# Patient Record
Sex: FEMALE | Race: BLACK OR AFRICAN AMERICAN | Employment: FULL TIME | URBAN - METROPOLITAN AREA
[De-identification: names, ages, dates, MRNs, and addresses within clinical notes are randomized per-mention and may not be internally consistent; named-entity substitution may affect disease eponyms.]

---

## 2017-01-12 ENCOUNTER — OFFICE VISIT (OUTPATIENT)
Dept: ORTHOPEDICS | Facility: CLINIC | Age: 37
End: 2017-01-12

## 2017-01-12 VITALS
SYSTOLIC BLOOD PRESSURE: 117 MMHG | DIASTOLIC BLOOD PRESSURE: 79 MMHG | WEIGHT: 241 LBS | HEIGHT: 69 IN | BODY MASS INDEX: 35.7 KG/M2

## 2017-01-12 DIAGNOSIS — M77.12 LATERAL EPICONDYLITIS OF LEFT ELBOW: ICD-10-CM

## 2017-01-12 DIAGNOSIS — G56.03 BILATERAL CARPAL TUNNEL SYNDROME: ICD-10-CM

## 2017-01-12 DIAGNOSIS — M67.40 GANGLION CYST: Primary | ICD-10-CM

## 2017-01-12 ASSESSMENT — ENCOUNTER SYMPTOMS
MYALGIAS: 1
JOINT SWELLING: 1
MUSCLE WEAKNESS: 0
BACK PAIN: 1
STIFFNESS: 1
ARTHRALGIAS: 1
MUSCLE CRAMPS: 1
NECK PAIN: 1

## 2017-01-12 NOTE — NURSING NOTE
Reason For Visit:   Chief Complaint   Patient presents with     Consult     Left wrist ganglion cyst        [facility]  Ref. MD: Dr. Parkinson    ?  No          Pain Assessment  Patient Currently in Pain: Yes  0-10 Pain Scale: 7  Primary Pain Location: Wrist  Pain Orientation: Left  Pain Descriptors: Aching  Alleviating Factors: NSAIDS (movement)  Aggravating Factors: Movement, Exercise    Current Outpatient Prescriptions   Medication     Acetaminophen (TYLENOL PO)     Prenatal Vit-Fe Fumarate-FA (PRENATAL MULTIVITAMIN  PLUS IRON) 27-0.8 MG TABS     VITAMIN D, CHOLECALCIFEROL, PO     No current facility-administered medications for this visit.          Allergies   Allergen Reactions     No Known Drug Allergies

## 2017-01-12 NOTE — PROGRESS NOTES
"SUBJECTIVE: Carrie Munoz is a 36 year old female who presents with ganglion on left hand.  Had drainage procedure attempted to ganglion on left dorsal hand.  Sometimes tingling but at times just pain.  , at 32 weeks.  Pt reports worsening numbness into fingers both hands.  Left elbow symptoms, lateral epicondylitis.      PAST MEDICAL, SOCIAL, SURGICAL AND FAMILY HISTORY: She  has a past medical history of Unspecified sinusitis (chronic); Breast lump on right side at 2:30  o'clock position (2011); Breast disorder; Mental disorder; and Depressive disorder.  She  has past surgical history that includes TOOTH EXTRACTION W/FORCEP ().  Her family history includes Hypertension in her father, paternal aunt, and paternal grandmother; Neurologic Disorder in her father and mother; OSTEOPOROSIS in her paternal grandmother.  She reports that she has never smoked. She does not have any smokeless tobacco history on file. She reports that she does not drink alcohol or use illicit drugs.      ALLERGIES: She is allergic to no known drug allergies.    CURRENT MEDICATIONS: She has a current medication list which includes the following prescription(s): acetaminophen, prenatal multivitamin  plus iron, and cholecalciferol.     REVIEW OF SYSTEMS: 10 point review of systems is negative except as noted above.    EXAM:  /79 mmHg  Ht 5' 9.33\" (1.761 m)  Wt 241 lb (109.317 kg)  BMI 35.25 kg/m2  CONSTITUTIONIAL: healthy, alert, no distress, cooperative and obese  HEAD: Normocephalic. No masses, lesions, tenderness or abnormalities  ENT: ENT exam normal, no neck nodes or sinus tenderness  SKIN: no suspicious lesions or rashes  GAIT: normal  Stance: normal  NEUROLOGIC: Normal muscle tone and strength, reflexes normal, sensation grossly normal.  PSYCHIATRIC: affect normal/bright and mentation appears normal.    MUSCULOSKELETAL: hand, left elbow, left ganglion cyst  Inspection:mild swelling  Tender: carpal tunnel, right " hand 4th finger, left hand 3rd and 4th finger- numbness  Non-tender: All Normal except above  Range of Motion All Normal  Strength: full strength  Special tests: + Phalen's, +Tinels    Inspection: no swelling, no ecchymosis  Tender: lateral epicondyle- left elbow  Non-tender: medial epicondyle, common flexor tendon and flexor muscle of forearm  Range of Motion: all normal  Strength: elbow strength full  Special tests: normal stability        ASSESSMENT/PLAN:  Pt is a 37 yo AA  at 32 weeks pregnant with PMHx of SI joint pain presenting with left ganglion cyst, left epicondylosis, bilateral carpal tunnel symptoms  1. Left ganglion cyst- had aspiration attempted, cyst is mildly decreased in size.  Pt could consider imaging after pregnancy, dealing with cyst for past 5 years, may increase in size after infant born, consider MRI  2. Left epicondylosis- forearm strengthening exercises, hand OT  3. Bilateral carpal tunnel symptoms- velcro braces, adjustment of work station, ice, hand OT  RTC 4-6 weeks  Encounter Diagnoses   Name Primary?     Ganglion cyst Yes     Bilateral carpal tunnel syndrome      Lateral epicondylitis of left elbow        X-RAY INTERPRETATION:   None- currently pregnant  Answers for HPI/ROS submitted by the patient on 2017   General Symptoms: No  Skin Symptoms: No  HENT Symptoms: No  EYE SYMPTOMS: No  HEART SYMPTOMS: No  LUNG SYMPTOMS: No  INTESTINAL SYMPTOMS: No  URINARY SYMPTOMS: No  GYNECOLOGIC SYMPTOMS: No  BREAST SYMPTOMS: No  SKELETAL SYMPTOMS: Yes  BLOOD SYMPTOMS: No  NERVOUS SYSTEM SYMPTOMS: No  MENTAL HEALTH SYMPTOMS: No  Back pain: Yes  Muscle aches: Yes  Neck pain: Yes  Swollen joints: Yes  Joint pain: Yes  Bone pain: No  Muscle cramps: Yes  Muscle weakness: No  Joint stiffness: Yes  Bone fracture: No

## 2017-01-12 NOTE — Clinical Note
"2017       RE: Carrie Munoz  [address]  [facility] [address]     Dear Colleague,    Thank you for referring your patient, Carrie Munoz, to the [facility] at [facility]. Please see a copy of my visit note below.    SUBJECTIVE: Carrie Munoz is a 36 year old female who presents with ganglion on left hand.  Had drainage procedure attempted to ganglion on left dorsal hand.  Sometimes tingling but at times just pain.  , at 32 weeks.  Pt reports worsening numbness into fingers both hands.  Left elbow symptoms, lateral epicondylitis.      PAST MEDICAL, SOCIAL, SURGICAL AND FAMILY HISTORY: She  has a past medical history of Unspecified sinusitis (chronic); Breast lump on right side at 2:30  o'clock position (2011); Breast disorder; Mental disorder; and Depressive disorder.  She  has past surgical history that includes TOOTH EXTRACTION W/FORCEP ().  Her family history includes Hypertension in her father, paternal aunt, and paternal grandmother; Neurologic Disorder in her father and mother; OSTEOPOROSIS in her paternal grandmother.  She reports that she has never smoked. She does not have any smokeless tobacco history on file. She reports that she does not drink alcohol or use illicit drugs.      ALLERGIES: She is allergic to no known drug allergies.    CURRENT MEDICATIONS: She has a current medication list which includes the following prescription(s): acetaminophen, prenatal multivitamin  plus iron, and cholecalciferol.     REVIEW OF SYSTEMS: 10 point review of systems is negative except as noted above.    EXAM:  /79 mmHg  Ht 5' 9.33\" (1.761 m)  Wt 241 lb (109.317 kg)  BMI 35.25 kg/m2  CONSTITUTIONIAL: healthy, alert, no distress, cooperative and obese  HEAD: Normocephalic. No masses, lesions, tenderness or abnormalities  ENT: ENT exam normal, no neck nodes or sinus tenderness  SKIN: no suspicious lesions or " rashes  GAIT: normal  Stance: normal  NEUROLOGIC: Normal muscle tone and strength, reflexes normal, sensation grossly normal.  PSYCHIATRIC: affect normal/bright and mentation appears normal.    MUSCULOSKELETAL: hand, left elbow, left ganglion cyst  Inspection:mild swelling  Tender: carpal tunnel, right hand 4th finger, left hand 3rd and 4th finger- numbness  Non-tender: All Normal except above  Range of Motion All Normal  Strength: full strength  Special tests: + Phalen's, +Tinels    Inspection: no swelling, no ecchymosis  Tender: lateral epicondyle- left elbow  Non-tender: medial epicondyle, common flexor tendon and flexor muscle of forearm  Range of Motion: all normal  Strength: elbow strength full  Special tests: normal stability        ASSESSMENT/PLAN:  Pt is a 37 yo AA  at 32 weeks pregnant with PMHx of SI joint pain presenting with left ganglion cyst, left epicondylosis, bilateral carpal tunnel symptoms  1. Left ganglion cyst- had aspiration attempted, cyst is mildly decreased in size.  Pt could consider imaging after pregnancy, dealing with cyst for past 5 years, may increase in size after infant born, consider MRI  2. Left epicondylosis- forearm strengthening exercises, hand OT  3. Bilateral carpal tunnel symptoms- velcro braces, adjustment of work station, ice, hand OT  RTC 4-6 weeks  Encounter Diagnoses   Name Primary?     Ganglion cyst Yes     Bilateral carpal tunnel syndrome      Lateral epicondylitis of left elbow        X-RAY INTERPRETATION:   None- currently pregnant  Answers for HPI/ROS submitted by the patient on 2017   General Symptoms: No  Skin Symptoms: No  HENT Symptoms: No  EYE SYMPTOMS: No  HEART SYMPTOMS: No  LUNG SYMPTOMS: No  INTESTINAL SYMPTOMS: No  URINARY SYMPTOMS: No  GYNECOLOGIC SYMPTOMS: No  BREAST SYMPTOMS: No  SKELETAL SYMPTOMS: Yes  BLOOD SYMPTOMS: No  NERVOUS SYSTEM SYMPTOMS: No  MENTAL HEALTH SYMPTOMS: No  Back pain: Yes  Muscle aches: Yes  Neck pain: Yes  Swollen  joints: Yes  Joint pain: Yes  Bone pain: No  Muscle cramps: Yes  Muscle weakness: No  Joint stiffness: Yes  Bone fracture: No          Again, thank you for allowing me to participate in the care of your patient.      Sincerely,    Ruth Jenkins MD

## 2017-01-30 ENCOUNTER — MEDICAL CORRESPONDENCE (OUTPATIENT)
Dept: HEALTH INFORMATION MANAGEMENT | Facility: CLINIC | Age: 37
End: 2017-01-30

## 2017-01-30 ENCOUNTER — TRANSFERRED RECORDS (OUTPATIENT)
Dept: HEALTH INFORMATION MANAGEMENT | Facility: CLINIC | Age: 37
End: 2017-01-30

## 2017-02-01 ENCOUNTER — ALLIED HEALTH/NURSE VISIT (OUTPATIENT)
Dept: CARDIOLOGY | Facility: CLINIC | Age: 37
End: 2017-02-01
Attending: INTERNAL MEDICINE
Payer: COMMERCIAL

## 2017-02-01 DIAGNOSIS — R00.0 TACHYCARDIA: Primary | ICD-10-CM

## 2017-02-01 DIAGNOSIS — R00.0 TACHYCARDIA: ICD-10-CM

## 2017-02-01 PROCEDURE — 93226 XTRNL ECG REC<48 HR SCAN A/R: CPT | Mod: ZF

## 2017-02-01 PROCEDURE — 93227 XTRNL ECG REC<48 HR R&I: CPT | Mod: ZP | Performed by: INTERNAL MEDICINE

## 2017-02-01 PROCEDURE — 93225 XTRNL ECG REC<48 HRS REC: CPT | Mod: ZF

## 2017-02-01 NOTE — NURSING NOTE
Per Dr. Azucena Garcia, patient to have 48 hr monitor placed.  Diagnosis: Tachycardia  Monitor placed: Yes  Patient Instructed: Yes  Patient verbalized understanding: Yes  Holter # [id number]  Card ID: [id number]      Placed by Yokasta Cheung MA

## 2017-02-07 ENCOUNTER — PRE VISIT (OUTPATIENT)
Dept: CARDIOLOGY | Facility: CLINIC | Age: 37
End: 2017-02-07

## 2017-02-07 DIAGNOSIS — R00.0 SINUS TACHYCARDIA: Primary | ICD-10-CM

## 2017-02-08 ENCOUNTER — OFFICE VISIT (OUTPATIENT)
Dept: CARDIOLOGY | Facility: CLINIC | Age: 37
End: 2017-02-08
Attending: INTERNAL MEDICINE
Payer: COMMERCIAL

## 2017-02-08 ENCOUNTER — HOSPITAL ENCOUNTER (OUTPATIENT)
Dept: CARDIOLOGY | Facility: CLINIC | Age: 37
Discharge: HOME | End: 2017-02-08
Attending: INTERNAL MEDICINE | Admitting: INTERNAL MEDICINE
Payer: COMMERCIAL

## 2017-02-08 VITALS
HEIGHT: 69 IN | OXYGEN SATURATION: 97 % | HEART RATE: 124 BPM | DIASTOLIC BLOOD PRESSURE: 69 MMHG | WEIGHT: 246.9 LBS | BODY MASS INDEX: 36.57 KG/M2 | SYSTOLIC BLOOD PRESSURE: 104 MMHG

## 2017-02-08 DIAGNOSIS — R60.0 EDEMA OF BOTH LEGS: Primary | ICD-10-CM

## 2017-02-08 DIAGNOSIS — R00.0 SINUS TACHYCARDIA: ICD-10-CM

## 2017-02-08 PROCEDURE — 93306 TTE W/DOPPLER COMPLETE: CPT

## 2017-02-08 PROCEDURE — 93005 ELECTROCARDIOGRAM TRACING: CPT | Mod: ZF

## 2017-02-08 PROCEDURE — 99203 OFFICE O/P NEW LOW 30 MIN: CPT | Mod: 25 | Performed by: INTERNAL MEDICINE

## 2017-02-08 PROCEDURE — 93010 ELECTROCARDIOGRAM REPORT: CPT | Mod: ZP | Performed by: INTERNAL MEDICINE

## 2017-02-08 PROCEDURE — 99212 OFFICE O/P EST SF 10 MIN: CPT | Mod: ZF

## 2017-02-08 PROCEDURE — 93306 TTE W/DOPPLER COMPLETE: CPT | Mod: 26 | Performed by: INTERNAL MEDICINE

## 2017-02-08 ASSESSMENT — PAIN SCALES - GENERAL: PAINLEVEL: NO PAIN (0)

## 2017-02-08 NOTE — LETTER
2/8/2017      RE: Carrie Munoz  [address]  [facility] [address]       Dear Colleague,    Thank you for the opportunity to participate in the care of your patient, Carrie Munoz, at the [facility] at [facility]. Please see a copy of my visit note below.    CARDIOLOGY CONSULTATION    Referring Provider:  Claudia Garcia  Primary Care Provider:   [facility]  Indication for Consultation:  Sinus tachycardia    HPI: Carrie Munoz is a 36 year old pregnant  female due to deliver in 2-4 weeks being seen today for evaluation of sinus tachycardia.  Her midwife was concerned that her heart rate was more elevated than typical even for pregnancy.  A 48 hour holter monitor showed sinus tachycardia with ventricular rates between 120 and 150s and a few couplets and triplets.  She feels her heart rate increase when she climbs stairs and when very fast she becomes mildly dyspneic but not at rest when in the 120s.  This has only been an issue in the past several weeks.  She had a normal heart rate prior to pregnancy.  She denies lightheadedness and syncope.    EKG performed in clinic today and personally reviewed shows sinus tachycardia with a ventricular rate of 121 bpm, normal axis, and no ischemic ST/Tw changes or pathologic Q waves.    She has no personal or 1st degree family history of heart failure or coronary artery disease.  She had an episode of significant chest tightness 6-8 weeks ago which came on suddenly and then subsided spontaneously after a few minutes.  It was followed by less severe episodes of chest tightness every 1-2 days that had no relationship to exertion and also resolved in a few minutes but she has not had any such episodes now for the past 3-4 weeks.  She has had bilateral leg swelling for the past few months which has not changed appreciably recently.  She denies hemoptysis.    She has no traditional risk factors  for cardiovascular disease: (-) HTN, (-) DM, (-) hypercholesterolemia, (-) tobacco use, (-) fam Hx premature CAD.    A complete review of systems is negative except as noted above in the HPI.    Past Medical History   Diagnosis Date     Unspecified sinusitis (chronic)      Breast lump on right side at 2:30  o'clock position 5/4/2011     Breast disorder      biopsy 2011(?) benign     Mental disorder      depression, anxiety     Depressive disorder      Past Surgical History   Procedure Laterality Date     Hc tooth extraction w/forcep  2003     all 4 together     Family History   Problem Relation Age of Onset     Hypertension Paternal Grandmother      Hypertension Paternal Aunt      OSTEOPOROSIS Paternal Grandmother      Neurologic Disorder Father      bell's palsy     Neurologic Disorder Mother      bell's palsy     Hypertension Father      not on medication, smoker   No premature CAD in 1st degree relatives.  An aunt had coronary stenting in her 50s.  Social History     Social History     Marital Status: Single     Spouse Name: N/A     Number of Children: 0     Years of Education: 13 1/2     Occupational History     teller [employer]     Social History Main Topics     Smoking status: Never Smoker      Smokeless tobacco: Not on file     Alcohol Use: No     Drug Use: No     Sexual Activity:     Partners: Male     Birth Control/ Protection: None     Other Topics Concern     Not on file     Social History Narrative    Risk Behaviors & Healthy habits    Balanced Diet  no     Prevent Osteoporosis  yes - lots of dairy    Regular Exercise  no     Dental Care  yes    Seat Belt use  yes    Self Breast Exam  no     Sexually Active  no , no STD concerns, but would like to be checked     Contraception  yes    Abuse  no     Guns  no     Sun Screen  no             Working at [employer], 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  Lives with her aunt, and gets along well.  In limited contact with her mother, in  "contact with father a lot.  Born in [place], and [place] 5, [place] 11.  Father in the .                       Current Outpatient Prescriptions on File Prior to Visit:  Acetaminophen (TYLENOL PO) Take 1,000 mg by mouth as needed    Prenatal Vit-Fe Fumarate-FA (PRENATAL MULTIVITAMIN  PLUS IRON) 27-0.8 MG TABS Take 1 tablet by mouth daily   VITAMIN D, CHOLECALCIFEROL, PO Take 3,000 Units by mouth daily     No current facility-administered medications on file prior to visit.     Allergies   Allergen Reactions     No Known Drug Allergies          Examination:  /69 mmHg  Pulse 124  Ht 1.761 m (5' 9.33\")  Wt 111.993 kg (246 lb 14.4 oz)  BMI 36.11 kg/m2  SpO2 97%  General appearance:  Well appearing, obese female with unlabored breathing at rest.  Psych:  Pleasant & cooperative.  Not visibly depressed or anxious.  Neuro:  Alert & oriented fully.  Eyes:  Anicteric sclerae.  Conjugate gaze.  Neck:  Supple.  JVP is not elevated.  CV:  Tachycardic but regular rhythm.  Normal S1 and S2 with no murmur or rub.  Warm extremities with mild 2+ edema in both lower legs.  Lungs are clear on ascultation.  Normal respiratory rate.  Abdomen is soft.  Not tender.  Skin:  Not jaundiced.  No petechiae/ecchymoses.    Labs, Imaging & Procedures were reviewed.  - No lipid panel in EPIC.  - TSH was normal in 2003.      Assessment & Plan  Ms Munoz is a 37 yo female in the third trimester of pregnancy who was referred by her obstetrics provider for sinus tachycardia.  On a holter monitor her heart rate varies between 120 and 150 bpm.  At least up until 12/22/2016 her heart rate was in the 80s-90s.  Although pregnancy itself causes sinus tachycardia, the patient's heart rate has been higher than usually observed and her complaints of chest tightness a few weeks ago prompted our obtaining an echocardiogram and bilateral lower extremity venous ultrasound to investigate for underlying structural heart disease or " evidence of venous thromboembolic disease.  She is not dyspneic or hypoxic or having any symptoms of a PE aside from the sinus tachycardia currently so we do not feel compelled to perform CT angiography of the chest, especially that she is pregnant.  Ddimer is unlikely to be helpful because it is elevated in pregnancy.    Follow up to be determine pending test results.  I discussed the patient with Dr. Francois Barone.    Kings Ruelas MD  Cardiovascular disease fellow    EP STAFF NOTE  Patient seen and examined and management plan personally reviewed with the patient. I agree with the note above.  Francois Barone MD [facility]  Cardiology - Electrophysiology

## 2017-02-08 NOTE — NURSING NOTE
Chief Complaint   Patient presents with     New Patient     Sinus tachy; 35 weeks pregnant. Review holter. EKG

## 2017-02-08 NOTE — PATIENT INSTRUCTIONS
Cardiology Provider you saw in clinic today: Dr. Barone    Labs/Tests needed:     1. Echocardiogram   2. Ultrasound of bilateral lower extremities     Follow-up Visit:  Based on results        You will receive all normal lab and testing results via [employer] or mail if not reviewed in clinic today. Please contact our office if you need assistance with setting up MyChart.    If you need a medication refill please contact your pharmacy. Please allow 3 business days for your refill to be completed.     As always, thank you for trusting us with your health care needs!    If you have any questions regarding your visit please contact your care team:   Cardiology  Telephone Number    Fabiana Guadarrama RN  Electrophysiology Nurse Coordinator [phone]     Call for EP procedure scheduling concerns  Molly Russ,   EP  [phone]           Device Clinic (Pacemakers, ICDs, Loop)   RN's : Ivy Kiser Connie, Dawn During business hours: [phone]    After business hours:   [phone]- select option 4 and ask for job code [phone].

## 2017-02-08 NOTE — PROGRESS NOTES
CARDIOLOGY CONSULTATION    Referring Provider:  Claudia Garcia  Primary Care Provider:   [facility]  Indication for Consultation:  Sinus tachycardia    HPI: Carrie Munoz is a 36 year old pregnant  female due to deliver in 2-4 weeks being seen today for evaluation of sinus tachycardia.  Her midwife was concerned that her heart rate was more elevated than typical even for pregnancy.  A 48 hour holter monitor showed sinus tachycardia with ventricular rates between 120 and 150s and a few couplets and triplets.  She feels her heart rate increase when she climbs stairs and when very fast she becomes mildly dyspneic but not at rest when in the 120s.  This has only been an issue in the past several weeks.  She had a normal heart rate prior to pregnancy.  She denies lightheadedness and syncope.    EKG performed in clinic today and personally reviewed shows sinus tachycardia with a ventricular rate of 121 bpm, normal axis, and no ischemic ST/Tw changes or pathologic Q waves.    She has no personal or 1st degree family history of heart failure or coronary artery disease.  She had an episode of significant chest tightness 6-8 weeks ago which came on suddenly and then subsided spontaneously after a few minutes.  It was followed by less severe episodes of chest tightness every 1-2 days that had no relationship to exertion and also resolved in a few minutes but she has not had any such episodes now for the past 3-4 weeks.  She has had bilateral leg swelling for the past few months which has not changed appreciably recently.  She denies hemoptysis.    She has no traditional risk factors for cardiovascular disease: (-) HTN, (-) DM, (-) hypercholesterolemia, (-) tobacco use, (-) fam Hx premature CAD.    A complete review of systems is negative except as noted above in the HPI.    Past Medical History   Diagnosis Date     Unspecified sinusitis (chronic)      Breast lump on right side at 2:30  o'clock position  5/4/2011     Breast disorder      biopsy 2011(?) benign     Mental disorder      depression, anxiety     Depressive disorder      Past Surgical History   Procedure Laterality Date     Hc tooth extraction w/forcep  2003     all 4 together     Family History   Problem Relation Age of Onset     Hypertension Paternal Grandmother      Hypertension Paternal Aunt      OSTEOPOROSIS Paternal Grandmother      Neurologic Disorder Father      bell's palsy     Neurologic Disorder Mother      bell's palsy     Hypertension Father      not on medication, smoker   No premature CAD in 1st degree relatives.  An aunt had coronary stenting in her 50s.  Social History     Social History     Marital Status: Single     Spouse Name: N/A     Number of Children: 0     Years of Education: 13 1/2     Occupational History     teller [employer]     Social History Main Topics     Smoking status: Never Smoker      Smokeless tobacco: Not on file     Alcohol Use: No     Drug Use: No     Sexual Activity:     Partners: Male     Birth Control/ Protection: None     Other Topics Concern     Not on file     Social History Narrative    Risk Behaviors & Healthy habits    Balanced Diet  no     Prevent Osteoporosis  yes - lots of dairy    Regular Exercise  no     Dental Care  yes    Seat Belt use  yes    Self Breast Exam  no     Sexually Active  no , no STD concerns, but would like to be checked     Contraception  yes    Abuse  no     Guns  no     Sun Screen  no             Working at [employer], 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  Lives with her aunt, and gets along well.  In limited contact with her mother, in contact with father a lot.  Born in [place], and [place] 5, [place] at [place] 11.  Father in the .                       Current Outpatient Prescriptions on File Prior to Visit:  Acetaminophen (TYLENOL PO) Take 1,000 mg by mouth as needed    Prenatal Vit-Fe Fumarate-FA (PRENATAL MULTIVITAMIN  PLUS IRON) 27-0.8 MG TABS  "Take 1 tablet by mouth daily   VITAMIN D, CHOLECALCIFEROL, PO Take 3,000 Units by mouth daily     No current facility-administered medications on file prior to visit.     Allergies   Allergen Reactions     No Known Drug Allergies          Examination:  /69 mmHg  Pulse 124  Ht 1.761 m (5' 9.33\")  Wt 111.993 kg (246 lb 14.4 oz)  BMI 36.11 kg/m2  SpO2 97%  General appearance:  Well appearing, obese female with unlabored breathing at rest.  Psych:  Pleasant & cooperative.  Not visibly depressed or anxious.  Neuro:  Alert & oriented fully.  Eyes:  Anicteric sclerae.  Conjugate gaze.  Neck:  Supple.  JVP is not elevated.  CV:  Tachycardic but regular rhythm.  Normal S1 and S2 with no murmur or rub.  Warm extremities with mild 2+ edema in both lower legs.  Lungs are clear on ascultation.  Normal respiratory rate.  Abdomen is soft.  Not tender.  Skin:  Not jaundiced.  No petechiae/ecchymoses.    Labs, Imaging & Procedures were reviewed.  - No lipid panel in EPIC.  - TSH was normal in 2003.      Assessment & Plan  Ms Munoz is a 37 yo female in the third trimester of pregnancy who was referred by her obstetrics provider for sinus tachycardia.  On a holter monitor her heart rate varies between 120 and 150 bpm.  At least up until 12/22/2016 her heart rate was in the 80s-90s.  Although pregnancy itself causes sinus tachycardia, the patient's heart rate has been higher than usually observed and her complaints of chest tightness a few weeks ago prompted our obtaining an echocardiogram and bilateral lower extremity venous ultrasound to investigate for underlying structural heart disease or evidence of venous thromboembolic disease.  She is not dyspneic or hypoxic or having any symptoms of a PE aside from the sinus tachycardia currently so we do not feel compelled to perform CT angiography of the chest, especially that she is pregnant.  Ddimer is unlikely to be helpful because it is elevated in pregnancy.    Follow up " to be determine pending test results.  I discussed the patient with Dr. Francois Barone.    Kings Ruelas MD  Cardiovascular disease fellow    EP STAFF NOTE  Patient seen and examined and management plan personally reviewed with the patient. I agree with the note above.  Francois Barone MD [facility]  Cardiology - Electrophysiology

## 2017-02-09 LAB
GROUP B STREP PCR: POSITIVE
INTERPRETATION ECG - MUSE: NORMAL

## 2017-02-13 ENCOUNTER — CARE COORDINATION (OUTPATIENT)
Dept: CARDIOLOGY | Facility: CLINIC | Age: 37
End: 2017-02-13

## 2017-02-13 NOTE — PROGRESS NOTES
Patient updated to results of ECHO and US bilateral lower extremities. All testing was within normal range. Patient stated understanding of results.

## 2017-02-23 ENCOUNTER — DOCUMENTATION ONLY (OUTPATIENT)
Dept: OBGYN | Facility: CLINIC | Age: 37
End: 2017-02-23

## 2017-02-24 PROBLEM — I49.9 IRREGULAR HEART BEAT: Status: ACTIVE | Noted: 2017-02-24

## 2017-02-26 ENCOUNTER — HOSPITAL ENCOUNTER (EMERGENCY)
Facility: CLINIC | Age: 37
Discharge: STILL ADMITTED OR ADMINISTRATIVE | End: 2017-02-27
Attending: EMERGENCY MEDICINE | Admitting: EMERGENCY MEDICINE
Payer: COMMERCIAL

## 2017-02-26 DIAGNOSIS — Z3A.38 38 WEEKS GESTATION OF PREGNANCY: ICD-10-CM

## 2017-02-26 DIAGNOSIS — R10.2 FEELING PELVIC PRESSURE IN PREGNANCY IN THIRD TRIMESTER, ANTEPARTUM: ICD-10-CM

## 2017-02-26 DIAGNOSIS — R07.89 CHEST TIGHTNESS: ICD-10-CM

## 2017-02-26 DIAGNOSIS — O26.893 FEELING PELVIC PRESSURE IN PREGNANCY IN THIRD TRIMESTER, ANTEPARTUM: ICD-10-CM

## 2017-02-26 DIAGNOSIS — R07.9 ACUTE CHEST PAIN: ICD-10-CM

## 2017-02-26 PROCEDURE — 99284 EMERGENCY DEPT VISIT MOD MDM: CPT | Mod: 25

## 2017-02-26 PROCEDURE — 36415 COLL VENOUS BLD VENIPUNCTURE: CPT

## 2017-02-26 PROCEDURE — 93005 ELECTROCARDIOGRAM TRACING: CPT

## 2017-02-26 PROCEDURE — 93010 ELECTROCARDIOGRAM REPORT: CPT | Mod: Z6 | Performed by: EMERGENCY MEDICINE

## 2017-02-26 PROCEDURE — 99284 EMERGENCY DEPT VISIT MOD MDM: CPT | Mod: 25 | Performed by: EMERGENCY MEDICINE

## 2017-02-26 NOTE — ED AVS SNAPSHOT
[facility], Emergency Department    [address]    [place] [address]    Phone:  [phone]    Fax:  [phone]                                       Carrie Munoz   MRN: [id number]    Department:  [facility], Emergency Department   Date of Visit:  2/26/2017           Patient Information     Date Of Birth          1980        Your diagnoses for this visit were:     Acute chest pain        You were seen by Pardeep Orozco MD.        Discharge Instructions       Please return if any worsening or recurrent symptoms.  Please go back to OB for evaluation tonight.            24 Hour Appointment Hotline       To make an appointment at any [facility] clinic, call [phone] ([phone]). If you don't have a family doctor or clinic, we will help you find one. [facility] clinics are conveniently located to serve the needs of you and your family.             Review of your medicines      Our records show that you are taking the medicines listed below. If these are incorrect, please call your family doctor or clinic.        Dose / Directions Last dose taken    prenatal multivitamin  plus iron 27-0.8 MG Tabs per tablet   Dose:  1 tablet        Take 1 tablet by mouth daily   Refills:  0        TYLENOL PO   Dose:  1000 mg        Take 1,000 mg by mouth as needed   Refills:  0        VITAMIN D (CHOLECALCIFEROL) PO   Dose:  3000 Units        Take 3,000 Units by mouth daily   Refills:  0                Procedures and tests performed during your visit     Basic metabolic panel    CBC with platelets differential    EKG 12 lead    Troponin I      Orders Needing Specimen Collection     None      Pending Results     Date and Time Order Name Status Description    2/26/2017 2343 EKG 12 lead Preliminary             Pending Culture Results     No orders found for last 3 day(s).            Thank you for choosing [facility]       Thank you for choosing [facility] for your care. Our goal is always to provide  "you with excellent care. Hearing back from our patients is one way we can continue to improve our services. Please take a few minutes to complete the written survey that you may receive in the mail after you visit with us. Thank you!        [email or web address] Information     [email or web address] lets you send messages to your doctor, view your test results, renew your prescriptions, schedule appointments and more. To sign up, go to [email or web address] . Click on \"Log in\" on the left side of the screen, which will take you to the Welcome page. Then click on \"Sign up Now\" on the right side of the page.     You will be asked to enter the access code listed below, as well as some personal information. Please follow the directions to create your username and password.     Your access code is: [email or web address]  Expires: 2017  1:45 AM     Your access code will  in 90 days. If you need help or a new code, please call your [facility] clinic or [phone].        Care EveryWhere ID     This is your Care EveryWhere ID. This could be used by other organizations to access your [facility] medical records  [id number]        After Visit Summary       This is your record. Keep this with you and show to your community pharmacist(s) and doctor(s) at your next visit.                  "

## 2017-02-26 NOTE — ED AVS SNAPSHOT
[facility], [place], Emergency Department    [address]    [place] [address]    Phone:  [phone]    Fax:  [phone]                                       Carrie Munoz   MRN: [id number]    Department:  [facility], Emergency Department   Date of Visit:  2/26/2017           After Visit Summary Signature Page     I have received my discharge instructions, and my questions have been answered. I have discussed any challenges I see with this plan with the nurse or doctor.    ..........................................................................................................................................  Patient/Patient Representative Signature      ..........................................................................................................................................  Patient Representative Print Name and Relationship to Patient    ..................................................               ................................................  Date                                            Time    ..........................................................................................................................................  Reviewed by Signature/Title    ...................................................              ..............................................  Date                                                            Time

## 2017-02-27 ENCOUNTER — HOSPITAL ENCOUNTER (OUTPATIENT)
Facility: CLINIC | Age: 37
Discharge: HOME | End: 2017-02-27
Attending: ADVANCED PRACTICE MIDWIFE | Admitting: ADVANCED PRACTICE MIDWIFE
Payer: COMMERCIAL

## 2017-02-27 VITALS
RESPIRATION RATE: 24 BRPM | SYSTOLIC BLOOD PRESSURE: 110 MMHG | HEART RATE: 112 BPM | TEMPERATURE: 97.8 F | WEIGHT: 253 LBS | DIASTOLIC BLOOD PRESSURE: 75 MMHG | OXYGEN SATURATION: 98 % | HEIGHT: 69 IN | BODY MASS INDEX: 37.47 KG/M2

## 2017-02-27 VITALS — SYSTOLIC BLOOD PRESSURE: 116 MMHG | DIASTOLIC BLOOD PRESSURE: 68 MMHG | TEMPERATURE: 98.2 F | RESPIRATION RATE: 20 BRPM

## 2017-02-27 LAB
ANION GAP SERPL CALCULATED.3IONS-SCNC: 9 MMOL/L (ref 3–14)
BASOPHILS # BLD AUTO: 0 10E9/L (ref 0–0.2)
BASOPHILS NFR BLD AUTO: 0.1 %
BUN SERPL-MCNC: 5 MG/DL (ref 7–30)
CALCIUM SERPL-MCNC: 8.6 MG/DL (ref 8.5–10.1)
CHLORIDE SERPL-SCNC: 109 MMOL/L (ref 94–109)
CO2 SERPL-SCNC: 23 MMOL/L (ref 20–32)
CREAT SERPL-MCNC: 0.42 MG/DL (ref 0.52–1.04)
DIFFERENTIAL METHOD BLD: NORMAL
EOSINOPHIL # BLD AUTO: 0 10E9/L (ref 0–0.7)
EOSINOPHIL NFR BLD AUTO: 0.6 %
ERYTHROCYTE [DISTWIDTH] IN BLOOD BY AUTOMATED COUNT: 13.1 % (ref 10–15)
GFR SERPL CREATININE-BSD FRML MDRD: ABNORMAL ML/MIN/1.7M2
GLUCOSE SERPL-MCNC: 111 MG/DL (ref 70–99)
HCT VFR BLD AUTO: 35.2 % (ref 35–47)
HGB BLD-MCNC: 11.7 G/DL (ref 11.7–15.7)
IMM GRANULOCYTES # BLD: 0 10E9/L (ref 0–0.4)
IMM GRANULOCYTES NFR BLD: 0.6 %
INTERPRETATION ECG - MUSE: NORMAL
LYMPHOCYTES # BLD AUTO: 2 10E9/L (ref 0.8–5.3)
LYMPHOCYTES NFR BLD AUTO: 28.3 %
MCH RBC QN AUTO: 30 PG (ref 26.5–33)
MCHC RBC AUTO-ENTMCNC: 33.2 G/DL (ref 31.5–36.5)
MCV RBC AUTO: 90 FL (ref 78–100)
MONOCYTES # BLD AUTO: 0.7 10E9/L (ref 0–1.3)
MONOCYTES NFR BLD AUTO: 10.2 %
NEUTROPHILS # BLD AUTO: 4.2 10E9/L (ref 1.6–8.3)
NEUTROPHILS NFR BLD AUTO: 60.2 %
NRBC # BLD AUTO: 0 10*3/UL
NRBC BLD AUTO-RTO: 0 /100
PLATELET # BLD AUTO: 166 10E9/L (ref 150–450)
POTASSIUM SERPL-SCNC: 3.6 MMOL/L (ref 3.4–5.3)
RBC # BLD AUTO: 3.9 10E12/L (ref 3.8–5.2)
SODIUM SERPL-SCNC: 141 MMOL/L (ref 133–144)
TROPONIN I SERPL-MCNC: NORMAL UG/L (ref 0–0.04)
WBC # BLD AUTO: 6.9 10E9/L (ref 4–11)

## 2017-02-27 PROCEDURE — 80048 BASIC METABOLIC PNL TOTAL CA: CPT | Performed by: EMERGENCY MEDICINE

## 2017-02-27 PROCEDURE — 99213 OFFICE O/P EST LOW 20 MIN: CPT

## 2017-02-27 PROCEDURE — 85025 COMPLETE CBC W/AUTO DIFF WBC: CPT | Performed by: EMERGENCY MEDICINE

## 2017-02-27 PROCEDURE — 84484 ASSAY OF TROPONIN QUANT: CPT | Performed by: EMERGENCY MEDICINE

## 2017-02-27 PROCEDURE — 99214 OFFICE O/P EST MOD 30 MIN: CPT

## 2017-02-27 PROCEDURE — 25000132 ZZH RX MED GY IP 250 OP 250 PS 637: Performed by: ADVANCED PRACTICE MIDWIFE

## 2017-02-27 RX ORDER — HYDROXYZINE HYDROCHLORIDE 50 MG/1
100 TABLET, FILM COATED ORAL ONCE
Status: COMPLETED | OUTPATIENT
Start: 2017-02-27 | End: 2017-02-27

## 2017-02-27 RX ADMIN — HYDROXYZINE HYDROCHLORIDE 100 MG: 50 TABLET, FILM COATED ORAL at 03:02

## 2017-02-27 NOTE — ED PROVIDER NOTES
[facility] [place] EMERGENCY DEPARTMENT ([facility])    February 27, 2017    ED 7   History     Chief Complaint   Patient presents with     Chest Pain     Pt is c/o chest tightness. Hx of chest tightness and pregnant. Pt has seen a cardiologist regarding the tightness.     Confirmation Of Pregnancy     Pt is 38 weeks pregnant. Pt is uncomfortable in her abdomen.      The history is provided by the patient and medical records.     Carrie Munoz is a 36 year old pregnant female who presents with chest pain and chest tightness. She is 38 weeks gestation. Her pregnancy has been complicated by sinus tachycardia, which was evaluated 2 weeks ago by Cardiology with 48 hour Holter monitor, EKG, bilateral lower extremity dopplers, and echocardiogram.  The EKG was read as sinus tachycardia with a ventricular rate of 121 bpm, normal axis, and no ischemic ST/Tw changes or pathologic Q waves. Echo showed:    Global and regional left ventricular function is hyperkinetic with an EF of  65-70%.  The right ventricle is hyperdynamic.  The IVC is upper limit of normal with reduced respiratory variability.  Estimated mean right atrial pressure is 3-8 mmHg.  No pericardial effusion is present.    Patient developed sudden onset of chest tightness and shortness of breath today. She contacted her midwife who instructed her to come to the ER for evaluation. She states her leg swelling is symmetric and unchanged. No orthopnea.  No fever or chills.     PAST MEDICAL HISTORY:   Past Medical History   Diagnosis Date     Breast disorder      biopsy 2011(?) benign     Breast lump on right side at 2:30  o'clock position 5/4/2011     Depressive disorder      Mental disorder      depression, anxiety     Unspecified sinusitis (chronic)        PAST SURGICAL HISTORY:   Past Surgical History   Procedure Laterality Date     Hc tooth extraction w/forcep  2003     all 4 together       FAMILY HISTORY:   Family History   Problem Relation Age of Onset      "Hypertension Paternal Grandmother      Hypertension Paternal Aunt      OSTEOPOROSIS Paternal Grandmother      Neurologic Disorder Father      bell's palsy     Neurologic Disorder Mother      bell's palsy     Hypertension Father      not on medication, smoker       SOCIAL HISTORY:   Social History   Substance Use Topics     Smoking status: Never Smoker     Smokeless tobacco: Not on file     Alcohol use No       Discharge Medication List as of 2/27/2017  1:49 AM      CONTINUE these medications which have NOT CHANGED    Details   Acetaminophen (TYLENOL PO) Take 1,000 mg by mouth as needed , Historical      Prenatal Vit-Fe Fumarate-FA (PRENATAL MULTIVITAMIN  PLUS IRON) 27-0.8 MG TABS Take 1 tablet by mouth daily, Historical      VITAMIN D, CHOLECALCIFEROL, PO Take 3,000 Units by mouth daily, Historical                Allergies   Allergen Reactions     No Known Drug Allergies        I have reviewed the Medications, Allergies, Past Medical and Surgical History, and Social History in the Epic system.    Review of Systems   10 pt ROS completed and negative except as noted above in HPI      Physical Exam   BP: 122/74  Pulse: 112  Temp: 97.8  F (36.6  C)  Resp: 18  Height: 175.3 cm (5' 9\")  Weight: 114.8 kg (253 lb)  SpO2: 97 %  Physical Exam   Constitutional: She is oriented to person, place, and time. No distress.   HENT:   Head: Normocephalic and atraumatic.   Right Ear: External ear normal.   Left Ear: External ear normal.   Mouth/Throat: No oropharyngeal exudate.   Eyes: Conjunctivae are normal. Pupils are equal, round, and reactive to light. Right eye exhibits no discharge. Left eye exhibits no discharge.   Neck: Normal range of motion. Neck supple.   Cardiovascular: Normal rate and intact distal pulses.    No murmur heard.  Pulmonary/Chest: Effort normal. No respiratory distress. She has no wheezes. She has no rales. She exhibits no tenderness.   Abdominal: Soft. There is no tenderness. There is no rebound and no " guarding.   Musculoskeletal: Normal range of motion. She exhibits no edema or tenderness.   Neurological: She is alert and oriented to person, place, and time.   Skin: Skin is warm and dry. She is not diaphoretic.   Psychiatric: She has a normal mood and affect. Her behavior is normal. Thought content normal.   Nursing note and vitals reviewed.      ED Course     ED Course     Procedures             EKG Interpretation:      Interpreted by Pardeep Orozco  Time reviewed: 000  Symptoms at time of EKG: palpiatations   Rhythm: ST  Rate: 103  Axis: normal  Ectopy: none  Conduction: normal  ST Segments/ T Waves: No ST-T wave changes  Q Waves: none  Comparison to prior: Unchanged    Clinical Impression: normal EKG          Critical Care time:  none               Labs Ordered and Resulted from Time of ED Arrival Up to the Time of Departure from the ED - No data to display    Assessments & Plan (with Medical Decision Making)   1.  Chest Pain    37 yo F at 38 weeks gestation who presents with chest tightness.  She has been seen by cardiology and her work up has been unremakable.  Her recent echo showed no signs of right heart strain and she has had similar symptoms episodically for the past several months.  She also had negative lower extremity US showing no signs of DVT.  Her symptoms seem unlikely related to a PE.   Her EKG is sinus tachycardia with no ischemic changes and troponin negative. She felt improved in the ER.  She was discharged for OB evaluation and told to return if any worsening symptoms.  Follow up with cardiology as scheduled.    I have reviewed the nursing notes.    I have reviewed the findings, diagnosis, plan and need for follow up with the patient.    New Prescriptions    No medications on file       Final diagnoses:   None       2/26/2017   [facility], [place], EMERGENCY DEPARTMENT     Pardeep Orozco MD  03/06/17 9688

## 2017-02-27 NOTE — DISCHARGE INSTRUCTIONS
Please return if any worsening or recurrent symptoms.  Please go back to OB for evaluation tonight.

## 2017-02-27 NOTE — PROGRESS NOTES
HOSPITAL TRIAGE NOTE  ===================    CHIEF COMPLAINT  ========================  Carrie Munoz is a 36 year old patient presenting today at 38w5d for evaluation.    Estimated Date of Delivery: Mar 8, 2017     HPI  ==================   Patient sent up for evaluation following ED visit for c/o shortness of breath and chest tightness. Spoke with patient on telephone previously and recommended pt present to ED for evaluation. Pt reported no obstetrical complaints. ED then notified of patient's imminent arrival. Following discharge for ED in stable condition, pt arrived to . ED report stated pt needed evaluation for rupture of membranes. Upon discussion with patient, pt denies any concern for ROM. Reports normal vaginal discharge, loss of mucous plug last week. Denies vaginal bleeding or leakage of fluid. Reports occasional irregular cramping, denies regular contractions. Reports +fetal movement. Would like SVE.     Reports resolution of previous symptoms of chest tightness and shortness of breath.    Prenatal record and labs reviewed from [facility], through printed records.    CONTRACTIONS: irreg and cramping  ABDOMINAL PAIN: none  FETAL MOVEMENT: active    VAGINAL BLEEDING: none  RUPTURE OF MEMBRANES: no  PELVIC PAIN: none    REVIEW OF SYSTEMS  =====================  C: NEGATIVE for fever, chills  I: NEGATIVE for worrisome rashes, moles or lesions  E: NEGATIVE for vision changes or irritation  R: NEGATIVE for significant cough or SOB  CV: NEGATIVE for chest pain, palpitations or varicosities  GI: NEGATIVE for nausea, abdominal pain, heartburn, or change in bowel habits  : NEGATIVE for frequency, dysuria, or hematuria  M: NEGATIVE for significant arthralgias or myalgia  N: NEGATIVE for headache, weakness, dizziness or paresthesias  P: NEGATIVE for changes in mood or affect    PROBLEM LIST  ===============  Patient Active Problem List    Diagnosis Date Noted     Irregular heart beat 2017     Priority:  "Medium     2/24/2017 - 2/24/2017 - Per cardiology \"indirect tests to rule out PE and DVT, all normal. I did not see anything unusual on her cardiac echo. NO special precautions besides the usual from cardiac standpoint. If she develops any sudden worsening of her shortness of breath, I would have a low threshold to do CT scan and rule out PE. I did not have a high enough suspicion when I saw her to pursue a CT scan\".        Sinus tachycardia 02/07/2017     Priority: Medium     GBS (group B Streptococcus carrier), +RV culture, currently pregnant 12/10/2016     Priority: Medium     12/9/16- POS GBS at 28 wks from PTL eval.no 36 wk screen needed.       Pelvic pain affecting pregnancy 12/09/2016     Priority: Medium     Encounter for triage in pregnant patient 11/11/2016     Priority: Medium     Labor and delivery, indication for care 11/11/2016     Priority: Medium     Sacroiliac joint pain 10/31/2016     Priority: Medium     Encounter for supervision of normal first pregnancy in first trimester 07/14/2016     Priority: Medium     8/23/16- first trimester screen wnl, NT wnl, nasal bone could not be evaluated_____  Needs AFP only at 15 wks______  10/21/16- MFM US wnl         Breast lump on right side at 2:30  o'clock position 05/04/2011     Priority: Medium     Sinusitis, chronic 10/22/2003     Priority: Medium     Problem list name updated by automated process. Provider to review         HISTORIES  ==============  ALLERGIES:      Allergies   Allergen Reactions     No Known Drug Allergies      PAST MEDICAL HISTORY  Past Medical History   Diagnosis Date     Breast disorder      biopsy 2011(?) benign     Breast lump on right side at 2:30  o'clock position 5/4/2011     Depressive disorder      Mental disorder      depression, anxiety     Unspecified sinusitis (chronic)      SOCIAL HISTORY  Social History     Social History     Marital status: Single     Spouse name: N/A     Number of children: 0     Years of education: 13 " 1/2     Occupational History     teller [employer]     Social History Main Topics     Smoking status: Never Smoker     Smokeless tobacco: Not on file     Alcohol use No     Drug use: No     Sexual activity: Yes     Partners: Male     Birth control/ protection: None     Other Topics Concern     Not on file     Social History Narrative    Risk Behaviors & Healthy habits    Balanced Diet  no     Prevent Osteoporosis  yes - lots of dairy    Regular Exercise  no     Dental Care  yes    Seat Belt use  yes    Self Breast Exam  no     Sexually Active  no , no STD concerns, but would like to be checked     Contraception  yes    Abuse  no     Guns  no     Sun Screen  no             Working at [employer], 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  Lives with her aunt, and gets along well.  In limited contact with her mother, in contact with father a lot.  Born in [place], and [place] 5[place] 11.  Father in the .                     PARTNER: present     FAMILY HISTORY  Family History   Problem Relation Age of Onset     Hypertension Paternal Grandmother      Hypertension Paternal Aunt      OSTEOPOROSIS Paternal Grandmother      Neurologic Disorder Father      bell's palsy     Neurologic Disorder Mother      bell's palsy     Hypertension Father      not on medication, smoker     OB HISTORY  Obstetric History       T0      TAB0   SAB0   E0   M0   L0       # Outcome Date GA Lbr Alexei/2nd Weight Sex Delivery Anes PTL Lv   1 Current                 Prenatal Labs:   Lab Results   Component Value Date    ABO O 2016    RH Pos 2016    HEPBANG negative 2016    HGB 11.7 2017    HIV Negative 10/22/2003     Rubella- Immune    EXAM  ============  /68  Temp 98.2  F (36.8  C) (Oral)  Resp 20  GENERAL APPEARANCE: healthy, alert and no distress  RESP: lungs clear to auscultation - no rales, rhonchi or wheezes  BREAST: normal without masses, tenderness or nipple  discharge and no palpable axillary masses or adenopathy  CV: regular rates and rhythm, normal S1 S2, no S3 or S4 and no murmur,and no varicosities  ABDOMEN:  soft, nontender, no epigastric pain  SKIN: no suspicious lesions or rashes  NEURO: Denies headache, blurred vision, other vision changes  PSYCH: mentation appears normal. and affect normal/bright  MS/ LEGS: Reflexes normal bilaterally    CONTRACTIONS: none   FETAL HEART TONES: continuous EFM- baseline 140 with moderate variability and positive accelerations. No decelerations.  NST: NOT DONE    PELVIC EXAM: closed/long/high, posterior/firm  PRESENTATION: VERTEX  BLOOD: no  DISCHARGE: none    ROM: no  AMNISURE: not done    DIAGNOSIS  ============  38w5d sent to  from ED for evaluation.  Not in labor.  Fetal Heart rate tracing:category one  Discharged in stable condition from ED. Symptoms resolved.    PLAN  ============  100mg vistaril given prior to discharge for sleep.   Reviewed labor precautions, fetal movement counts and s/s to return to triage.  Emphasized importance of returning to ED immediately for recurrence of symptoms.  Continue scheduled prenatal care, Return to [facility] clinic for MALA appt on Thursday.  Discharged home in stable condition.    YASMEEN Esquivel, MARIEL

## 2017-02-27 NOTE — IP AVS SNAPSHOT
[id number]    [address] [facility] [place] [address]    Phone:  [phone]                                       After Visit Summary   2/27/2017    Carrie Munoz    MRN: [id number]           After Visit Summary Signature Page     I have received my discharge instructions, and my questions have been answered. I have discussed any challenges I see with this plan with the nurse or doctor.    ..........................................................................................................................................  Patient/Patient Representative Signature      ..........................................................................................................................................  Patient Representative Print Name and Relationship to Patient    ..................................................               ................................................  Date                                            Time    ..........................................................................................................................................  Reviewed by Signature/Title    ...................................................              ..............................................  Date                                                            Time

## 2017-02-27 NOTE — PLAN OF CARE
Data: [facility] at 0204.   Reason for maternal/fetal assessment per patient is Rule Out Labor  . Patient is a . Prenatal record reviewed.      Obstetric History       T0      TAB0   SAB0   E0   M0   L0       # Outcome Date GA Lbr Alexei/2nd Weight Sex Delivery Anes PTL Lv   1 Current                  Medical History:   Past Medical History   Diagnosis Date     Breast disorder      biopsy (?) benign     Breast lump on right side at 2:30  o'clock position 2011     Depressive disorder      Mental disorder      depression, anxiety     Unspecified sinusitis (chronic)    . Gestational Age 38w5d. VSS. Cervix: closed.  Fetal movement present. Patient denies cramping, backache, vaginal discharge, pelvic pressure, UTI symptoms, GI problems, bloody show, vaginal bleeding, edema, headache, visual disturbances, epigastric or URQ pain, abdominal pain, rupture of membranes. Support persons Isacc present.  Action: Verbal consent for EFM. Triage assessment completed. EFM applied. Fetal assessment: Presumed adequate fetal oxygenation documented (see flow record). Patient education pamphlets given on fetal movement counts. Patient instructed to report change in fetal movement, vaginal leaking of fluid or bleeding, abdominal pain, or any concerns related to the pregnancy to her nurse/physician.   Response: Nikita Barnes CNM, informed of arrival and complaints. Plan per provider is discharge home with vistaril. Patient verbalized understanding of education and verbalized agreement with plan. Discharged ambulatory at 0305.

## 2017-02-27 NOTE — DISCHARGE INSTRUCTIONS
Discharge Instruction for Undelivered Patients      You were seen for: Labor Assessment  We Consulted: Nikita Barnes CNM  You had (Test or Medicine):Fetal monitoring, cervical exam     Diet:   Drink 8 to 12 glasses of liquids (milk, juice, water) every day.  You may eat meals and snacks.     Activity:  Count fetal kicks everyday (see handout)     Call your provider if you notice:  Swelling in your face or increased swelling in your hands or legs.  Headaches that are not relieved by Tylenol (acetaminophen).  Changes in your vision (blurring: seeing spots or stars.)  Nausea (sick to your stomach) and vomiting (throwing up).   Weight gain of 5 pounds or more per week.  Heartburn that doesn't go away.  Signs of bladder infection: pain when you urinate (use the toilet), need to go more often and more urgently.  The bag of almonte (rupture of membranes) breaks, or you notice leaking in your underwear.  Bright red blood in your underwear.  Abdominal (lower belly) or stomach pain.  For first baby: Contractions (tightening) less than 5 minutes apart for one hour or more.  Increase or change in vaginal discharge (note the color and amount)      Follow-up:  As scheduled in the clinic

## 2017-02-27 NOTE — IP AVS SNAPSHOT
MRN:[id number]                      After Visit Summary   2/27/2017    Carrie Munoz    MRN: [id number]           Thank you!     Thank you for choosing [facility] for your care. Our goal is always to provide you with excellent care. Hearing back from our patients is one way we can continue to improve our services. Please take a few minutes to complete the written survey that you may receive in the mail after you visit with us. Thank you!        Patient Information     Date Of Birth          1980        About your hospital stay     You were admitted on:  February 27, 2017 You last received care in the:  [facility]    You were discharged on:  February 27, 2017       Who to Call     For medical emergencies, please call 911.  For non-urgent questions about your medical care, please call your primary care provider or clinic, [phone]          Attending Provider     Provider Nikita Garcia CNM Midwives       Primary Care Provider Office Phone # Fax #    Clinic [facility] [phone]       [address] [facility] [address]        Further instructions from your care team       Discharge Instruction for Undelivered Patients      You were seen for: Labor Assessment  We Consulted: Nikita Barnes CNM  You had (Test or Medicine):Fetal monitoring, cervical exam     Diet:   Drink 8 to 12 glasses of liquids (milk, juice, water) every day.  You may eat meals and snacks.     Activity:  Count fetal kicks everyday (see handout)     Call your provider if you notice:  Swelling in your face or increased swelling in your hands or legs.  Headaches that are not relieved by Tylenol (acetaminophen).  Changes in your vision (blurring: seeing spots or stars.)  Nausea (sick to your stomach) and vomiting (throwing up).   Weight gain of 5 pounds or more per week.  Heartburn that doesn't go away.  Signs of bladder infection: pain when you urinate (use the toilet),  "need to go more often and more urgently.  The bag of almonte (rupture of membranes) breaks, or you notice leaking in your underwear.  Bright red blood in your underwear.  Abdominal (lower belly) or stomach pain.  For first baby: Contractions (tightening) less than 5 minutes apart for one hour or more.  Increase or change in vaginal discharge (note the color and amount)      Follow-up:  As scheduled in the clinic          Pending Results     Date and Time Order Name Status Description    2017 2343 EKG 12 lead Preliminary             Admission Information     Date & Time Provider Department Dept. Phone    2017 Nikita BarnesanThuan, [facility] [phone]      Your Vitals Were     Blood Pressure Temperature Respirations             116/68 98.2  F (36.8  C) (Oral) 20         MyChart Information     [employer] lets you send messages to your doctor, view your test results, renew your prescriptions, schedule appointments and more. To sign up, go to [email or web address] . Click on \"Log in\" on the left side of the screen, which will take you to the Welcome page. Then click on \"Sign up Now\" on the right side of the page.     You will be asked to enter the access code listed below, as well as some personal information. Please follow the directions to create your username and password.     Your access code is: [email or web address]  Expires: 2017  1:45 AM     Your access code will  in 90 days. If you need help or a new code, please call your [facility] clinic or [phone].        Care EveryWhere ID     This is your Care EveryWhere ID. This could be used by other organizations to access your [facility] medical records  [id number]           Review of your medicines      UNREVIEWED medicines. Ask your doctor about these medicines        Dose / Directions    prenatal multivitamin  plus iron 27-0.8 MG Tabs per tablet        Dose:  1 tablet   Take 1 tablet by mouth daily   Refills:  0       TYLENOL PO     "    Dose:  1000 mg   Take 1,000 mg by mouth as needed   Refills:  0       VITAMIN D (CHOLECALCIFEROL) PO        Dose:  3000 Units   Take 3,000 Units by mouth daily   Refills:  0                Protect others around you: Learn how to safely use, store and throw away your medicines at www.disposemymeds.org.             Medication List: This is a list of all your medications and when to take them. Check marks below indicate your daily home schedule. Keep this list as a reference.      Medications           Morning Afternoon Evening Bedtime As Needed    prenatal multivitamin  plus iron 27-0.8 MG Tabs per tablet   Take 1 tablet by mouth daily                                TYLENOL PO   Take 1,000 mg by mouth as needed                                VITAMIN D (CHOLECALCIFEROL) PO   Take 3,000 Units by mouth daily                                          More Information        Kick Counts  It s normal to worry about your baby s health. One way you can know your baby s doing well is to record the baby s movements once a day. This is called a kick count. You will usually feel your baby move by the 20th week of pregnancy. Remember to take your kick count records to all your appointments with your healthcare provider.       How to Count Kicks    Choose a time when the baby is active, such as after a meal.     Sit comfortably or lie on your side.     The first time the baby moves, write down the time.     Count each movement until the baby has moved 10 times. This can take from 20 minutes to 2 hours.     If the baby hasn t moved 4 times in 1 hour, pat your stomach to wake the baby up.    Write down the time you feel the baby s 10th movement.    Try to do it at the same time each day.  When to Call Your Healthcare Provider  Call your healthcare provider right away if you notice any of the following:    Your baby moves fewer than 10 times in 2 hours while you re doing kick counts.    Your baby moves much less often than on  the days before.    You have not felt your baby move all day.    [id number] Sandy Jerez, [address] [place] [address], [place] [address]. All rights reserved. This information is not intended as a substitute for professional medical care. Always follow your healthcare professional's instructions.

## 2017-03-02 DIAGNOSIS — Z34.03 ENCOUNTER FOR SUPERVISION OF NORMAL FIRST PREGNANCY IN THIRD TRIMESTER: Primary | ICD-10-CM

## 2017-03-02 RX ORDER — HYDROXYZINE PAMOATE 25 MG/1
CAPSULE ORAL
Qty: 30 CAPSULE | Refills: 0 | Status: ON HOLD | OUTPATIENT
Start: 2017-03-02 | End: 2017-03-25

## 2017-03-09 ENCOUNTER — DOCUMENTATION ONLY (OUTPATIENT)
Dept: OBGYN | Facility: CLINIC | Age: 37
End: 2017-03-09

## 2017-03-09 NOTE — PROGRESS NOTES
36 year old  being followed in [facility] clinic for prenatal care.   Original MARIANNA in epic noted entered with incorrect date for ultrasound.  Pt had US on 16 that showed sac but no cardiac activity.  Had follow up ultrasound on 16 at 7w0d and an MARIANNA on 3/14/17.   Information was incorrectly entered into Epic linking 7w0d to the first ultrasound on 16.  NT and anatomy scans done at [facility] correlated with MARIANNA set by US on 16.  Correct information entered into Epic altering MARIANNA.  Correct in [facility] prenatal chart.

## 2017-03-16 ENCOUNTER — HOSPITAL ENCOUNTER (OUTPATIENT)
Facility: CLINIC | Age: 37
Discharge: HOME | End: 2017-03-16
Attending: ADVANCED PRACTICE MIDWIFE | Admitting: ADVANCED PRACTICE MIDWIFE
Payer: COMMERCIAL

## 2017-03-16 ENCOUNTER — TELEPHONE (OUTPATIENT)
Dept: OBGYN | Facility: CLINIC | Age: 37
End: 2017-03-16

## 2017-03-16 VITALS
DIASTOLIC BLOOD PRESSURE: 77 MMHG | SYSTOLIC BLOOD PRESSURE: 132 MMHG | HEIGHT: 69 IN | RESPIRATION RATE: 18 BRPM | TEMPERATURE: 98.1 F

## 2017-03-16 PROBLEM — O36.8190 DECREASED FETAL MOVEMENT: Status: RESOLVED | Noted: 2017-03-16 | Resolved: 2017-03-16

## 2017-03-16 PROBLEM — O36.8190 DECREASED FETAL MOVEMENT: Status: ACTIVE | Noted: 2017-03-16

## 2017-03-16 PROCEDURE — 59025 FETAL NON-STRESS TEST: CPT

## 2017-03-16 PROCEDURE — 99214 OFFICE O/P EST MOD 30 MIN: CPT | Mod: 25

## 2017-03-16 NOTE — DISCHARGE INSTRUCTIONS
Discharge Instructions for Undelivered Patients    Diet:  * Drink 8 to 12 glasses of liquids (milk, juice, water) every day  * You may eat meals and snacks.    Activity:  * Rest the pelvic area. No sex. Do not stimulate breasts or nipples.  * Stay on bed rest or partial bed rest.  * Count fetal kicks every day (see handout).  * Call your doctor or nurse midwife if your baby is moving less than usual.    Call your provider if you notice:  * Swelling in your face or increased swelling in your hands or legs.  * Headaches that are not relieved by Tylenol (acetaminophen).  * Changes in your vision (blurring; seeing spots or stars).  * Nausea (sick to your stomach) and vomiting (throwing up).  * Weight gain of 5 pounds per week.  * Heartburn that doesn't go away.  * Signs of bladder infection: Pain when you urinate (use the toilet), needing to go more often or more urgently.  * The bag of almonte (membrane) breaks, or you notice leaking in your underwear.  * Bright red blood in your underwear.  * Abdominal (lower belly) or stomach pain.  * For first baby: Contractions (tightenings) less than 5 minutes apart for one hour or more.  * Second (plus) baby: Contractions (tightenings) less than 10 minutes apart and getting stronger.  * Increase or change in vaginal discharge (note the color and amount).    .

## 2017-03-16 NOTE — PLAN OF CARE
After assessment by Roma Lancaster CNM pt has discharge to home orders.  Reviewed discharge instructions and kick count.  Pt has no further questions.  P) D/C to home

## 2017-03-16 NOTE — PROGRESS NOTES
"HOSPITAL TRIAGE NOTE  ===================    CHIEF COMPLAINT  ========================  Carrie Munoz is a 36 year old patient presenting today at 40w2d for evaluation of decreased fetal movement.    Patient's last menstrual period was 2016.  Estimated Date of Delivery: Mar 14, 2017       HPI  ==================       Prenatal record and labs reviewed from [facility], through [employer] EMR and [facility] EMR.    CONTRACTIONS: irreg and mild  ABDOMINAL PAIN: none  FETAL MOVEMENT: decreased since this past week    VAGINAL BLEEDING: none  RUPTURE OF MEMBRANES: no  PELVIC PAIN: none    PREGNANCY COMPLICATIONS: none  OTHER:     REVIEW OF SYSTEMS  =====================  C: NEGATIVE for fever, chills  I: NEGATIVE for worrisome rashes, moles or lesions  E: NEGATIVE for vision changes or irritation  R: NEGATIVE for significant cough or SOB  CV: NEGATIVE for chest pain, palpitations or varicosities  GI: NEGATIVE for nausea, abdominal pain, heartburn, or change in bowel habits  : NEGATIVE for frequency, dysuria, or hematuria  M: NEGATIVE for significant arthralgias or myalgia  N: NEGATIVE for headache, weakness, dizziness or paresthesias  P: NEGATIVE for changes in mood or affect    PROBLEM LIST  ===============  Patient Active Problem List    Diagnosis Date Noted     Decreased fetal movement 2017     Priority: Medium     Irregular heart beat 2017     Priority: Medium     2017 - 2017 - Per cardiology \"indirect tests to rule out PE and DVT, all normal. I did not see anything unusual on her cardiac echo. NO special precautions besides the usual from cardiac standpoint. If she develops any sudden worsening of her shortness of breath, I would have a low threshold to do CT scan and rule out PE. I did not have a high enough suspicion when I saw her to pursue a CT scan\".        Sinus tachycardia 2017     Priority: Medium     GBS (group B Streptococcus carrier), +RV culture, currently pregnant 12/10/2016    "  Priority: Medium     12/9/16- POS GBS at 28 wks from PTL eval.no 36 wk screen needed.       Pelvic pain affecting pregnancy 12/09/2016     Priority: Medium     Encounter for triage in pregnant patient 11/11/2016     Priority: Medium     Labor and delivery, indication for care 11/11/2016     Priority: Medium     Sacroiliac joint pain 10/31/2016     Priority: Medium     Encounter for supervision of normal first pregnancy in first trimester 07/14/2016     Priority: Medium     8/23/16- first trimester screen wnl, NT wnl, nasal bone could not be evaluated_____  Needs AFP only at 15 wks______  10/21/16- MFM US wnl         Breast lump on right side at 2:30  o'clock position 05/04/2011     Priority: Medium     Sinusitis, chronic 10/22/2003     Priority: Medium     Problem list name updated by automated process. Provider to review         HISTORIES  ==============  ALLERGIES:      Allergies   Allergen Reactions     No Known Drug Allergies      PAST MEDICAL HISTORY  Past Medical History   Diagnosis Date     Breast disorder      biopsy 2011(?) benign     Breast lump on right side at 2:30  o'clock position 5/4/2011     Cardiac abnormality      tachycardia during pregnancy     Depressive disorder      Mental disorder      depression, anxiety     Unspecified sinusitis (chronic)      SOCIAL HISTORY  Social History     Social History     Marital status: Single     Spouse name: N/A     Number of children: 0     Years of education: 13 1/2     Occupational History     teller [employer]     Social History Main Topics     Smoking status: Never Smoker     Smokeless tobacco: Not on file     Alcohol use No     Drug use: No     Sexual activity: Yes     Partners: Male     Birth control/ protection: None     Other Topics Concern     Not on file     Social History Narrative    Risk Behaviors & Healthy habits    Balanced Diet  no     Prevent Osteoporosis  yes - lots of dairy    Regular Exercise  no     Dental Care  yes    Seat Belt use  yes     "Self Breast Exam  no     Sexually Active  no , no STD concerns, but would like to be checked     Contraception  yes    Abuse  no     Guns  no     Sun Screen  no             Working at [employer], 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  Lives with her aunt, and gets along well.  In limited contact with her mother, in contact with father a lot.  Born in [place], and moved [place] 5, [place] 11.  Father in the .                     PARTNER: at bedside  EMPLOYMENT: fulltime, [employer]  FAMILY HISTORY  Family History   Problem Relation Age of Onset     Hypertension Paternal Grandmother      Hypertension Paternal Aunt      OSTEOPOROSIS Paternal Grandmother      Neurologic Disorder Father      bell's palsy     Neurologic Disorder Mother      bell's palsy     Hypertension Father      not on medication, smoker     OB HISTORY  Obstetric History       T0      TAB0   SAB0   E0   M0   L0       # Outcome Date GA Lbr Alexei/2nd Weight Sex Delivery Anes PTL Lv   1 Current                 Prenatal Labs:   Lab Results   Component Value Date    ABO O 2016    RH Pos 2016    HEPBANG negative 2016    HGB 11.7 2017    HIV Negative 10/22/2003     Rubella- immune    ULTRASOUND(s) reviewed: normal    EXAM  ============  Temp 98.1  F (36.7  C) (Oral)  Resp 18  Ht 1.753 m (5' 9\")  LMP 2016  GENERAL APPEARANCE: healthy, alert and no distress  ABDOMEN:  soft, nontender, no epigastric pain  SKIN: no suspicious lesions or rashes  NEURO: Denies headache, blurred vision, other vision changes  PSYCH: mentation appears normal. and affect normal/bright  MS/ LEGS: Reflexes normal bilaterally    CONTRACTIONS: irreg and mild   FETAL HEART TONES: continuous EFM- baseline 145 with moderate variability and positive accelerations. No decelerations.  NST: REACTIVE  EFW:8.5    PELVIC EXAM: deferred  BEAR SCORE:   PRESENTATION: VERTEX  BLOOD: no  DISCHARGE: none    ROM: no  AMNISURE: " not done    LABS: none  Lab results reviewed-   DIAGNOSIS  ============  40w2d seen on the [facility] Triage for decreased fetal movement     NST: REACTIVE  Fetal Heart rate tracing:category one    PLAN  ============  Discharge to home with labor instuctions per discharge instruction form  Call or return to the [facility] with contractions, cramping, abdominal or pelvic pain, vaginal bleeding, leaking fluid or decreased fetal movement.  Pt has , long discussion of stretching, activity balanced with rest. Discussed drinking fluids/eating. Discussed management of early labor at home prn. Follow up- w scheduled IOL next Tuesday  Fetal Non-Stress Test Results    NST Ordered By: YASMEEN Nuñez CNM    NST Medical Indication: decreased fetal movement    NST Start & Stop Times  NST Start Time: 1025  NST Stop Time: 1045         NST Results  Fetus A   Baseline Rate: 145  Accelerations: Present  Decelerations: None  Interpretation: reactive      YASMEEN Nuñez CNM

## 2017-03-16 NOTE — PLAN OF CARE
40+1weeks gestation, here with c/o decreased fetal movement.  Fetal and uterine monitors applied, intake interview completed.  FHTs are reactive, toco irregular, mild contractions, medical history remarkable for GBS+.  Roma Lancaster CNM notified of patient's arrival and RN assessment.  Will await Roma Lancaster's  assessment and plan.

## 2017-03-16 NOTE — IP AVS SNAPSHOT
[facility]    [address] [facility] [place] [address]    Phone:  [phone]                                       After Visit Summary   3/16/2017    Carrie Munoz    MRN: [id number]           After Visit Summary Signature Page     I have received my discharge instructions, and my questions have been answered. I have discussed any challenges I see with this plan with the nurse or doctor.    ..........................................................................................................................................  Patient/Patient Representative Signature      ..........................................................................................................................................  Patient Representative Print Name and Relationship to Patient    ..................................................               ................................................  Date                                            Time    ..........................................................................................................................................  Reviewed by Signature/Title    ...................................................              ..............................................  Date                                                            Time

## 2017-03-16 NOTE — IP AVS SNAPSHOT
MRN:[id number]                      After Visit Summary   3/16/2017    Carrie Munoz    MRN: [id number]           Thank you!     Thank you for choosing [facility] for your care. Our goal is always to provide you with excellent care. Hearing back from our patients is one way we can continue to improve our services. Please take a few minutes to complete the written survey that you may receive in the mail after you visit with us. Thank you!        Patient Information     Date Of Birth          1980        About your hospital stay     You were admitted on:  March 16, 2017 You last received care in the:   [facility]    You were discharged on:  March 16, 2017       Who to Call     For medical emergencies, please call 911.  For non-urgent questions about your medical care, please call your primary care provider or clinic, [phone]          Attending Provider     Provider Specialty    Page, YASMEEN Rodgers CNM MIDWIFE       Primary Care Provider Office Phone # Fax #    Clinic [facility] [phone]       [address] [facility] [address]        Further instructions from your care team       Discharge Instructions for Undelivered Patients    Diet:  * Drink 8 to 12 glasses of liquids (milk, juice, water) every day  * You may eat meals and snacks.    Activity:  * Rest the pelvic area. No sex. Do not stimulate breasts or nipples.  * Stay on bed rest or partial bed rest.  * Count fetal kicks every day (see handout).  * Call your doctor or nurse midwife if your baby is moving less than usual.    Call your provider if you notice:  * Swelling in your face or increased swelling in your hands or legs.  * Headaches that are not relieved by Tylenol (acetaminophen).  * Changes in your vision (blurring; seeing spots or stars).  * Nausea (sick to your stomach) and vomiting (throwing up).  * Weight gain of 5 pounds per week.  * Heartburn that doesn't go away.  * Signs of bladder  "infection: Pain when you urinate (use the toilet), needing to go more often or more urgently.  * The bag of almonte (membrane) breaks, or you notice leaking in your underwear.  * Bright red blood in your underwear.  * Abdominal (lower belly) or stomach pain.  * For first baby: Contractions (tightenings) less than 5 minutes apart for one hour or more.  * Second (plus) baby: Contractions (tightenings) less than 10 minutes apart and getting stronger.  * Increase or change in vaginal discharge (note the color and amount).    .        Pending Results     No orders found from 3/14/2017 to 3/17/2017.            Statement of Approval     Ordered          17 1136  I have reviewed and agree with all the recommendations and orders detailed in this document.  EFFECTIVE NOW     Approved and electronically signed by:  Roma Lancaster APRN CNM             Admission Information     Date & Time Provider Department Dept. Phone    3/16/2017 Roma Lancaster APRN CNM [facility] [phone]      Your Vitals Were     Blood Pressure Temperature Respirations Height Last Period       132/77 98.1  F (36.7  C) (Oral) 18 1.753 m (5' 9\") 2016       [employer] Information     [employer] lets you send messages to your doctor, view your test results, renew your prescriptions, schedule appointments and more. To sign up, go to [email or web address] . Click on \"Log in\" on the left side of the screen, which will take you to the Welcome page. Then click on \"Sign up Now\" on the right side of the page.     You will be asked to enter the access code listed below, as well as some personal information. Please follow the directions to create your username and password.     Your access code is: [email or web address]  Expires: 2017  2:45 AM     Your access code will  in 90 days. If you need help or a new code, please call your [facility] clinic or [phone].        Care EveryWhere ID     This is your Care EveryWhere ID. This could be " used by other organizations to access your [facility] medical records  [id number]           Review of your medicines      UNREVIEWED medicines. Ask your doctor about these medicines        Dose / Directions    hydrOXYzine 25 MG capsule   Commonly known as:  VISTARIL   Used for:  Encounter for supervision of normal first pregnancy in third trimester        May take 1-2 capsules at HS prn sleep   Quantity:  30 capsule   Refills:  0       prenatal multivitamin  plus iron 27-0.8 MG Tabs per tablet        Dose:  1 tablet   Take 1 tablet by mouth daily   Refills:  0       TYLENOL PO        Dose:  1000 mg   Take 1,000 mg by mouth as needed   Refills:  0       VITAMIN D (CHOLECALCIFEROL) PO        Dose:  3000 Units   Take 3,000 Units by mouth daily   Refills:  0                Protect others around you: Learn how to safely use, store and throw away your medicines at www.disposemymeds.org.             Medication List: This is a list of all your medications and when to take them. Check marks below indicate your daily home schedule. Keep this list as a reference.      Medications           Morning Afternoon Evening Bedtime As Needed    hydrOXYzine 25 MG capsule   Commonly known as:  VISTARIL   May take 1-2 capsules at HS prn sleep                                prenatal multivitamin  plus iron 27-0.8 MG Tabs per tablet   Take 1 tablet by mouth daily                                TYLENOL PO   Take 1,000 mg by mouth as needed                                VITAMIN D (CHOLECALCIFEROL) PO   Take 3,000 Units by mouth daily                                          More Information        Kick Counts  It s normal to worry about your baby s health. One way you can know your baby s doing well is to record the baby s movements once a day. This is called a kick count. You will usually feel your baby move by the 20th week of pregnancy. Remember to take your kick count records to all your appointments with your healthcare provider.        How to Count Kicks    Choose a time when the baby is active, such as after a meal.     Sit comfortably or lie on your side.     The first time the baby moves, write down the time.     Count each movement until the baby has moved 10 times. This can take from 20 minutes to 2 hours.     If the baby hasn t moved 4 times in 1 hour, pat your stomach to wake the baby up.    Write down the time you feel the baby s 10th movement.    Try to do it at the same time each day.  When to Call Your Healthcare Provider  Call your healthcare provider right away if you notice any of the following:    Your baby moves fewer than 10 times in 2 hours while you re doing kick counts.    Your baby moves much less often than on the days before.    You have not felt your baby move all day.    8052-8042 [facility], [address] [place] [address], [place] [address]. All rights reserved. This information is not intended as a substitute for professional medical care. Always follow your healthcare professional's instructions.

## 2017-03-16 NOTE — TELEPHONE ENCOUNTER
36 year old  at 40w2d requested IOL at 41w0d after risk/benefit of  testing vs IOL.    Scheduled for 3/21/17 at 1000.  Voicemail left for pt to call back to discuss induction timing/instructions.     - Pt called backed via  text page.  Verbalized understanding of/agreemen to IOL for postdates at 41w0d on 3/21/17 at 1000.  Verified to call [phone] ~ an hour before to verify that IOL is still proceeding on time.  Reviewed may eat normal breakfast.   Pt requesting letter for work, can come to [facility] clinic tomorrow 3/17/17 to .  Pt requesting that birth plan be updated in chart - will bring copy with her to IOL.   Reviewed with pt importance of following up over the weekend if decreased fetal movement, strong/regular contractions, LOF or VB.   All pt's questions discussed and answered.  Pt verbalized understanding of and agreement to plan of care.   Cecille ARANA, CNM

## 2017-03-21 ENCOUNTER — HOSPITAL ENCOUNTER (INPATIENT)
Facility: CLINIC | Age: 37
LOS: 4 days | Discharge: HOME HEALTH | End: 2017-03-25
Attending: ADVANCED PRACTICE MIDWIFE | Admitting: ADVANCED PRACTICE MIDWIFE
Payer: COMMERCIAL

## 2017-03-21 DIAGNOSIS — D62 ANEMIA DUE TO BLOOD LOSS, ACUTE: Primary | ICD-10-CM

## 2017-03-21 LAB
ABO + RH BLD: NORMAL
ABO + RH BLD: NORMAL
BASOPHILS # BLD AUTO: 0 10E9/L (ref 0–0.2)
BASOPHILS NFR BLD AUTO: 0.2 %
DIFFERENTIAL METHOD BLD: ABNORMAL
EOSINOPHIL # BLD AUTO: 0 10E9/L (ref 0–0.7)
EOSINOPHIL NFR BLD AUTO: 0.5 %
ERYTHROCYTE [DISTWIDTH] IN BLOOD BY AUTOMATED COUNT: 13.4 % (ref 10–15)
HCT VFR BLD AUTO: 34.7 % (ref 35–47)
HGB BLD-MCNC: 11.6 G/DL (ref 11.7–15.7)
IMM GRANULOCYTES # BLD: 0 10E9/L (ref 0–0.4)
IMM GRANULOCYTES NFR BLD: 0.6 %
LYMPHOCYTES # BLD AUTO: 1.7 10E9/L (ref 0.8–5.3)
LYMPHOCYTES NFR BLD AUTO: 27.5 %
MCH RBC QN AUTO: 29.7 PG (ref 26.5–33)
MCHC RBC AUTO-ENTMCNC: 33.4 G/DL (ref 31.5–36.5)
MCV RBC AUTO: 89 FL (ref 78–100)
MONOCYTES # BLD AUTO: 0.4 10E9/L (ref 0–1.3)
MONOCYTES NFR BLD AUTO: 6.6 %
NEUTROPHILS # BLD AUTO: 4.1 10E9/L (ref 1.6–8.3)
NEUTROPHILS NFR BLD AUTO: 64.6 %
NRBC # BLD AUTO: 0 10*3/UL
NRBC BLD AUTO-RTO: 0 /100
PLATELET # BLD AUTO: 164 10E9/L (ref 150–450)
RBC # BLD AUTO: 3.91 10E12/L (ref 3.8–5.2)
SPECIMEN EXP DATE BLD: NORMAL
T PALLIDUM IGG+IGM SER QL: NEGATIVE
WBC # BLD AUTO: 6.3 10E9/L (ref 4–11)

## 2017-03-21 PROCEDURE — 25900017 H RX MED GY IP 259 OP 259 PS 637: Performed by: ADVANCED PRACTICE MIDWIFE

## 2017-03-21 PROCEDURE — 3E0P7GC INTRODUCTION OF OTHER THERAPEUTIC SUBSTANCE INTO FEMALE REPRODUCTIVE, VIA NATURAL OR ARTIFICIAL OPENING: ICD-10-PCS | Performed by: ADVANCED PRACTICE MIDWIFE

## 2017-03-21 PROCEDURE — S0191 MISOPROSTOL, ORAL, 200 MCG: HCPCS | Performed by: ADVANCED PRACTICE MIDWIFE

## 2017-03-21 PROCEDURE — 86901 BLOOD TYPING SEROLOGIC RH(D): CPT | Performed by: ADVANCED PRACTICE MIDWIFE

## 2017-03-21 PROCEDURE — 86780 TREPONEMA PALLIDUM: CPT | Performed by: ADVANCED PRACTICE MIDWIFE

## 2017-03-21 PROCEDURE — 86900 BLOOD TYPING SEROLOGIC ABO: CPT | Performed by: ADVANCED PRACTICE MIDWIFE

## 2017-03-21 PROCEDURE — 12000030 ZZH R&B OB INTERMEDIATE UMMC

## 2017-03-21 PROCEDURE — 85025 COMPLETE CBC W/AUTO DIFF WBC: CPT | Performed by: ADVANCED PRACTICE MIDWIFE

## 2017-03-21 RX ORDER — METHYLERGONOVINE MALEATE 0.2 MG/ML
200 INJECTION INTRAVENOUS
Status: DISCONTINUED | OUTPATIENT
Start: 2017-03-21 | End: 2017-03-23

## 2017-03-21 RX ORDER — MISOPROSTOL 100 UG/1
25 TABLET ORAL EVERY 4 HOURS PRN
Status: DISCONTINUED | OUTPATIENT
Start: 2017-03-21 | End: 2017-03-21

## 2017-03-21 RX ORDER — OXYTOCIN 10 [USP'U]/ML
10 INJECTION, SOLUTION INTRAMUSCULAR; INTRAVENOUS
Status: DISCONTINUED | OUTPATIENT
Start: 2017-03-21 | End: 2017-03-23

## 2017-03-21 RX ORDER — NALOXONE HYDROCHLORIDE 0.4 MG/ML
.1-.4 INJECTION, SOLUTION INTRAMUSCULAR; INTRAVENOUS; SUBCUTANEOUS
Status: DISCONTINUED | OUTPATIENT
Start: 2017-03-21 | End: 2017-03-23

## 2017-03-21 RX ORDER — MISOPROSTOL 100 UG/1
25 TABLET ORAL
Status: DISCONTINUED | OUTPATIENT
Start: 2017-03-21 | End: 2017-03-22

## 2017-03-21 RX ORDER — OXYCODONE AND ACETAMINOPHEN 5; 325 MG/1; MG/1
1 TABLET ORAL
Status: DISCONTINUED | OUTPATIENT
Start: 2017-03-21 | End: 2017-03-23

## 2017-03-21 RX ORDER — ONDANSETRON 2 MG/ML
4 INJECTION INTRAMUSCULAR; INTRAVENOUS EVERY 6 HOURS PRN
Status: DISCONTINUED | OUTPATIENT
Start: 2017-03-21 | End: 2017-03-23

## 2017-03-21 RX ORDER — SODIUM CHLORIDE, SODIUM LACTATE, POTASSIUM CHLORIDE, CALCIUM CHLORIDE 600; 310; 30; 20 MG/100ML; MG/100ML; MG/100ML; MG/100ML
INJECTION, SOLUTION INTRAVENOUS CONTINUOUS
Status: DISCONTINUED | OUTPATIENT
Start: 2017-03-21 | End: 2017-03-23

## 2017-03-21 RX ORDER — PENICILLIN G POTASSIUM 5000000 [IU]/1
5 INJECTION, POWDER, FOR SOLUTION INTRAMUSCULAR; INTRAVENOUS ONCE
Status: COMPLETED | OUTPATIENT
Start: 2017-03-21 | End: 2017-03-23

## 2017-03-21 RX ORDER — TERBUTALINE SULFATE 1 MG/ML
0.25 INJECTION, SOLUTION SUBCUTANEOUS
Status: DISCONTINUED | OUTPATIENT
Start: 2017-03-21 | End: 2017-03-23

## 2017-03-21 RX ORDER — CARBOPROST TROMETHAMINE 250 UG/ML
250 INJECTION, SOLUTION INTRAMUSCULAR
Status: DISCONTINUED | OUTPATIENT
Start: 2017-03-21 | End: 2017-03-23

## 2017-03-21 RX ORDER — ACETAMINOPHEN 325 MG/1
650 TABLET ORAL EVERY 4 HOURS PRN
Status: DISCONTINUED | OUTPATIENT
Start: 2017-03-21 | End: 2017-03-23

## 2017-03-21 RX ORDER — IBUPROFEN 800 MG/1
800 TABLET, FILM COATED ORAL
Status: DISCONTINUED | OUTPATIENT
Start: 2017-03-21 | End: 2017-03-23

## 2017-03-21 RX ORDER — OXYTOCIN/0.9 % SODIUM CHLORIDE 30/500 ML
100-340 PLASTIC BAG, INJECTION (ML) INTRAVENOUS CONTINUOUS PRN
Status: COMPLETED | OUTPATIENT
Start: 2017-03-21 | End: 2017-03-23

## 2017-03-21 RX ORDER — FENTANYL CITRATE 50 UG/ML
50-100 INJECTION, SOLUTION INTRAMUSCULAR; INTRAVENOUS
Status: DISCONTINUED | OUTPATIENT
Start: 2017-03-21 | End: 2017-03-23

## 2017-03-21 RX ADMIN — Medication 25 MCG: at 12:26

## 2017-03-21 RX ADMIN — Medication 25 MCG: at 16:32

## 2017-03-21 NOTE — PLAN OF CARE
Pt arrived to birthplace for induction of labor. Reports feeling well, loss of mucous plug and occ irreg contractions. +FM per pt. Vss. Membranes intact. Excited for birth support person Isabel here with significant other Isacc (goes by Joann). Pt planning for natural labor. Gbs +. Plan for provider to perform SVE and determine plan of care. Pt oriented to room, call light in reach, pt bill of rights given.

## 2017-03-21 NOTE — IP AVS SNAPSHOT
[facility]    [address] [address]    Phone:  [phone]                                       After Visit Summary   3/21/2017    Carrie Munzo    MRN: [id number]           After Visit Summary Signature Page     I have received my discharge instructions, and my questions have been answered. I have discussed any challenges I see with this plan with the nurse or doctor.    ..........................................................................................................................................  Patient/Patient Representative Signature      ..........................................................................................................................................  Patient Representative Print Name and Relationship to Patient    ..................................................               ................................................  Date                                            Time    ..........................................................................................................................................  Reviewed by Signature/Title    ...................................................              ..............................................  Date                                                            Time

## 2017-03-21 NOTE — PROGRESS NOTES
"Labor progress note    S:  Patient feeling well, just requested time to nap.    O:  Blood pressure 127/62, temperature 98.3  F (36.8  C), temperature source Oral, resp. rate 16, height 1.753 m (5' 9.02\"), last menstrual period 06/08/2016.  General appearance: comfortable.  Contractions: Every 6-11 minutes. 40-60 seconds duration.  Palpate: cramping.  FHT: Baseline 135 with moderate variability. Accelerations are present. No decelerations present.  ROM: not ruptured.   Pelvic exam: deferred      Pitocin- none,  Antibiotics- PCN to be started with rupture of membranes or active labor.      A:  IUP @ 41w0d IOL for postdates   Fetal Heart rate tracing Category category one  GBS- positive  Patient Active Problem List   Diagnosis     Sinusitis, chronic     Breast lump on right side at 2:30  o'clock position     Encounter for supervision of normal first pregnancy in first trimester     Sacroiliac joint pain     Pelvic pain affecting pregnancy     GBS (group B Streptococcus carrier), +RV culture, currently pregnant     Sinus tachycardia     Irregular heart beat     Labor and delivery, indication for care         P:  After first dose of vaginal Misoprostol, patient requested to change to oral dosing.   Second dose of medication given at 1630.  Continue doses every 2 hours as needed  YASMEEN Parks CNM  "

## 2017-03-21 NOTE — H&P
"ADMIT NOTE  =================  41w0d    Carrie Munoz is a 36 year old female with an Patient's last menstrual period was 2016. and Estimated Date of Delivery: Mar 14, 2017 is admitted to the Birthplace on 3/21/2017 at 10:30 AM with for induction of labor.  Indication: postdates.     HPI  ================  Patient presents to labor and delivery for IOL.  Reports regular fetal movement.  Denies vaginal bleeding or leaking of fluid.    Contractions- not felt by patient and none  Fetal movement- active  GBS- positive  FOB- is involved, \"Joann\"  Other labor support-  Cousin Isabel in room    Weight gain- 35lbs  Height- 69\"  BMI- 32  First prenatal visit at 7 weeks, Total visits- 15    PROBLEM LIST  =================  Patient Active Problem List    Diagnosis Date Noted     Labor and delivery, indication for care 2017     Priority: Medium     Sinus tachycardia 2017     Priority: Medium     Pelvic pain affecting pregnancy 2016     Priority: Medium     Sacroiliac joint pain 10/31/2016     Priority: Medium     Encounter for supervision of normal first pregnancy in first trimester 2016     Priority: Medium     16- first trimester screen wnl, NT wnl, nasal bone could not be evaluated_____  Needs AFP only at 15 wks______  10/21/16- MFM US wnl  3/16/2017 - IOL scheduled for 41w0d on 3/21/17 at 1000.  Will bring birth plan.           Irregular heart beat 2017 - 2017 - Per cardiology \"indirect tests to rule out PE and DVT, all normal. I did not see anything unusual on her cardiac echo. NO special precautions besides the usual from cardiac standpoint. If she develops any sudden worsening of her shortness of breath, I would have a low threshold to do CT scan and rule out PE. I did not have a high enough suspicion when I saw her to pursue a CT scan\".        GBS (group B Streptococcus carrier), +RV culture, currently pregnant 12/10/2016     12/9/16- POS GBS at 28 wks " from PTL eval.no 36 wk screen needed.       Breast lump on right side at 2:30  o'clock position 2011     Sinusitis, chronic 10/22/2003     Problem list name updated by automated process. Provider to review         HISTORIES  ============  Allergies   Allergen Reactions     No Known Drug Allergies      Past Medical History   Diagnosis Date     Breast disorder      biopsy (?) benign     Breast lump on right side at 2:30  o'clock position 2011     Cardiac abnormality      tachycardia during pregnancy     Depressive disorder      Mental disorder      depression, anxiety     Unspecified sinusitis (chronic)      Past Surgical History   Procedure Laterality Date     Hc tooth extraction w/forcep       all 4 together   .  Family History   Problem Relation Age of Onset     Hypertension Paternal Grandmother      Hypertension Paternal Aunt      OSTEOPOROSIS Paternal Grandmother      Neurologic Disorder Father      bell's palsy     Neurologic Disorder Mother      bell's palsy     Hypertension Father      not on medication, smoker     Social History   Substance Use Topics     Smoking status: Never Smoker     Smokeless tobacco: Not on file     Alcohol use No     Obstetric History       T0      TAB0   SAB0   E0   M0   L0       # Outcome Date GA Lbr Alexei/2nd Weight Sex Delivery Anes PTL Lv   1 Current                    LABS:   ===========  Prenatal Labs:  Rhogam not indicated   Lab Results   Component Value Date    ABO O 2017    RH  Pos 2017    HEPBANG negative 2016    HGB 11.6 (L) 2017    HIV Negative 10/22/2003     Rubella immune  Lab Results   Component Value Date    GBS  2016     Positive  Positive: GBS DNA detected, presumed positive for GBS.   Assay performed on incubated broth culture of specimen using [employer] real-time   PCR.       Other labs:  Results for orders placed or performed during the hospital encounter of 17 (from the past 24 hour(s))   ABO and Rh  "  Result Value Ref Range    ABO O     RH(D)  Pos     Specimen Expires 03/24/2017    CBC with platelets differential   Result Value Ref Range    WBC 6.3 4.0 - 11.0 10e9/L    RBC Count 3.91 3.8 - 5.2 10e12/L    Hemoglobin 11.6 (L) 11.7 - 15.7 g/dL    Hematocrit 34.7 (L) 35.0 - 47.0 %    MCV 89 78 - 100 fl    MCH 29.7 26.5 - 33.0 pg    MCHC 33.4 31.5 - 36.5 g/dL    RDW 13.4 10.0 - 15.0 %    Platelet Count 164 150 - 450 10e9/L    Diff Method Automated Method     % Neutrophils 64.6 %    % Lymphocytes 27.5 %    % Monocytes 6.6 %    % Eosinophils 0.5 %    % Basophils 0.2 %    % Immature Granulocytes 0.6 %    Nucleated RBCs 0 0 /100    Absolute Neutrophil 4.1 1.6 - 8.3 10e9/L    Absolute Lymphocytes 1.7 0.8 - 5.3 10e9/L    Absolute Monocytes 0.4 0.0 - 1.3 10e9/L    Absolute Eosinophils 0.0 0.0 - 0.7 10e9/L    Absolute Basophils 0.0 0.0 - 0.2 10e9/L    Abs Immature Granulocytes 0.0 0 - 0.4 10e9/L    Absolute Nucleated RBC 0.0        ROS  =========  Pt denies significant respiratory, cardiovacular, GI, or muscular/skeletalcomplaints.    See RN data base ROS.       PHYSICAL EXAM:  ===============  /74  Temp 98.1  F (36.7  C) (Oral)  Resp 18  Ht 1.753 m (5' 9.02\")  LMP 06/08/2016  General appearance: comfortable  GENERAL APPEARANCE: healthy, alert and no distress  RESP: lungs clear to auscultation - no rales, rhonchi or wheezes  BREAST: normal without masses, tenderness or nipple discharge and no palpable axillary masses or adenopathy  CV: regular rates and rhythm, normal S1 S2, no S3 or S4 and no murmur,and no varicosities  ABDOMEN:  soft, nontender, no epigastric pain  SKIN: no suspicious lesions or rashes  NEURO: Denies headache, blurred vision, other vision changes  PSYCH: mentation appears normal. and affect normal/bright  Legs: Reflexes normal bilaterally     Abdomen: gravid, vertex fetus per Leopold's, non-tender between contractions.     EFW-  9 lbs.   CONTACTIONS: irreg and not felt by patient  FETAL HEART TONES: " continuous EFM- baseline 145 with moderate variability and positive accelerations. No decelerations.  PELVIC EXAM: closed/ 50%/ Posterior/ average/ -1   BEAR SCORE: 4  BLOODY SHOW: no   ROM:no  FLUID: none  AMNISURE: not done    ASSESSMENT:  ==============  IUP @ 41w0d admitted for induction of labor.  Indication: postdates   NST REACTIVE  Fetal Heart Rate - category one  GBS- positive     PLAN:  ===========  Admit - see IP orders  Pain medication options reviewed. Pt is interested in using Nitrous Oxide  Reviewed R/B/A cervical ripening options: including cook catheter and oral and vaginal misoprostol.  Patient desires to proceed with vaginal Misoprostol, verbal consent obtained.  All questions answered    Agreeable to plan.  First dose of vaginal Misoprostol placed at 1230    YASMEEN Parks CNM

## 2017-03-21 NOTE — IP AVS SNAPSHOT
MRN:[id number]                      After Visit Summary   3/21/2017    Carrie Munoz    MRN: [id number]           Thank you!     Thank you for choosing [facility] for your care. Our goal is always to provide you with excellent care. Hearing back from our patients is one way we can continue to improve our services. Please take a few minutes to complete the written survey that you may receive in the mail after you visit with us. Thank you!        Patient Information     Date Of Birth          1980        About your hospital stay     You were [facility] on:  March 21, 2017 [facility]    You were discharged on:  March 25, 2017       Who to Call     For medical emergencies, please call 911.  For non-urgent questions about your medical care, please call your primary care provider or clinic, [phone]          Attending Provider     Provider Specialty    Ivy Garza, APRJANEL CN Midwives    Cameron, Nikita Crenshaw, CNM Midwives    Cecille Ramos, APRN CN Midwives    Jeromy Cannon, APRN CN Midwives    Akanksha Hogan, APRN CN MIDWIFE       Primary Care Provider Office Phone # Fax #    Clinic [facility] [phone]       [address] [facility] [address]        After Care Instructions     Activity       Review discharge instructions            Diet       Resume previous diet            Discharge Instructions - Gestational diabetic patients       Gestational diabetic patients to follow up for fasting blood sugar and 2 hour 75gm glucose load at 6 weeks postpartum.            Discharge Instructions - Postpartum visit       Schedule postpartum visit with your provider and return to clinic in 6 weeks.                  Further instructions from your care team       Postpartum Vaginal Delivery Instructions    Activity       Ask family and friends for help when you need it.    Do not place anything in your vagina for 6  weeks.    You are not restricted on other activities, but take it easy for a few weeks to allow your body to recover from delivery.  You are able to do any activities you feel up to that point.    No driving until you have stopped taking your pain medications (usually two weeks after delivery).     Call your health care provider if you have any of these symptoms:       Increased pain, swelling, redness, or fluid around your stiches from an episiotomy or perineal tear.    A fever above 100.4 F (38 C) with or without chills when placing a thermometer under your tongue.    You soak a sanitary pad with blood within 1 hour, or you see blood clots larger than a golf ball.    Bleeding that lasts more than 6 weeks.    Vaginal discharge that smells bad.    Severe pain, cramping or tenderness in your lower belly area.    A need to urinate more frequently (use the toilet more often), more urgently (use the toilet very quickly), or it burns when you urinate.    Nausea and vomiting.    Redness, swelling or pain around a vein in your leg.    Problems breastfeeding or a red or painful area on your breast.    Chest pain and cough or are gasping for air.    Problems coping with sadness, anxiety, or depression.  If you have any concerns about hurting yourself or the baby, call your provider immediately.     You have questions or concerns after you return home.     Keep your hands clean:  Always wash your hands before touching your perineal area and stitches.  This helps reduce your risk of infection.  If your hands aren't dirty, you may use an alcohol hand-rub to clean your hands. Keep your nails clean and short.        Pending Results     No orders found from 3/19/2017 to 3/22/2017.            Statement of Approval     Ordered          03/25/17 1136  I have reviewed and agree with all the recommendations and orders detailed in this document.  EFFECTIVE NOW     Approved and electronically signed by:  Ivy Garza APRN CNM      "        Admission Information     Date & Time Provider Department Dept. Phone    3/21/2017 Akanksha Hogan, APRN CNM [facility] [phone]      Your Vitals Were     Blood Pressure Pulse Temperature Respirations Height Last Period     120 97.5  F (36.4  C) (Oral) 20 1.753 m (5' 9.02\") 2016    Pulse Oximetry                   98%           MyChart Information     [employer] lets you send messages to your doctor, view your test results, renew your prescriptions, schedule appointments and more. To sign up, go to [email or web address] . Click on \"Log in\" on the left side of the screen, which will take you to the Welcome page. Then click on \"Sign up Now\" on the right side of the page.     You will be asked to enter the access code listed below, as well as some personal information. Please follow the directions to create your username and password.     Your access code is: [email or web address]  Expires: 2017  2:45 AM     Your access code will  in 90 days. If you need help or a new code, please call your [place] clinic or [phone].        Care EveryWhere ID     This is your Care EveryWhere ID. This could be used by other organizations to access your [place] medical records  [id number]           Review of your medicines      START taking        Dose / Directions    ferrous sulfate 325 (65 FE) MG tablet   Commonly known as:  IRON   Used for:  Anemia due to blood loss, acute        Dose:  325 mg   Take 1 tablet (325 mg) by mouth 2 times daily   Quantity:  60 tablet   Refills:  2       ibuprofen 600 MG tablet   Commonly known as:  ADVIL/MOTRIN        Dose:  600 mg   Take 1 tablet (600 mg) by mouth every 6 hours as needed for moderate pain   Quantity:  90 tablet   Refills:  1       sennosides 8.6 MG tablet   Commonly known as:  SENOKOT        Dose:  2 tablet   Take 2 tablets by mouth 2 times daily as needed for constipation   Quantity:  120 tablet   Refills:  1         CONTINUE these medicines " which have NOT CHANGED        Dose / Directions    prenatal multivitamin  plus iron 27-0.8 MG Tabs per tablet        Dose:  1 tablet   Take 1 tablet by mouth daily   Refills:  0       TYLENOL PO        Dose:  1000 mg   Take 1,000 mg by mouth as needed   Refills:  0       VITAMIN D (CHOLECALCIFEROL) PO        Dose:  3000 Units   Take 3,000 Units by mouth daily   Refills:  0         STOP taking     hydrOXYzine 25 MG capsule   Commonly known as:  VISTARIL                Where to get your medicines      These medications were sent to [facility] [place], [place] - [address]  [address] [address]     Phone:  [phone]     ferrous sulfate 325 (65 FE) MG tablet    ibuprofen 600 MG tablet    sennosides 8.6 MG tablet                Protect others around you: Learn how to safely use, store and throw away your medicines at www.disposemymeds.org.             Medication List: This is a list of all your medications and when to take them. Check marks below indicate your daily home schedule. Keep this list as a reference.      Medications           Morning Afternoon Evening Bedtime As Needed    ferrous sulfate 325 (65 FE) MG tablet   Commonly known as:  IRON   Take 1 tablet (325 mg) by mouth 2 times daily   Last time this was given:  325 mg on 3/25/2017  8:12 AM                                ibuprofen 600 MG tablet   Commonly known as:  ADVIL/MOTRIN   Take 1 tablet (600 mg) by mouth every 6 hours as needed for moderate pain   Last time this was given:  800 mg on 3/25/2017 10:15 AM                                prenatal multivitamin  plus iron 27-0.8 MG Tabs per tablet   Take 1 tablet by mouth daily                                sennosides 8.6 MG tablet   Commonly known as:  SENOKOT   Take 2 tablets by mouth 2 times daily as needed for constipation                                TYLENOL PO   Take 1,000 mg by mouth as needed   Last time this was given:  650 mg on 3/23/2017  9:10 AM                                 VITAMIN D (CHOLECALCIFEROL) PO   Take 3,000 Units by mouth daily

## 2017-03-22 PROCEDURE — 25900017 H RX MED GY IP 259 OP 259 PS 637: Performed by: ADVANCED PRACTICE MIDWIFE

## 2017-03-22 PROCEDURE — S0191 MISOPROSTOL, ORAL, 200 MCG: HCPCS | Performed by: ADVANCED PRACTICE MIDWIFE

## 2017-03-22 PROCEDURE — 25800025 ZZH RX 258: Performed by: ADVANCED PRACTICE MIDWIFE

## 2017-03-22 PROCEDURE — 93010 ELECTROCARDIOGRAM REPORT: CPT | Performed by: INTERNAL MEDICINE

## 2017-03-22 PROCEDURE — 93005 ELECTROCARDIOGRAM TRACING: CPT

## 2017-03-22 PROCEDURE — 25000132 ZZH RX MED GY IP 250 OP 250 PS 637: Performed by: ADVANCED PRACTICE MIDWIFE

## 2017-03-22 PROCEDURE — 25000128 H RX IP 250 OP 636: Performed by: ADVANCED PRACTICE MIDWIFE

## 2017-03-22 PROCEDURE — 25000125 ZZHC RX 250: Performed by: ADVANCED PRACTICE MIDWIFE

## 2017-03-22 PROCEDURE — 12000030 ZZH R&B OB INTERMEDIATE UMMC

## 2017-03-22 RX ORDER — HYDROXYZINE HYDROCHLORIDE 50 MG/1
150 TABLET, FILM COATED ORAL ONCE
Status: COMPLETED | OUTPATIENT
Start: 2017-03-22 | End: 2017-03-22

## 2017-03-22 RX ORDER — SODIUM CHLORIDE, SODIUM LACTATE, POTASSIUM CHLORIDE, CALCIUM CHLORIDE 600; 310; 30; 20 MG/100ML; MG/100ML; MG/100ML; MG/100ML
INJECTION, SOLUTION INTRAVENOUS CONTINUOUS
Status: DISCONTINUED | OUTPATIENT
Start: 2017-03-22 | End: 2017-03-23

## 2017-03-22 RX ORDER — MORPHINE SULFATE 10 MG/ML
INJECTION, SOLUTION INTRAMUSCULAR; INTRAVENOUS
Status: DISCONTINUED
Start: 2017-03-22 | End: 2017-03-24 | Stop reason: HOSPADM

## 2017-03-22 RX ORDER — MORPHINE SULFATE 10 MG/ML
10 INJECTION, SOLUTION INTRAMUSCULAR; INTRAVENOUS ONCE
Status: COMPLETED | OUTPATIENT
Start: 2017-03-22 | End: 2017-03-22

## 2017-03-22 RX ORDER — OXYTOCIN/0.9 % SODIUM CHLORIDE 30/500 ML
1-24 PLASTIC BAG, INJECTION (ML) INTRAVENOUS CONTINUOUS
Status: DISCONTINUED | OUTPATIENT
Start: 2017-03-22 | End: 2017-03-23

## 2017-03-22 RX ORDER — MORPHINE SULFATE 10 MG/ML
10 INJECTION, SOLUTION INTRAMUSCULAR; INTRAVENOUS ONCE
Status: DISCONTINUED | OUTPATIENT
Start: 2017-03-22 | End: 2017-03-22

## 2017-03-22 RX ORDER — MISOPROSTOL 100 UG/1
25 TABLET ORAL EVERY 4 HOURS PRN
Status: DISCONTINUED | OUTPATIENT
Start: 2017-03-22 | End: 2017-03-22

## 2017-03-22 RX ORDER — NALBUPHINE HYDROCHLORIDE 10 MG/ML
10 INJECTION, SOLUTION INTRAMUSCULAR; INTRAVENOUS; SUBCUTANEOUS EVERY 4 HOURS PRN
Status: DISCONTINUED | OUTPATIENT
Start: 2017-03-22 | End: 2017-03-23

## 2017-03-22 RX ADMIN — NALBUPHINE HYDROCHLORIDE 10 MG: 10 INJECTION, SOLUTION INTRAMUSCULAR; INTRAVENOUS; SUBCUTANEOUS at 10:59

## 2017-03-22 RX ADMIN — Medication 25 MCG: at 04:30

## 2017-03-22 RX ADMIN — Medication 25 MCG: at 00:22

## 2017-03-22 RX ADMIN — HYDROXYZINE HYDROCHLORIDE 150 MG: 50 TABLET, FILM COATED ORAL at 00:39

## 2017-03-22 RX ADMIN — OXYTOCIN-SODIUM CHLORIDE 0.9% IV SOLN 30 UNIT/500ML 2 MILLI-UNITS/MIN: 30-0.9/5 SOLUTION at 16:11

## 2017-03-22 RX ADMIN — SODIUM CHLORIDE, POTASSIUM CHLORIDE, SODIUM LACTATE AND CALCIUM CHLORIDE: 600; 310; 30; 20 INJECTION, SOLUTION INTRAVENOUS at 16:11

## 2017-03-22 RX ADMIN — MORPHINE SULFATE 10 MG: 10 INJECTION, SOLUTION INTRAMUSCULAR; INTRAVENOUS at 02:13

## 2017-03-22 RX ADMIN — NALBUPHINE HYDROCHLORIDE 10 MG: 10 INJECTION, SOLUTION INTRAMUSCULAR; INTRAVENOUS; SUBCUTANEOUS at 18:32

## 2017-03-22 NOTE — PROGRESS NOTES
"Labor progress note    S: Carrie has been up on birthing ball, sitting on toilet to cope with contractions.   Working through them with partner's support.   Consents to SVE. Was hoping to use Nitrous Oxide for check but none available at this time.  Prefers to try IV Nubain for exam/contraction discomfort.   Again amenable to using overturned bed pan for assistance with exam.  Undecided if she is open to membrane sweep but will consider/consent during exam if desires.     O:  Blood pressure 113/55, pulse 104, temperature 98.1  F (36.7  C), temperature source Oral, resp. rate 18, height 1.753 m (5' 9.02\"), last menstrual period 2016.  General appearance: uncomfortable with contractions but coping.  Contractions: Every 3-8 minutes. 50-70 seconds duration.  Palpate: mild/moderate.  Soft resting tone.   FHT: Baseline 130 with moderate variability. Accelerations not present. no decelerations present.  EFM with periods of poor tracing d/t maternal positioning.  EFM readjusted multiple times by CNM and RN.  Audible FHR not always tracing.    ROM: not ruptured.   Pelvic exam: 2/ 80/ Mid/ soft/ -2   IBOW. +scant bloody show on glove after SVE.   Membrane sweep x2 after Carrie's consent.   Hendrix Score:  -------------------------------  Dilation:1: 1-2cm.   Effacement: 3 >/= 80%  Station: 1 -2.  Consistency: 2: Soft.  Position of Cervix: 1: Intermediate.   Total:     Pitocin- none,  Antibiotics- none  IV Saline lock  Nubain 10mg IV given before SVE  Last does of Vaginal Misoprostol #3 given at 0430.    A:  36 year old  with IUP @ 41w1d IOL for postdates, now early labor/ripe for inductoin  Fetal Heart rate tracing Category one.   GBS- positive  Patient Active Problem List   Diagnosis     Sinusitis, chronic     Breast lump on right side at 2:30  o'clock position     Encounter for supervision of normal first pregnancy in first trimester     Sacroiliac joint pain     Pelvic pain affecting pregnancy     GBS " (group B Streptococcus carrier), +RV culture, currently pregnant     Sinus tachycardia     Irregular heart beat     Labor and delivery, indication for care         P:  - Continue comfort measures prn. Recommend positions to encourage fetal rotation and descent - pt using peanut ball while resting.  Using essential oil diffuser. Plans  when active.   - Reviewed with pt importance of FHR/TOCO tracing if using pitocin for augmentation/IOL.  Pt amenable to Kelli EFM/TOCO monitor attempt.  Consider FSE/IUPC once ROM prn.   - Reviewed pain medication options, pt s/p Nubain.  RN will check with CNM before next dose.   - Prophylactic antibiotic for + GBS status to start with ROM or labor.   - MD consultant on call Jay[place]/ available prn  - Labor induction with Pitocin per protocol.   - reevaluate in 2-4 hours/PRN       Cecille ARANA, MARIEL

## 2017-03-22 NOTE — PROGRESS NOTES
"Summary:  Received report at 1205 from LAURA Mckenzie RN.  Pt is on Kelli for monitoring.  Unable to  consistantly using kelli - trouble shooting used.  Pt requesting Kelli use and for it to remain on her abdomin.  Discussed the potential for skin irritation.  Pt denies any irritation and prefers it to remain in place.  Pt seen by MARK ANTHONY Ramos CNM to discuss the plan of care.  Pt is declining the start of oxytocin until after her  arrives.  MARIEL Ramos reviewed strip.  Monitor off as no tocolytic use at this time.  Will use US for spot checks while waiting for pt's .  Periods of minimum and moderate variability.  accels present with no decels.  ctxs 3.5\" apart palpating moderate. Pt is resting and changing positions frequently - balls, sling, warm packs, aromatherapy in room and available for use.  Pt is intact. Call light is within reach.  "

## 2017-03-22 NOTE — PROGRESS NOTES
"Blood pressure 122/76, pulse 90, temperature 98.2  F (36.8  C), temperature source Oral, resp. rate 20, height 1.753 m (5' 9.02\"), last menstrual period 06/08/2016.  Patient Vitals for the past 24 hrs:   BP Temp Temp src Pulse Resp Height   03/22/17 0218 122/76 98.2  F (36.8  C) Oral 90 20 - 03/22/17 0125 137/83 - - - 18 -   03/22/17 0055 108/62 - - - 20 - 03/21/17 2348 - - - - 20 - 03/21/17 2312 107/63 98.4  F (36.9  C) Oral 98 18 -   03/21/17 2030 122/81 98.3  F (36.8  C) Oral 93 20 - 03/21/17 1930 - - - 100 - -   03/21/17 1628 127/62 98.3  F (36.8  C) Oral - 16 -   03/21/17 1049 130/74 98.1  F (36.7  C) Oral - 18 1.753 m (5' 9.02\")     General appearance: Has had 10mg IM morphine and 150mg vistaril. Reporting less discomfort. Reported 2 episodes of chest tightness and shortness of breath, lasting very briefly and occurring with contraction. States it feels the same as when she has been previously evaluated in the pregnancy. Denies racing/pound heart, palpitations, dizziness.  Appears comfortable, not showing difficulty breathing or discomfort.   CONTRACTIONS: Contractions every 3-5 minutes.  Palpate: mild  Pitocin- none,  Antibiotics- PCN to be started in active labor or rom  FETAL HEART TONES: baseline 125 with moderate FHR variability and  pos accelerations.  No decelerations present.    ROM: not ruptured  PELVIC EXAM:deferred    ASSESSMENT:  ==============  IUP @ 41w0d for induction of labor. Indication: late term 41 weeks.   Fetal Heart Rate Tracing category one  GBS- positive- not treated until active labor or rom  S/P PV cytotec x1 and PO cytotec x 1 @1630  Returned to vaginal admin of cytotec, 25mcg cytotec placed PV at 0025      Patient Active Problem List   Diagnosis     Sinusitis, chronic     Breast lump on right side at 2:30  o'clock position     Encounter for supervision of normal first pregnancy in first trimester     Sacroiliac joint pain     Pelvic pain affecting pregnancy     GBS (group B " Streptococcus carrier), +RV culture, currently pregnant     Sinus tachycardia     Irregular heart beat     Labor and delivery, indication for care      PLAN:  ===========  12 lead EKG ordered.  VS WNL. Continue to monitor closely.  Continuous efm and toco.  Reevaluate prn per maternal/fetal indications.    Nikita Barnes, APRN, CNM

## 2017-03-22 NOTE — PLAN OF CARE
Problem: Goal Outcome Summary  Goal: Goal Outcome Summary  VSS. FHTs have been reactive throughout the night (LD x1). Uterine contractions are spaced~ 8-10 minutes apart on average. Pt reports relief of pain and ability to sleep after receiving vistaril and morphine. No further complaints of  chest tightness, SOB, or heart palpitations since receiving morphine. EKG done- per provider, with the exception of already diagnosed sinus tachycardia, EKG shows no further concerns. Vaginal misoprostal #3 placed at 0430, making a total of 4 misoprostol administrations (1 oral dose). Plan to continue with IOL. Monitor closely for s/s of maternal/fetal compromise. Pain interventions as needed/per requested.

## 2017-03-22 NOTE — PROVIDER NOTIFICATION
03/22/17 0249   Provider Notification   Provider Name/Title QUOC Barnes CNM   Method of Notification In Department   Plan for stat EKG

## 2017-03-22 NOTE — PLAN OF CARE
Problem: Goal Outcome Summary  Goal: Goal Outcome Summary  The EMR was down for 3 hours on 3/22/2017.     GOYO Neves RN was responsible for completing the paper charting during this time period.      The following information was re-entered into the system by Magnolia Neves: MAR     The following information will remain in the paper chart: flowsheet data and notes made in downtime      Magnolia Neves  3/22/2017

## 2017-03-22 NOTE — PROGRESS NOTES
"Blood pressure 117/63, pulse 95, temperature 98.2  F (36.8  C), temperature source Oral, resp. rate 18, height 1.753 m (5' 9.02\"), last menstrual period 06/08/2016.  Patient Vitals for the past 24 hrs:   BP Temp Temp src Pulse Resp Height   03/22/17 0640 117/63 - - 95 18 -   03/22/17 0500 109/61 - - 90 - -   03/22/17 0430 109/64 - - 90 - -   03/22/17 0218 122/76 98.2  F (36.8  C) Oral 90 20 -   03/22/17 0125 137/83 - - - 18 -   03/22/17 0055 108/62 - - - 20 -   03/21/17 2348 - - - - 20 - 03/21/17 2312 107/63 98.4  F (36.9  C) Oral 98 18 -   03/21/17 2030 122/81 98.3  F (36.8  C) Oral 93 20 -   03/21/17 1930 - - - 100 - -   03/21/17 1628 127/62 98.3  F (36.8  C) Oral - 16 -   03/21/17 1049 130/74 98.1  F (36.7  C) Oral - 18 1.753 m (5' 9.02\")     General appearance: comfortable, got some rest   CONTRACTIONS: Contractions every q7-9 minutes.  Palpate: mild  Pitocin- none,  Antibiotics- PCN when active labor or rom  FETAL HEART TONES: baseline 125 with moderate FHR variability and  pos accelerations.  No decelerations present.    ROM: not ruptured  PELVIC EXAM:deferred    ASSESSMENT:  ==============  ============  IUP @ 41w0d for induction of labor. Indication: late term 41 weeks.   Fetal Heart Rate Tracing category one  GBS- positive- treat when active labor or rom  S/P PV cytotec x1 and PO cytotec x 1 @1630  Returned to vaginal admin of cytotec, 25mcg PV x2, last dose 0430    Patient Active Problem List   Diagnosis     Sinusitis, chronic     Breast lump on right side at 2:30  o'clock position     Encounter for supervision of normal first pregnancy in first trimester     Sacroiliac joint pain     Pelvic pain affecting pregnancy     GBS (group B Streptococcus carrier), +RV culture, currently pregnant     Sinus tachycardia     Irregular heart beat     Labor and delivery, indication for care        PLAN:  ===========  Reevaluate at 0830.  Report given to [facility] midwife, MARIEL Ramos.    YASMEEN Esquivel, MARIEL  "

## 2017-03-22 NOTE — PROGRESS NOTES
"Blood pressure 127/62, temperature 98.3  F (36.8  C), temperature source Oral, resp. rate 16, height 1.753 m (5' 9.02\"), last menstrual period 06/08/2016.  Patient Vitals for the past 24 hrs:   BP Temp Temp src Resp Height   03/21/17 1628 127/62 98.3  F (36.8  C) Oral 16 -   03/21/17 1049 130/74 98.1  F (36.7  C) Oral 18 1.753 m (5' 9.02\")     General appearance: States she is feeling \"constant\" cramping. Appears comfortable, sitting on birthing ball, talking with partner and aunt; engaged and laughing with staff. Excited to meet baby girl \"Reign Mari\"  CONTRACTIONS: Irregular; q4-10mintues  Palpate: mild  Pitocin- none,  Antibiotics- Plan for PCN in active labor or with ROM  FETAL HEART TONES: baseline 145 with moderate FHR variability and  pos accelerations. No decelerations present.    ROM: not ruptured  PELVIC EXAM:deferred    ASSESSMENT:  ==============  IUP @ 41w0d for induction of labor.  Indication: late term 41 weeks.   Fetal Heart Rate Tracing category one  GBS- positive- not treated until active labor or rom    Patient Active Problem List   Diagnosis     Sinusitis, chronic     Breast lump on right side at 2:30  o'clock position     Encounter for supervision of normal first pregnancy in first trimester     Sacroiliac joint pain     Pelvic pain affecting pregnancy     GBS (group B Streptococcus carrier), +RV culture, currently pregnant     Sinus tachycardia     Irregular heart beat     Labor and delivery, indication for care      PLAN:  ===========  Ambulation, hydration, position changes, birthing ball/sling and tub options to facilitate labor.  Difficulty tracing contractions and fetal heart tones. Kelli monitor placed.   Continue cervical ripening with PO cytotec as appropriate.  Discussed cervical exam if regularly ophelia and additional doses of cytotec cannot be given or if pt reports change in status.   Pt states cervical exams are very uncomfortable and would like to limit them as much as " possible. Would also like 1 hour notice if able so she can prepare.  Discussed potential use of cook catheter for cervical ripening. Pt very resistant to this idea- at this time, declines use of cook catheter in the future.  Plan for prophylactic IV antibiotic for positive GBS status once in active labor or if rom. Pt agreeable to PCN. IV saline lock already in place.  MD consultant on call Dr. Childs/ YASMEEN Medina, MARIEL

## 2017-03-22 NOTE — PROGRESS NOTES
"Labor progress note    S: Pt working through contractions, coping with position changes and  support.  Pt prefers SVE before starting pitocin to see if pitocin is needed, consents to SVE with overturned bedpan for positioning.  Nitrous oxide available, pt desires to use.      O:  Blood pressure 142/77, pulse 100, temperature 98  F (36.7  C), temperature source Oral, resp. rate 18, height 1.753 m (5' 9.02\"), last menstrual period 2016.  General appearance: uncomfortable with contractions, coping.  Contractions: Every 4-8 minutes. 60-80 seconds duration.  Palpate: mild and moderate.  Soft resting tone.   FHT: Baseline 135 with moderate variability. Accelerations are present. not decelerations present.  EFM tracing with breaks d/t maternal positioning, EFM readjusted.   Changed from Kelli to EFM/TOCO for better tracing.   ROM: not ruptured.   Pelvic exam: 2/ 80/ Mid/ soft/ -2.  No change from 1115 exam.   Hendrix Score: 8/13    Pitocin- none - will be started per protocol by RN,  Antibiotics- none  IV saline lock converted to continuous infusion with pitocin.     A:  36 year old  with IUP @ 41w1d IOL for postterm pregnancy  Fetal Heart rate tracing Category one  GBS- positive  Patient Active Problem List   Diagnosis     Sinusitis, chronic     Breast lump on right side at 2:30  o'clock position     Encounter for supervision of normal first pregnancy in first trimester     Sacroiliac joint pain     Pelvic pain affecting pregnancy     GBS (group B Streptococcus carrier), +RV culture, currently pregnant     Sinus tachycardia     Irregular heart beat     Labor and delivery, indication for care         P:  - Continue comfort measures prn. Positions to encourage fetal rotation/descent.    - Pain medication prn.  Pt using Nitrous Oxide with some relief, will continue prn.   - Plan Prophylactic antibiotic for + GBS status when ROM or labor.  Confirmed with bedside RN will update CNM if ROM or strong/regular " contractions to start prophylaxis.   - MD consultant on call Jayro/ available prn  - Start Labor induction with Pitocin per protocol  - reevaluate in 2-4 hours/PRN       Cecille ARANA, HEBERTM

## 2017-03-22 NOTE — PROGRESS NOTES
"Labor progress note    S: Pt using position changes, support from .  Coping but visibly tearful with several of the contractions.  Declines to try Nitrous Oxide again.  Open to considering IV Nubain again.    Had out of range BP noted x1, follow up normal.     O:  Blood pressure 137/73, pulse 90, temperature 98  F (36.7  C), temperature source Oral, resp. rate 18, height 1.753 m (5' 9.02\"), last menstrual period 2016.  General appearance: uncomfortable with contractions, coping overall.   Contractions: Every 3-5 minutes. 60-90 seconds duration.  Palpate: moderate.  Soft resting tone.   FHT: Baseline 145 with moderate variability. Accelerations are present. no decelerations present. Periods of loss of contact, EFM readjusted by RN.    ROM: not ruptured.   Pelvic exam: Defer  Hendrix Score: 8/13    Pitocin- 2 mu/min.,  Antibiotics- none  LR at maintenance rate    A:  36 year old  with IUP @ 41w1d IOL for postterm   Fetal Heart rate tracing Category one  GBS- positive  Patient Active Problem List   Diagnosis     Sinusitis, chronic     Breast lump on right side at 2:30  o'clock position     Encounter for supervision of normal first pregnancy in first trimester     Sacroiliac joint pain     Pelvic pain affecting pregnancy     GBS (group B Streptococcus carrier), +RV culture, currently pregnant     Sinus tachycardia     Irregular heart beat     Labor and delivery, indication for care         P:  - Continue comfort measures prn.  remains at bedside as does RN for continuous labor support.   - Pain medication - pt amenable to try Nubain dose again.    - Plan Prophylactic antibiotic for + GBS status  - MD consultant on call Merle/ available prn  - Continue Labor induction with Pitocin per protocol  - reevaluate in 2-4 hours/PRN       Cecille ARANA, CNKWAN    "

## 2017-03-22 NOTE — PROGRESS NOTES
Kelli replaced with belly band and US/[place].  Position changes. Pt up to bathroom.  MARK ANTHONY Ramos CNM in room.  Pt wants to have SVE prior to starting oxytocin.  Nitrous agreement signed.  Pt wants nitrous prior to SVE.   and  at bedside.  Bedside report given to maria g BRADLEY.

## 2017-03-22 NOTE — PROGRESS NOTES
"Labor progress note    S: Pt rested on/off throughout the night after Vistaril/Morphine. Breathing through contraction while CNM in room, relaxed between contractions.   Open to additional IV analgesia prn, not interested in epidural at this point.  Not interested in nitrous oxide at this point.  No chest pain, SOB, or palpitations presently.  Would like to continue with vaginal Misoprostol dosing, aware she's due around 0830.   Curious about timing of next SVE.   Partner at bedside supportive.  Pt agreeable to have Family Practice resident involved in care today.     O:  Blood pressure 113/55, pulse 104, temperature 98.1  F (36.7  C), temperature source Oral, resp. rate 18, height 1.753 m (5' 9.02\"), last menstrual period 2016.  General appearance: uncomfortable with contractions but coping.  Contractions: Every 4-5 minutes. 50-70 seconds duration.  Palpate: mild.  Soft resting tone.   FHT: Baseline 140 with moderate variability. Accelerations are present. no decelerations present.  Periods of loss of EFM contact, readjusted by RN.  ROM: not ruptured. .  Pelvic exam: Defer      Pitocin- none,  Antibiotics- none  IV Saline Lock  PO Miso 3/21 #1 at 1635  Vag Miso 3/21 #1 at 1230 ; 3/22 #2@ 0022, #3@ 0430    A:  36 year old  with IUP @ 41w1d IOL for postdates  Fetal Heart rate tracing Category one  GBS- positive  Patient Active Problem List   Diagnosis     Sinusitis, chronic     Breast lump on right side at 2:30  o'clock position     Encounter for supervision of normal first pregnancy in first trimester     Sacroiliac joint pain     Pelvic pain affecting pregnancy     GBS (group B Streptococcus carrier), +RV culture, currently pregnant     Sinus tachycardia     Irregular heart beat     Labor and delivery, indication for care         P:  - Continue comfort measures prn.  Recommend positions to encourage fetal rotation/descent.    - Conitnue cervical ripening with misoprostol every 4 hours vaginally x 6 doses " total (Has had 3 previous vaginal doses, 1 PO dose).  RN will place next dose at 0830. Reassess SVE if change in pt status, FHR changes, vaginal bleeding or labor.   - Reviewed pain medication options with pt.  Declines need for epidural, declines nitrous oxide.  Reviewed IV Stadol for analgesia prn. Encouraged up right/mobile.  - Close maternal surveillance for cardiac status.  Follow up prn.   - Plan prophylactic antibiotic for + GBS status once ROM or laboring  - MD consultant on call Jayro/ available prn - updated on cards clearance for labor.   - reevaluate in 2-4 hours/PRN       Cecille ARANA CNM      Update  3/22/2017 9:04 AM  -  Pt desiring shower before next Misoprostol dose.  Up/out of bed/shower and then EFM/TOCO before next dose placed by RN.

## 2017-03-22 NOTE — PROVIDER NOTIFICATION
03/22/17 0232   Provider Notification   Provider Name/Title QUOC Barnes CNM   Method of Notification Electronic Page   Notification Reason Other (Comment)   Pt reports tightness in chest and SOB, similar to what she has experienced in the past. Happening with contractions. Would an EKG be appropriate?

## 2017-03-22 NOTE — PLAN OF CARE
Problem: Labor (Cervical Ripen, Induct, Augment) (Adult,Obstetrics,Pediatric)  Goal: Signs and Symptoms of Listed Potential Problems Will be Absent or Manageable (Labor)  Signs and symptoms of listed potential problems will be absent or manageable by discharge/transition of care (reference Labor (Cervical Ripen, Induct, Augment) (Adult,Obstetrics,Pediatric) CPG).   Outcome: Improving  V SS. Pt coping with labor thus far, changing positions frequently, using peanut ball, hot packs. Reports vaginal pressure and lower backache. Labor vocalization. Ambulating to bathroom and emptying bladder independently. Using Nitrous PRN. Pitocin infusion started at 1611. FHR category 1. Ctx palpate mild.  and SO at bedside. Questions answered. Continue to titrate Pitocin per protocol.

## 2017-03-22 NOTE — PROGRESS NOTES
"Blood pressure 122/81, pulse 93, temperature 98.3  F (36.8  C), temperature source Oral, resp. rate 20, height 1.753 m (5' 9.02\"), last menstrual period 06/08/2016.  Patient Vitals for the past 24 hrs:   BP Temp Temp src Pulse Resp Height   03/21/17 2030 122/81 98.3  F (36.8  C) Oral 93 20 -   03/21/17 1930 - - - 100 - -   03/21/17 1628 127/62 98.3  F (36.8  C) Oral - 16 -   03/21/17 1049 130/74 98.1  F (36.7  C) Oral - 18 1.753 m (5' 9.02\")     General appearance: Reports increased intensity of \"cramping\" but states that she has a high tolerance as her normal menstrual cramps are typically extremely painful. Reports urge to have a bowel movement- not associated with contractions. Does not feel that baby is lower.   CONTRACTIONS: Contractions every 2-5 minutes.  Palpate: Nonpalable to moderate- varies with each contraction  Pitocin- none,  Antibiotics- PCN in active labor or with rom  FETAL HEART TONES: baseline 145 with moderate FHR variability and  pos accelerations.  No decelerations present.    Difficulty tracing fhr/ctx so Kelli monitor was placed. Worked well for several hours and then began showing fhr of 90s. Deceleration ruled out as FHR noted to be 150s when external monitor placed. Will switch to EFM and TOCO as pt now desires to rest in bed.  ROM: not ruptured  PELVIC EXAM:deferred, not indicated at this time, pt declines.    ASSESSMENT:  ==============  IUP @ 41w0d for induction of labor. Indication: late term 41 weeks.   Fetal Heart Rate Tracing category one  GBS- positive- not treated until active labor or rom   Josue too frequently for additional cytotec dosing  S/P PV cytotec x1 and PO cytotec x 1, last dose 1630    Patient Active Problem List   Diagnosis     Sinusitis, chronic     Breast lump on right side at 2:30  o'clock position     Encounter for supervision of normal first pregnancy in first trimester     Sacroiliac joint pain     Pelvic pain affecting pregnancy     GBS (group B " Streptococcus carrier), +RV culture, currently pregnant     Sinus tachycardia     Irregular heart beat     Labor and delivery, indication for care      PLAN:  ===========  Kelli monitor not accurately tracing fetal heart rate. EFM and TOCO applied.  Redose with PO cytotec if contractions space out.   Consider cervical exam and assessment for cook catheter placement. Pt declines at this time as she feels contractions are increasing in regularity and intensity.  Plan for prophylactic IV antibiotic for positive GBS status once in active labor or if rom. Pt agreeable to PCN. IV saline lock already in place.  Reevaluate prn per maternal/fetal indications.  MD consultant on call Dr. Childs/ available prn      Nikita Barnes, APRN, CNM

## 2017-03-22 NOTE — PROVIDER NOTIFICATION
03/22/17 0109   Provider Notification   Provider Name/Title QUOC Barnes CNM   Method of Notification Phone   Notification Reason Other (Comment)   Per CNM- Plan to continue to monitor for heart palpitations, SOB, difficulty breathing, or chest tightness. Plan for EKG if happens again.

## 2017-03-22 NOTE — PROVIDER NOTIFICATION
03/22/17 0130   Provider Notification   Provider Name/Title QUOC Barnes CNM   Method of Notification In Department   Notification Reason Patient Request   Pt reports contractions are more intense and she is unable to sleep through them. Requesting a sleep aid stronger then vistaril. Contractions mild on palpation.

## 2017-03-22 NOTE — PROVIDER NOTIFICATION
03/22/17 0207   Provider Notification   Provider Name/Title pharmacist   Per pharmacist, will need t pull morphine injection under override in pyxis due to an epic glitch

## 2017-03-22 NOTE — PROGRESS NOTES
"Labor progress note    S: Pt requesting waiting to start pitocin augmentation until  here in ~30 minutes.   Coping well with contractions but would like the additional support when starting pitocin.  Would like update on plan of care for the rest of the day.     O:  Blood pressure 114/64, pulse 91, temperature 98.4  F (36.9  C), temperature source Oral, resp. rate 16, height 1.753 m (5' 9.02\"), last menstrual period 2016.  General appearance: uncomfortable with contractions but coping well, resting in between.  Contractions: Every 5-7 minutes. 60-80 seconds duration.  Palpate: moderate.  Soft resting tone.   FHT: Baseline 130 with moderate variability. Accelerations are present. No decelerations present.   Kelli monitor with periods of intake EFM tracing and periods of loss of contact despite RN and CNM troubleshooting.    ROM: not ruptured.  Pelvic exam: Defer  Hendrix Score: 8/13    Pitocin- none,  Antibiotics- none  IV Saline Lock   Last dose of Misoprostol at 0430.      A:  36 year old  with IUP @ 41w1d IOL for postdates   Fetal Heart rate tracing Category one  GBS- positive  Patient Active Problem List   Diagnosis     Sinusitis, chronic     Breast lump on right side at 2:30  o'clock position     Encounter for supervision of normal first pregnancy in first trimester     Sacroiliac joint pain     Pelvic pain affecting pregnancy     GBS (group B Streptococcus carrier), +RV culture, currently pregnant     Sinus tachycardia     Irregular heart beat     Labor and delivery, indication for care         P:  - Continue comfort measures prn. Pt resting with peanut ball at present.   - Continue Pain medication prn.  RN will check with CNM before next dose of IV analgesia.   - Plan Prophylactic antibiotic for + GBS status when ROM or active labor.  - MD consultant on call Jayro/ available prn  - Reviewed with pt plan of care including pitocin induction of labor.  Plan pitocin for contractions q2-3 minutes.  " When active labor can decreased Pitocin infusion.  May continue to be up ad roxi, hydrotherapy.  Will need continuous EFM/TOCO while on pitocin.  All pt's questions discussed and answered.  PT verbalized understanding of and agreement to plan of care.   - Reevaluate in 30min - 1 hours to start pitocin/discus with  present and PRN       Cecille ARANA, HEBERTM

## 2017-03-22 NOTE — PROGRESS NOTES
"Blood pressure 107/63, pulse 98, temperature 98.4  F (36.9  C), temperature source Oral, resp. rate 18, height 1.753 m (5' 9.02\"), last menstrual period 06/08/2016.  Patient Vitals for the past 24 hrs:   BP Temp Temp src Pulse Resp Height   03/21/17 2312 107/63 98.4  F (36.9  C) Oral 98 18 -   03/21/17 2030 122/81 98.3  F (36.8  C) Oral 93 20 -   03/21/17 1930 - - - 100 - -   03/21/17 1628 127/62 98.3  F (36.8  C) Oral - 16 -   03/21/17 1049 130/74 98.1  F (36.7  C) Oral - 18 1.753 m (5' 9.02\")     General appearance: No change in status; not reporting contractions are more frequent or stronger than previously. Would like to sleep. Willing to have cervical exam; when discussing next dose of cytotec, patient requesting PV instead of PO.  CONTRACTIONS: Irregular, contractions every 3-7 minutes.  Palpate: some mild, some nonpalpable, not consistent  Pitocin- none,  Antibiotics- PCN when in active labor or rom  FETAL HEART TONES: baseline 145 with moderate FHR variability and  pos accelerations. No decelerations present.  Continued difficulty with consistent tracing d/t maternal body habitus.  ROM: not ruptured  PELVIC EXAM: 1/50/-2, posterior/soft, cephalic; membrane sweep performed; 25mcg PO cytotec placed PV.    ASSESSMENT:  ==============  IUP @ 41w0d for induction of labor. Indication: late term 41 weeks.   Fetal Heart Rate Tracing category one  GBS- positive- not treated until active labor or rom  Cervical change  S/P PV cytotec x1 and PO cytotec x 1 @1630  Returned to vaginal admin of cytotec, 25mcg cytotec placed PV at 0025     Patient Active Problem List   Diagnosis     Sinusitis, chronic     Breast lump on right side at 2:30  o'clock position     Encounter for supervision of normal first pregnancy in first trimester     Sacroiliac joint pain     Pelvic pain affecting pregnancy     GBS (group B Streptococcus carrier), +RV culture, currently pregnant     Sinus tachycardia     Irregular heart beat     Labor and " delivery, indication for care      PLAN:  ===========  Pt requesting PV administration vs PO of next dose of cytotec to help minimize interruptions.  25mcg cytotec placed per vagina; membrane sweep performed.  Reviewed plan of care with patient- potential of additional pv cytotec if not ophelia too frequently or cook catheter if cannot redose since cervix is now open. Pt remains resistant to idea of cook catheter. Will continue to discuss plan of care and make recommendations as appropriate.  150mg vistaril given to aide with sleep.  Continue to adjust efm and toco to obtain consistent tracing.  Plan for prophylactic IV antibiotic for positive GBS status once in active labor or if rom. Pt agreeable to PCN. IV saline lock already in place; orders for saline flush placed per protocol.  Reevaluate prn per maternal/fetal indications.  MD consultant on call Dr. Childs/ available prn     Nikita Barnes, APRN, CNM

## 2017-03-22 NOTE — PLAN OF CARE
Problem: Labor (Cervical Ripen, Induct, Augment) (Adult,Obstetrics,Pediatric)  Goal: Signs and Symptoms of Listed Potential Problems Will be Absent or Manageable (Labor)  Signs and symptoms of listed potential problems will be absent or manageable by discharge/transition of care (reference Labor (Cervical Ripen, Induct, Augment) (Adult,Obstetrics,Pediatric) [facility]).  Outcome: No Change  Pt continues to have irregular contractions. Using position changes and aromatherapy for comfort.  Family at bedside for support.  Continue with Misoprostal induction.

## 2017-03-22 NOTE — PROGRESS NOTES
"Labor progress note    S: Pt with  at bedside for support.  Has been out of bed up on birthing ball.   Had small amount regular diet for lunch.  Tolerating sips of clear after each contraction.  Consents to starting pitocin infusion as previously discussed.     O:  Blood pressure 114/64, pulse 91, temperature 98.4  F (36.9  C), temperature source Oral, resp. rate 16, height 1.753 m (5' 9.02\"), last menstrual period 2016.  General appearance: uncomfortable with contractions but coping.  Contractions: Every 2-6 minutes. 50-80 seconds duration.  Palpate: mild and moderate.  Soft resting tone.   FHT: Baseline 145 with moderate variability. Accelerations are present. No decelerations present.   Broken EFM tracing d/t Kelli pads not consistently picking up.  Readjusted by RN.    ROM: not ruptured.   Pelvic exam: Defer  Hendrix Score:8/13    Pitocin- none - will be started by RN,  Antibiotics- none  IV Saline lock    A:  36 year old  with IUP @ 41w1d IOL for postterm pregnancy  Fetal Heart rate tracing Category one when tracing.   GBS- positive  Patient Active Problem List   Diagnosis     Sinusitis, chronic     Breast lump on right side at 2:30  o'clock position     Encounter for supervision of normal first pregnancy in first trimester     Sacroiliac joint pain     Pelvic pain affecting pregnancy     GBS (group B Streptococcus carrier), +RV culture, currently pregnant     Sinus tachycardia     Irregular heart beat     Labor and delivery, indication for care         P:  - Continue comfort measures prn including positions to encourage fetal rotation/descent.  Continue non pharmacologic interventions as previously noted.  at bedside for support.   - Checked in with bedside RN who will change from Kelli monitor to EFM/TOCO prn to insure adequate FHR monitoring while pitocin infusing.  Consider FSE/IUPC prn.   - Continue Pain medication prn as ordered.  RN will update CNM if pt requesting additional " pharmacologic intervention requested.   - Plan Prophylactic antibiotic for + GBS status with ROM and labor  - MD consultant on call Jayro/ available prn  - Start Labor induction with Pitocin per protocol  - Reevaluate in 2 hours/PRN.  Consider AROM if clinically indicated for FSE/IUPC tracing prn      Cecille ARANA, MARIEL    Update 3/22/2017 2:52 PM - Pt and  updated bedside RN that she preferred to have Rebozzo done before reapplying EFM/TOCO monitors and starting pitocin.  RN will check back in ~10 minutes to move forward with agreed upon plan of care for pitocin and EFM/TOCO.  HEBERT JassoM

## 2017-03-22 NOTE — PROVIDER NOTIFICATION
"   03/22/17 0100   Provider Notification   Provider Name/Title QUOC Barnes CNM   Method of Notification Electronic Page   Notification Reason Other (Comment)   Pt called out due to experiencing an episode of \"tightness\" in her chest, her heart pounding and shortness of breath. She reports episode lasted less than one minute. Episode happened during a contraction. FHTs reactive.  "

## 2017-03-23 ENCOUNTER — ANESTHESIA (OUTPATIENT)
Dept: OBGYN | Facility: CLINIC | Age: 37
End: 2017-03-23
Payer: COMMERCIAL

## 2017-03-23 ENCOUNTER — ANESTHESIA EVENT (OUTPATIENT)
Dept: OBGYN | Facility: CLINIC | Age: 37
End: 2017-03-23
Payer: COMMERCIAL

## 2017-03-23 LAB
ALT SERPL W P-5'-P-CCNC: 13 U/L (ref 0–50)
AST SERPL W P-5'-P-CCNC: 24 U/L (ref 0–45)
CREAT SERPL-MCNC: 0.69 MG/DL (ref 0.52–1.04)
ERYTHROCYTE [DISTWIDTH] IN BLOOD BY AUTOMATED COUNT: 13.3 % (ref 10–15)
GFR SERPL CREATININE-BSD FRML MDRD: NORMAL ML/MIN/1.7M2
HCT VFR BLD AUTO: 32.3 % (ref 35–47)
HGB BLD-MCNC: 10.7 G/DL (ref 11.7–15.7)
INTERPRETATION ECG - MUSE: NORMAL
MCH RBC QN AUTO: 29.4 PG (ref 26.5–33)
MCHC RBC AUTO-ENTMCNC: 33.1 G/DL (ref 31.5–36.5)
MCV RBC AUTO: 89 FL (ref 78–100)
PLATELET # BLD AUTO: 151 10E9/L (ref 150–450)
RBC # BLD AUTO: 3.64 10E12/L (ref 3.8–5.2)
URATE SERPL-MCNC: 5.6 MG/DL (ref 2.6–6)
WBC # BLD AUTO: 14 10E9/L (ref 4–11)

## 2017-03-23 PROCEDURE — 85027 COMPLETE CBC AUTOMATED: CPT | Performed by: ADVANCED PRACTICE MIDWIFE

## 2017-03-23 PROCEDURE — 25800025 ZZH RX 258: Performed by: ADVANCED PRACTICE MIDWIFE

## 2017-03-23 PROCEDURE — 84460 ALANINE AMINO (ALT) (SGPT): CPT | Performed by: ADVANCED PRACTICE MIDWIFE

## 2017-03-23 PROCEDURE — 25000125 ZZHC RX 250: Performed by: ADVANCED PRACTICE MIDWIFE

## 2017-03-23 PROCEDURE — 82565 ASSAY OF CREATININE: CPT | Performed by: ADVANCED PRACTICE MIDWIFE

## 2017-03-23 PROCEDURE — 25000125 ZZHC RX 250

## 2017-03-23 PROCEDURE — 25000132 ZZH RX MED GY IP 250 OP 250 PS 637: Performed by: ADVANCED PRACTICE MIDWIFE

## 2017-03-23 PROCEDURE — 40000977 ZZH STATISTIC ATTENDANCE AT DELIVERY

## 2017-03-23 PROCEDURE — S0020 INJECTION, BUPIVICAINE HYDRO: HCPCS | Performed by: ANESTHESIOLOGY

## 2017-03-23 PROCEDURE — 84550 ASSAY OF BLOOD/URIC ACID: CPT | Performed by: ADVANCED PRACTICE MIDWIFE

## 2017-03-23 PROCEDURE — 84450 TRANSFERASE (AST) (SGOT): CPT | Performed by: ADVANCED PRACTICE MIDWIFE

## 2017-03-23 PROCEDURE — 25000125 ZZHC RX 250: Performed by: ANESTHESIOLOGY

## 2017-03-23 PROCEDURE — 12000030 ZZH R&B OB INTERMEDIATE UMMC

## 2017-03-23 PROCEDURE — 72200001 ZZH LABOR CARE VAGINAL DELIVERY SINGLE

## 2017-03-23 PROCEDURE — 12000032 ZZH R&B OB CRITICAL UMMC

## 2017-03-23 PROCEDURE — 36415 COLL VENOUS BLD VENIPUNCTURE: CPT | Performed by: ADVANCED PRACTICE MIDWIFE

## 2017-03-23 PROCEDURE — 00HU33Z INSERTION OF INFUSION DEVICE INTO SPINAL CANAL, PERCUTANEOUS APPROACH: ICD-10-PCS | Performed by: ANESTHESIOLOGY

## 2017-03-23 PROCEDURE — 3E0R3CZ INTRODUCTION OF REGIONAL ANESTHETIC INTO SPINAL CANAL, PERCUTANEOUS APPROACH: ICD-10-PCS | Performed by: ANESTHESIOLOGY

## 2017-03-23 PROCEDURE — 10907ZC DRAINAGE OF AMNIOTIC FLUID, THERAPEUTIC FROM PRODUCTS OF CONCEPTION, VIA NATURAL OR ARTIFICIAL OPENING: ICD-10-PCS | Performed by: ADVANCED PRACTICE MIDWIFE

## 2017-03-23 PROCEDURE — 25000128 H RX IP 250 OP 636: Performed by: ADVANCED PRACTICE MIDWIFE

## 2017-03-23 RX ORDER — CALCIUM CARBONATE 500 MG/1
1000 TABLET, CHEWABLE ORAL 3 TIMES DAILY PRN
Status: DISCONTINUED | OUTPATIENT
Start: 2017-03-23 | End: 2017-03-23

## 2017-03-23 RX ORDER — NALOXONE HYDROCHLORIDE 0.4 MG/ML
.1-.4 INJECTION, SOLUTION INTRAMUSCULAR; INTRAVENOUS; SUBCUTANEOUS
Status: DISCONTINUED | OUTPATIENT
Start: 2017-03-23 | End: 2017-03-25 | Stop reason: HOSPADM

## 2017-03-23 RX ORDER — OXYTOCIN 10 [USP'U]/ML
INJECTION, SOLUTION INTRAMUSCULAR; INTRAVENOUS
Status: DISCONTINUED
Start: 2017-03-23 | End: 2017-03-23 | Stop reason: WASHOUT

## 2017-03-23 RX ORDER — OXYTOCIN/0.9 % SODIUM CHLORIDE 30/500 ML
100 PLASTIC BAG, INJECTION (ML) INTRAVENOUS CONTINUOUS
Status: DISCONTINUED | OUTPATIENT
Start: 2017-03-23 | End: 2017-03-25 | Stop reason: HOSPADM

## 2017-03-23 RX ORDER — OXYTOCIN/0.9 % SODIUM CHLORIDE 30/500 ML
PLASTIC BAG, INJECTION (ML) INTRAVENOUS
Status: COMPLETED
Start: 2017-03-23 | End: 2017-03-23

## 2017-03-23 RX ORDER — LANOLIN 100 %
OINTMENT (GRAM) TOPICAL
Status: DISCONTINUED | OUTPATIENT
Start: 2017-03-23 | End: 2017-03-25 | Stop reason: HOSPADM

## 2017-03-23 RX ORDER — IBUPROFEN 400 MG/1
400-800 TABLET, FILM COATED ORAL EVERY 6 HOURS PRN
Status: DISCONTINUED | OUTPATIENT
Start: 2017-03-23 | End: 2017-03-25 | Stop reason: HOSPADM

## 2017-03-23 RX ORDER — HYDROCORTISONE 2.5 %
CREAM (GRAM) TOPICAL 3 TIMES DAILY PRN
Status: DISCONTINUED | OUTPATIENT
Start: 2017-03-23 | End: 2017-03-25 | Stop reason: HOSPADM

## 2017-03-23 RX ORDER — OXYTOCIN/0.9 % SODIUM CHLORIDE 30/500 ML
340 PLASTIC BAG, INJECTION (ML) INTRAVENOUS CONTINUOUS PRN
Status: DISCONTINUED | OUTPATIENT
Start: 2017-03-23 | End: 2017-03-25 | Stop reason: HOSPADM

## 2017-03-23 RX ORDER — NALBUPHINE HYDROCHLORIDE 10 MG/ML
2.5-5 INJECTION, SOLUTION INTRAMUSCULAR; INTRAVENOUS; SUBCUTANEOUS EVERY 6 HOURS PRN
Status: DISCONTINUED | OUTPATIENT
Start: 2017-03-23 | End: 2017-03-25 | Stop reason: HOSPADM

## 2017-03-23 RX ORDER — AMOXICILLIN 250 MG
1-2 CAPSULE ORAL 2 TIMES DAILY
Status: DISCONTINUED | OUTPATIENT
Start: 2017-03-23 | End: 2017-03-25 | Stop reason: HOSPADM

## 2017-03-23 RX ORDER — ACETAMINOPHEN 325 MG/1
650 TABLET ORAL EVERY 4 HOURS PRN
Status: DISCONTINUED | OUTPATIENT
Start: 2017-03-23 | End: 2017-03-25 | Stop reason: HOSPADM

## 2017-03-23 RX ORDER — MISOPROSTOL 200 UG/1
400 TABLET ORAL
Status: DISCONTINUED | OUTPATIENT
Start: 2017-03-23 | End: 2017-03-25 | Stop reason: HOSPADM

## 2017-03-23 RX ORDER — OXYTOCIN 10 [USP'U]/ML
10 INJECTION, SOLUTION INTRAMUSCULAR; INTRAVENOUS
Status: DISCONTINUED | OUTPATIENT
Start: 2017-03-23 | End: 2017-03-25 | Stop reason: HOSPADM

## 2017-03-23 RX ORDER — EPHEDRINE SULFATE 50 MG/ML
5 INJECTION, SOLUTION INTRAMUSCULAR; INTRAVENOUS; SUBCUTANEOUS
Status: DISCONTINUED | OUTPATIENT
Start: 2017-03-23 | End: 2017-03-25 | Stop reason: HOSPADM

## 2017-03-23 RX ADMIN — Medication 10 ML: at 01:30

## 2017-03-23 RX ADMIN — Medication 2.5 MILLION UNITS: at 13:33

## 2017-03-23 RX ADMIN — PENICILLIN G POTASSIUM 5 MILLION UNITS: 5000000 POWDER, FOR SOLUTION INTRAMUSCULAR; INTRAPLEURAL; INTRATHECAL; INTRAVENOUS at 05:31

## 2017-03-23 RX ADMIN — OXYTOCIN-SODIUM CHLORIDE 0.9% IV SOLN 30 UNIT/500ML 340 ML/HR: 30-0.9/5 SOLUTION at 22:16

## 2017-03-23 RX ADMIN — Medication 10 ML/HR: at 18:46

## 2017-03-23 RX ADMIN — Medication 2.5 MILLION UNITS: at 17:30

## 2017-03-23 RX ADMIN — CALCIUM CARBONATE (ANTACID) CHEW TAB 500 MG 1000 MG: 500 CHEW TAB at 13:35

## 2017-03-23 RX ADMIN — SODIUM CHLORIDE, POTASSIUM CHLORIDE, SODIUM LACTATE AND CALCIUM CHLORIDE: 600; 310; 30; 20 INJECTION, SOLUTION INTRAVENOUS at 01:16

## 2017-03-23 RX ADMIN — Medication 340 ML/HR: at 22:16

## 2017-03-23 RX ADMIN — Medication 2.5 MILLION UNITS: at 10:15

## 2017-03-23 RX ADMIN — Medication: at 01:40

## 2017-03-23 RX ADMIN — SODIUM CHLORIDE, POTASSIUM CHLORIDE, SODIUM LACTATE AND CALCIUM CHLORIDE: 600; 310; 30; 20 INJECTION, SOLUTION INTRAVENOUS at 10:15

## 2017-03-23 RX ADMIN — ACETAMINOPHEN 650 MG: 325 TABLET, FILM COATED ORAL at 09:10

## 2017-03-23 RX ADMIN — SODIUM CHLORIDE, POTASSIUM CHLORIDE, SODIUM LACTATE AND CALCIUM CHLORIDE 1000 ML: 600; 310; 30; 20 INJECTION, SOLUTION INTRAVENOUS at 00:12

## 2017-03-23 RX ADMIN — OXYTOCIN-SODIUM CHLORIDE 0.9% IV SOLN 30 UNIT/500ML 2 MILLI-UNITS/MIN: 30-0.9/5 SOLUTION at 02:13

## 2017-03-23 NOTE — PROVIDER NOTIFICATION
03/22/17 2315   Uterine Activity   Monitor [place]   Contraction Frequency (minutes) 2-5   Contraction Duration (seconds)    Contraction Quality Moderate   Resting Tone Palpated Soft   FHR   Monitor External US   Unable to obtain FHT while pt sitting on birthing ball and up to bathroom. Once in side lying, FHT moderate variability in 140-150s.

## 2017-03-23 NOTE — PROGRESS NOTES
Rounded on patient to assess functionality of epidural.  Patient's RN was present and stated patient is complete.  Patient stated she was comfortable, but required a bolus from anesthesia roughly 2 hours prior.      I notified the patient that as she gets closer to pushing and as baby descends, she may have more discomfort that may not be fully covered by the epidural.  I did suggest to her that once she begins to push, she should bolus her catheter with her PCEA to try to garnish some increased relief during the final period of labor.  Patient understood these recommendations.    Adam Henderson  OB Anesthesia Resident

## 2017-03-23 NOTE — PROGRESS NOTES
"Blood pressure 156/70, pulse 100, temperature 98.5  F (36.9  C), temperature source Oral, resp. rate 18, height 1.753 m (5' 9.02\"), last menstrual period 2016.    General appearance: uncomfortable with contractions  CONTRACTIONS: moderate and every 2-5 minutes, toco not picking up contractions well  Pitocin- none,  Antibiotics- none  FETAL HEART TONES: much loss of contact, continuous EFM- baseline 145 with moderate variability and positive accelerations. No decelerations.  ROM: not ruptured  PELVIC EXAM: 4.5/ 80%/ -2     ASSESSMENT:  ==============  IUP @ 41w2d in early labor and for induction of labor.  Indication: SROM without labor   Fetal Heart Rate Tracing category one  GBS- positive- not treated until active labor    PLAN:  ===========  Discussed pain management options with patient. Reviewed ambulation, hydration, position changes, birthing ball/sling, hydrotherapy, nitrous, and IV medications.  Pt has tried nitrous, birthing ball & sling, IV nubain; not interested in trying other methods at this time. Discussed r/b/a of epidural. Pt interested in epidural.   IV fluid bolus begun and anesthesia notified  Reevaluate in 2-4 hours prn  Anticipate   Pitocin discontinued due to frequent contractions per patient's report. Continue labor augmentation with Pitocin when epidural is placed.  MD consultant on call Dr. Bloom/ available prn     YASMEEN [place] CNM    "

## 2017-03-23 NOTE — PROGRESS NOTES
"Blood pressure 121/66, pulse 100, temperature 98  F (36.7  C), temperature source Oral, resp. rate 18, height 1.753 m (5' 9.02\"), last menstrual period 2016.    General appearance: pt reporting more rectal pressure with contractions, appears uncomfortable during contractions;  and FOB bedside for support  CONTRACTIONS: moderate and every 3-5 minutes  Pitocin- 4 mu/min,  Antibiotics- none  FETAL HEART TONES: continuous EFM- baseline 125 with minimal to moderate variability and accelerations x 1. No decelerations.  S/p Nubain at 1630.  ROM: not ruptured  PELVIC EXAM: 3.5/ 80%/ Anterior/ soft/ -2     ASSESSMENT:  ==============  IUP @ 41w1d in early labor and for induction of labor.  Indication: postdates   Fetal Heart Rate Tracing category two  GBS- positive- not treated until active labor    PLAN:  ===========  Encouraged ambulation, hydration, position changes, counter-pressure as comfort measure options.  Re-evaluate in 2-4 hours prn  Anticipate   Continue labor induction with Pitocin  Prophylactic IV antibiotic for positive GBS status reviewed with pt. Agreeable to PCN. Begin treatment with active labor.  MD consultant on call Dr. Bloom/ available prn     YASMEEN [place] CNM    "

## 2017-03-23 NOTE — PROGRESS NOTES
SVE at 2018: 3.5/80/-2. Pt coping, using counter pressure. Comfortable in lateral positions. Educated on upright positioning, discussed several positions to try, encouraged ambulation -- pt states ctx while standing are too unbearable and wants to stay lying down. Pitocin at 5mL/hr. FHR category 1.  and support person at bedside. Continue with plan of care.

## 2017-03-23 NOTE — PROGRESS NOTES
"Blood pressure 118/56, pulse 130, temperature 98.6  F (37  C), resp. rate 18, height 1.753 m (5' 9.02\"), last menstrual period 2016, SpO2 96 %.    General appearance: resting comfortably in bed, has been able to rest  CONTRACTIONS: moderate and every 3-6 minutes  Pitocin- 6 mu/min.,  Antibiotics- none  FETAL HEART TONES: continuous EFM- baseline 135 with moderate variability and positive accelerations. No decelerations.  ROM: not ruptured  PELVIC EXAM: 5.5/ 90%/  -1     ASSESSMENT:  ==============  IUP @ 41w2d in early labor and for induction of labor.  Indication: postdates   Fetal Heart Rate Tracing category one  GBS- positive- antibiotics started  Maternal tachycardia, afebrile    PLAN:  ===========  Frequent position changes to facilitate labor with epidural anesthesia.  Reevaluate in 2-4 hours prn  Anticipate   Continue labor induction with Pitocin  Begin prophylactic IV antibiotic for positive GBS status reviewed with pt. Agreeable to PCN.  MD consultant on call Dr. Bloom/ available prn     YASMEEN [place] CNM    Update:  Consulted with Dr. Bloom about maternal tachycardia.  Vitals stable, patient asymptomatic; encouraged to continue to monitor vitals (HR, T, BP, and O2). Re-evaluate if patient becomes symptomatic or change in vital signs.   "

## 2017-03-23 NOTE — PLAN OF CARE
Problem: Goal Outcome Summary  Goal: Goal Outcome Summary  Outcome: Improving  Pt is getting more uncomfortable. Changing positions with encouragement, upright positioning. TOCO being adjusted frequently as well as US. RN having to rely on pt report/palpation d/t unclear TOCO strip. This RN hesitant to titrate Pitocin without reliable interpretation of strip. It's currently at 6mL/hr. ARACELI Cannon CNM updated in department on patient status. Penicillin has not been started yet. Continue with plan of care. Anticipate .

## 2017-03-23 NOTE — PROVIDER NOTIFICATION
03/22/17 2215   Uterine Activity   Monitor [place]   Contraction Frequency (minutes) 1-5   Contraction Duration (seconds)    Contraction Quality Moderate   Resting Tone Palpated Soft   FHR   Monitor External US   Variability 6-25 BPM   Baseline Rate (Fetus A) 130 bpm   Baseline Classification Normal   Accelerations Present   Decelerations Other (comment)   Pt up to void and was sitting on a chair in the shower for awhile leaning against Joann. FHT shows what could be decels to 90s but the tracing was broken. Pt not wanting to stand up or get into another position to identify FHR. At 2220 - 2240 pt was standing up in room, leaning against the bed, and went into hands and knees. RN trying to adjust US to detect FHR, pt not willing to allow RN to adjust monitor adequately -- doesn't want to be touched. Pt needing help to control breathing, support persons providing counter pressure when able. Contraction pattern also not detected the best, adjusting TOCO again is difficult. RN palpating and listening to pt report for frequency/duration.

## 2017-03-23 NOTE — PROGRESS NOTES
"Blood pressure 137/77, pulse 100, temperature 98.5  F (36.9  C), temperature source Oral, resp. rate 18, height 1.753 m (5' 9.02\"), last menstrual period 2016, SpO2 99 %.    General appearance: pt sleeping in bed after epidural placement  CONTRACTIONS: moderate and every 5 minutes  Pitocin- none,  Antibiotics- none  FETAL HEART TONES: continuous EFM- baseline 145 with moderate variability and positive accelerations. No decelerations.  ROM: not ruptured  PELVIC EXAM:deferred    ASSESSMENT:  ==============  IUP @ 41w2d in early labor and for induction of labor.  Indication: postdates   Fetal Heart Rate Tracing category one  GBS- positive- not treated until active labor    PLAN:  ===========  Frequent position changes to facilitate labor with epidural anesthesia.  Reevaluate in 2-4 hours prn  Anticipate   Continue labor induction with Pitocin, restart Pitocin  MD consultant on call Dr. Bloom/ available prn     YASMEEN [place] CNM    "

## 2017-03-23 NOTE — PROGRESS NOTES
"Blood pressure 120/73, pulse 130, temperature 97.6  F (36.4  C), temperature source Oral, resp. rate 18, height 1.753 m (5' 9.02\"), last menstrual period 06/08/2016, SpO2 99 %.  Patient Vitals for the past 24 hrs:   BP Temp Temp src Pulse Resp SpO2   03/23/17 0929 120/73 - - - - -   03/23/17 0900 - 97.6  F (36.4  C) Oral - 18 99 %   03/23/17 0856 - - - - - 96 %   03/23/17 0844 - - - - - 100 %   03/23/17 0830 - - - - - 98 %   03/23/17 0800 - - - - - 98 %   03/23/17 0715 117/61 - - - - 97 %   03/23/17 0645 139/77 - - - - 94 %   03/23/17 0515 150/72 - - - - 98 %   03/23/17 0415 118/56 - - 130 18 96 %   03/23/17 0400 - 98.6  F (37  C) - 124 - 96 %   03/23/17 0345 129/61 - - 115 18 99 %   03/23/17 0330 - - - 118 - -   03/23/17 0315 118/54 - - 115 - 96 %   03/23/17 0300 - - - 111 - -   03/23/17 0245 116/61 - - 109 - 96 %   03/23/17 0230 - - - 99 - -   03/23/17 0215 138/80 - - - - -   03/23/17 0211 142/79 - - - - -   03/23/17 0206 142/80 - - - - -   03/23/17 0201 139/83 - - - - -   03/23/17 0156 138/79 - - - - -   03/23/17 0152 138/80 - - - - -   03/23/17 0147 137/75 - - - - -   03/23/17 0144 135/74 - - - - 98 %   03/23/17 0141 136/73 - - - - -   03/23/17 0140 137/77 - - - - -   03/23/17 0137 139/74 - - - - -   03/23/17 0134 150/78 - - - - 99 %   03/23/17 0128 153/81 - - - - -   03/23/17 0127 141/76 - - - - -   03/23/17 0125 146/86 - - - - 98 %   03/23/17 0055 131/78 - - - - 98 %   03/22/17 2345 - 98.5  F (36.9  C) Oral - - -   03/22/17 2229 156/70 - - - - -   03/22/17 2117 132/63 - - - - -   03/22/17 2021 130/63 - - - - -   03/22/17 1930 121/66 98  F (36.7  C) Oral 100 18 -   03/22/17 1844 125/72 - - - 18 -   03/22/17 1710 137/73 - - - - -   03/22/17 1541 142/77 98  F (36.7  C) Oral 90 18 -   03/22/17 1505 123/73 98.2  F (36.8  C) Oral 100 18 -   03/22/17 1211 114/64 98.4  F (36.9  C) Oral 91 16 -     General appearance: comfortable and dozing. Some point pain in left groin not covered by epidural bolus. Better with " position change.  Not feeling pressure. No chestpain, SOB  CONTACTIONS: moderate and every 3-7 minutes  Pitocin- 14 mu/min.,  Antibiotics- PCN  FETAL HEART TONES: continuous EFM- baseline 140 with moderate variability and positive accelerations. No decelerations.  ROM: AROM with SVE with pt consent, moderate meconium fluid  PELVIC EXAM:4.5/90/-1  ASSESSMENT:  ==============  IUP @ 41w2d for induction of labor.  Indication: postdates   No cervical change since last exam  Fetal Heart Rate Tracing category one  GBS- positive- adequately treated    PLAN:  ===========  Frequent position changes to facilitate labor with epidural anesthesia.  Reevaluate in 2-4 hours prn  Continue labor induction with Pitocin  Continue prophylactic IV antibiotic PCN for positive GBS status.   Akanksha Griffiths, YASMEEN AMBROSIOM

## 2017-03-23 NOTE — ANESTHESIA PREPROCEDURE EVALUATION
Anesthesia Evaluation       history and physical reviewed .      No history of anesthetic complications          ROS/MED HX    ENT/Pulmonary:  - neg pulmonary ROS     Neurologic:  - neg neurologic ROS     Cardiovascular:  - neg cardiovascular ROS       METS/Exercise Tolerance:     Hematologic:         Musculoskeletal:         GI/Hepatic:  - neg GI/hepatic ROS       Renal/Genitourinary:         Endo:         Psychiatric:         Infectious Disease:         Malignancy:         Other:                     Physical Exam  Normal systems: cardiovascular, pulmonary and dental    Airway   Mallampati: III  TM distance: > 3 FB  Neck ROM: full  Mouth opening: > 3 cm    Dental     Cardiovascular       Pulmonary                 Carpal tunnel.  Sinus tachycardia.           Anesthesia Plan      History & Physical Review      ASA Status:  .  OB Epidural Asa: 2 and emergent            Postoperative Care      Consents  Anesthetic plan, risks, benefits and alternatives discussed with:  Patient..

## 2017-03-23 NOTE — PROVIDER NOTIFICATION
03/22/17 6331   Provider Notification   Provider Name/Title Jeromy Cannon CNM   Method of Notification Electronic Page   Notification Reason Patient Request   Pt is requesting to see you for SVE and to talk to you about medication :)  She is not feeling like she is getting a break from ctx and toco is not picking up well, so  Pit stopped for the moment and will re-evaluate in 30 min.

## 2017-03-23 NOTE — PLAN OF CARE
Problem: Goal Outcome Summary  Goal: Goal Outcome Summary  Outcome: No Change  VSS. (HR WDL- hx of tachycardia, states normal range is 100-130). Afebrile with SaO2 >95%. Patient has been experiencing hip pain on both sides, benefits from frequent repositioning and pillows/peanut ball between legs. Pitocin has been steadily increased as contractions have not stayed in the 2-3 minutes range (currently running at 23). FHR stable between 130-145. Patient's bladder emptied two times (last emptied at 1350). Last empty provided lower abdomen pain relief for patient. Epidural is running, may need to remind patient she can push PCA. Patient takes frequent naps and is able to relax between contractions.

## 2017-03-23 NOTE — PLAN OF CARE
Problem: Labor (Cervical Ripen, Induct, Augment) (Adult,Obstetrics,Pediatric)  Goal: Signs and Symptoms of Listed Potential Problems Will be Absent or Manageable (Labor)  Signs and symptoms of listed potential problems will be absent or manageable by discharge/transition of care (reference Labor (Cervical Ripen, Induct, Augment) (Adult,Obstetrics,Pediatric) [facility]).   Outcome: Improving  Pt stable, VS WDL - maternal tachycardia (ranging 100-130 bpm) with hx of sinus tachycardia, pt states this is her normal range. Afebrile. SaO2>95%. Epidural placement, difficulty tracing FHR prior to and during placement with pt movement and position. Pitocin turned off at 2341 due to pt reports of not getting rest between contractions & difficulty tracing ctx on [place]; pit restarted at 0213 and titrated up to 11mu/min. After epidural, pt repositioned frequently and bladder emptied via straight catheter. Pt able to rest comfortably with epidural.

## 2017-03-23 NOTE — PROGRESS NOTES
"Labor progress note    S:  Carrie is now completely dilated per SVE from RN.  She received an epidural bolus and was getting good relief until recently, she is now feeling pressure on her pubic bone with contractions.  She denies rectal pressure or urge to push.      O:  Blood pressure 126/58, pulse 120, temperature 98.8  F (37.1  C), temperature source Oral, resp. rate 20, height 1.753 m (5' 9.02\"), last menstrual period 2016, SpO2 99 %.  General appearance: feeling anterior pressure with contractions.  Contractions: Every 4-5 minutes. 60 seconds duration.  Palpate: moderate.  FHT: Baseline 150 with moderate variability. Accelerations and decelerations are absent.  ROM: light meconium fluid. Membranes have been ruptured for 8 hours.  Pelvic exam: 10/ +1 per RN    Pitocin- 24 mu/min.,  Antibiotics- PCN    A:  IUP @ 41w2d IOL for postdates   Fetal Heart rate tracing Category category one  GBS- positive, adequately treated with antibiotics.   Patient Active Problem List   Diagnosis     Sinusitis, chronic     Breast lump on right side at 2:30  o'clock position     Encounter for supervision of normal first pregnancy in first trimester     Sacroiliac joint pain     Pelvic pain affecting pregnancy     GBS (group B Streptococcus carrier), +RV culture, currently pregnant     Sinus tachycardia     Irregular heart beat     Labor and delivery, indication for care     P:  Labor down until patient feeling urge to push.  Re-evaluate in one hour.  Anticipate .       IEbony SNM am serving as a scribe to document services personally performed by Sidney Griffiths CNM based on the provider's statements to me.  - Ebony Meyers  Seen and evaluated with student and agree with note as written. Assessment and plan formulated by myself.YASMEEN [place]air CNM    "

## 2017-03-23 NOTE — PROVIDER NOTIFICATION
03/23/17 0417   Provider Notification   Provider Name/Title ARACELI Cannon CNM   Method of Notification Electronic Page   Notification Reason Status Update   Pit titrated up to 6 - pt HR had been sitting in the 110s and is now up to the 130s - baby looks great, last /56, temp 98.6. I wasn't sure if there was anything we're doing with her Hx of Sinus Tachycardia?

## 2017-03-23 NOTE — PROGRESS NOTES
"Blood pressure 117/61, pulse 130, temperature 98.6  F (37  C), resp. rate 18, height 1.753 m (5' 9.02\"), last menstrual period 06/08/2016, SpO2 98 %.  Patient Vitals for the past 24 hrs:   BP Temp Temp src Pulse Resp SpO2   03/23/17 0800 - - - - - 98 %   03/23/17 0715 117/61 - - - - 97 %   03/23/17 0645 139/77 - - - - 94 %   03/23/17 0515 150/72 - - - - 98 %   03/23/17 0415 118/56 - - 130 18 96 %   03/23/17 0400 - 98.6  F (37  C) - 124 - 96 %   03/23/17 0345 129/61 - - 115 18 99 %   03/23/17 0330 - - - 118 - -   03/23/17 0315 118/54 - - 115 - 96 %   03/23/17 0300 - - - 111 - -   03/23/17 0245 116/61 - - 109 - 96 %   03/23/17 0230 - - - 99 - -   03/23/17 0215 138/80 - - - - -   03/23/17 0211 142/79 - - - - -   03/23/17 0206 142/80 - - - - -   03/23/17 0201 139/83 - - - - -   03/23/17 0156 138/79 - - - - -   03/23/17 0152 138/80 - - - - -   03/23/17 0147 137/75 - - - - -   03/23/17 0144 135/74 - - - - 98 %   03/23/17 0141 136/73 - - - - -   03/23/17 0140 137/77 - - - - -   03/23/17 0137 139/74 - - - - -   03/23/17 0134 150/78 - - - - 99 %   03/23/17 0128 153/81 - - - - -   03/23/17 0127 141/76 - - - - -   03/23/17 0125 146/86 - - - - 98 %   03/23/17 0055 131/78 - - - - 98 %   03/22/17 2345 - 98.5  F (36.9  C) Oral - - -   03/22/17 2229 156/70 - - - - -   03/22/17 2117 132/63 - - - - -   03/22/17 2021 130/63 - - - - -   03/22/17 1930 121/66 98  F (36.7  C) Oral 100 18 -   03/22/17 1844 125/72 - - - 18 -   03/22/17 1710 137/73 - - - - -   03/22/17 1541 142/77 98  F (36.7  C) Oral 90 18 -   03/22/17 1505 123/73 98.2  F (36.8  C) Oral 100 18 -   03/22/17 1211 114/64 98.4  F (36.9  C) Oral 91 16 -     General appearance: comfortable  No chest pain or SOB. EKG from yesterday reviewed with MD  Rested for the last few hours. Not feeling pressure  CONTACTIONS: moderate and every 3-5 minutes  Pitocin- 12 mu/min.,  Antibiotics- PCN  FETAL HEART TONES: continuous EFM- baseline 145 with moderate variability and positive " accelerations. No decelerations.  ROM: not ruptured  PELVIC EXAM:deferred  EFW 8.5-9 lb per leopolds  ASSESSMENT:  ==============  IUP @ 41w2d for induction of labor.  Indication: postdates   Hx of sinus tachycardia, EKG wnl and unchanged from previous study  Fetal Heart Rate Tracing category one  GBS- positive- antibiotics started    PLAN:  ===========  Discussed AROM to facilitate labor. Pt wishes to wait until after she has eaten- planning applesauce and juice  Frequent position changes to facilitate labor with epidural anesthesia.  Continue labor induction with Pitocin  Continue prophylactic IV antibiotic PCN for positive GBS status.     Akanksha Griffiths, YASMEEN CNM

## 2017-03-23 NOTE — ANESTHESIA PROCEDURE NOTES
Epidural Procedure Note    Staff:     Anesthesiologist:  JOSEPH HARTLEY    Resident/CRNA:  MITZI RUDOLPH    Procedure performed by resident/CRNA in the presence of a teaching physician    Location: OB     Procedure start time:  3/23/2017 12:25 AM     Procedure end time:  3/23/2017 1:30 AM   Pre-procedure checklist:   patient identified, IV checked, site marked, risks and benefits discussed, informed consent, monitors and equipment checked, pre-op evaluation, at physician/surgeon's request and post-op pain management      Correct Patient: Yes      Correct Position: Yes      Correct Site: Yes      Correct Procedure: Yes      Correct Laterality:  Yes    Site Marked:  Yes  Procedure:     Procedure:  Epidural catheter    Position:  Sitting    Sterile Prep: chloraprep, mask, sterile gloves and patient draped      Insertion site:  L3-4    Local skin infiltration:  1% lidocaine    amount (mL):  5    Approach:  Midline    Needle gauge (G):  17    Needle Length (in):  3.5    Block Needle Type:  Touhy    Injection Technique:  LORT saline    CATARINO at (cm):  9    Attempts:  3    Redirects:  2    Catheter gauge (G):  19    Catheter threaded easily: Yes      Threaded to cm at skin:  13    Threaded in epidural space (cm):  4    Paresthesias:  No    Aspiration negative for Heme or CSF: Yes      Test dose (mL):  3     Local anesthetic:  Lidocaine 1.5% w/ 1:200,000 epinephrine    Test dose time:  01:25    Test dose negative for signs of intravascular, subdural or intrathecal injection: Yes    Assessment/Narrative:      Difficult placement given patient's body habitus.  Difficulty locating spinous processes.  Attempted CSE; however, return of spinal fluid was not accomplished and patient was having pain while attempting to puncture dura therefore CSE was aborted and catheter was threaded into epidural space.  10 mL bolus of 0.125% Bupivacaine given at 0130.

## 2017-03-23 NOTE — PROGRESS NOTES
"Blood pressure 122/77, pulse 120, temperature 98.2  F (36.8  C), temperature source Oral, resp. rate 18, height 1.753 m (5' 9.02\"), last menstrual period 06/08/2016, SpO2 99 %.    General appearance: uncomfortable with contractions  Had been feeling more pressure. Was rebolused by anesthesia with some relief  CONTACTIONS: moderate and every 2-4 minutes  Pitocin- 23 mu/min.,  Antibiotics- PCN  FETAL HEART TONES: continuous EFM- baseline 145 with moderate variability and positive accelerations. No decelerations.  ROM: moderate meconium fluid  PELVIC EXAM:9/100/-1 per RN  ASSESSMENT:  ==============  IUP @ 41w2d in active labor and for induction of labor.  Indication: postdates   Fetal Heart Rate Tracing category one  GBS- positive- adequately treated    PLAN:  ===========  Frequent position changes to facilitate labor with epidural anesthesia.  Observation and reevaluate in 1-2 hours prn. Plan to labor down when complete  Continue labor induction with Pitocin  Continue prophylactic IV antibiotic PCN for positive GBS status.   Akanksha Griffiths, APRN CNM    "

## 2017-03-24 LAB
GLUCOSE BLDC GLUCOMTR-MCNC: 88 MG/DL (ref 70–99)
HGB BLD-MCNC: 9.3 G/DL (ref 11.7–15.7)

## 2017-03-24 PROCEDURE — 25000132 ZZH RX MED GY IP 250 OP 250 PS 637: Performed by: ADVANCED PRACTICE MIDWIFE

## 2017-03-24 PROCEDURE — 85018 HEMOGLOBIN: CPT | Performed by: ADVANCED PRACTICE MIDWIFE

## 2017-03-24 PROCEDURE — 00000146 ZZHCL STATISTIC GLUCOSE BY METER IP

## 2017-03-24 PROCEDURE — 12000030 ZZH R&B OB INTERMEDIATE UMMC

## 2017-03-24 PROCEDURE — 36415 COLL VENOUS BLD VENIPUNCTURE: CPT | Performed by: ADVANCED PRACTICE MIDWIFE

## 2017-03-24 RX ORDER — SENNOSIDES 8.6 MG
2 TABLET ORAL 2 TIMES DAILY PRN
Qty: 120 TABLET | Refills: 1 | Status: SHIPPED | OUTPATIENT
Start: 2017-03-24 | End: 2017-08-24

## 2017-03-24 RX ORDER — FERROUS SULFATE 325(65) MG
325 TABLET ORAL 2 TIMES DAILY
Qty: 60 TABLET | Refills: 2 | Status: SHIPPED | OUTPATIENT
Start: 2017-03-24 | End: 2017-08-24

## 2017-03-24 RX ORDER — IBUPROFEN 600 MG/1
600 TABLET, FILM COATED ORAL EVERY 6 HOURS PRN
Qty: 90 TABLET | Refills: 1 | Status: SHIPPED | OUTPATIENT
Start: 2017-03-24 | End: 2017-08-24

## 2017-03-24 RX ORDER — FERROUS SULFATE 325(65) MG
325 TABLET ORAL 2 TIMES DAILY
Status: DISCONTINUED | OUTPATIENT
Start: 2017-03-24 | End: 2017-03-25 | Stop reason: HOSPADM

## 2017-03-24 RX ADMIN — IBUPROFEN 800 MG: 400 TABLET ORAL at 08:40

## 2017-03-24 RX ADMIN — SENNOSIDES AND DOCUSATE SODIUM 2 TABLET: 8.6; 5 TABLET ORAL at 21:10

## 2017-03-24 RX ADMIN — IBUPROFEN 800 MG: 400 TABLET ORAL at 00:20

## 2017-03-24 RX ADMIN — IRON 325 MG: 65 TABLET ORAL at 08:41

## 2017-03-24 RX ADMIN — IBUPROFEN 800 MG: 400 TABLET ORAL at 21:10

## 2017-03-24 RX ADMIN — IBUPROFEN 800 MG: 400 TABLET ORAL at 15:47

## 2017-03-24 RX ADMIN — IRON 325 MG: 65 TABLET ORAL at 21:10

## 2017-03-24 RX ADMIN — SENNOSIDES AND DOCUSATE SODIUM 2 TABLET: 8.6; 5 TABLET ORAL at 08:41

## 2017-03-24 NOTE — PROGRESS NOTES
"Labor progress note    S:  Carrie has been pushing with strong urge since 1945. She has tried high-fowlers and both left and right side. She continues to have some pain in her left hip and groin. Baby has descended with small amount of caput but is not yet .     O:  Blood pressure 138/80, pulse 120, temperature 99.1  F (37.3  C), resp. rate 20, height 1.753 m (5' 9.02\"), last menstrual period 2016, SpO2 100 %.  General appearance: pressure with contractions.  Contractions: Every 2-3 minutes. 60 seconds duration.  Palpate: strong.  FHT: Baseline 155 bpm with moderate to minimal variability. Accelerations are not present present and decelerations are also absent.   ROM: moderate meconium fluid. Membranes have been ruptured for 11 hours.  Pelvic exam: Complete and pushing    Pitocin- 24 mu/min.,  Antibiotics- PCN    A:  IUP @ 41w2d IOL for postdates  Fetal Heart rate tracing Category category two  GBS- positive, has been adequately treated  Has been pushing for 1 hour and 15 minutes with slow fetal descent.   Patient Active Problem List   Diagnosis     Sinusitis, chronic     Breast lump on right side at 2:30  o'clock position     Encounter for supervision of normal first pregnancy in first trimester     Sacroiliac joint pain     Pelvic pain affecting pregnancy     GBS (group B Streptococcus carrier), +RV culture, currently pregnant     Sinus tachycardia     Irregular heart beat     Labor and delivery, indication for care       P:  MD on call, Dr. Malin, has been notified of possible large infant and risk for shoulder dystocia, will have inhouse MD stand by.  Anticipate vaginal delivery at this time.     IEbony SNM am serving as a scribe to document services personally performed by Sidney Griffiths CNM based on the provider's statements to me.  - Ebony Meyers  Seen and evaluated with student and agree with note as written. Assessment and plan formulated by myself.Akanksha Chris" YASMEEN GriffithsM

## 2017-03-24 NOTE — PROGRESS NOTES
"Post Partum Note    Carrie Munoz      MRN#: [id number]  Age: 36 year old      YOB: 1980      Post-partum day #1    SIGNIFICANT PROBLEMS:  Patient Active Problem List    Diagnosis Date Noted      (normal spontaneous vaginal delivery) 2017     Priority: Medium     Labor and delivery, indication for care 2017     Priority: Medium     Sinus tachycardia 2017     Priority: Medium     Pelvic pain affecting pregnancy 2016     Priority: Medium     Sacroiliac joint pain 10/31/2016     Priority: Medium     Encounter for supervision of normal first pregnancy in first trimester 2016     Priority: Medium     16- first trimester screen wnl, NT wnl, nasal bone could not be evaluated_____  Needs AFP only at 15 wks______  10/21/16- MFM US wnl  3/16/2017 - IOL scheduled for 41w0d on 3/21/17 at 1000.  Will bring birth plan.           Irregular heart beat 2017 - 2017 - Per cardiology \"indirect tests to rule out PE and DVT, all normal. I did not see anything unusual on her cardiac echo. NO special precautions besides the usual from cardiac standpoint. If she develops any sudden worsening of her shortness of breath, I would have a low threshold to do CT scan and rule out PE. I did not have a high enough suspicion when I saw her to pursue a CT scan\".        GBS (group B Streptococcus carrier), +RV culture, currently pregnant 12/10/2016     12/9/16- POS GBS at 28 wks from PTL eval.no 36 wk screen needed.       Breast lump on right side at 2:30  o'clock position 2011     Sinusitis, chronic 10/22/2003     Problem list name updated by automated process. Provider to review         INTERVAL HISTORY:  /75  Pulse 120  Temp 98.2  F (36.8  C) (Oral)  Resp 18  Ht 1.753 m (5' 9.02\")  LMP 2016  SpO2 98%  Breastfeeding? Yes    Pt stable, BPs now within normal range.  Baby is rooming in and doing well.   Breast feeding status: " Initiated  Complications since 2 hours post delivery: None  Patient is tolerating acitivity well Voiding without difficulty, cramping is minimal, lochia is decreasing and patient denies clots.  Perineal pain is is minimal.  The perineum is intact and moderate edema present.  Denies lightheaded/dizziness with mild anemia.    Normal postpartum exam    Postpartum hemoglobin   Hemoglobin   Date Value Ref Range Status   03/24/2017 9.3 (L) 11.7 - 15.7 g/dL Final     Blood type   Lab Results   Component Value Date    ABO O 03/21/2017       Lab Results   Component Value Date    RH  Pos 03/21/2017     Rubella status: Immune    ASSESSMENT/PLAN:  Stable Post-partum day #1  BPs well controlled, Pre-eclampsia labs WNL  Anemic, plan for iron supplementation BID  Encouraged ice and tub bath for perineal swelling   Teaching done: Birth Control Options and Iron supplemenation  Plan d/c home tomorrow  RTC 6 weeks    Birthcontrol planned: Unsure at this time, considering Paragard IUD    Current Discharge Medication List      CONTINUE these medications which have NOT CHANGED    Details   hydrOXYzine (VISTARIL) 25 MG capsule May take 1-2 capsules at HS prn sleep  Qty: 30 capsule, Refills: 0    Associated Diagnoses: Encounter for supervision of normal first pregnancy in third trimester      Acetaminophen (TYLENOL PO) Take 1,000 mg by mouth as needed       Prenatal Vit-Fe Fumarate-FA (PRENATAL MULTIVITAMIN  PLUS IRON) 27-0.8 MG TABS Take 1 tablet by mouth daily      VITAMIN D, CHOLECALCIFEROL, PO Take 3,000 Units by mouth daily             IEbony SNM am serving as a scribe to document services personally performed by Ivy Garza CNM based on the provider's statements to me.  - Ebony Meyers    Patient was seen with student who acted as my scribe. I personally assessed, examined and made medical decisions reflected in the documentation. Any necessary edits to the note have been made by myself. Ivy Garza, YASMEEN  CNM

## 2017-03-24 NOTE — PLAN OF CARE
Problem: Goal Outcome Summary  Goal: Goal Outcome Summary  Outcome: Improving  VS stable.  Postpartum assessment WNL.  Breastfeeding with a nipple shield, infant has not had any interest in sucking most of the day.  Pumping and hand expression, produced only drops.  Gave to infant on cotton swab.  Infant has been spitting up moderate amounts all shift, clear and yellow.  Had some interest at 1325 to suck on nipple shield.  Voiding, no BM.  Eating and drinking.  Ambulates independently.  Pain controlled with Ibuprofen.  Patient started taking iron supplement today due to Hgb of 9.3.  Mother and father independent with cares, supportive of each other and bonding with baby.

## 2017-03-24 NOTE — PLAN OF CARE
Problem: Goal Outcome Summary  Goal: Goal Outcome Summary  Outcome: Improving  Pt was complete at 1805 and began pushing at 1945. RN, epg, Cristina, spouse at bedside. She pushed in multiple positions, some positions limited by left hip pain -- epidural wasn't covering this pain adequately during pushing. FHR unable to be traced during pushes, at rest FHR had minimal variability, no accelerations, no decelerations noted. 02 therapy was initiated to help with variability. Bladder emptied via straight cath. She had 2 high blood pressures while pushing, MARK ANTHONY Hogan CNM notified at bedside. (Plan to do Pre-E labs postpartum.) BP rechecks normal. She pushed with good efforts and delivered a baby girl at 2214. NICU, second RN, and Dr. Sanchez present for delivery. No postpartum complications thus far. Peg helping pt breastfeed at this time. Pt would like to take home placenta -- consent paperwork given to receiving RN to complete -- pt's friend to  placenta. Report given to Haylee LIU RN at bedside. Continue with postpartum recovery.

## 2017-03-24 NOTE — PLAN OF CARE
0045-PT. UP TO VOID, WITH ASSIST OF ALYX CHRISTOPHER.  PT. DENIES DIZZINESS.  PT. UNABLE TO VOID.  PERICARE COMPLETED.  NEW GOWN, PERIPAD, ET PANTIES APPLIED.  PT. TO WHEELCHAIR.  PT. TRANSFERRED TO RM 7124, PER WHEELCHAIR, HOLDING INFANT.  BOTH IN STABLE CONDITION.  PT. TRANSFERRED TO  BED, WITH MINIMAL ASSISTANCE.  REPORT GIVEN JR RG RN.

## 2017-03-24 NOTE — PLAN OF CARE
Problem: Goal Outcome Summary  Goal: Goal Outcome Summary  Outcome: Therapy, progress toward functional goals as expected  Patient , infant and FOB transferred to room 7124 per wheelchair at 0100. IV infusing RT arm. Transferred to bed with assist of 2. VSS. Bleeding WDL, no clots. Oriented to room and plan of care. Declined watching into to [employer] at this time. Attempted to breastfeed, few sucks with nipple shield. Nipples inverted. Electric pumped and hand expression.Q-tip colostrum, oral care for baby. Assisted up to bathroom at 0300. Unable to void. Plan to rest and drink water, retry in one hour.

## 2017-03-24 NOTE — L&D DELIVERY NOTE
Delivery Summary    Carrie Munoz MRN# [id number]   Age: 36 year old YOB: 1980     ASSESSMENT & PLAN:  DELIVERY NOTE:  Brief Labor Course: pt admitted 3/21/17 for IOL for postdates. Had vaginal miso x3 doses and oral miso x2. Pitocin started 3/22/17 at 1600am, AROM mec fluid 3/23/17 at 1015am. Proceeded steadily to complete, labored down x2 hours.   Delivery Note:   Pushed well with slow crown in variety of positions. Dr desouza called to stand by for delivery. NICU present for delivery for meconium.  viable Fe with nuchal arm. Infant rotated as delivering. Placed on moms abd and cried with stimulation. NICU dismissed. Spont rg placenta delivered intact. Minor skid marks on perineum. BP elevated intermittently in labor and with pushing. Pre-eclampsia labs ordered.  IUP at 41 weeks gestation delivered on 2017.     delivery of a viable Female infant.  Weight : pending  Apgars of 8 at 1 minute and 9 at 5 minutes.  Labor was induced.  Medications administered  in labor:  Pain Rx Epidural; Antibiotics Yes: PCN and IV antibiotics infused greater than 4 hours; Other   Perineum: Minor laceration - No repair  Placenta-mechanism: spontaneous, intact,  with a 3 vessel cord. IV oxytocin was given After delivery of baby  Quantitative Blood Loss was 500cc..  Complications of labor and delivery: Meconium stained fluid and Nuchal hand  Anticipated Discharge Date: 3/25/17  Birth attendants: Akanksha Hogan CNM and Student Nurse Midwife YASMEEN Healy CNM   I was present, gowned and gloved for the entire procedure.  Student kevin pardo assisted me with delivery of viable Female infant as documented above.   Minor laceration - No repair . Documentation reflects events of labor and procedures completed. YASMEEN Adair CNM              Labor Event Times    Labor onset date:  3/23/17 Onset time:   4:00 PM   Dilation complete date:   3/23/17 Complete time:   6:05 PM   Start pushing date/time:  3/23/2017 1945            Labor Length    1st Stage (hrs):  2 (min):  5   2nd Stage (hrs):  4 (min):  9   3rd Stage (hrs):  0 (min):  31      Labor Events     labor?:  No    steroids:  None   Labor Type:  Induction   Predominate monitoring during 1st stage:  continuous electronic fetal monitoring      Antibiotics received during labor?:  Yes   Reason for Antibiotics:  GBS   Antibiotics received for GBS:  Penicillin   Antibiotics Given (GBS):  Greater than 4 hours prior to delivery      Rupture identifier:  Rupture 1   Rupture date/time: 3/23/17 1045   Rupture type:  Artificial Rupture of Membranes   Fluid color:  Meconium   Fluid odor:  Normal      Induction:  Misoprostol, AROM, Oxytocin   Induction date/time:     Cervical ripening date/time:     Indications for induction:  Post-term Gestation      1:1 continuous labor support provided by?:  RN, salvatore Labor partogram used?:  no         Delivery/Placenta Date and Time    Delivery Date:  3/23/17 Delivery Time:  10:14 PM   Placenta Date/Time:  3/23/2017 10:45 PM   Oxytocin given at the time of delivery:  after delivery of baby      Vaginal Counts    Initial count performed by 2 team members:   Two Team Members   LATONIA Cárdenas RN          Needles Suture Box Elder Sponges Instruments   Initial counts 2  5    Added to count       Final counts 2  5       Placed during labor Accounted for at the end of labor   No NA   No NA   No NA      Final count performed by 2 team members:   Two Team Members   MARIEL Tinsley RN         Final count correct?:  Yes         Apgars    Living status:  Yes    1 Minute 5 Minute 10 Minute 15 Minute 20 Minute   Skin color: 1  1       Heart rate: 2  2       Reflex irritability: 2  2       Muscle tone: 1  2       Respiratory effort: 2  2       Total: 8  9          Apgars assigned by:  JOON DEMARCO RN      Cord    Vessels:  3 Vessels Complications:   None   Cord Blood Disposition:  Lab Gases Sent?:  No          Resuscitation    Methods:  Suctioning       Care at Delivery:  NICU team called by Jacquelyn Hogan CNM for a vaginal delivery of a post-term, 41 week infant for meconium stained amniotic fluid and minimal variability.  Infant did not cry initially, was cyanotic but had fair tone.  After drying, stimulating and oral bulb suctioning, the infant cried lustily, she was centrally pink with acrocyanosis noted, and she had good tone.  NICU team dismissed and infant left in the care of the L&D nurse.    Rozina Galan, APRN, CNP  3/23/2017 10:35 PM     Output in Delivery Room:  Stool      Skin to Skin and Feeding Plan    Skin to skin initiation date/time: 3/23/17 2214   Skin to skin with:  Mother   Skin to skin end date/time:     Breastfeeding initiated date/time:  3/23/2017 2307   How do you plan to feed your baby:  Breastfeeding      Labor Events and Shoulder Dystocia    Fetal Tracing Prior to Delivery:  Category 2   Shoulder dystocia present?:  Neg            Delivery (Maternal) (Provider to Complete) ([id number])    Episiotomy:  None   Perineal lacerations:  None    Vaginal laceration?:  No    Cervical laceration?:  No          Mother's Information  Mother: Carrie Munoz #[id number]    Start of Mother's Information     IO Blood Loss  17 1600 - 17 2317    Mom's I/O Activity            End of Mother's Information  Mother: Carrie Munoz #[id number]            Delivery - Provider to Complete ([id number])    Delivering clinician:  JACQUELYN HOGAN CNM Care:  Exclusive CNM care in labor   Attempted Delivery Types (Choose all that apply):  Spontaneous Vaginal Delivery   Delivery Type (Choose the 1 that will go to the Birth History):  Vaginal, Spontaneous Delivery                     Other personnel:   Provider Role   MARKELL KIRBY Student            Placenta    Delayed Cord Clamping:  Done   Date/Time:  3/23/2017  10:45 PM   Removal:  Spontaneous   Disposition:  Patient possesion      Anesthesia    Method:  Epidural, Nitrous Oxide         Presentation and Position    Presentation:  Vertex    Occiput Anterior                    YASMEEN Adair CNM

## 2017-03-25 VITALS
HEART RATE: 120 BPM | TEMPERATURE: 97.5 F | HEIGHT: 69 IN | OXYGEN SATURATION: 98 % | SYSTOLIC BLOOD PRESSURE: 96 MMHG | RESPIRATION RATE: 20 BRPM | DIASTOLIC BLOOD PRESSURE: 55 MMHG

## 2017-03-25 PROCEDURE — 25000132 ZZH RX MED GY IP 250 OP 250 PS 637: Performed by: ADVANCED PRACTICE MIDWIFE

## 2017-03-25 RX ADMIN — IBUPROFEN 800 MG: 400 TABLET ORAL at 10:15

## 2017-03-25 RX ADMIN — IRON 325 MG: 65 TABLET ORAL at 08:12

## 2017-03-25 RX ADMIN — SENNOSIDES AND DOCUSATE SODIUM 2 TABLET: 8.6; 5 TABLET ORAL at 08:13

## 2017-03-25 RX ADMIN — IBUPROFEN 800 MG: 400 TABLET ORAL at 03:31

## 2017-03-25 NOTE — PLAN OF CARE
Problem: Goal Outcome Summary  Goal: Goal Outcome Summary  Outcome: Adequate for Discharge Date Met:  03/25/17  VS stable.  Postpartum assessment WNL.  Breastfeeding with nipple shield, infant struggles with latching.  Pumping after breastfeeding but is not producing colostrum.  Mother and father feeling overwhelmed and concerned about feeding baby.  Decided before discharge it would be best to supplement with formula until more colostrum and milk is present.  Infant at 7.5 percent weight loss.  Patient being seen by home care tomorrow and clinic on Monday.  Recommended they meet with lactation consultant at clinic.  Voiding and had BM.  Eating and drinking.  Ambulates independently.  Mother and father independent with cares, supportive of each other and bonding with baby.  Parents went to discharge class.  Discharge instructions reviewed, signed for and copy given.  Discharge medications reviewed, signed for and given.  Education complete and resolved.  Care plan complete and resolved.

## 2017-03-25 NOTE — DISCHARGE SUMMARY
[facility] Discharge Summary    Carrie Munoz MRN# [id number]   Age: 36 year old YOB: 1980     Date of Admission:  3/21/2017  Date of Discharge::  3/25/2017  Admitting Physician:  YASMEEN Leiva CNM  Discharge Physician:  YASMEEN Parks CNM      Home clinic: [facility]          Admission Diagnoses:   Maternity*MARIANNA 2017/IOL  Labor and delivery, indication for care  Labor and delivery, indication for care   (normal spontaneous vaginal delivery)          Discharge Diagnosis:   Normal spontaneous vaginal delivery  Intrauterine pregnancy at 41 weeks gestation          Procedures:   Procedure(s): No additional procedures performed       No other significant procedures performed during this admission           Medications Prior to Admission:     Prescriptions Prior to Admission   Medication Sig Dispense Refill Last Dose     Acetaminophen (TYLENOL PO) Take 1,000 mg by mouth as needed    Past Week at Unknown time     Prenatal Vit-Fe Fumarate-FA (PRENATAL MULTIVITAMIN  PLUS IRON) 27-0.8 MG TABS Take 1 tablet by mouth daily   3/20/2017 at Unknown time     VITAMIN D, CHOLECALCIFEROL, PO Take 3,000 Units by mouth daily   3/20/2017 at Unknown time     [DISCONTINUED] hydrOXYzine (VISTARIL) 25 MG capsule May take 1-2 capsules at HS prn sleep 30 capsule 0 Past Week at Unknown time             Discharge Medications:     Current Discharge Medication List      START taking these medications    Details   ferrous sulfate (IRON) 325 (65 FE) MG tablet Take 1 tablet (325 mg) by mouth 2 times daily  Qty: 60 tablet, Refills: 2    Associated Diagnoses: Anemia due to blood loss, acute      sennosides (SENOKOT) 8.6 MG tablet Take 2 tablets by mouth 2 times daily as needed for constipation  Qty: 120 tablet, Refills: 1    Associated Diagnoses:  (normal spontaneous vaginal delivery)      ibuprofen (ADVIL/MOTRIN) 600 MG tablet Take 1 tablet (600 mg) by mouth every 6 hours as needed for  moderate pain  Qty: 90 tablet, Refills: 1    Associated Diagnoses:  (normal spontaneous vaginal delivery)         CONTINUE these medications which have NOT CHANGED    Details   Acetaminophen (TYLENOL PO) Take 1,000 mg by mouth as needed       Prenatal Vit-Fe Fumarate-FA (PRENATAL MULTIVITAMIN  PLUS IRON) 27-0.8 MG TABS Take 1 tablet by mouth daily      VITAMIN D, CHOLECALCIFEROL, PO Take 3,000 Units by mouth daily         STOP taking these medications       hydrOXYzine (VISTARIL) 25 MG capsule Comments:   Reason for Stopping:                     Consultations:   No consultations were requested during this admission          Brief History of Labor:   Brief Labor Course: pt admitted 3/21/17 for IOL for postdates. Had vaginal miso x3 doses and oral miso x2. Pitocin started 3/22/17 at 1600am, AROM mec fluid 3/23/17 at 1015am. Proceeded steadily to complete, labored down x2 hours.   Delivery Note:   Pushed well with slow crown in variety of positions. Dr desouza called to stand by for delivery. NICU present for delivery for meconium.  viable Fe with nuchal arm. Infant rotated as delivering. Placed on moms abd and cried with stimulation. NICU dismissed. Spont rg placenta delivered intact. Minor skid marks on perineum. BP elevated intermittently in labor and with pushing. Pre-eclampsia labs ordered.  IUP at 41 weeks gestation delivered on 2017.    delivery of a viable Female infant.  Weight : pending  Apgars of 8 at 1 minute and 9 at 5 minutes.  Labor was induced.  Medications administered in labor: Pain Rx Epidural; Antibiotics Yes: PCN and IV antibiotics infused greater than 4 hours; Other   Perineum: Minor laceration - No repair  Placenta-mechanism: spontaneous, intact, with a 3 vessel cord. IV oxytocin was given After delivery of baby  Quantitative Blood Loss was 500cc..  Complications of labor and delivery: Meconium stained fluid and Nuchal hand  Anticipated Discharge Date: 3/25/17  Birth  attendants: Akanksha Hogan CNM and Student Nurse Midwife YASMEEN Healy CNM   I was present, gowned and gloved for the entire procedure.  Student kevin pardo assisted me with delivery of viable Female infant as documented above.  Minor laceration - No repair . Documentation reflects events of labor and procedures completed. YASMEEN Adair CNM     Assessment Day of Discharge      Breasts: Soft, filling  Nipples:inverted, using nipple shield  Fundus: firm  Abdomen: soft  Lochia: minimal  Perineum: inact, minor lacerations well approximated  Legs: trace edema           Hospital Course:   The patient's hospital course was unremarkable, elevated blood pressure at the end of second stage, no severe range pressures.  Pre-eclampsia labs WNL.  On discharge, her pain was well controlled. Vaginal bleeding is similar to peak menstrual flow.  Voiding without difficulty.  Ambulating well and tolerating a normal diet.  No fever.  Breastfeeding difficulties, using nipple shield and pumping.  Infant is stable.  Has had a bowel movement. She was discharged on post-partum day #2.    Post-partum hemoglobin:   Hemoglobin   Date Value Ref Range Status   03/24/2017 9.3 (L) 11.7 - 15.7 g/dL Final             Discharge Instructions and Follow-Up:   Discharge diet: Regular   Discharge activity: Pelvic rest: abstain from intercourse and do not use tampons for 6 week(s)   Discharge follow-up: Follow up with CNM in 6 weeks   Wound care: Drink plenty of fluids           Discharge Disposition:   Discharged to home        YASMEEN Parks CNM

## 2017-03-25 NOTE — PLAN OF CARE
"Problem: Goal Outcome Summary  Goal: Goal Outcome Summary  Outcome: Improving  PP assessment WNL. Vital signs stable. Pt up ad roxi, steady gait. Pain managed with Ibuprofen for uterine cramping-see MAR. Intake and output remains appropriate. Breastfeeding infant with assistance on cue, latch assessed and is appropriate with nipple shield. Utilizing double electric breast pump after feedings independently. Encouraged independence and educated with feedings tonight. Discussed confidence with breastfeeding infant at home pt states \"she feels much more comfortable.\" No further concerns, will continue with pt plan of care.       "

## 2017-03-31 ENCOUNTER — OFFICE VISIT (OUTPATIENT)
Dept: FAMILY MEDICINE | Facility: CLINIC | Age: 37
End: 2017-03-31
Payer: COMMERCIAL

## 2017-03-31 DIAGNOSIS — N64.59 INVERTED NIPPLE: Primary | ICD-10-CM

## 2017-03-31 PROCEDURE — 99214 OFFICE O/P EST MOD 30 MIN: CPT | Performed by: NURSE PRACTITIONER

## 2017-03-31 NOTE — NURSING NOTE
"Chief Complaint   Patient presents with     Lactation Consult       Initial LMP 06/08/2016 Estimated body mass index is 37.36 kg/(m^2) as calculated from the following:    Height as of 3/14/17: 5' 9\" (1.753 m).    Weight as of 3/14/17: 253 lb (114.8 kg).  Medication Reconciliation: complete       Jerrell Goodman MA      "

## 2017-03-31 NOTE — PROGRESS NOTES
.Initial Lactation Consultation    Baby:  Dyan Sanders         MRN:  [id number]  Mom:  Carrie lugo    Consultation Date: 3/31/2017    HPI  Breastfeeding long-term goals: Want to do feeding and pumping for 4 months.   Breastfeeding story:  Baby did not latch at all in the hospital OR eat a bottle in the hospital and lost weight after discharge.  Did not have a lot of support in the hospital for latching.  Has tried nipple shield, but milk had not come in at that point so it did not work  Packing and moving this weekend  Mom has questions about baby's vaginal discharge     Nursing 0 times per day/every 0 hours.  Nursing on 0 side(s).  Nursing sessions last 0 minutes per side.    Nipple pain: None. Not latching yet    PUMPING: MPIS; pumping at least three times a day, one side and then the other; no hands free bra.  Getting 5-7 oz total with morning pump, 1-2 oz with later in the day pumps  SUPPLEMENTATION: Expressed breastmilk and Similac - 3.5-4 oz per feeding every 1.5-2 hours    Baby's OUTPUT:   Lots of stooled diapers with Mustard yellowish appearance    MOTHER      Medical History  Patient Active Problem List   Diagnosis     Sinusitis, chronic     Breast lump on right side at 2:30  o'clock position     Encounter for supervision of normal first pregnancy in first trimester     Sacroiliac joint pain     Pelvic pain affecting pregnancy     GBS (group B Streptococcus carrier), +RV culture, currently pregnant     Sinus tachycardia     Irregular heart beat     Labor and delivery, indication for care      (normal spontaneous vaginal delivery)       History of Breast Surgery: No    Breastfeeding History  NoN/A    Pregnancy History  First pregnancy    Delivery History  Vaginal   41 weeks  Pitocin induction and three days of labor  Penicillin for GBS    Labor Meds/Anesthesia  Epidural    Current Medications  Ibuprofen and stool softeners, and iron.     Herbals:  None    ASSESSMENT OF MOTHER    Physical:  "  Breast appearance  Breast Size: extra large  Nipple Appearance - Left: intact  Nipple Appearance - Right: intact  Nipple Erectility - Left: inverted  Nipple Erectility - Right: inverted  Areolas Compressibility: soft  Nipple Size: large  Milk Supply: mature      BABY       Name: Dyan Sanders Birth Date: [birth year] Age: 8 days    Doctor: RFP    BABY'S WEIGHT HISTORY  Last interval weight:  6.5 oz.in 3 days.  Birth Weight: 10 lbs 1 oz  Discharge Weight: 9 lb 5 oz  Bottom weight 8 lb 11 oz  Wt Readings from Last 5 Encounters:   03/31/17 9 lb 9.5 oz (4.352 kg) (95 %)*   03/28/17 9 lb 3 oz (4.167 kg) (94 %)*   03/27/17 9 lb 2 oz (4.139 kg) (94 %)*   03/24/17 9 lb 5 oz (4.224 kg) (97 %)*     * Growth percentiles are based on WHO (Girls, 0-2 years) data.       ASSESSMENT OF BABY    Physical:   Temp 97.9  F (36.6  C) (Axillary)  Ht 1' 8.5\" (0.521 m)  Wt 9 lb 9.5 oz (4.352 kg)  HC 15\" (38.1 cm)  BMI 16.05 kg/m2    GENERAL: Alert, vigorous, is in no acute distress.  SKIN: skin is clear, no rash or abnormal pigmentation  HEAD: The head is normocephalic. The fontanels and sutures are normal  EYES: The eyes are normal. The conjunctivae and cornea normal.   NOSE: Clear, no discharge or congestion  NECK: The neck is supple and thyroid is normal, no masses  LYMPH NODES: No adenopathy  LUNGS: The lung fields are clear to auscultation,no rales, rhonchi, wheezing or retractions  HEART: The precordium is quiet. Rhythm is regular. S1 and S2 are normal. No murmurs. .  ABDOMEN: The umbilicus is normal. The bowel sounds are normal. Abdomen soft, non tender,  non distended, no masses or hepatosplenomegaly.  : normal female genitalia with vernix covering inner labia  EXTREMITIES: Symmetric extremities no deformities  NEUROLOGIC: Normal tone throughout. Has normal reflexes for age    Oral Anatomy  Mouth: normal  Palate: normal  Jaw: normal  Tongue: normal  Lingual Frenulum: normal  Lip Frenulum: normal  Digital Suck Exam: " "root      FEEDING ASSESSMENT    Initial position and latch strategy observed: assisted; able to slightly pull out nipple with hand expression, but rebounded before baby attempted to latch.  Baby would open mouth wide and also lick nipple, but unable to make meaningful latch.  With nipple shield baby latched more readily with a wide mouth and appeared to suckle, but no swallowing was noted  Effort to Latch: gentle stimulation needed for infant to latch    Nipple pain:  none  Weight gain at breast:  none     INTERVENTIONS/EVALUATION:  Cross Cradle, Asymmetric Latch, Breast Compression and Shield      SUMMARY  Lack of early support with inverted nipples  Seems to have maintained a fair supply despite relative lack of milk expression with TID pumping  Infant unable to latch without nipple shield currently  Family stressors of moving   Great infant weight gain with expressed breastmilk and formula.  We have room to pull back on formula    RECOMMENDATIONS  Patient Instructions   BrestFriend pillow  Look into \"Milk Savers\"    After getting settled in your new place, then the days should be all about nursing and pumping    1. Breastfeed every 1-3 hours, at least 8 times a day.  If she is not going into the longer sucks, then limit the breastfeeding time to 10-15 minutes.  If she is doing longer sucks with swallows, you can let her nurse until she is no longer having long sucks. Use breast compression during feedings to help  maximize milk flow.  For now, use the nipple shield to help Reign get the breast in her mouth.  You c an also try pumping a little bit ahead to pull out the nipple (but not pump milk).   Supplement with expressed breastmilk or formula 1.5-2 oz after nursing.      2. Pumping after each breastfeeding    -for 10-15 minutes    -at least 6-8 times in 24 hrs  Hints:      wash pump parts every 24 hours if you store them in the fridge between    Pump right before you go to bed and again right after the first " "nursing in the am    Breast massage and hand expression for 1-2 minutes before, during and after pumping completed to maximize milk production.   3. \"Hands on \" pumping - breast massage and hand expression before, during and after pumping to help breast stimulation-see website below.   You can make a \"hands free\" pumping bra by using an old bra and cutting out holes for the pump flanges to fit in. You can also use a tube top and make a slit or cut out holes for the pump flanges     Positioning and latch  Goal is to have a deep latch with areola in the baby's mouth instead of just nipple and to have baby pulling tongue along breast to get milk instead of \"chomping\" or sucking  Shallowly    Here is one way to achieve that:  1. Sit back with feet up and shoulders relaxed - you'll be bringing baby to you instead of your breast to baby  2. Bring baby snug up to you (skin to skin is best!) with baby's tummy to your tummy and with baby's ear, shoulder and hip aligned  3.  The baby's nose (not mouth) should be aligned with your nipple  4.  Hold breast behind areola in a \"U shape\" to help mold the breast tissue and make it easy for baby to latch  5.  Hand on neck/bottom of head and baby's chin on the breast  6.  Wait for a big open mouth and \"pop\" baby onto breast    For a football hold, you would hold your breast in a more \"C\" shape      Follow up: 1 week    60 minutes time spent face-to-face, 30 with mother and 30 with baby, with over 50% spent in counseling/coordination of care regarding breastfeeding goals, latch, nipple care, weight gain expectations, and pumping.     MASOUD Perkins    "

## 2017-03-31 NOTE — MR AVS SNAPSHOT
After Visit Summary   3/31/2017    Carrie Munoz    MRN: [id number]           Patient Information     Date Of Birth          1980        Visit Information        Provider Department      3/31/2017 10:00 AM Eleanor Bullock APRN CNP [facility]        Today's Diagnoses     Inverted nipple    -  1       Follow-ups after your visit        Your next 10 appointments already scheduled     Apr 07, 2017 11:15 AM CDT   Office Visit with YASMEEN Zuniga CNP   [facility] ([facility])    [address] [place] [id number] [phone]           Bring a current list of meds and any records pertaining to this visit.  For Physicals, please bring immunization records and any forms needing to be filled out.  Please arrive 10 minutes early to complete paperwork.              Who to contact     If you have questions or need follow up information about today's clinic visit or your schedule please contact [facility] directly at [phone].  Normal or non-critical lab and imaging results will be communicated to you by [employer] System[employer]art, letter or phone within 4 business days after the clinic has received the results. If you do not hear from us within 7 days, please contact the clinic through [employer] or phone. If you have a critical or abnormal lab result, we will notify you by phone as soon as possible.  Submit refill requests through [employer] or call your pharmacy and they will forward the refill request to us. Please allow 3 business days for your refill to be completed.          Additional Information About Your Visit        Notion Systemshart Information     [employer] gives you secure access to your electronic health record. If you see a primary care provider, you can also send messages to your care team and make appointments. If you have questions, please call your primary care clinic.  If you do not have a primary care provider, please  call [phone] and they will assist you.        Care EveryWhere ID     This is your Care EveryWhere ID. This could be used by other organizations to access your [facility] medical records  [id number]        Your Vitals Were     Last Period                   06/08/2016            Blood Pressure from Last 3 Encounters:   03/25/17 96/55   03/16/17 132/77   03/14/17 127/69    Weight from Last 3 Encounters:   03/14/17 253 lb (114.8 kg)   02/26/17 253 lb (114.8 kg)   02/08/17 246 lb 14.4 oz (112 kg)              Today, you had the following     No orders found for display       Primary Care Provider Office Phone # Fax #    Clinic [facility] [phone]       [address] [facility] [address]        Thank you!     Thank you for choosing [facility]  for your care. Our goal is always to provide you with excellent care. Hearing back from our patients is one way we can continue to improve our services. Please take a few minutes to complete the written survey that you may receive in the mail after your visit with us. Thank you!             Your Updated Medication List - Protect others around you: Learn how to safely use, store and throw away your medicines at www.disposemymeds.org.          This list is accurate as of: 3/31/17  1:07 PM.  Always use your most recent med list.                   Brand Name Dispense Instructions for use    ferrous sulfate 325 (65 FE) MG tablet    IRON    60 tablet    Take 1 tablet (325 mg) by mouth 2 times daily       ibuprofen 600 MG tablet    ADVIL/MOTRIN    90 tablet    Take 1 tablet (600 mg) by mouth every 6 hours as needed for moderate pain       prenatal multivitamin  plus iron 27-0.8 MG Tabs per tablet      Take 1 tablet by mouth daily       sennosides 8.6 MG tablet    SENOKOT    120 tablet    Take 2 tablets by mouth 2 times daily as needed for constipation       TYLENOL PO      Take 1,000 mg by mouth as needed       VITAMIN D (CHOLECALCIFEROL) PO       Take 3,000 Units by mouth daily

## 2017-04-07 ENCOUNTER — OFFICE VISIT (OUTPATIENT)
Dept: FAMILY MEDICINE | Facility: CLINIC | Age: 37
End: 2017-04-07
Payer: COMMERCIAL

## 2017-04-07 DIAGNOSIS — O92.70 LACTATION PROBLEM: Primary | ICD-10-CM

## 2017-04-07 PROCEDURE — 99214 OFFICE O/P EST MOD 30 MIN: CPT | Performed by: NURSE PRACTITIONER

## 2017-04-07 NOTE — MR AVS SNAPSHOT
After Visit Summary   4/7/2017    Carrie Munoz    MRN: [id number]           Patient Information     Date Of Birth          1980        Visit Information        Provider Department      4/7/2017 11:15 AM Eleanor Bullock APRN CNP [facility]        Today's Diagnoses     Lactation problem    -  1       Follow-ups after your visit        Who to contact     If you have questions or need follow up information about today's clinic visit or your schedule please contact [facility] directly at [phone].  Normal or non-critical lab and imaging results will be communicated to you by [address] [facility]art, letter or phone within 4 business days after the clinic has received the results. If you do not hear from us within 7 days, please contact the clinic through [employer] or phone. If you have a critical or abnormal lab result, we will notify you by phone as soon as possible.  Submit refill requests through [employer] or call your pharmacy and they will forward the refill request to us. Please allow 3 business days for your refill to be completed.          Additional Information About Your Visit        MyChart Information     [employer] gives you secure access to your electronic health record. If you see a primary care provider, you can also send messages to your care team and make appointments. If you have questions, please call your primary care clinic.  If you do not have a primary care provider, please call [phone] and they will assist you.        Care EveryWhere ID     This is your Care EveryWhere ID. This could be used by other organizations to access your [facility] medical records  [id number]        Your Vitals Were     Last Period                   06/08/2016            Blood Pressure from Last 3 Encounters:   03/25/17 96/55   03/16/17 132/77   03/14/17 127/69    Weight from Last 3 Encounters:   03/14/17 253 lb (114.8 kg)   02/26/17 253 lb (114.8 kg)   02/08/17 246 lb 14.4 oz  (112 kg)              Today, you had the following     No orders found for display       Primary Care Provider Office Phone # Fax #    [facility] [phone]       [address] [facility] [address]        Thank you!     Thank you for choosing [facility]  for your care. Our goal is always to provide you with excellent care. Hearing back from our patients is one way we can continue to improve our services. Please take a few minutes to complete the written survey that you may receive in the mail after your visit with us. Thank you!             Your Updated Medication List - Protect others around you: Learn how to safely use, store and throw away your medicines at www.disposemymeds.org.          This list is accurate as of: 4/7/17 11:59 PM.  Always use your most recent med list.                   Brand Name Dispense Instructions for use    ferrous sulfate 325 (65 FE) MG tablet    IRON    60 tablet    Take 1 tablet (325 mg) by mouth 2 times daily       ibuprofen 600 MG tablet    ADVIL/MOTRIN    90 tablet    Take 1 tablet (600 mg) by mouth every 6 hours as needed for moderate pain       prenatal multivitamin  plus iron 27-0.8 MG Tabs per tablet      Take 1 tablet by mouth daily       sennosides 8.6 MG tablet    SENOKOT    120 tablet    Take 2 tablets by mouth 2 times daily as needed for constipation       TYLENOL PO      Take 1,000 mg by mouth as needed       VITAMIN D (CHOLECALCIFEROL) PO      Take 3,000 Units by mouth daily

## 2017-04-09 NOTE — PROGRESS NOTES
"Follow-up Lactation Consultation    Baby:  Dyan Sanders         MRN:  [id number]  Mom:      Consultation Date: 4/7/2017    HPI  Update since last visit:  \"Not well\"  Infant has not been latching on the nipple shield and mother has not been able to pump more than 1-2 times a day due to caring for infant almost entirely on her own.  She is feeling very overwhelmed.      Nursing 0 times per day/every 0 hours.  Nursing on both side(s).  Nipple pain: None    PUMPING: Pump in Style - only able to get in 1-2 pumping times a day and getting 2-3 oz total  SUPPLEMENTATION: Baby's IOnly Organic Toddler Formula  - 2 oz every 2 hours - takes 45 minutes with the bottle due to stops for burping; longest stretch without eating is 4-5 hours    Baby's OUTPUT:   3-4 stooled diapers with yellow mustard appearance with bad odor.    MOTHER      Medical History  None    History of Breast Surgery: No    Breastfeeding History  NoN/A    Pregnancy History  First pregnancy     Delivery History  Vaginal    Labor Meds/Anesthesia  Epidural    Current Medications  None, should be taking vitamins and iron but forget    Herbals:  None    ASSESSMENT OF MOTHER    Physical:   Breast appearance  Breast Size: extra large  Nipple Appearance - Left: intact  Nipple Appearance - Right: intact  Nipple Erectility - Left: inverted  Nipple Erectility - Right: inverted  Areolas Compressibility: soft  Nipple Size: large  Milk Supply: mature      BABY       Name: Dyan Scott  Age: 2 weeks     Doctor: RFP    BABY'S WEIGHT HISTORY  Last interval weight:  2 oz.in 4 days.    Wt Readings from Last 5 Encounters:   04/07/17 9 lb 11 oz (4.394 kg) (90 %)*   04/03/17 9 lb 9 oz (4.338 kg) (93 %)*   03/31/17 9 lb 9.5 oz (4.352 kg) (95 %)*   03/28/17 9 lb 3 oz (4.167 kg) (94 %)*   03/27/17 9 lb 2 oz (4.139 kg) (94 %)*     * Growth percentiles are based on WHO (Girls, 0-2 years) data.       ASSESSMENT OF BABY    Physical:   Temp 97.8  F (36.6  C) (Axillary)  Ht 1' 9.5\" " "(0.546 m)  Wt 9 lb 11 oz (4.394 kg)  HC 15.5\" (39.4 cm)  BMI 14.73 kg/m2    GENERAL: Alert, vigorous, is in no acute distress.  SKIN: skin is clear, no rash or abnormal pigmentation  HEAD: The head is normocephalic. The fontanels and sutures are normal  NECK: The neck is supple and thyroid is normal, no masses  LYMPH NODES: No adenopathy  LUNGS: The lung fields are clear to auscultation,no rales, rhonchi, wheezing or retractions  HEART: The precordium is quiet. Rhythm is regular. S1 and S2 are normal. No murmurs. The femoral pulses are normal.  NEUROLOGIC: Normal tone throughout.         FEEDING ASSESSMENT    Initial position and latch strategy observed: Left side with nipple shield.  Side-lying, tummy-to-tummy.  Infant not latching  Fed baby bottle.  Difficulty initially had difficulty achieving sealed lips around the bottle nipple.  Assisted by supporting cheeks and infant was then able to sustain seal and nurse, but did have occasionally noisy gap.  Effort to Latch: did not sustain latch  did not nurse       INTERVENTIONS/EVALUATION:  Asymmetric Latch and Shield      SUMMARY  Infant not latching on nipple shield and having some difficulty with bottle nipple.  Adjusts with assistance and able to then finish bottle.  Does not tolerate volumes larger than 1-1.5 oz at a time.  Did slow on weight gain over the last week, though still within the expectations.  Has been drinking toddler formula for supplement and advised to immediately switch to infant formula for appropriate calories.  Inadequate milk expression causing decrease in milk supply.  Largely due to lack of support and time for mom to pump.    Mom is overwhelmed and tired, but \"not ready to give up.\"  Will need to increase pumping to have a chance at offering breastmilk.  To simplify, we took out the effort at the breast with the nipple shield as this is time consuming and has not had any benefit over the last week.      RECOMMENDATIONS  Patient " Instructions   Let's simplify things and have you just pumping.  We need you to get the milk out of your breast for your supply to stay up and Reign is not able to do this right now.  Pump 8-10 times a day.  A hands-free bra would allow you to give Reign a bottle at the same time you pump.  Switch to an infant formula for the right calories per oz.  Return for two week well child in one week and we can recheck pumping and weight        Follow up: 1 week - two week well child    60 minutes time spent face-to-face, 30 with mother and 30 with baby, with over 50% spent in counseling/coordination of care regarding breastfeeding goals, latch, nipple care, weight gain expectations, and pumping.     MASOUD Perkins

## 2017-04-20 ENCOUNTER — MYC MEDICAL ADVICE (OUTPATIENT)
Dept: MIDWIFE SERVICES | Facility: CLINIC | Age: 37
End: 2017-04-20

## 2017-04-20 DIAGNOSIS — M25.559 HIP PAIN: Primary | ICD-10-CM

## 2017-04-20 NOTE — TELEPHONE ENCOUNTER
Order placed. TC to pt to give her the number so that she could call and schedule and appt. Beatriz Escobar RN

## 2017-04-22 ENCOUNTER — THERAPY VISIT (OUTPATIENT)
Dept: PHYSICAL THERAPY | Facility: CLINIC | Age: 37
End: 2017-04-22
Payer: COMMERCIAL

## 2017-04-22 DIAGNOSIS — M53.3 SACROILIAC JOINT PAIN: ICD-10-CM

## 2017-04-22 PROCEDURE — 97110 THERAPEUTIC EXERCISES: CPT | Mod: GP | Performed by: PHYSICAL THERAPIST

## 2017-04-22 PROCEDURE — 97535 SELF CARE MNGMENT TRAINING: CPT | Mod: GP | Performed by: PHYSICAL THERAPIST

## 2017-04-22 ASSESSMENT — ACTIVITIES OF DAILY LIVING (ADL)
WALKING_UP_STEEP_HILLS: EXTREME DIFFICULTY
HOS_ADL_ITEM_SCORE_TOTAL: 28
SITTING_FOR_15_MINUTES: SLIGHT DIFFICULTY
HOS_ADL_HIGHEST_POTENTIAL_SCORE: 60
HEAVY_WORK: EXTREME DIFFICULTY
LIGHT_TO_MODERATE_WORK: MODERATE DIFFICULTY
WALKING_15_MINUTES_OR_GREATER: EXTREME DIFFICULTY
WALKING_APPROXIMATELY_10_MINUTES: EXTREME DIFFICULTY
HOS_ADL_SCORE(%): 46.67
ROLLING_OVER_IN_BED: MODERATE DIFFICULTY
STANDING_FOR_15_MINUTES: EXTREME DIFFICULTY
GOING_DOWN_1_FLIGHT_OF_STAIRS: SLIGHT DIFFICULTY
WALKING_INITIALLY: SLIGHT DIFFICULTY
GETTING_INTO_AND_OUT_OF_AN_AVERAGE_CAR: MODERATE DIFFICULTY
DEEP_SQUATTING: EXTREME DIFFICULTY
STEPPING_UP_AND_DOWN_CURBS: NO DIFFICULTY AT ALL
GOING_UP_1_FLIGHT_OF_STAIRS: SLIGHT DIFFICULTY
GETTING_INTO_AND_OUT_OF_A_BATHTUB: MODERATE DIFFICULTY
WALKING_DOWN_STEEP_HILLS: EXTREME DIFFICULTY
HOS_ADL_COUNT: 15
PUTTING_ON_SOCKS_AND_SHOES: SLIGHT DIFFICULTY

## 2017-04-22 NOTE — PROGRESS NOTES
Subjective:    HPI                    Objective:    System    Physical Exam    General     ROS    Assessment/Plan:      PROGRESS  REPORT    Progress reporting period is from Pre partum to 4/22/17.       SUBJECTIVE  Subjective changes noted by patient:  Patient came ib today to get instruction on SIJ lock brace. She is 4 weeks post partum.       Current pain level is moderate  .     Previous pain level was  NA  .   Changes in function:  None  Adverse reaction to treatment or activity: None    OBJECTIVE  Changes noted in objective findings:  Only spent time with patient discussing how to use SIJ lock brace for Instability.        ASSESSMENT/PLAN  Updated problem list and treatment plan: Diagnosis 1:  SIJ  Pain -  home program  Instability -  Therapeutic Activity  home program  STG/LTGs have been met or progress has been made towards goals:    Assessment of Progress: The patient's condition has potential to improve.  Self Management Plans:  Patient is independent in self management of symptoms.    Carrie continues to require the following intervention to meet STG and LTG's:  PT    Recommendations:  Should follow up with [facility] PT after she tries 2 weeks with SIJ belt    Please refer to the daily flowsheet for treatment today, total treatment time and time spent performing 1:1 timed codes.

## 2017-04-22 NOTE — MR AVS SNAPSHOT
After Visit Summary   4/22/2017    Carrie Munoz    MRN: [id number]           Patient Information     Date Of Birth          1980        Visit Information        Provider Department      4/22/2017 11:10 AM Antwon Pt, Eric PT [place] [facility]        Today's Diagnoses     Sacroiliac joint pain           Follow-ups after your visit        Your next 10 appointments already scheduled     Apr 27, 2017 11:30 AM CDT   MyChart OB [facility]    [address] [place] [address] [address]  [facility] [id number] [phone]            May 01, 2017 10:20 AM CDT   KACIE Extremity with Juliet Seay PT   [place] [facility] ([facility]  )    [address] [facility] [address]  [facility] [address] [phone]              Who to contact     If you have questions or need follow up information about today's clinic visit or your schedule please contact [facility] directly at [phone].  Normal or non-critical lab and imaging results will be communicated to you by [employer], letter or phone within 4 business days after the clinic has received the results. If you do not hear from us within 7 days, please contact the clinic through [employer] or phone. If you have a critical or abnormal lab result, we will notify you by phone as soon as possible.  Submit refill requests through [employer] or call your pharmacy and they will forward the refill request to us. Please allow 3 business days for your refill to be completed.          Additional Information About Your Visit        MyChart Information     [employer] gives you secure access to your electronic health record. If you see a primary care provider, you can also send messages to your care team and make appointments. If you have questions, please call your primary  care clinic.  If you do not have a primary care provider, please call [phone] and they will assist you.        Care EveryWhere ID     This is your Care EveryWhere ID. This could be used by other organizations to access your [facility] medical records  [id number]        Your Vitals Were     Last Period                   06/08/2016            Blood Pressure from Last 3 Encounters:   03/25/17 96/55   03/16/17 132/77   03/14/17 127/69    Weight from Last 3 Encounters:   03/14/17 114.8 kg (253 lb)   02/26/17 114.8 kg (253 lb)   02/08/17 112 kg (246 lb 14.4 oz)              We Performed the Following     KACIE PROGRESS NOTES REPORT     Self Care Management Training     THERAPEUTIC EXERCISES        Primary Care Provider Office Phone # Fax #    [facility] [phone]       [address] [facility] [address]        Thank you!     Thank you for choosing [facility]  for your care. Our goal is always to provide you with excellent care. Hearing back from our patients is one way we can continue to improve our services. Please take a few minutes to complete the written survey that you may receive in the mail after your visit with us. Thank you!             Your Updated Medication List - Protect others around you: Learn how to safely use, store and throw away your medicines at www.disposemymeds.org.          This list is accurate as of: 4/22/17 12:00 PM.  Always use your most recent med list.                   Brand Name Dispense Instructions for use    ferrous sulfate 325 (65 FE) MG tablet    IRON    60 tablet    Take 1 tablet (325 mg) by mouth 2 times daily       ibuprofen 600 MG tablet    ADVIL/MOTRIN    90 tablet    Take 1 tablet (600 mg) by mouth every 6 hours as needed for moderate pain       prenatal multivitamin  plus iron 27-0.8 MG Tabs per tablet      Take 1 tablet by mouth daily       sennosides 8.6 MG tablet    SENOKOT    120 tablet    Take 2  tablets by mouth 2 times daily as needed for constipation       TYLENOL PO      Take 1,000 mg by mouth as needed       VITAMIN D (CHOLECALCIFEROL) PO      Take 3,000 Units by mouth daily

## 2017-04-27 ENCOUNTER — PRENATAL OFFICE VISIT (OUTPATIENT)
Dept: MIDWIFE SERVICES | Facility: CLINIC | Age: 37
End: 2017-04-27
Payer: COMMERCIAL

## 2017-04-27 VITALS
TEMPERATURE: 98 F | HEART RATE: 87 BPM | DIASTOLIC BLOOD PRESSURE: 79 MMHG | WEIGHT: 233.7 LBS | SYSTOLIC BLOOD PRESSURE: 112 MMHG | HEIGHT: 69 IN | BODY MASS INDEX: 34.61 KG/M2

## 2017-04-27 DIAGNOSIS — Z30.015 ENCOUNTER FOR INITIAL PRESCRIPTION OF VAGINAL RING HORMONAL CONTRACEPTIVE: ICD-10-CM

## 2017-04-27 LAB — BETA HCG QUAL IFA URINE: NEGATIVE

## 2017-04-27 PROCEDURE — 84703 CHORIONIC GONADOTROPIN ASSAY: CPT | Performed by: ADVANCED PRACTICE MIDWIFE

## 2017-04-27 PROCEDURE — 87624 HPV HI-RISK TYP POOLED RSLT: CPT | Performed by: ADVANCED PRACTICE MIDWIFE

## 2017-04-27 PROCEDURE — G0145 SCR C/V CYTO,THINLAYER,RESCR: HCPCS | Performed by: ADVANCED PRACTICE MIDWIFE

## 2017-04-27 RX ORDER — ETONOGESTREL AND ETHINYL ESTRADIOL VAGINAL RING .015; .12 MG/D; MG/D
RING VAGINAL
Qty: 3 EACH | Refills: 3 | Status: SHIPPED | OUTPATIENT
Start: 2017-04-27 | End: 2018-01-11

## 2017-04-27 NOTE — LETTER
[address]  [facility] [address]  [phone]        April 27, 2017      Carrie Munoz  [address]   [facility] [address]      To Whom it May Concern:      Carrie Munoz was seen in our office on 4/27/2017 and was given the following instructions:    Patient may return to work on 4/27/2017.      Sincerely,          Shelby Monsivais CNM

## 2017-04-27 NOTE — NURSING NOTE
"Chief Complaint   Patient presents with     Post Partum Exam     post ob, due for pap       Initial /79  Pulse 87  Temp 98  F (36.7  C) (Oral)  Ht 5' 9\" (1.753 m)  Wt 233 lb 11.2 oz (106 kg)  Breastfeeding? No  BMI 34.51 kg/m2 Estimated body mass index is 34.51 kg/(m^2) as calculated from the following:    Height as of this encounter: 5' 9\" (1.753 m).    Weight as of this encounter: 233 lb 11.2 oz (106 kg).  BP completed using cuff size: X-large        The following HM Due: pap smear      The following patient reported/Care Every where data was sent to:  P ABSTRACT QUALITY INITIATIVES [89430]       patient has appointment for today  Farrah Hall               "

## 2017-04-27 NOTE — MR AVS SNAPSHOT
After Visit Summary   4/27/2017    Carrie Munoz    MRN: [id number]           Patient Information     Date Of Birth          1980        Visit Information        Provider Department      4/27/2017 11:30 AM Shelby Monsivais APRN CNM [facility]        Today's Diagnoses     Postpartum exam    -  1    Encounter for initial prescription of vaginal ring hormonal contraceptive           Follow-ups after your visit        Your next 10 appointments already scheduled     May 08, 2017  7:30 AM CDT   KACIE Extremity with Margi Vivas PT   [facility]    [address] [place] [address] [phone]              Who to contact     If you have questions or need follow up information about today's clinic visit or your schedule please contact [facility] directly at [phone].  Normal or non-critical lab and imaging results will be communicated to you by MyChart, letter or phone within 4 business days after the clinic has received the results. If you do not hear from us within 7 days, please contact the clinic through [employer] or phone. If you have a critical or abnormal lab result, we will notify you by phone as soon as possible.  Submit refill requests through [employer] or call your pharmacy and they will forward the refill request to us. Please allow 3 business days for your refill to be completed.          Additional Information About Your Visit        MyChart Information     [employer] gives you secure access to your electronic health record. If you see a primary care provider, you can also send messages to your care team and make appointments. If you have questions, please call your primary care clinic.  If you do not have a primary care provider, please call [phone] and they will assist you.        Care EveryWhere ID     This is your Care EveryWhere ID. This could be used by  "other organizations to access your [place] medical records  [id number]        Your Vitals Were     Pulse Temperature Height Breastfeeding? BMI (Body Mass Index)       87 98  F (36.7  C) (Oral) 5' 9\" (1.753 m) No 34.51 kg/m2        Blood Pressure from Last 3 Encounters:   04/27/17 112/79   03/25/17 96/55   03/16/17 132/77    Weight from Last 3 Encounters:   04/27/17 233 lb 11.2 oz (106 kg)   03/14/17 253 lb (114.8 kg)   02/26/17 253 lb (114.8 kg)              We Performed the Following     Beta HCG qual IFA urine     HPV High Risk Types DNA Cervical     Pap imaged thin layer screen with HPV - recommended age 30 - 65 years (select HPV order below)          Today's Medication Changes          These changes are accurate as of: 4/27/17  1:49 PM.  If you have any questions, ask your nurse or doctor.               Start taking these medicines.        Dose/Directions    etonogestrel-ethinyl estradiol 0.12-0.015 MG/24HR vaginal ring   Commonly known as:  NUVARING   Used for:  Encounter for initial prescription of vaginal ring hormonal contraceptive        Insert 1 ring vaginally every 21 days then remove for 1 week then repeat with new ring.   Quantity:  3 each   Refills:  3            Where to get your medicines      These medications were sent to [facility] [place], [place] - [address]  [address] [address]     Phone:  [phone]     etonogestrel-ethinyl estradiol 0.12-0.015 MG/24HR vaginal ring                Primary Care Provider Office Phone # Fax #    [facility] [phone]       [address] [facility] [address]        Thank you!     Thank you for choosing [facility]  for your care. Our goal is always to provide you with excellent care. Hearing back from our patients is one way we can continue to improve our services. Please take a few minutes to complete the written survey that you may receive in the mail after your visit " with us. Thank you!             Your Updated Medication List - Protect others around you: Learn how to safely use, store and throw away your medicines at www.disposemymeds.org.          This list is accurate as of: 4/27/17  1:49 PM.  Always use your most recent med list.                   Brand Name Dispense Instructions for use    etonogestrel-ethinyl estradiol 0.12-0.015 MG/24HR vaginal ring    NUVARING    3 each    Insert 1 ring vaginally every 21 days then remove for 1 week then repeat with new ring.       ferrous sulfate 325 (65 FE) MG tablet    IRON    60 tablet    Take 1 tablet (325 mg) by mouth 2 times daily       ibuprofen 600 MG tablet    ADVIL/MOTRIN    90 tablet    Take 1 tablet (600 mg) by mouth every 6 hours as needed for moderate pain       prenatal multivitamin  plus iron 27-0.8 MG Tabs per tablet      Take 1 tablet by mouth daily       sennosides 8.6 MG tablet    SENOKOT    120 tablet    Take 2 tablets by mouth 2 times daily as needed for constipation       TYLENOL PO      Take 1,000 mg by mouth as needed       VITAMIN D (CHOLECALCIFEROL) PO      Take 3,000 Units by mouth daily

## 2017-04-27 NOTE — PROGRESS NOTES
"SUBJECTIVE:   Carrie Munoz is here for her 6-week postpartum checkup.     HPI: Patient presents for 6 week PP visit. She had her baby with the [facility] CNM group but her baby is now being seen by FP in our clinic and the patient states it is easier for both of them to come to the same clinic. She is interested in birth control - has narrowed it down to NuvaRing. Briefly reviewed all options and patient is certain she would like NuvaRing. Patient has been dealing with low back, SI and hip pain since pregnancy. Thought it would be improving by now but it isn't. Still seeing chiro and has appt for PT.     DELIVERY DATE: 3/23/2017   of a viable girl, weight 10 pounds 1 oz., with no complications.  FEEDING METHOD:Bottlefed    CONTRACEPTION PLANNED: ring  She  has not had intercourse since delivery and complains of No discomfort.  HX OF DEPRESSION:Yes: sees a therapist weekly, not currently on any medication and does not feel it is necessary at this time. PHQ9 = 7  OTHER HPI: She has no signs of infection, bleeding or other complications. Bleeding stopped, epis/lac healing well.         EXAM:  /79  Pulse 87  Temp 98  F (36.7  C) (Oral)  Ht 5' 9\" (1.753 m)  Wt 233 lb 11.2 oz (106 kg)  Breastfeeding? No  BMI 34.51 kg/m2  GENERAL APPEARANCE: healthy, alert and no distress  NECK: thyroid normal to palpation  BREAST: soft, nontender, nipples intact, lactating   ABDOMEN: soft, nontender, diastasis recti closing  PSYCH: mentation appears normal and affect normal/bright  PELVIC EXAM:  Vulva: BUS WNL, no lesions noted  Vagina: Discharge normal and physiologic, no lesions, well rugated, good tone  Cervix: Smooth, pink, no visible lesions, neg CMT  Uterus: Normal size and position, non-tender, mobile  Ovaries: No masses palpable, non-tender, mobile  Rectal exam: deferred    ASSESSMENT:   Normal postpartum exam after .    PLAN:  Birth Control as ordered. Fertility reviewed.   Return as needed or at time interval " for next routine pap, pelvic, or breast exam.  Encourage Kegals and abdominal exercise. Slow, steady weight loss.  Continue a multi vitamin supplement, especially if breastfeeding.  Pap smear was obtained.  GC/CHLAMYDIA CULTURE OBTAINED:NO   Post partum Hgb was not obtained.  Letter given to return to work - plans to go back on Tuesday.   Shelby Monsivais CNM

## 2017-04-28 ASSESSMENT — PATIENT HEALTH QUESTIONNAIRE - PHQ9: SUM OF ALL RESPONSES TO PHQ QUESTIONS 1-9: 7

## 2017-05-01 LAB
COPATH REPORT: NORMAL
PAP: NORMAL

## 2017-05-02 LAB
FINAL DIAGNOSIS: NORMAL
HPV HR 12 DNA CVX QL NAA+PROBE: NEGATIVE
HPV16 DNA SPEC QL NAA+PROBE: NEGATIVE
HPV18 DNA SPEC QL NAA+PROBE: NEGATIVE
SPECIMEN DESCRIPTION: NORMAL

## 2017-05-08 ENCOUNTER — THERAPY VISIT (OUTPATIENT)
Dept: PHYSICAL THERAPY | Facility: CLINIC | Age: 37
End: 2017-05-08
Payer: COMMERCIAL

## 2017-05-08 DIAGNOSIS — M53.3 SACROILIAC JOINT PAIN: ICD-10-CM

## 2017-05-08 PROCEDURE — 97110 THERAPEUTIC EXERCISES: CPT | Mod: GP | Performed by: PHYSICAL THERAPIST

## 2017-05-08 PROCEDURE — 97530 THERAPEUTIC ACTIVITIES: CPT | Mod: GP | Performed by: PHYSICAL THERAPIST

## 2017-05-16 ENCOUNTER — THERAPY VISIT (OUTPATIENT)
Dept: PHYSICAL THERAPY | Facility: CLINIC | Age: 37
End: 2017-05-16
Payer: COMMERCIAL

## 2017-05-16 DIAGNOSIS — M53.3 SACROILIAC JOINT PAIN: ICD-10-CM

## 2017-05-16 PROCEDURE — 97112 NEUROMUSCULAR REEDUCATION: CPT | Mod: GP | Performed by: PHYSICAL THERAPIST

## 2017-05-16 PROCEDURE — 97140 MANUAL THERAPY 1/> REGIONS: CPT | Mod: GP | Performed by: PHYSICAL THERAPIST

## 2017-05-23 ENCOUNTER — THERAPY VISIT (OUTPATIENT)
Dept: PHYSICAL THERAPY | Facility: CLINIC | Age: 37
End: 2017-05-23
Payer: COMMERCIAL

## 2017-05-23 DIAGNOSIS — M53.3 SACROILIAC JOINT PAIN: ICD-10-CM

## 2017-05-23 PROCEDURE — 97112 NEUROMUSCULAR REEDUCATION: CPT | Mod: GP | Performed by: PHYSICAL THERAPIST

## 2017-05-23 PROCEDURE — 97530 THERAPEUTIC ACTIVITIES: CPT | Mod: GP | Performed by: PHYSICAL THERAPIST

## 2017-05-23 PROCEDURE — 97140 MANUAL THERAPY 1/> REGIONS: CPT | Mod: GP | Performed by: PHYSICAL THERAPIST

## 2017-05-30 ENCOUNTER — THERAPY VISIT (OUTPATIENT)
Dept: PHYSICAL THERAPY | Facility: CLINIC | Age: 37
End: 2017-05-30
Payer: COMMERCIAL

## 2017-05-30 DIAGNOSIS — M53.3 SACROILIAC JOINT PAIN: ICD-10-CM

## 2017-05-30 PROCEDURE — 97110 THERAPEUTIC EXERCISES: CPT | Mod: GP | Performed by: PHYSICAL THERAPIST

## 2017-05-30 PROCEDURE — 97140 MANUAL THERAPY 1/> REGIONS: CPT | Mod: GP | Performed by: PHYSICAL THERAPIST

## 2017-05-30 PROCEDURE — 97112 NEUROMUSCULAR REEDUCATION: CPT | Mod: GP | Performed by: PHYSICAL THERAPIST

## 2017-06-26 ENCOUNTER — MYC MEDICAL ADVICE (OUTPATIENT)
Dept: FAMILY MEDICINE | Facility: CLINIC | Age: 37
End: 2017-06-26

## 2017-06-27 NOTE — TELEPHONE ENCOUNTER
Adilia,   Please see SI-BONEt message below and advise. Thanks.    Ruth Guerrier RN  [facility]

## 2017-08-24 ENCOUNTER — OFFICE VISIT (OUTPATIENT)
Dept: MIDWIFE SERVICES | Facility: CLINIC | Age: 37
End: 2017-08-24
Payer: COMMERCIAL

## 2017-08-24 VITALS
WEIGHT: 234.9 LBS | OXYGEN SATURATION: 97 % | TEMPERATURE: 99.2 F | HEART RATE: 93 BPM | SYSTOLIC BLOOD PRESSURE: 120 MMHG | BODY MASS INDEX: 34.79 KG/M2 | HEIGHT: 69 IN | DIASTOLIC BLOOD PRESSURE: 84 MMHG

## 2017-08-24 DIAGNOSIS — N91.2 AMENORRHEA: Primary | ICD-10-CM

## 2017-08-24 DIAGNOSIS — E03.8 TSH (THYROID-STIMULATING HORMONE DEFICIENCY): ICD-10-CM

## 2017-08-24 PROCEDURE — 36415 COLL VENOUS BLD VENIPUNCTURE: CPT | Performed by: ADVANCED PRACTICE MIDWIFE

## 2017-08-24 PROCEDURE — 84146 ASSAY OF PROLACTIN: CPT | Performed by: ADVANCED PRACTICE MIDWIFE

## 2017-08-24 PROCEDURE — 84443 ASSAY THYROID STIM HORMONE: CPT | Performed by: ADVANCED PRACTICE MIDWIFE

## 2017-08-24 PROCEDURE — 84403 ASSAY OF TOTAL TESTOSTERONE: CPT | Performed by: ADVANCED PRACTICE MIDWIFE

## 2017-08-24 PROCEDURE — 99213 OFFICE O/P EST LOW 20 MIN: CPT | Performed by: ADVANCED PRACTICE MIDWIFE

## 2017-08-24 RX ORDER — MEDROXYPROGESTERONE ACETATE 10 MG
10 TABLET ORAL DAILY
Qty: 10 TABLET | Refills: 0 | Status: SHIPPED | OUTPATIENT
Start: 2017-08-24 | End: 2017-09-03

## 2017-08-24 NOTE — MR AVS SNAPSHOT
"              After Visit Summary   8/24/2017    Carrie Munoz    MRN: [id number]           Patient Information     Date Of Birth          1980        Visit Information        Provider Department      8/24/2017 4:00 PM May Escobedo APRN CNM [facility]        Today's Diagnoses     Amenorrhea    -  1    TSH (thyroid-stimulating hormone deficiency)           Follow-ups after your visit        Who to contact     If you have questions or need follow up information about today's clinic visit or your schedule please contact [facility] directly at [phone].  Normal or non-critical lab and imaging results will be communicated to you by Think Passengerhart, letter or phone within 4 business days after the clinic has received the results. If you do not hear from us within 7 days, please contact the clinic through [employer] or phone. If you have a critical or abnormal lab result, we will notify you by phone as soon as possible.  Submit refill requests through [employer]iv[employer] or call your pharmacy and they will forward the refill request to us. Please allow 3 business days for your refill to be completed.          Additional Information About Your Visit        MyChart Information     [employer] gives you secure access to your electronic health record. If you see a primary care provider, you can also send messages to your care team and make appointments. If you have questions, please call your primary care clinic.  If you do not have a primary care provider, please call [phone] and they will assist you.        Care EveryWhere ID     This is your Care EveryWhere ID. This could be used by other organizations to access your [place] medical records  [id number]        Your Vitals Were     Pulse Temperature Height Last Period Pulse Oximetry Breastfeeding?    93 99.2  F (37.3  C) (Oral) 5' 9\" (1.753 m) 06/25/2017 97% No    BMI (Body Mass Index)                   34.69 kg/m2            Blood " Pressure from Last 3 Encounters:   08/24/17 120/84   04/27/17 112/79   03/25/17 96/55    Weight from Last 3 Encounters:   08/24/17 234 lb 14.4 oz (106.5 kg)   04/27/17 233 lb 11.2 oz (106 kg)   03/14/17 253 lb (114.8 kg)              We Performed the Following     Prolactin     Testosterone, total     TSH with free T4 reflex          Today's Medication Changes          These changes are accurate as of: 8/24/17 11:59 PM.  If you have any questions, ask your nurse or doctor.               Start taking these medicines.        Dose/Directions    medroxyPROGESTERone 10 MG tablet   Commonly known as:  PROVERA   Used for:  Amenorrhea   Started by:  May Escobedo APRN CNM        Dose:  10 mg   Take 1 tablet (10 mg) by mouth daily for 10 days   Quantity:  10 tablet   Refills:  0         Stop taking these medicines if you haven't already. Please contact your care team if you have questions.     ferrous sulfate 325 (65 FE) MG tablet   Commonly known as:  IRON   Stopped by:  May Escobedo APRN CNM           ibuprofen 600 MG tablet   Commonly known as:  ADVIL/MOTRIN   Stopped by:  May Escobedo APRN CNM           prenatal multivitamin plus iron 27-0.8 MG Tabs per tablet   Stopped by:  May Escobedo APRN CNM           sennosides 8.6 MG tablet   Commonly known as:  SENOKOT   Stopped by:  May Escobedo APRN CNM           TYLENOL PO   Stopped by:  May Escobedo APRN CNM           VITAMIN D (CHOLECALCIFEROL) PO   Stopped by:  May Escobedo APRN CNM                Where to get your medicines      These medications were sent to [facility] [place], [place] - [address]  [address] [address]     Phone:  [phone]     medroxyPROGESTERone 10 MG tablet                Primary Care Provider Office Phone # Fax #    [facility] [phone]       [address] [facility] [address]         Equal Access to Services     [facility] : Hadii aad ku hadhaleighlouisa Adelaidadara, wadomingoda lushavonnewilliamha, qaemelia montanamalcommyriam durham. So [facility] [phone].    ATENCIÓN: Si habla español, tiene a kapoor disposición servicios gratuitos de asistencia lingüística. Llame al [phone].    We comply with applicable federal civil rights laws and [place] laws. We do not discriminate on the basis of race, color, national origin, age, disability sex, sexual orientation or gender identity.            Thank you!     Thank you for choosing [facility]  for your care. Our goal is always to provide you with excellent care. Hearing back from our patients is one way we can continue to improve our services. Please take a few minutes to complete the written survey that you may receive in the mail after your visit with us. Thank you!             Your Updated Medication List - Protect others around you: Learn how to safely use, store and throw away your medicines at www.disposemymeds.org.          This list is accurate as of: 8/24/17 11:59 PM.  Always use your most recent med list.                   Brand Name Dispense Instructions for use Diagnosis    etonogestrel-ethinyl estradiol 0.12-0.015 MG/24HR vaginal ring    NUVARING    3 each    Insert 1 ring vaginally every 21 days then remove for 1 week then repeat with new ring.    Encounter for initial prescription of vaginal ring hormonal contraceptive       medroxyPROGESTERone 10 MG tablet    PROVERA    10 tablet    Take 1 tablet (10 mg) by mouth daily for 10 days    Amenorrhea

## 2017-08-24 NOTE — PROGRESS NOTES
"Chief Complaint   Patient presents with     Consult     Hormone issues.        Initial /84 (BP Location: Left arm, Patient Position: Sitting, Cuff Size: Adult Large)  Pulse 93  Temp 99.2  F (37.3  C) (Oral)  Ht 5' 9\" (1.753 m)  Wt 234 lb 14.4 oz (106.5 kg)  LMP 2017  SpO2 97%  Breastfeeding? No  BMI 34.69 kg/m2 Estimated body mass index is 34.69 kg/(m^2) as calculated from the following:    Height as of this encounter: 5' 9\" (1.753 m).    Weight as of this encounter: 234 lb 14.4 oz (106.5 kg).  BP completed using cuff size: large        The following HM Due: NONE      The following patient reported/Care Every where data was sent to:  P ABSTRACT QUALITY INITIATIVES [77889]  n/a     n/a and patient has appointment for today             "

## 2017-08-25 LAB
PROLACTIN SERPL-MCNC: 22 UG/L (ref 3–27)
TSH SERPL DL<=0.005 MIU/L-ACNC: 1.39 MU/L (ref 0.4–4)

## 2017-08-25 NOTE — PROGRESS NOTES
"S: Carrie Munoz is a 36 year old woman who presents to the clinic for a discussion about feeling that her hormones are out of control. She has a long history of depression but this feels \"totally different\". She has an 8 month old daughter who she loves and is sleeping through the night. She is not currently breastfeeding. Reports no current stressors and reports that she is in a very supportive relationship (her  just quit work to take care of their baby full time) and she just got an awesome new job (feels financially secure and happy/ challenged and respected at work). Despite all this, she reports having big mood swings, like she has PMS all of the time. She is sleeping well and eating well. She is using the nuvaring for contraception but has not had sex since she was pregnant because she has absolutely no libido. Her partner is supportive but encouraged her to get help. Before she had her daughter she got her period every month/ 28 days. SInce starting the nuvaring she gets it irregularly. She used the nuvaring several months and got a period when nuvaring was removed. Then she forgot to use the nuvaring for several months and has not gotten a period since June.     Past Medical History:   Diagnosis Date     Breast disorder     biopsy 2011(?) benign     Breast lump on right side at 2:30  o'clock position 5/4/2011     Cardiac abnormality     tachycardia during pregnancy     Depressive disorder      Mental disorder     depression, anxiety     Unspecified sinusitis (chronic)      Current Outpatient Prescriptions   Medication     medroxyPROGESTERone (PROVERA) 10 MG tablet     etonogestrel-ethinyl estradiol (NUVARING) 0.12-0.015 MG/24HR vaginal ring     No current facility-administered medications for this visit.      Facility-Administered Medications Ordered in Other Visits   Medication     bupivacaine (MARCAINE) 0.125 % injection (diluted from stock concentration by MD or CRNA)     Review Of " "Systems  Skin: negative  Eyes: negative  Ears/Nose/Throat: negative  Respiratory: No shortness of breath, dyspnea on exertion, cough, or hemoptysis  Cardiovascular: negative  Gastrointestinal: negative  Genitourinary: positive for negative, abnormal menstrual cycles and decreased libido  Musculoskeletal: negative  Neurologic: negative  Psychiatric: positive for mood swings without depressive or anxiety symptoms  Hematologic/Lymphatic/Immunologic: negative  Endocrine: negative for, cold intolerance, heat intolerance and hot flashes      O: /84 (BP Location: Left arm, Patient Position: Sitting, Cuff Size: Adult Large)  Pulse 93  Temp 99.2  F (37.3  C) (Oral)  Ht 5' 9\" (1.753 m)  Wt 234 lb 14.4 oz (106.5 kg)  LMP 06/25/2017  SpO2 97%  Breastfeeding? No  BMI 34.69 kg/m2  Mentation is normal and bright. She seems upbeat and articulate.   Heart: RRR  Lungs: clear      Labs drawn: prolactin, testosterone, TSH,     A: Amenorrhea  Mood swings  Absent libido      P: Labs were drawn today to r/o pathological causes of amennorhea including PCOS or thyroid disorder.  IF these labs are normal, we discussed not using nuvaring and starting a progesterone challenge to see if that starts her cycle. I discussed that she should get a period after the 10 days of progesterone and if she does not have withdrawal bleeding, she should call us.  If cycles start and ovulation resumes, hopefully from a hormonal standpoint, she will feel better. She is not currently sexually active but as it is a goal of hers, should she become sexually active I encouraged her to use condoms. Wait several cycles and make and appointment for BC consult. If this all does not impact her mood swings, her mental health should be addressed by a mental health provider.  She agrees with the plan and all her questions were answered.    May Escobedo, MARIEL APRN    "

## 2017-08-29 LAB — TESTOST SERPL-MCNC: 6 NG/DL (ref 8–60)

## 2017-08-31 NOTE — PROGRESS NOTES
"Dear Carrie,    Your test results are attached below. Your hormones are normal. The test results show that it is unlikely that you have a thyroid problem or hormonal causes for your period problems. The level for your testosterone was a little low which does not indicate that anything is wrong. I was actually drawing this lab to rule out a disorder called polycystic ovarian syndrome (PCOS) which is characterized by an elevated testosterone level. So, for sure, you do not have PCOS.  Yay. I hope taking the progesterone pills and \"resetting\" your cycle is the cure. If not, call us. If you have any questions, please contact me via BookingPal or you can call our office at [phone].    Omid,  May Escobedo, MARIEL, HEBERTM"

## 2017-09-22 ENCOUNTER — ALLIED HEALTH/NURSE VISIT (OUTPATIENT)
Dept: NURSING | Facility: CLINIC | Age: 37
End: 2017-09-22
Payer: COMMERCIAL

## 2017-09-22 DIAGNOSIS — Z23 NEED FOR PROPHYLACTIC VACCINATION AND INOCULATION AGAINST INFLUENZA: Primary | ICD-10-CM

## 2017-09-22 PROCEDURE — 90471 IMMUNIZATION ADMIN: CPT

## 2017-09-22 PROCEDURE — 99207 ZZC NO CHARGE NURSE ONLY: CPT

## 2017-09-22 PROCEDURE — 90686 IIV4 VACC NO PRSV 0.5 ML IM: CPT

## 2017-09-22 NOTE — PROGRESS NOTES
Injectable Influenza Immunization Documentation    1.  Is the person to be vaccinated sick today?   No    2. Does the person to be vaccinated have an allergy to a component   of the vaccine?   No    3. Has the person to be vaccinated ever had a serious reaction   to influenza vaccine in the past?   No    4. Has the person to be vaccinated ever had Guillain-Barré syndrome?   No    Form completed by Fiona Rubio CMA

## 2017-09-22 NOTE — MR AVS SNAPSHOT
After Visit Summary   9/22/2017    Carrie Munoz    MRN: [id number]           Patient Information     Date Of Birth          1980        Visit Information        Provider Department      9/22/2017 11:45 AM RD LIZZIE ASTUDILLO/LPN [facility]        Today's Diagnoses     Need for prophylactic vaccination and inoculation against influenza    -  1       Follow-ups after your visit        Your next 10 appointments already scheduled     Sep 27, 2017  3:00 PM CDT   Office Visit with Shira Alejandro MD   [facility] ([facility])    [address] [place] [id number] [phone]           Bring a current list of meds and any records pertaining to this visit. For Physicals, please bring immunization records and any forms needing to be filled out. Please arrive 10 minutes early to complete paperwork.              Who to contact     If you have questions or need follow up information about today's clinic visit or your schedule please contact [facility] directly at [phone].  Normal or non-critical lab and imaging results will be communicated to you by MMRGlobalhart, letter or phone within 4 business days after the clinic has received the results. If you do not hear from us within 7 days, please contact the clinic through [facility] or phone. If you have a critical or abnormal lab result, we will notify you by phone as soon as possible.  Submit refill requests through [facility] or call your pharmacy and they will forward the refill request to us. Please allow 3 business days for your refill to be completed.          Additional Information About Your Visit        M[employer] Information     [facility] gives you secure access to your electronic health record. If you see a primary care provider, you can also send messages to your care team and make appointments. If you have questions, please call your primary care clinic.  If you do not  have a primary care provider, please call [phone] and they will assist you.        Care EveryWhere ID     This is your Care EveryWhere ID. This could be used by other organizations to access your [place] medical records  [id number]        Your Vitals Were     Last Period                   06/25/2017            Blood Pressure from Last 3 Encounters:   08/24/17 120/84   04/27/17 112/79   03/25/17 96/55    Weight from Last 3 Encounters:   08/24/17 234 lb 14.4 oz (106.5 kg)   04/27/17 233 lb 11.2 oz (106 kg)   03/14/17 253 lb (114.8 kg)              We Performed the Following     FLU VAC, SPLIT VIRUS IM > 3 YO (QUADRIVALENT) [08944]     Vaccine Administration, Initial [31931]        Primary Care Provider Office Phone # Fax #    Clinic [facility] [phone]       [address] [facility] [address]        Equal Access to Services     JUANA MOMIN AH: Hadii aad ku hadasho Soomaali, waaxda luqadaha, qaybta kaalmada adeegyada, waxay idiin hayeduardon mildred mario . So [facility] [phone].    ATENCIÓN: Si habla español, tiene a kapoor disposición servicios gratuitos de asistencia lingüística. Llame al [phone].    We comply with applicable federal civil rights laws and [place] laws. We do not discriminate on the basis of race, color, national origin, age, disability sex, sexual orientation or gender identity.            Thank you!     Thank you for choosing [facility]  for your care. Our goal is always to provide you with excellent care. Hearing back from our patients is one way we can continue to improve our services. Please take a few minutes to complete the written survey that you may receive in the mail after your visit with us. Thank you!             Your Updated Medication List - Protect others around you: Learn how to safely use, store and throw away your medicines at www.disposemymeds.org.          This list is accurate as of: 9/22/17 11:59 AM.  Always use your most recent  med list.                   Brand Name Dispense Instructions for use Diagnosis    etonogestrel-ethinyl estradiol 0.12-0.015 MG/24HR vaginal ring    NUVARING    3 each    Insert 1 ring vaginally every 21 days then remove for 1 week then repeat with new ring.    Encounter for initial prescription of vaginal ring hormonal contraceptive

## 2017-11-06 ENCOUNTER — OFFICE VISIT (OUTPATIENT)
Dept: OBGYN | Facility: CLINIC | Age: 37
End: 2017-11-06
Payer: MEDICAID

## 2017-11-06 VITALS
WEIGHT: 244 LBS | DIASTOLIC BLOOD PRESSURE: 84 MMHG | HEART RATE: 84 BPM | SYSTOLIC BLOOD PRESSURE: 124 MMHG | TEMPERATURE: 98.7 F | BODY MASS INDEX: 36.03 KG/M2

## 2017-11-06 DIAGNOSIS — N91.4 SECONDARY OLIGOMENORRHEA: Primary | ICD-10-CM

## 2017-11-06 LAB — BETA HCG QUAL IFA URINE: NEGATIVE

## 2017-11-06 PROCEDURE — 84146 ASSAY OF PROLACTIN: CPT | Performed by: OBSTETRICS & GYNECOLOGY

## 2017-11-06 PROCEDURE — 84443 ASSAY THYROID STIM HORMONE: CPT | Performed by: OBSTETRICS & GYNECOLOGY

## 2017-11-06 PROCEDURE — 99214 OFFICE O/P EST MOD 30 MIN: CPT | Performed by: OBSTETRICS & GYNECOLOGY

## 2017-11-06 PROCEDURE — 83001 ASSAY OF GONADOTROPIN (FSH): CPT | Performed by: OBSTETRICS & GYNECOLOGY

## 2017-11-06 PROCEDURE — 36415 COLL VENOUS BLD VENIPUNCTURE: CPT | Performed by: OBSTETRICS & GYNECOLOGY

## 2017-11-06 PROCEDURE — 84703 CHORIONIC GONADOTROPIN ASSAY: CPT | Performed by: OBSTETRICS & GYNECOLOGY

## 2017-11-06 RX ORDER — MEDROXYPROGESTERONE ACETATE 10 MG
10 TABLET ORAL DAILY
Qty: 10 TABLET | Refills: 0 | Status: SHIPPED | OUTPATIENT
Start: 2017-11-06 | End: 2017-11-16

## 2017-11-06 NOTE — PROGRESS NOTES
CC:  Oligomenorrhea      Carrie Munoz is a 37 year old  who had an uncomplicated vaginal delivery on .  She denies having an extra bleeding at delivery.  Prior to pregnancy, she had normal, predictable monthly cycles.  After her pregnancy, she started using the NuvaRing.  She had bleeding in April.  Used the NuvaRing again in May and had bleeding.  She has had no bleeding since she took out her NuvaRing in .      She was seen by her midwife in August.  TSH and prolactin here checked and normal.  Testosterone was not elevated.  Had progresterone withdrawal test and had no bleeding after this.    Denies any history of D&C,  or uterine instrumentation.  No history of hemorrhage.  No hot flashes or mood swings.    We discussed possible etiology of oligomenorrhea, including hormonal imbalance, PCOS, uterine scaring (Ashermans syndrome), result of significant hemorrage (Levy syndrome) and premature ovarian failure.    Re hormonal imbalance: repeated TSH and prolactin.    Re PCOS:  No hirsutism, androgens not elevated.    Re Asherman:  Surgical history is negative for any procedures associated with Asherman's, so this is unlikely.    Re Levy syndrome:  Did not have a post-partum hemorrhage or any other episode of severe bleeding in her life.    Re premature ovarian failure:  Ordered FSH to evaluate.    Pregnancy test ordered to rule out pregnancy as a cause.    She had failed progesterone withdrawal, but will re-attempt with estrogen support initially.  Premarin 0.625mg daily x 35d.  On days 26-35, add provera 10mg.  She should have a withdrawal bleed after this.    If she does not have a withdrawal bleeding or lab testing is abnormal, she will return to clinic to discuss next steps.    Shira Alejandro MD    Please note greater than 50% of this  minute appointment were spent in counseling with the patient on issues described above in the history of present illness and in the plan,  including evaluation and management of secondary oligomenorrhea.

## 2017-11-06 NOTE — MR AVS SNAPSHOT
After Visit Summary   11/6/2017    Carrie Munoz    MRN: [id number]           Patient Information     Date Of Birth          1980        Visit Information        Provider Department      11/6/2017 4:00 PM Shira Alejandro MD [facility]        Today's Diagnoses     Secondary oligomenorrhea    -  1      Care Instructions    Take the estrogen tablet daily for 35 days.    Take the Provera on for the last 10 days you're taking the estrogen.    I would anticipate you will have bleeding after this.          Follow-ups after your visit        Who to contact     If you have questions or need follow up information about today's clinic visit or your schedule please contact [facility] directly at [phone].  Normal or non-critical lab and imaging results will be communicated to you by MyChart, letter or phone within 4 business days after the clinic has received the results. If you do not hear from us within 7 days, please contact the clinic through [employer] or phone. If you have a critical or abnormal lab result, we will notify you by phone as soon as possible.  Submit refill requests through [employer] or call your pharmacy and they will forward the refill request to us. Please allow 3 business days for your refill to be completed.          Additional Information About Your Visit        MyChart Information     [employer] gives you secure access to your electronic health record. If you see a primary care provider, you can also send messages to your care team and make appointments. If you have questions, please call your primary care clinic.  If you do not have a primary care provider, please call [phone] and they will assist you.        Care EveryWhere ID     This is your Care EveryWhere ID. This could be used by other organizations to access your [place] medical records  [id number]        Your Vitals Were     Pulse Temperature Last Period BMI (Body Mass Index)           84 98.7  F (37.1  C) (Oral) 06/09/2017 36.03 kg/m2         Blood Pressure from Last 3 Encounters:   11/06/17 124/84   08/24/17 120/84   04/27/17 112/79    Weight from Last 3 Encounters:   11/06/17 244 lb (110.7 kg)   08/24/17 234 lb 14.4 oz (106.5 kg)   04/27/17 233 lb 11.2 oz (106 kg)              Today, you had the following     No orders found for display         Today's Medication Changes          These changes are accurate as of: 11/6/17  4:08 PM.  If you have any questions, ask your nurse or doctor.               Start taking these medicines.        Dose/Directions    estrogens (conjugated) 0.625 MG tablet   Commonly known as:  PREMARIN   Used for:  Secondary oligomenorrhea        Dose:  0.625 mg   Take 1 tablet (0.625 mg) by mouth daily   Quantity:  35 tablet   Refills:  0       medroxyPROGESTERone 10 MG tablet   Commonly known as:  PROVERA   Used for:  Secondary oligomenorrhea        Dose:  10 mg   Take 1 tablet (10 mg) by mouth daily for 10 days   Quantity:  10 tablet   Refills:  0            Where to get your medicines      These medications were sent to [facility] [address]  [address] [address]     Phone:  [phone]     estrogens (conjugated) 0.625 MG tablet    medroxyPROGESTERone 10 MG tablet                Primary Care Provider Office Phone # Fax #    Clinic [facility] [phone]       [address] [facility] [address]        Equal Access to Services     JUANA MOMIN : Hadii tonny kauffman hadhaleigho Sodara, waaxda luqadaha, qaybta kaalmada adeegyada, myriam levy. So [facility] [phone].    ATENCIÓN: Si habla español, tiene a kapoor disposición servicios gratuitos de asistencia lingüística. Llame al [phone].    We comply with applicable federal civil rights laws and [place] laws. We do not discriminate on the basis of race, color, national origin, age, disability, sex, sexual  orientation, or gender identity.            Thank you!     Thank you for choosing [facility]  for your care. Our goal is always to provide you with excellent care. Hearing back from our patients is one way we can continue to improve our services. Please take a few minutes to complete the written survey that you may receive in the mail after your visit with us. Thank you!             Your Updated Medication List - Protect others around you: Learn how to safely use, store and throw away your medicines at www.disposemymeds.org.          This list is accurate as of: 11/6/17  4:08 PM.  Always use your most recent med list.                   Brand Name Dispense Instructions for use Diagnosis    estrogens (conjugated) 0.625 MG tablet    PREMARIN    35 tablet    Take 1 tablet (0.625 mg) by mouth daily    Secondary oligomenorrhea       etonogestrel-ethinyl estradiol 0.12-0.015 MG/24HR vaginal ring    NUVARING    3 each    Insert 1 ring vaginally every 21 days then remove for 1 week then repeat with new ring.    Encounter for initial prescription of vaginal ring hormonal contraceptive       medroxyPROGESTERone 10 MG tablet    PROVERA    10 tablet    Take 1 tablet (10 mg) by mouth daily for 10 days    Secondary oligomenorrhea

## 2017-11-06 NOTE — NURSING NOTE
"Chief Complaint   Patient presents with     RECHECK     patient have not had a period since        Initial /84  Pulse 84  Temp 98.7  F (37.1  C) (Oral)  Wt 244 lb (110.7 kg)  LMP 2017  BMI 36.03 kg/m2 Estimated body mass index is 36.03 kg/(m^2) as calculated from the following:    Height as of 17: 5' 9\" (1.753 m).    Weight as of this encounter: 244 lb (110.7 kg).  BP completed using cuff size: large        The following HM Due: NONE      The following patient reported/Care Every where data was sent to:  P ABSTRACT QUALITY INITIATIVES [31784]  VINAY Mcintyre          "

## 2017-11-06 NOTE — PATIENT INSTRUCTIONS
Take the estrogen tablet daily for 35 days.    Take the Provera on for the last 10 days you're taking the estrogen.    I would anticipate you will have bleeding after this.

## 2017-11-07 LAB
FSH SERPL-ACNC: 6.2 IU/L
PROLACTIN SERPL-MCNC: 19 UG/L (ref 3–27)
TSH SERPL DL<=0.005 MIU/L-ACNC: 0.78 MU/L (ref 0.4–4)

## 2017-12-08 ENCOUNTER — OFFICE VISIT (OUTPATIENT)
Dept: FAMILY MEDICINE | Facility: CLINIC | Age: 37
End: 2017-12-08
Payer: MEDICAID

## 2017-12-08 VITALS
SYSTOLIC BLOOD PRESSURE: 116 MMHG | TEMPERATURE: 98.7 F | BODY MASS INDEX: 35.91 KG/M2 | WEIGHT: 243.2 LBS | HEART RATE: 90 BPM | OXYGEN SATURATION: 96 % | DIASTOLIC BLOOD PRESSURE: 80 MMHG

## 2017-12-08 DIAGNOSIS — M54.41 ACUTE MIDLINE LOW BACK PAIN WITH RIGHT-SIDED SCIATICA: Primary | ICD-10-CM

## 2017-12-08 PROCEDURE — 99213 OFFICE O/P EST LOW 20 MIN: CPT | Performed by: FAMILY MEDICINE

## 2017-12-08 RX ORDER — GABAPENTIN 300 MG/1
CAPSULE ORAL
Qty: 90 CAPSULE | Refills: 0 | Status: SHIPPED | OUTPATIENT
Start: 2017-12-08 | End: 2017-12-18

## 2017-12-08 NOTE — MR AVS SNAPSHOT
After Visit Summary   12/8/2017    Carrie Munoz    MRN: [id number]           Patient Information     Date Of Birth          1980        Visit Information        Provider Department      12/8/2017 9:00 AM Narciso Sanford MD [facility] [place] [place]        Today's Diagnoses     Acute midline low back pain with right-sided sciatica    -  1       Follow-ups after your visit        Additional Services     SPINE SURGERY REFERRAL       Please choose Medical Spine Specialist (unless patient was seen by a Medical Spine Specialist within the past 6 months).  Surgical Evaluation is advised if the patient presents with one or more of the following red flags:     **Cauda Equina Syndrome  **Evidence of Spinal Tumor  **Fracture  **Infection  **Loss of Bowel or Bladder Control  **Sudden or Progressive Weakness  **Any other documented emergent neurological condition resulting from a Lumbar Spinal Condition.    You have been referred to: Spine Lumbar: Spine Surgeon: [facility]: Neurosurgery Clinic [facility] [phone]   [email or web address]  [facility] [phone]   [email or web address]    Please be aware that coverage of these services is subject to the terms and limitations of your health insurance plan.  Call member services at your health plan with any benefit or coverage questions.      Please bring the following to your appointment:    **Any x-rays, CTs or MRIs which have been performed.  Contact the facility where they were done to arrange for  prior to your scheduled appointment.    **List of current medications   **This referral request   **Any documents/labs given to you regarding this referral                  Your next 10 appointments already scheduled     Dec 08, 2017  9:00 AM CST   MyChart Long with Narciso Sanford MD   [facility] [place] [place] ([facility] [place] [place]) [id number]  [address] [address] [phone]              Future tests that were ordered for you today     Open Future Orders        Priority Expected Expires Ordered    MR Lumbar Spine w/o & w Contrast Routine  12/8/2018 12/8/2017            Who to contact     If you have questions or need follow up information about today's clinic visit or your schedule please contact [facility] directly at [phone].  Normal or non-critical lab and imaging results will be communicated to you by BleepBleepshart, letter or phone within 4 business days after the clinic has received the results. If you do not hear from us within 7 days, please contact the clinic through Drive.SG or phone. If you have a critical or abnormal lab result, we will notify you by phone as soon as possible.  Submit refill requests through Drive.SG or call your pharmacy and they will forward the refill request to us. Please allow 3 business days for your refill to be completed.          Additional Information About Your Visit        Drive.SG Information     Drive.SG gives you secure access to your electronic health record. If you see a primary care provider, you can also send messages to your care team and make appointments. If you have questions, please call your primary care clinic.  If you do not have a primary care provider, please call [phone] and they will assist you.        Care EveryWhere ID     This is your Care EveryWhere ID. This could be used by other organizations to access your [facility] medical records  [id number]        Your Vitals Were     Pulse Temperature Pulse Oximetry BMI (Body Mass Index)          90 98.7  F (37.1  C) (Oral) 96% 35.91 kg/m2         Blood Pressure from Last 3 Encounters:   12/08/17 116/80   11/06/17 124/84   08/24/17 120/84    Weight from Last 3 Encounters:   12/08/17 243 lb 3.2 oz (110.3 kg)   11/06/17 244 lb (110.7 kg)   08/24/17 234 lb 14.4 oz (106.5 kg)              We Performed the  Following     SPINE SURGERY REFERRAL        Primary Care Provider Office Phone # Fax #    Clinic [facility] [phone]       [address] [facility] [address]        Equal Access to Services     JUANA MOMIN : Hadii aad ku hadtessa Hobbs, melissada lumiranda, qaalbata kaalmada celestino, myriam leon shelbi levy. So [facility] [phone].    ATENCIÓN: Si habla español, tiene a kapoor disposición servicios gratuitos de asistencia lingüística. Llame al [phone].    We comply with applicable federal civil rights laws and [place] laws. We do not discriminate on the basis of race, color, national origin, age, disability, sex, sexual orientation, or gender identity.            Thank you!     Thank you for choosing [facility]  for your care. Our goal is always to provide you with excellent care. Hearing back from our patients is one way we can continue to improve our services. Please take a few minutes to complete the written survey that you may receive in the mail after your visit with us. Thank you!             Your Updated Medication List - Protect others around you: Learn how to safely use, store and throw away your medicines at www.disposemymeds.org.          This list is accurate as of: 12/8/17  8:59 AM.  Always use your most recent med list.                   Brand Name Dispense Instructions for use Diagnosis    estrogens (conjugated) 0.625 MG tablet    PREMARIN    35 tablet    Take 1 tablet (0.625 mg) by mouth daily    Secondary oligomenorrhea       etonogestrel-ethinyl estradiol 0.12-0.015 MG/24HR vaginal ring    NUVARING    3 each    Insert 1 ring vaginally every 21 days then remove for 1 week then repeat with new ring.    Encounter for initial prescription of vaginal ring hormonal contraceptive

## 2017-12-08 NOTE — NURSING NOTE
"Chief Complaint   Patient presents with     Back Pain     Musculoskeletal Problem       Initial /80 (BP Location: Right arm, Patient Position: Chair, Cuff Size: Adult Large)  Pulse 90  Temp 98.7  F (37.1  C) (Oral)  Wt 243 lb 3.2 oz (110.3 kg)  SpO2 96%  BMI 35.91 kg/m2 Estimated body mass index is 35.91 kg/(m^2) as calculated from the following:    Height as of 8/24/17: 5' 9\" (1.753 m).    Weight as of this encounter: 243 lb 3.2 oz (110.3 kg).  Medication Reconciliation: complete     Fiona Rubio CMA    "

## 2017-12-08 NOTE — PROGRESS NOTES
SUBJECTIVE:   Carrie Munoz is a 37 year old female who presents to clinic today for the following health issues:    Back Pain       Duration: Several months         Specific cause: pregnancy     Description:   Location of pain: middle of back and hips  Character of pain: Burning, throbbing, spasms   Pain radiation:Shooting down legs   New numbness or weakness in legs, not attributed to pain:  no     Intensity: moderate to severe     History:   Pain interferes with job: YES- missed one day   History of back problems: no prior back problems  Any previous MRI or X-rays: X-rays done 1 month ago   Sees a specialist for back pain:  No  Therapies tried without relief: chiropractor, massage and Physical Therapy, ibuprofen     Alleviating factors:   Improved by: Nothing       Precipitating factors:  Worsened by: Lifting, Bending, Standing, Sitting, Lying Flat and Walking    Functional and Psychosocial Screen (Honey STarT Back):      Not performed today          Accompanying Signs & Symptoms:  Risk of Fracture:  None  Risk of Cauda Equina:  Unexplained bowel or bladder changes but came after postpartum ?  Risk of Infection:  None  Risk of Cancer:  None  Risk of Ankylosing Spondylitis:  Onset at age <35, male, AND morning back stiffness. no                       Problem list and histories reviewed & adjusted, as indicated.  Additional history: as documented    Labs reviewed in EPIC    Reviewed and updated as needed this visit by clinical staff       Reviewed and updated as needed this visit by Provider         ROS:  Constitutional, HEENT, cardiovascular, pulmonary, gi and gu systems are negative, except as otherwise noted.      OBJECTIVE:   /80 (BP Location: Right arm, Patient Position: Chair, Cuff Size: Adult Large)  Pulse 90  Temp 98.7  F (37.1  C) (Oral)  Wt 243 lb 3.2 oz (110.3 kg)  SpO2 96%  BMI 35.91 kg/m2  Body mass index is 35.91 kg/(m^2).  GENERAL: alert, mild distress, obese and fatigued  RESP: lungs  clear to auscultation - no rales, rhonchi or wheezes  CV: regular rate and rhythm, normal S1 S2, no S3 or S4, no murmur, click or rub, no peripheral edema and peripheral pulses strong  ABDOMEN: soft, nontender, no hepatosplenomegaly, no masses and bowel sounds normal  MS: no gross musculoskeletal defects noted, no edema  BACK: no CVA tenderness, has paralumbar tenderness  Comprehensive back pain exam:  Tenderness of lowewr LS region, Pain limits the following motions: flexion beyond 45', Lower extremity strength functional and equal on both sides, Lower extremity reflexes within normal limits bilaterally, Lower extremity sensation normal and equal on both sides and Straight leg raise negative bilaterally        ASSESSMENT/PLAN:             1. Acute midline low back pain with right-sided sciatica  Not responding to P etc  - MR Lumbar Spine w/o & w Contrast; Future  - SPINE SURGERY REFERRAL  - gabapentin (NEURONTIN) 300 MG capsule; Take 1 tablet (300 mg) every morning for 1-3 days, then 1 tablet twice daily for 1-3 days, then 1 tablet three times daily  Dispense: 90 capsule; Refill: 0    Work on weight loss  Follow up in 1 month.   See Patient Instructions    Narciso Sanford MD  [facility]

## 2017-12-13 ENCOUNTER — RADIANT APPOINTMENT (OUTPATIENT)
Dept: MRI IMAGING | Facility: CLINIC | Age: 37
End: 2017-12-13
Attending: FAMILY MEDICINE
Payer: MEDICAID

## 2017-12-13 DIAGNOSIS — M54.41 ACUTE MIDLINE LOW BACK PAIN WITH RIGHT-SIDED SCIATICA: ICD-10-CM

## 2017-12-13 RX ORDER — GADOBUTROL 604.72 MG/ML
10 INJECTION INTRAVENOUS ONCE
Status: COMPLETED | OUTPATIENT
Start: 2017-12-13 | End: 2017-12-13

## 2017-12-13 RX ADMIN — GADOBUTROL 10 ML: 604.72 INJECTION INTRAVENOUS at 19:06

## 2017-12-14 ENCOUNTER — TELEPHONE (OUTPATIENT)
Dept: FAMILY MEDICINE | Facility: CLINIC | Age: 37
End: 2017-12-14

## 2017-12-14 DIAGNOSIS — M54.16 LUMBAR NERVE ROOT IMPINGEMENT: Primary | ICD-10-CM

## 2017-12-14 NOTE — DISCHARGE INSTRUCTIONS
MRI Contrast Discharge Instructions    The IV contrast you received today will pass out of your body in your  urine. This will happen in the next 24 hours. You will not feel this process.  Your urine will not change color.    Drink at least 4 extra glasses of water or juice today (unless your doctor  has restricted your fluids). This reduces the stress on your kidneys.  You may take your regular medicines.    If you are on dialysis: It is best to have dialysis today.    If you have a reaction: Most reactions happen right away. If you have  any new symptoms after leaving the hospital (such as hives or swelling),  call your hospital at the correct number below. Or call your family doctor.  If you have breathing distress or wheezing, call 911.    Special instructions: ***    I have read and understand the above information.    Signature:______________________________________ Date:___________    Staff:__________________________________________ Date:___________     Time:__________    [place] [facility] Departments:    ___[place]: [phone]  ___[facility]: [phone]  ___[place]: [phone] ___[facility]: [phone]  ___[facility]: [phone]  ___[facility]: [phone]  ___[phone]  ___[facility]: [phone]  ___[phone]

## 2017-12-14 NOTE — TELEPHONE ENCOUNTER
Writer called patient left non detailed message requesting return call to clinic and ask to speak with nurse    Writer notes referral is within [facility] network - no transfer of imaging needed    Miah Calhoun RN

## 2017-12-14 NOTE — TELEPHONE ENCOUNTER
Please call patient  MRI shows nerve impingement   she sh]ould see spine surgery as discussed  Orders Placed This Encounter     SPINE SURGERY REFERRAL     Referral Type:   Consultation

## 2017-12-15 NOTE — TELEPHONE ENCOUNTER
Writer relayed message from Dr. Sanford regarding the MRI results. Patient has appt. Set up for 1/2018.     Thanks! Augustina Street RN

## 2017-12-27 DIAGNOSIS — N91.1 AMENORRHEA, SECONDARY: ICD-10-CM

## 2017-12-27 LAB — ESTRADIOL SERPL-MCNC: 73 PG/ML

## 2017-12-27 PROCEDURE — 82670 ASSAY OF TOTAL ESTRADIOL: CPT | Performed by: OBSTETRICS & GYNECOLOGY

## 2017-12-27 PROCEDURE — 36415 COLL VENOUS BLD VENIPUNCTURE: CPT | Performed by: OBSTETRICS & GYNECOLOGY

## 2018-01-02 ENCOUNTER — OFFICE VISIT (OUTPATIENT)
Dept: OBGYN | Facility: CLINIC | Age: 38
End: 2018-01-02
Attending: OBSTETRICS & GYNECOLOGY
Payer: MEDICAID

## 2018-01-02 ENCOUNTER — RADIANT APPOINTMENT (OUTPATIENT)
Dept: ULTRASOUND IMAGING | Facility: CLINIC | Age: 38
End: 2018-01-02
Attending: OBSTETRICS & GYNECOLOGY
Payer: MEDICAID

## 2018-01-02 VITALS
HEART RATE: 85 BPM | SYSTOLIC BLOOD PRESSURE: 114 MMHG | BODY MASS INDEX: 36.92 KG/M2 | WEIGHT: 250 LBS | DIASTOLIC BLOOD PRESSURE: 79 MMHG | TEMPERATURE: 98.8 F

## 2018-01-02 DIAGNOSIS — N91.1 AMENORRHEA, SECONDARY: ICD-10-CM

## 2018-01-02 DIAGNOSIS — N91.2 ABSENCE OF MENSTRUATION: Primary | ICD-10-CM

## 2018-01-02 DIAGNOSIS — N91.1 SECONDARY AMENORRHEA: Primary | ICD-10-CM

## 2018-01-02 PROCEDURE — 99213 OFFICE O/P EST LOW 20 MIN: CPT | Performed by: OBSTETRICS & GYNECOLOGY

## 2018-01-02 PROCEDURE — 76830 TRANSVAGINAL US NON-OB: CPT | Performed by: OBSTETRICS & GYNECOLOGY

## 2018-01-02 NOTE — MR AVS SNAPSHOT
After Visit Summary   1/2/2018    Carrie Munoz    MRN: [id number]           Patient Information     Date Of Birth          1980        Visit Information        Provider Department      1/2/2018 10:00 AM Shira Alejandro MD [facility]        Today's Diagnoses     Secondary amenorrhea    -  1       Follow-ups after your visit        Your next 10 appointments already scheduled     Jan 05, 2018  9:00 AM CST   (Arrive by 8:45 AM)   XR HYSTEROSALPINGOGRAM with URXR2   [facility],  Radiology ([facility])    [address] [address] [phone]           Schedule this exam within 10 days from the start of your period.  Do not have sex (intercourse) from the start of your period until you come in for the exam. If you have had sex, we will need to reschedule the exam.  An hour before the exam, you may take 600 mg (milligrams) of ibuprofen (Advil, Motrin), if you wish. This may relieve cramping during the exam.  Please bring a list of your current medicines to your exam. (Include vitamins, minerals and over-the-counter medicines.) Leave your valuables at home.  Tell your doctor if there is a chance you may be pregnant.  Please call the Imaging Department at your exam site with any questions.            Jan 11, 2018  1:45 PM CST   (Arrive by 1:30 PM)   New Patient Visit with Akanksha Guerrier MD   [facility] Neurosurgery ([facility] [place])    [address] [place] [id number] [phone]              Future tests that were ordered for you today     Open Future Orders        Priority Expected Expires Ordered    XR Hysterosalpingogram Routine 1/2/2018 1/2/2019 1/2/2018            Who to contact     If you have questions or need follow up information about today's clinic visit or your schedule please contact [facility] directly at  [phone].  Normal or non-critical lab and imaging results will be communicated to you by MyChart, letter or phone within 4 business days after the clinic has received the results. If you do not hear from us within 7 days, please contact the clinic through Crowdmarkhart or phone. If you have a critical or abnormal lab result, we will notify you by phone as soon as possible.  Submit refill requests through Lumense or call your pharmacy and they will forward the refill request to us. Please allow 3 business days for your refill to be completed.          Additional Information About Your Visit        Crowdmarkhart Information     Lumense gives you secure access to your electronic health record. If you see a primary care provider, you can also send messages to your care team and make appointments. If you have questions, please call your primary care clinic.  If you do not have a primary care provider, please call [phone] and they will assist you.        Care EveryWhere ID     This is your Care EveryWhere ID. This could be used by other organizations to access your [facility] medical records  [id number]        Your Vitals Were     Pulse Temperature BMI (Body Mass Index)             85 98.8  F (37.1  C) (Oral) 36.92 kg/m2          Blood Pressure from Last 3 Encounters:   01/02/18 114/79   12/08/17 116/80   11/06/17 124/84    Weight from Last 3 Encounters:   01/02/18 250 lb (113.4 kg)   12/08/17 243 lb 3.2 oz (110.3 kg)   11/06/17 244 lb (110.7 kg)              Today, you had the following     No orders found for display       Primary Care Provider Office Phone # Fax #    Clinic [facility] [phone]       [address] [facility] [address]        Equal Access to Services     JUANA MOMIN : Hadii tonny Hobbs, kimberly luqadaha, qaeemlia kaalmada celestino, myriam levy. So [facility] [phone].    ATENCIÓN: Si habla español, tiene a kapoor disposición servicios gratuitos de  asistencia lingüística. Omar al [phone].    We comply with applicable federal civil rights laws and [place] laws. We do not discriminate on the basis of race, color, national origin, age, disability, sex, sexual orientation, or gender identity.            Thank you!     Thank you for choosing [facility]  for your care. Our goal is always to provide you with excellent care. Hearing back from our patients is one way we can continue to improve our services. Please take a few minutes to complete the written survey that you may receive in the mail after your visit with us. Thank you!             Your Updated Medication List - Protect others around you: Learn how to safely use, store and throw away your medicines at www.disposemymeds.org.          This list is accurate as of: 1/2/18  6:09 PM.  Always use your most recent med list.                   Brand Name Dispense Instructions for use Diagnosis    estrogens (conjugated) 0.625 MG tablet    PREMARIN    35 tablet    Take 1 tablet (0.625 mg) by mouth daily    Secondary oligomenorrhea       etonogestrel-ethinyl estradiol 0.12-0.015 MG/24HR vaginal ring    NUVARING    3 each    Insert 1 ring vaginally every 21 days then remove for 1 week then repeat with new ring.    Encounter for initial prescription of vaginal ring hormonal contraceptive       gabapentin 300 MG capsule    NEURONTIN    120 capsule    Take 1 tablet (300 mg) by mouth in AM/morning, 1 tablet (300 mg) at noon and 2 tablets (600 mg) at bedtime for sleep    Acute midline low back pain with right-sided sciatica

## 2018-01-02 NOTE — NURSING NOTE
"Chief Complaint   Patient presents with     RECHECK     ultrasound       Initial /79  Pulse 85  Temp 98.8  F (37.1  C) (Oral)  Wt 250 lb (113.4 kg)  BMI 36.92 kg/m2 Estimated body mass index is 36.92 kg/(m^2) as calculated from the following:    Height as of 17: 5' 9\" (1.753 m).    Weight as of this encounter: 250 lb (113.4 kg).  BP completed using cuff size: large        The following HM Due: NONE      The following patient reported/Care Every where data was sent to:  P ABSTRACT QUALITY INITIATIVES [[id number]]  VINAY Mcintyre            "

## 2018-01-03 NOTE — PROGRESS NOTES
CC:  F/u US, secondary amennorhea.    Patient reports she hasn't any any vaginal bleeding since our initial visit.  Does report feeling symptoms that she's ovulating, despite the fact she hasn't been getting her periods.  Once a month feels cramping and bloating, similar to what she had with ovulation prior to this last pregnancy.    Reviewed workup so far.  Hormonal testing WNL.  Negative progesterone withdrawal test x 2.  Pelvic US today WNL.  Thin endometrium.    Discussed that while initially Asherman's syndrome was thought to be unlikely given the fact she's never had instrumentation of her uterus, it is possible.  Could perform hysterosalpingogram to evaluate for intrauterine adhesions.  Reviewed procedure and expectations.  She would like to proceed.    Patient brought to surgery scheduler prior to leaving office today.    Shira Alejandro MD    Please note greater than 50% of this 15 minute appointment were spent in counseling with the patient on issues described above in the history of present illness and in the plan, including Ashermans syndrome and HSG.

## 2018-01-05 ENCOUNTER — HOSPITAL ENCOUNTER (OUTPATIENT)
Dept: GENERAL RADIOLOGY | Facility: CLINIC | Age: 38
Discharge: HOME | End: 2018-01-05
Attending: OBSTETRICS & GYNECOLOGY | Admitting: OBSTETRICS & GYNECOLOGY
Payer: MEDICAID

## 2018-01-05 DIAGNOSIS — Z01.812 PRE-PROCEDURE LAB EXAM: ICD-10-CM

## 2018-01-05 DIAGNOSIS — N91.2 ABSENCE OF MENSTRUATION: ICD-10-CM

## 2018-01-05 DIAGNOSIS — Z01.812 PRE-PROCEDURE LAB EXAM: Primary | ICD-10-CM

## 2018-01-05 LAB — BETA HCG QUAL IFA URINE: NEGATIVE

## 2018-01-05 PROCEDURE — 58340 CATHETER FOR HYSTEROGRAPHY: CPT

## 2018-01-05 PROCEDURE — 58340 CATHETER FOR HYSTEROGRAPHY: CPT | Performed by: OBSTETRICS & GYNECOLOGY

## 2018-01-05 PROCEDURE — 84703 CHORIONIC GONADOTROPIN ASSAY: CPT | Performed by: OBSTETRICS & GYNECOLOGY

## 2018-01-05 PROCEDURE — 25500064 ZZH RX 255 OP 636: Performed by: OBSTETRICS & GYNECOLOGY

## 2018-01-05 PROCEDURE — 25000125 ZZHC RX 250: Performed by: OBSTETRICS & GYNECOLOGY

## 2018-01-05 RX ORDER — IOPAMIDOL 510 MG/ML
50 INJECTION, SOLUTION INTRAVASCULAR ONCE
Status: COMPLETED | OUTPATIENT
Start: 2018-01-05 | End: 2018-01-05

## 2018-01-05 RX ORDER — LIDOCAINE HYDROCHLORIDE 10 MG/ML
5 INJECTION, SOLUTION EPIDURAL; INFILTRATION; INTRACAUDAL; PERINEURAL ONCE
Status: COMPLETED | OUTPATIENT
Start: 2018-01-05 | End: 2018-01-05

## 2018-01-05 RX ADMIN — LIDOCAINE HYDROCHLORIDE 3 ML: 10 INJECTION, SOLUTION EPIDURAL; INFILTRATION; INTRACAUDAL; PERINEURAL at 09:54

## 2018-01-05 RX ADMIN — IOPAMIDOL 25 ML: 510 INJECTION, SOLUTION INTRAVASCULAR at 09:53

## 2018-01-05 NOTE — PROCEDURES
The patient is seen today for a hysterosalpingogram to discern evaluate uterine cavity due to 6+months of amenorrhea and normal hormonal testing.    Procedure:  After verbal informed consent was obtained, they patient was placed in dorsal lithotomy on the fluoroscopy table. A speculum was placed in the vagina and the vagina and cervix were prepped with hibicleans. A tenaculum was placed on the anterior lip of the cervix.    A Avery acorn cannula was placed into the cervical os and the speculum was removed.    Under fluoroscopy, the Isovue dye was injected.    The radiologic findings will be documented by the interpreting radiologist.    The cannula and tenaculum were removed.      Speculum was reinserted to evaluate tenaculum sites for hemostasis, and a NuvaRing was found in the posterior fornix.  This had likely been in place since around April and would explain the longstanding amenorrhea.  Patient was counseled that she will return to fertility quickly and should use contraception, as she doesn't desire pregnancy.    The patient tolerated the procedure well and was discharged to home.

## 2018-01-08 ASSESSMENT — ENCOUNTER SYMPTOMS
INSOMNIA: 1
DEPRESSION: 1
NUMBNESS: 1
DECREASED APPETITE: 1
TINGLING: 1
WEIGHT GAIN: 1
DECREASED LIBIDO: 1
WEAKNESS: 1
NECK PAIN: 1
HEADACHES: 1
INCREASED ENERGY: 1
BACK PAIN: 1
NERVOUS/ANXIOUS: 1
POLYPHAGIA: 1
DISTURBANCES IN COORDINATION: 1
MUSCLE CRAMPS: 1
MYALGIAS: 1
STIFFNESS: 1
ARTHRALGIAS: 1
PANIC: 1
MUSCLE WEAKNESS: 1

## 2018-01-10 NOTE — PROGRESS NOTES
"  Neurosurgery Clinic Consult  Date of Visit: 1/11/2018  Referred for: therapy \"middle of back and hips\"  Referring Provider: Narciso Sanford MD     Dear Dr. Sanford,    We were happy to see Carrie Munoz, a pleasant 37 year old year old female for low back pain and bilateral posterior leg pain.     HPI: As you may recall her history begins prior to her pregnancy.  About 18 months ago she developed some bilateral low back pain. Initially it was really just in the low back, present when she was moving around or shifting positions or sitting in one position too long, then as her pregnancy progressed and after birth her posterior legs began causing pain, down the backs of both legs to the bottoms of both feet This is present more often on the right than the left, but overall leg pain is less common, and less troublesome than the back pain. Interestingly her back pain additionally radiates up the back to both shoulder blades and creates numbness across the trapezii bilaterally. She has been to chiropractic, without much ultimate relief, physical therapy again without much ultimate relief, has been on gabapentin at 1200 mg per day which gives some relief, but with no particular improvement over time she  Spoke to you and you ordered an MRI of the lumbar spine. There were some findings and so she was referred for surgical consideration.    Other symptoms include occasional leakage of urine and stool which began about 2 months ago. No coordination or balance troubles no difficulty with walking.  Current symptom list:  [facility] Pain -  Patient Entered Questionnaire/Answers 1/8/2018   What number best describes your pain right now:  0 = No pain  to  10 = Worst pain imaginable 8   How would you describe the pain? sharp, throbbing   Which of the following worsen your pain? lying down, standing, sitting, walking   Which of the following improve or reduce your pain?  nothing relieves the pain   What number best describes " your average pain for the past week:  0 = No pain  to  10 = Worst pain imaginable 8   What number best describes your LOWEST pain in past 24 hours:  0 = No pain  to  10 = Worst pain imaginable 3   What number best describes your WORST pain in past 24 hours:  0 = No pain  to  10 = Worst pain imaginable 8   When is your pain worst? Constant   What non-medicine treatments have you already had for your pain? physical therapy, chiropractic care   Have you tried treating your pain with medication?  Yes   Are you currently taking medications for your pain? Yes   *  PAIN: Location:  Low back greater than bilateral S1 right greater than left   Exacerbated by: Standing plus walking more than 2 hours, sitting more than 2 hrs  Alleviated by:  sleep  *  NUMBNESS: Location:  Shoulder area, trapezius   *  WEAKNESS:  no  *  BOWEL/BLADDER FUNCTION: sometimes leaks urine and stool 2 months ago  *  OTHER SYMPTOMS: none (Coordination, Balance, Vision, Hearing, Scent, Speech.)    She presents for evaluation, and treatment recommendations with the understanding that we are a surgical team.    Current Outpatient Prescriptions:      gabapentin (NEURONTIN) 300 MG capsule, Take 1 tablet (300 mg) by mouth in AM/morning, 1 tablet (300 mg) at noon and 2 tablets (600 mg) at bedtime for sleep, Disp: 120 capsule, Rfl: 0  No current facility-administered medications for this visit.     Facility-Administered Medications Ordered in Other Visits:      bupivacaine (MARCAINE) 0.125 % injection (diluted from stock concentration by MD or CRNA), , EPIDURAL, PRN, Honl, Adam Fernando MD, 10 mL at 03/23/17 0130    Allergies   Allergen Reactions     No Known Drug Allergies        Past Medical History:   Diagnosis Date     Breast disorder     biopsy 2011(?) benign     Breast lump on right side at 2:30  o'clock position 5/4/2011     Cardiac abnormality     tachycardia during pregnancy     Depressive disorder      Mental disorder     depression, anxiety      "Unspecified sinusitis (chronic)        Past Surgical History:   Procedure Laterality Date     HC TOOTH EXTRACTION W/FORCEP  2003    all 4 together       Family History   Problem Relation Age of Onset     Hypertension Paternal Grandmother      OSTEOPOROSIS Paternal Grandmother      Hypertension Paternal Aunt      Neurologic Disorder Father      sheehan's palsy     Hypertension Father      not on medication, smoker     Neurologic Disorder Mother      bell's palsy       Social History     Social History     Marital status: Single     Spouse name: N/A     Number of children: 0     Years of education: 13 1/2     Occupational History     teller [employer]     Social History Main Topics     Smoking status: Never Smoker     Smokeless tobacco: Never Used     Alcohol use No     Drug use: No     Sexual activity: Not Currently     Partners: Male     Birth control/ protection: Inserts     Other Topics Concern     Not on file     Social History Narrative    Risk Behaviors & Healthy habits    Balanced Diet  no     Prevent Osteoporosis  yes - lots of dairy    Regular Exercise  no     Dental Care  yes    Seat Belt use  yes    Self Breast Exam  no     Sexually Active  no , no STD concerns, but would like to be checked     Contraception  yes    Abuse  no     Guns  no     Sun Screen  no             Working at [employer], 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  Lives with her aunt, and gets along well.  In limited contact with her mother, in contact with father a lot.  Born in [place], and [place] 5, [place] 11.  Father in the .                     Problem list and 13 point review of systems: is reviewed in Epic and is negative with the exception of those symptoms associated with HPI and PMH.      OBJECTIVE:   BP (!) 128/91  Pulse 80  Ht 1.74 m (5' 8.5\")  Wt 113.4 kg (250 lb)  BMI 37.46 kg/m2    Imaging:  These are the pertinent radiologist's findings from:  MRI lumbar W/WO 12/13/17 @ [facility]   1. " Lumbar spondylosis with tiny right foraminal disc protrusion at  L4-5 contacting and possibly impinging the exiting right L4 nerve  root.  2. Mild neural foraminal stenosis on the right at L4-5 and on the left  L5-S1. No significant spinal canal stenosis.  3. Mild right L5-S1 facet joint active arthropathy.  4. Incompletely characterized and visualized rounded signal  abnormality posterior to the T7 vertebral body on the  images. If  clinically indicated, consider thoracic spine MRI for further  evaluation.  See the full report in EPIC/Media tab.  I personally reviewed the images with the patient.      Exam:  *   Well developed, well nourished female found seated comfortably in exam room chair.  She is able to sit and rise independently.  She is accompanied by .    *  Mental Status  A&O X3.  Bright, alert, affable, interactive. Language fluid, fund of knowledge intact. Good historian.  Mood and affect congruent and WNL.  *  Cranial Nerves  II: Able to read printed forms, VF full to gross confrontation.  III: IV, VI:  PERRLA, EOMI, No nystagmus, no ptosis.  V: Sensation intact in bilateral V1, V2, and V3. Jaw clench symmetric.    VII:  Intact to voice bilaterally.  IX:  Pushes tongue against bilateral cheeks.  X:  Palate elevates, uvula midline, phonation intact.  XI: Elevates shoulders, head turn intact.  XII: Tongue midline. No fasciculations..  *  Cervical Spine:  DTR's at Biceps, Triceps, and Brachioradialis 2/4 and symmetric.  Sensation intact.    No Shelton's. No Phalen's.  No Tinel's. No fasciculations.   Muscle bulk and tone WNL.  Upper Extremity Strength.              RIGHT                LEFT     Deltoid              5/5                   5/5       Biceps              5/5                   5/5        Triceps              5/5                   5/5       Wrist Extensor              5/5                   5/5                     5/5                    5/5       Interossei              5/5                    5/5       EPL              5/5                    5/5       Pinch              5/5                  5/5          *  Lumbar Spine:    Able to heel and toe stand/walk.   DTR's Patellar and Achilles 1/4 and symmetric.   No fasciculations.  Muscle bulk and tone WNL.  No Clonus.  Sensation intact.    Lower Extremity Strength                   RIGHT                   LEFT     Iliopsoas                    5/5                      5/5       Quad                    5/5                        5/5       Hamstring                      5/5                        5/5         Gastrocs                    5/5                        5/5       Tib. Anterior                     5/5                        5/5       EHL                      5/5                        5/5       Babinski                 Down                                      Down                   *  Structural Exam  Inspection of the spine reveals no scoliosis, exaggerated kyphosis or lordosis. Head is balanced over the pelvis in the coronal and sagittal plane.    Cervical spine ROM full. No spasming. No tenderness to palpation.  No Spurling's or L'Hermitte's.   Lumbar ROM full.  Able to doff and don she was without difficulty. No spasming,  no step offs. No SLR.    Bilateral SI tenderness. Moderately tender at the midline at around the L2 level. . Negative piriformis stretch tests.  No trochanteric bursal tenderness. Negative Caryn's.   Feet are warm  Gait narrow, smooth, no scissoring. No antalgia.   *  Sensory level intact.   *  Bartolo's 5/5 are negative.   (Bartolo's are:   non anatomic tenderness;   pain with simulated exam, trunk rotation or axial load;    positive distraction tests e.g.. SLR;    regional disturbances, non anatomic pain, numbness, weakness;  overreaction to testing.)    ASSESSMENT/PLAN:  1. Spondylosis of lumbar region without myelopathy or radiculopathy    2. Arthropathy of sacroiliac joint (H)    3. Mass of spinal cord (H)        Overall I  think she has rather mild spondylosis of the lumbar spine.  She describes symptoms of bilateral S1 radiculopathy, but there is no impingement at that level on the imaging, so I don't think her leg pain is coming from her spine, but neither are the piriformis tests positive. I may consider EMGs down the road. However, in thinking about this she is clear that her back pain is the more troublesome of the symptoms and so I'm going to put aside her leg symptoms for the moment and focus on her back.    On exam her back pain seems to be emanating from her SI joints. Given the relatively mild spondylosis on the spinal MRI, I will put in orders for bilateral SI joint injections for diagnosis and treatment and see how she does.     I am troubled however by her bowel/bladder control issues the pain radiating up the back into the trapezii, and the lesion seen on the  film in the thoracic cord. She has no long tract signs, fortunately.   I'm going to image the thoracic spine with an MRI with and without gadolinium.    She may continue the gabapentin.  No other medications or therapies ordered today.    Once all this is completed I'll have her return and we will assess her response and go over the images together.    I answered all her questions, and supplied her with contact information if anything else comes up between now and the time of her next visit.  It's been a pleasure participating in the care of this nice patient. We thank you for your confidence in the [facility], Department of Neurosurgery.      Best Regards,    Laura Porter PA-C  [facility]  Department of Neurosurgery  Phone: [phone]  Fax: [phone]        Total time: 60 minutes with more than 30 minutes spent in direct face to face contact reviewing films, providing education, counseling, non-operative therapies,and indications for surgery as well as further follow up.    This note was generated using voice  recognition software. While edited for content some inaccurate phrasing may be found.

## 2018-01-11 ENCOUNTER — MYC MEDICAL ADVICE (OUTPATIENT)
Dept: NEUROSURGERY | Facility: CLINIC | Age: 38
End: 2018-01-11

## 2018-01-11 ENCOUNTER — OFFICE VISIT (OUTPATIENT)
Dept: NEUROSURGERY | Facility: CLINIC | Age: 38
End: 2018-01-11
Payer: MEDICAID

## 2018-01-11 VITALS
HEIGHT: 69 IN | BODY MASS INDEX: 37.03 KG/M2 | DIASTOLIC BLOOD PRESSURE: 91 MMHG | WEIGHT: 250 LBS | SYSTOLIC BLOOD PRESSURE: 128 MMHG | HEART RATE: 80 BPM

## 2018-01-11 DIAGNOSIS — G95.89 MASS OF SPINAL CORD (H): ICD-10-CM

## 2018-01-11 DIAGNOSIS — M47.818 ARTHROPATHY OF SACROILIAC JOINT: ICD-10-CM

## 2018-01-11 DIAGNOSIS — M47.816 SPONDYLOSIS OF LUMBAR REGION WITHOUT MYELOPATHY OR RADICULOPATHY: Primary | ICD-10-CM

## 2018-01-11 ASSESSMENT — PAIN SCALES - GENERAL: PAINLEVEL: SEVERE PAIN (7)

## 2018-01-11 NOTE — LETTER
"1/11/2018       RE: Carrie Munoz  [address]   [facility] [address]     Dear Colleague,    Thank you for referring your patient, Carrie Munoz, to the [facility] NEUROSURGERY at [facility]. Please see a copy of my visit note below.      Neurosurgery Clinic Consult  Date of Visit: 1/11/2018  Referred for: therapy \"middle of back and hips\"  Referring Provider: Narciso Sanford MD     Dear Dr. Sanford,    We were happy to see Carrie Munoz, a pleasant 37 year old year old female for low back pain and bilateral posterior leg pain.     HPI: As you may recall her history begins prior to her pregnancy.  About 18 months ago she developed some bilateral low back pain. Initially it was really just in the low back, present when she was moving around or shifting positions or sitting in one position too long, then as her pregnancy progressed and after birth her posterior legs began causing pain, down the backs of both legs to the bottoms of both feet This is present more often on the right than the left, but overall leg pain is less common, and less troublesome than the back pain. Interestingly her back pain additionally radiates up the back to both shoulder blades and creates numbness across the trapezii bilaterally. She has been to chiropractic, without much ultimate relief, physical therapy again without much ultimate relief, has been on gabapentin at 1200 mg per day which gives some relief, but with no particular improvement over time she  Spoke to you and you ordered an MRI of the lumbar spine. There were some findings and so she was referred for surgical consideration.    Other symptoms include occasional leakage of urine and stool which began about 2 months ago. No coordination or balance troubles no difficulty with walking.  Current symptom list:  [facility] Pain -  Patient Entered Questionnaire/Answers 1/8/2018   What number best describes your pain right " now:  0 = No pain  to  10 = Worst pain imaginable 8   How would you describe the pain? sharp, throbbing   Which of the following worsen your pain? lying down, standing, sitting, walking   Which of the following improve or reduce your pain?  nothing relieves the pain   What number best describes your average pain for the past week:  0 = No pain  to  10 = Worst pain imaginable 8   What number best describes your LOWEST pain in past 24 hours:  0 = No pain  to  10 = Worst pain imaginable 3   What number best describes your WORST pain in past 24 hours:  0 = No pain  to  10 = Worst pain imaginable 8   When is your pain worst? Constant   What non-medicine treatments have you already had for your pain? physical therapy, chiropractic care   Have you tried treating your pain with medication?  Yes   Are you currently taking medications for your pain? Yes   *  PAIN: Location:  Low back greater than bilateral S1 right greater than left   Exacerbated by: Standing plus walking more than 2 hours, sitting more than 2 hrs  Alleviated by:  sleep  *  NUMBNESS: Location:  Shoulder area, trapezius   *  WEAKNESS:  no  *  BOWEL/BLADDER FUNCTION: sometimes leaks urine and stool 2 months ago  *  OTHER SYMPTOMS: none (Coordination, Balance, Vision, Hearing, Scent, Speech.)    She presents for evaluation, and treatment recommendations with the understanding that we are a surgical team.    Current Outpatient Prescriptions:      gabapentin (NEURONTIN) 300 MG capsule, Take 1 tablet (300 mg) by mouth in AM/morning, 1 tablet (300 mg) at noon and 2 tablets (600 mg) at bedtime for sleep, Disp: 120 capsule, Rfl: 0  No current facility-administered medications for this visit.     Facility-Administered Medications Ordered in Other Visits:      bupivacaine (MARCAINE) 0.125 % injection (diluted from stock concentration by MD or CRNA), , EPIDURAL, PRN, Honl, Adam Fernando MD, 10 mL at 03/23/17 0130    Allergies   Allergen Reactions     No Known Drug  Allergies        Past Medical History:   Diagnosis Date     Breast disorder     biopsy 2011(?) benign     Breast lump on right side at 2:30  o'clock position 5/4/2011     Cardiac abnormality     tachycardia during pregnancy     Depressive disorder      Mental disorder     depression, anxiety     Unspecified sinusitis (chronic)        Past Surgical History:   Procedure Laterality Date     HC TOOTH EXTRACTION W/FORCEP  2003    all 4 together       Family History   Problem Relation Age of Onset     Hypertension Paternal Grandmother      OSTEOPOROSIS Paternal Grandmother      Hypertension Paternal Aunt      Neurologic Disorder Father      sheehan's palsy     Hypertension Father      not on medication, smoker     Neurologic Disorder Mother      bell's palsy       Social History     Social History     Marital status: Single     Spouse name: N/A     Number of children: 0     Years of education: 13 1/2     Occupational History     teller [employer]     Social History Main Topics     Smoking status: Never Smoker     Smokeless tobacco: Never Used     Alcohol use No     Drug use: No     Sexual activity: Not Currently     Partners: Male     Birth control/ protection: Inserts     Other Topics Concern     Not on file     Social History Narrative    Risk Behaviors & Healthy habits    Balanced Diet  no     Prevent Osteoporosis  yes - lots of dairy    Regular Exercise  no     Dental Care  yes    Seat Belt use  yes    Self Breast Exam  no     Sexually Active  no , no STD concerns, but would like to be checked     Contraception  yes    Abuse  no     Guns  no     Sun Screen  no             Working at [employer], 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  Lives with her aunt, and gets along well.  In limited contact with her mother, in contact with father a lot.  Born in [place], and moved [place] 5, [place] at age 11.  Father in the .                     Problem list and 13 point review of systems: is reviewed in  "Epic and is negative with the exception of those symptoms associated with HPI and PMH.      OBJECTIVE:   BP (!) 128/91  Pulse 80  Ht 1.74 m (5' 8.5\")  Wt 113.4 kg (250 lb)  BMI 37.46 kg/m2    Imaging:  These are the pertinent radiologist's findings from:  MRI lumbar W/WO 12/13/17 @ [facility]   1. Lumbar spondylosis with tiny right foraminal disc protrusion at  L4-5 contacting and possibly impinging the exiting right L4 nerve  root.  2. Mild neural foraminal stenosis on the right at L4-5 and on the left  L5-S1. No significant spinal canal stenosis.  3. Mild right L5-S1 facet joint active arthropathy.  4. Incompletely characterized and visualized rounded signal  abnormality posterior to the T7 vertebral body on the  images. If  clinically indicated, consider thoracic spine MRI for further  evaluation.  See the full report in EPIC/Media tab.  I personally reviewed the images with the patient.      Exam:  *   Well developed, well nourished female found seated comfortably in exam room chair.  She is able to sit and rise independently.  She is accompanied by .    *  Mental Status  A&O X3.  Bright, alert, affable, interactive. Language fluid, fund of knowledge intact. Good historian.  Mood and affect congruent and WNL.  *  Cranial Nerves  II: Able to read printed forms, VF full to gross confrontation.  III: IV, VI:  PERRLA, EOMI, No nystagmus, no ptosis.  V: Sensation intact in bilateral V1, V2, and V3. Jaw clench symmetric.    VII:  Intact to voice bilaterally.  IX:  Pushes tongue against bilateral cheeks.  X:  Palate elevates, uvula midline, phonation intact.  XI: Elevates shoulders, head turn intact.  XII: Tongue midline. No fasciculations..  *  Cervical Spine:  DTR's at Biceps, Triceps, and Brachioradialis 2/4 and symmetric.  Sensation intact.    No Shelton's. No Phalen's.  No Tinel's. No fasciculations.   Muscle bulk and tone WNL.  Upper Extremity Strength.              RIGHT                LEFT     Deltoid        "       5/5                   5/5       Biceps              5/5 5/5        Triceps              5/5 5/5       Wrist Extensor              5/5 5/5                     5/5 5/5       Interossei              5/5 5/5       EPL              5/5 5/5       Pinch              5/5 5/5          *  Lumbar Spine:    Able to heel and toe stand/walk.   DTR's Patellar and Achilles 1/4 and symmetric.   No fasciculations.  Muscle bulk and tone WNL.  No Clonus.  Sensation intact.    Lower Extremity Strength                   RIGHT                   LEFT     Iliopsoas                    5/5 5/5       Quad                    5/5 5/5       Hamstring                      5/5 5/5         Gastrocs                    5/5 5/5       Tib. Anterior                     5/5 5/5       EHL                      5/5 5/5       Babinski                 Down                                      Down                   *  Structural Exam  Inspection of the spine reveals no scoliosis, exaggerated kyphosis or lordosis. Head is balanced over the pelvis in the coronal and sagittal plane.    Cervical spine ROM full. No spasming. No tenderness to palpation.  No Spurling's or L'Hermitte's.   Lumbar ROM full.  Able to doff and don she was without difficulty. No spasming,  no step offs. No SLR.    Bilateral SI tenderness. Moderately tender at the midline at around the L2 level. . Negative piriformis stretch tests.  No trochanteric bursal tenderness. Negative Caryn's.   Feet are warm  Gait narrow, smooth, no scissoring. No antalgia.   *  Sensory level intact.   *  Bartolo's 5/5 are negative.   (Bartolo's are:   non anatomic tenderness;   pain with simulated exam, trunk rotation or axial load;    positive  distraction tests e.g.. SLR;    regional disturbances, non anatomic pain, numbness, weakness;  overreaction to testing.)    ASSESSMENT/PLAN:  1. Spondylosis of lumbar region without myelopathy or radiculopathy    2. Arthropathy of sacroiliac joint (H)    3. Mass of spinal cord (H)        Overall I think she has rather mild spondylosis of the lumbar spine.  She describes symptoms of bilateral S1 radiculopathy, but there is no impingement at that level on the imaging, so I don't think her leg pain is coming from her spine, but neither are the piriformis tests positive. I may consider EMGs down the road. However, in thinking about this she is clear that her back pain is the more troublesome of the symptoms and so I'm going to put aside her leg symptoms for the moment and focus on her back.    On exam her back pain seems to be emanating from her SI joints. Given the relatively mild spondylosis on the spinal MRI, I will put in orders for bilateral SI joint injections for diagnosis and treatment and see how she does.     I am troubled however by her bowel/bladder control issues the pain radiating up the back into the trapezii, and the lesion seen on the  film in the thoracic cord. She has no long tract signs, fortunately.   I'm going to image the thoracic spine with an MRI with and without gadolinium.    She may continue the gabapentin.  No other medications or therapies ordered today.    Once all this is completed I'll have her return and we will assess her response and go over the images together.    I answered all her questions, and supplied her with contact information if anything else comes up between now and the time of her next visit.  It's been a pleasure participating in the care of this nice patient. We thank you for your confidence in the [facility], Department of Neurosurgery.      Best Regards,    Laura Porter PA-C  [facility]  Department of Neurosurgery  Phone:  [phone]  Fax: [phone]        Total time: 60 minutes with more than 30 minutes spent in direct face to face contact reviewing films, providing education, counseling, non-operative therapies,and indications for surgery as well as further follow up.    This note was generated using voice recognition software. While edited for content some inaccurate phrasing may be found.    Again, thank you for allowing me to participate in the care of your patient.      Sincerely,    Laura Porter PA-C

## 2018-01-11 NOTE — MR AVS SNAPSHOT
After Visit Summary   1/11/2018    Carrie Munoz    MRN: [id number]           Patient Information     Date Of Birth          1980        Visit Information        Provider Department      1/11/2018 12:30 PM Laura Porter PA-C [facility] Neurosurgery        Today's Diagnoses     Spondylosis of lumbar region without myelopathy or radiculopathy    -  1    Arthropathy of sacroiliac joint (H)        Mass of spinal cord (H)           Follow-ups after your visit        Your next 10 appointments already scheduled     Feb 09, 2018  5:30 PM CST   (Arrive by 5:15 PM)   MR THORACIC SPINE W/O & W CONTRAST with [id number]   [facility] [place] [facility] ([facility] [place])    [address] [address] [phone]           Take your medicines as usual, unless your doctor tells you not to. Bring a list of your current medicines to your exam (including vitamins, minerals and over-the-counter drugs).  You will be given intravenous contrast for this exam. To prepare:   The day before your exam, drink extra fluids at least six 8-ounce glasses (unless your doctor tells you to restrict your fluids).   Have a blood test (creatinine test) within 30 days of your exam. Go to your clinic or Diagnostic Imaging Department for this test.  The MRI machine uses a strong magnet. Please wear clothes without metal (snaps, zippers). A sweatsuit works well, or we may give you a hospital gown.  Please remove any body piercings and hair extensions before you arrive. You will also remove watches, jewelry, hairpins, wallets, dentures, partial dental plates and hearing aids. You may wear contact lenses, and you may be able to wear your rings. We have a safe place to keep your personal items, but it is safer to leave them at home.   **IMPORTANT** THE INSTRUCTIONS BELOW ARE ONLY FOR THOSE PATIENTS WHO HAVE BEEN TOLD THEY WILL RECEIVE SEDATION OR GENERAL ANESTHESIA DURING THEIR MRI  PROCEDURE:  IF YOU WILL RECEIVE SEDATION (take medicine to help you relax during your exam):   You must get the medicine from your doctor before you arrive. Bring the medicine to the exam. Do not take it at home.   Arrive one hour early. Bring someone who can take you home after the test. Your medicine will make you sleepy. After the exam, you may not drive, take a bus or take a taxi by yourself.   No eating 8 hours before your exam. You may have clear liquids up until 4 hours before your exam. (Clear liquids include water, clear tea, black coffee and fruit juice without pulp.)  IF YOU WILL RECEIVE ANESTHESIA (be asleep for your exam):   Arrive 1 1/2 hours early. Bring someone who can take you home after the test. You may not drive, take a bus or take a taxi by yourself.   No eating 8 hours before your exam. You may have clear liquids up until 4 hours before your exam. (Clear liquids include water, clear tea, black coffee and fruit juice without pulp.)  Please call the Imaging Department at your exam site with any questions.            Feb 16, 2018 12:30 PM CST   (Arrive by 12:15 PM)   Return Visit with Laura Porter PA-C   [facility] ([facility])    [address] [address] [phone]              Future tests that were ordered for you today     Open Future Orders        Priority Expected Expires Ordered    MRI Thoracic spine with & without gadolinium [MVF062] Routine  1/11/2019 1/11/2018            Who to contact     Please call your clinic at [phone] to:    Ask questions about your health    Make or cancel appointments    Discuss your medicines    Learn about your test results    Speak to your doctor   If you have compliments or concerns about an experience at your clinic, or if you wish to file a complaint, please contact [facility] Patient Relations at [phone] or email us at  [email or web address]         Additional Information About Your Visit        [employer] Information     [employer] gives you secure access to your electronic health record. If you see a primary care provider, you can also send messages to your care team and make appointments. If you have questions, please call your primary care clinic.  If you do not have a primary care provider, please call [phone] and they will assist you.      Mungo is an electronic gateway that provides easy, online access to your medical records. With Mungo, you can request a clinic appointment, read your test results, renew a prescription or communicate with your care team.     To access your existing account, please contact your [facility] or call [phone] for assistance.        Care EveryWhere ID     This is your Care EveryWhere ID. This could be used by other organizations to access your [place] medical records  [id number]        Your Vitals Were     Pulse Height BMI (Body Mass Index)             80 1.74 m (5' 8.5\") 37.46 kg/m2          Blood Pressure from Last 3 Encounters:   01/11/18 (!) 128/91   01/02/18 114/79   12/08/17 116/80    Weight from Last 3 Encounters:   01/11/18 113.4 kg (250 lb)   01/02/18 113.4 kg (250 lb)   12/08/17 110.3 kg (243 lb 3.2 oz)              We Performed the Following     INJECTION SACROILIAC JOINT          Today's Medication Changes          These changes are accurate as of: 1/11/18  1:30 PM.  If you have any questions, ask your nurse or doctor.               Stop taking these medicines if you haven't already. Please contact your care team if you have questions.     estrogens (conjugated) 0.625 MG tablet   Commonly known as:  PREMARIN   Stopped by:  Laura Porter PA-C           etonogestrel-ethinyl estradiol 0.12-0.015 MG/24HR vaginal ring   Commonly known as:  NUVARING   Stopped by:  Laura Porter PA-C                    Primary Care Provider Office " Phone # Fax #    [facility] [phone]       [address] [facility] [address]        Equal Access to Services     JUANA MOMIN : Hadii aad ku hadhaleighlouisa Adelaidajaniaali, melissada erikawilliamha, apolinar kamalcomda celestino, myriam leon shelbi levy. So [facility] [phone].    ATENCIÓN: Si habla español, tiene a kapoor disposición servicios gratuitos de asistencia lingüística. Llame al [phone].    We comply with applicable federal civil rights laws and [place] laws. We do not discriminate on the basis of race, color, national origin, age, disability, sex, sexual orientation, or gender identity.            Thank you!     Thank you for choosing [facility]  for your care. Our goal is always to provide you with excellent care. Hearing back from our patients is one way we can continue to improve our services. Please take a few minutes to complete the written survey that you may receive in the mail after your visit with us. Thank you!             Your Updated Medication List - Protect others around you: Learn how to safely use, store and throw away your medicines at www.disposemymeds.org.          This list is accurate as of: 1/11/18  1:30 PM.  Always use your most recent med list.                   Brand Name Dispense Instructions for use Diagnosis    gabapentin 300 MG capsule    NEURONTIN    120 capsule    Take 1 tablet (300 mg) by mouth in AM/morning, 1 tablet (300 mg) at noon and 2 tablets (600 mg) at bedtime for sleep    Acute midline low back pain with right-sided sciatica

## 2018-01-22 ENCOUNTER — TELEPHONE (OUTPATIENT)
Dept: NEUROSURGERY | Facility: CLINIC | Age: 38
End: 2018-01-22

## 2018-01-22 NOTE — TELEPHONE ENCOUNTER
Sent my chart message to follow up with Clarissa for information on injections.   Followed up after conversation with voice mail that the orders were place, I sent a copy of the order to her in the mail with all the information she needs to make an appointment for her injections. I left follow up phone number on voice mail.   Clarissa Scruggs LPN

## 2018-02-09 ENCOUNTER — OFFICE VISIT (OUTPATIENT)
Dept: OBGYN | Facility: CLINIC | Age: 38
End: 2018-02-09
Payer: MEDICAID

## 2018-02-09 ENCOUNTER — RADIANT APPOINTMENT (OUTPATIENT)
Dept: MRI IMAGING | Facility: CLINIC | Age: 38
End: 2018-02-09
Attending: PHYSICIAN ASSISTANT
Payer: MEDICAID

## 2018-02-09 VITALS
BODY MASS INDEX: 36.41 KG/M2 | HEART RATE: 84 BPM | SYSTOLIC BLOOD PRESSURE: 106 MMHG | WEIGHT: 243 LBS | TEMPERATURE: 98.5 F | DIASTOLIC BLOOD PRESSURE: 80 MMHG

## 2018-02-09 DIAGNOSIS — Z30.430 ENCOUNTER FOR IUD INSERTION: ICD-10-CM

## 2018-02-09 DIAGNOSIS — G95.89 MASS OF SPINAL CORD (H): ICD-10-CM

## 2018-02-09 DIAGNOSIS — N91.1 SECONDARY AMENORRHEA: Primary | ICD-10-CM

## 2018-02-09 LAB — BETA HCG QUAL IFA URINE: NEGATIVE

## 2018-02-09 PROCEDURE — 84703 CHORIONIC GONADOTROPIN ASSAY: CPT | Performed by: OBSTETRICS & GYNECOLOGY

## 2018-02-09 PROCEDURE — 58300 INSERT INTRAUTERINE DEVICE: CPT | Performed by: OBSTETRICS & GYNECOLOGY

## 2018-02-09 RX ORDER — GADOBUTROL 604.72 MG/ML
10 INJECTION INTRAVENOUS ONCE
Status: COMPLETED | OUTPATIENT
Start: 2018-02-09 | End: 2018-02-09

## 2018-02-09 RX ADMIN — GADOBUTROL 10 ML: 604.72 INJECTION INTRAVENOUS at 18:24

## 2018-02-09 NOTE — MR AVS SNAPSHOT
After Visit Summary   2/9/2018    Carrie Munoz    MRN: [id number]           Patient Information     Date Of Birth          1980        Visit Information        Provider Department      2/9/2018 2:30 PM Shira Alejandro MD [facility]        Today's Diagnoses     Secondary amenorrhea    -  1    Encounter for IUD insertion           Follow-ups after your visit        Your next 10 appointments already scheduled     Feb 09, 2018  5:30 PM CST   (Arrive by 5:15 PM)   MR THORACIC SPINE W/O & W CONTRAST with [id number]   [facility] [place] [facility] ([facility] [place])    [address] [address] [phone]           Take your medicines as usual, unless your doctor tells you not to. Bring a list of your current medicines to your exam (including vitamins, minerals and over-the-counter drugs).  You may or may not receive intravenous (IV) contrast for this exam pending the discretion of the Radiologist.  You do not need to do anything special to prepare.  The MRI machine uses a strong magnet. Please wear clothes without metal (snaps, zippers). A sweatsuit works well, or we may give you a hospital gown.  Please remove any body piercings and hair extensions before you arrive. You will also remove watches, jewelry, hairpins, wallets, dentures, partial dental plates and hearing aids. You may wear contact lenses, and you may be able to wear your rings. We have a safe place to keep your personal items, but it is safer to leave them at home.  **IMPORTANT** THE INSTRUCTIONS BELOW ARE ONLY FOR THOSE PATIENTS WHO HAVE BEEN PRESCRIBED SEDATION OR GENERAL ANESTHESIA DURING THEIR MRI PROCEDURE:  IF YOUR DOCTOR PRESCRIBED ORAL SEDATION (take medicine to help you relax during your exam):   You must get the medicine from your doctor (oral medication) before you arrive. Bring the medicine to the exam. Do not take it at home. You ll be told when to  take it upon arriving for your exam.   Arrive one hour early. Bring someone who can take you home after the test. Your medicine will make you sleepy. After the exam, you may not drive, take a bus or take a taxi by yourself.  IF YOUR DOCTOR PRESCRIBED IV SEDATION:   Arrive one hour early. Bring someone who can take you home after the test. Your medicine will make you sleepy. After the exam, you may not drive, take a bus or take a taxi by yourself.   No eating 6 hours before your exam. You may have clear liquids up until 4 hours before your exam. (Clear liquids include water, clear tea, black coffee and fruit juice without pulp.)  IF YOUR DOCTOR PRESCRIBED ANESTHESIA (be asleep for your exam):   Arrive 1 1/2 hours early. Bring someone who can take you home after the test. You may not drive, take a bus or take a taxi by yourself.   No eating 8 hours before your exam. You may have clear liquids up until 4 hours before your exam. (Clear liquids include water, clear tea, black coffee and fruit juice without pulp.)   You will spend four to five hours in the recovery room.  Please call the Imaging Department at your exam site with any questions.            Feb 16, 2018 12:30 PM CST   (Arrive by 12:15 PM)   Return Visit with Laura Porter PA-C   [facility] Neurosurgery ([facility] [place])    [address] [place] [address] [phone]              Who to contact     If you have questions or need follow up information about today's clinic visit or your schedule please contact [facility] directly at [phone].  Normal or non-critical lab and imaging results will be communicated to you by MyChart, letter or phone within 4 business days after the clinic has received the results. If you do not hear from us within 7 days, please contact the clinic through MyChart or phone. If you have a critical or abnormal lab result, we will notify you by phone as soon as  possible.  Submit refill requests through [employer]a or call your pharmacy and they will forward the refill request to us. Please allow 3 business days for your refill to be completed.          Additional Information About Your Visit        [employer] Information     Yarraa gives you secure access to your electronic health record. If you see a primary care provider, you can also send messages to your care team and make appointments. If you have questions, please call your primary care clinic.  If you do not have a primary care provider, please call [phone] and they will assist you.        Care EveryWhere ID     This is your Care EveryWhere ID. This could be used by other organizations to access your [place] medical records  [id number]        Your Vitals Were     Pulse Temperature Last Period BMI (Body Mass Index)          84 98.5  F (36.9  C) (Oral) 01/29/2018 36.41 kg/m2         Blood Pressure from Last 3 Encounters:   02/09/18 106/80   01/11/18 (!) 128/91   01/02/18 114/79    Weight from Last 3 Encounters:   02/09/18 243 lb (110.2 kg)   01/11/18 250 lb (113.4 kg)   01/02/18 250 lb (113.4 kg)              We Performed the Following     Beta HCG qual IFA urine - [facility] and [place]      LEVONORGESTREL IU 52MG 5 YR     INSERTION INTRAUTERINE DEVICE          Today's Medication Changes          These changes are accurate as of 2/9/18  3:15 PM.  If you have any questions, ask your nurse or doctor.               Start taking these medicines.        Dose/Directions    levonorgestrel 20 MCG/24HR IUD   Commonly known as:  MIRENA (52 MG)   Used for:  Encounter for IUD insertion   Started by:  Shira Alejandro MD        Dose:  1 each   1 each (20 mcg) by Intrauterine route once for 1 dose   Quantity:  1 each   Refills:  0            Where to get your medicines      Some of these will need a paper prescription and others can be bought over the counter.  Ask your nurse if you have questions.     You don't need a  prescription for these medications     levonorgestrel 20 MCG/24HR IUD                Primary Care Provider Office Phone # Fax #    [facility] [phone]       [address] [facility] [address]        Equal Access to Services     JUANA MOMIN : Hadii aad ku hadhaleighlouisa Sojaniaali, waaxda luqadaha, qaybta kaalmada adereta, myriam martínin hayaaberonica levy dellaradhika levy. So [facility] [phone].    ATENCIÓN: Si habla español, tiene a kapoor disposición servicios gratuitos de asistencia lingüística. Llame al [phone].    We comply with applicable federal civil rights laws and [place] laws. We do not discriminate on the basis of race, color, national origin, age, disability, sex, sexual orientation, or gender identity.            Thank you!     Thank you for choosing [facility]  for your care. Our goal is always to provide you with excellent care. Hearing back from our patients is one way we can continue to improve our services. Please take a few minutes to complete the written survey that you may receive in the mail after your visit with us. Thank you!             Your Updated Medication List - Protect others around you: Learn how to safely use, store and throw away your medicines at www.disposemymeds.org.          This list is accurate as of 2/9/18  3:15 PM.  Always use your most recent med list.                   Brand Name Dispense Instructions for use Diagnosis    gabapentin 300 MG capsule    NEURONTIN    120 capsule    Take 1 tablet (300 mg) by mouth in AM/morning, 1 tablet (300 mg) at noon and 2 tablets (600 mg) at bedtime for sleep    Acute midline low back pain with right-sided sciatica       levonorgestrel 20 MCG/24HR IUD    MIRENA (52 MG)    1 each    1 each (20 mcg) by Intrauterine route once for 1 dose    Encounter for IUD insertion

## 2018-02-09 NOTE — PROGRESS NOTES
GYN clinic visit  2018    CC: IUD insertion    HPI:   Carrie Munoz is a 37 year old  who presents to clinic today for an IUD insertion.  She has used NuvaRing for contraception in the past. She has a no history of STI and most recently had negative testing for GC/chlamydia in . She denies current abnormal vaginal discharge. Her LMP was Patient's last menstrual period was 2018.  She had no menses for months due to NuvaRing in place, but had normal period in January.  Last Pap smear was NIL, HPV neg in 2017.     Reviewed GYN hx, OB hx, past medical hx, surgical hx and family hx.   Reviewed medications and allergies. Changes made in EPIC.     OBJECTIVE:  Vitals:    18 1430   BP: 106/80   Pulse: 84   Temp: 98.5  F (36.9  C)   TempSrc: Oral   Weight: 243 lb (110.2 kg)     Body mass index is 36.41 kg/(m^2).    Gen: alert, oriented, no distress, cooperative and pleasant  Abd: soft, nontender, nondistended, normoactive bowel sounds, no masses, no scars  : normal external genitalia without lesions or abnormalities. Normal Bartholin's, normal Startex's, normal urethra. Normal vaginal mucosa, no abnormal discharge or lesions. Cervix appears WNL, no lesions or erythema. Bimanual exam reveals 7 week sized anteverted mobile uterus, no cervical motion tenderness, no uterine tenderness, no adnexal masses.    PROCEDURE:  Patient has verbalized understanding of risks and benefits. She was counseled on risks of infection, bleeding, uterine perforation, cervical laceration, expulsion, overall risk of pregnancy 2 in 1000, if she does get pregnant that there is an increased risk of ectopic pregnancy. All questions answered. She has signed the consent form.    Urine pregnancy test was negative.      A regular Graves speculum was placed in the vagina with good visualization of the cervix.  The cervix was then swabbed with a betadine x3.  Tenaculum was placed at the 12 o'clock position on the cervix and the  uterus sounded to 8cm.  The Mirena  IUD was then placed in the usual fashion under sterile technique without difficulty.  Strings were clipped about 2-3 cm from the cervical os.  Tenaculum was removed and cervix was hemostatic.  There were no complications. The patient tolerated the procedure well.      ASSESSMENT:   Carrie Munoz is a 37 year old  who had a Mirena IUD inserted today without complication.    PLAN:  The patient should feel for the IUD strings in 2 weeks.  If unable to locate them, she should return to clinic for a speculum examination for confirmation that the IUD is in place. Bleeding pattern of this particular IUD was discussed with the patient. She is aware that the IUD will need to be removed in 5 years or PRN.  She is to return to clinic for her next annual or PRN.    Shira Alejandro MD

## 2018-02-09 NOTE — NURSING NOTE
"Chief Complaint   Patient presents with     IUD     placement   NDC:83042-994-16 LOT: [id number] EXP:     Initial /80  Pulse 84  Temp 98.5  F (36.9  C) (Oral)  Wt 243 lb (110.2 kg)  LMP 2018  BMI 36.41 kg/m2 Estimated body mass index is 36.41 kg/(m^2) as calculated from the following:    Height as of 18: 5' 8.5\" (1.74 m).    Weight as of this encounter: 243 lb (110.2 kg).  BP completed using cuff size: large        The following HM Due: NONE      The following patient reported/Care Every where data was sent to:  P ABSTRACT QUALITY INITIATIVES [[id number]]  VINAY Mcintyre           "

## 2018-02-10 NOTE — DISCHARGE INSTRUCTIONS

## 2018-02-16 ENCOUNTER — OFFICE VISIT (OUTPATIENT)
Dept: NEUROSURGERY | Facility: CLINIC | Age: 38
End: 2018-02-16
Payer: MEDICAID

## 2018-02-16 VITALS
WEIGHT: 243 LBS | HEART RATE: 82 BPM | SYSTOLIC BLOOD PRESSURE: 116 MMHG | RESPIRATION RATE: 24 BRPM | DIASTOLIC BLOOD PRESSURE: 74 MMHG | BODY MASS INDEX: 35.99 KG/M2 | HEIGHT: 69 IN | TEMPERATURE: 98.4 F | OXYGEN SATURATION: 97 %

## 2018-02-16 DIAGNOSIS — M47.816 SPONDYLOSIS OF LUMBAR REGION WITHOUT MYELOPATHY OR RADICULOPATHY: Primary | ICD-10-CM

## 2018-02-16 DIAGNOSIS — M47.818 ARTHROPATHY OF SACROILIAC JOINT: ICD-10-CM

## 2018-02-16 DIAGNOSIS — G95.89 MASS OF SPINAL CORD (H): ICD-10-CM

## 2018-02-16 RX ORDER — IBUPROFEN 600 MG/1
600 TABLET, FILM COATED ORAL EVERY 6 HOURS PRN
COMMUNITY
End: 2018-06-14

## 2018-02-16 ASSESSMENT — PAIN SCALES - GENERAL: PAINLEVEL: SEVERE PAIN (7)

## 2018-02-16 NOTE — LETTER
"2/16/2018       RE: Carrie Munoz  [address]   [facility] [address]     Dear Colleague,    Thank you for referring your patient, Carrie Munoz, to the [facility] NEUROSURGERY at [facility]. Please see a copy of my visit note below.      Neurosurgery Clinic Follow-up  Date of Visit:  2/16/18/2018  Referred for: therapy \"middle of back and hips\"  Referring Provider: Narciso Sanford MD     Dear Dr. Sanford,    We were happy to see Carrie Munoz, a pleasant 37 year old year old female for low back pain and bilateral posterior leg pain.     HPI: As you may recall her history begins prior to her pregnancy.  About 18 months ago she developed some bilateral low back pain. Initially it was really just in the low back, present when she was moving around or shifting positions or sitting in one position too long, then as her pregnancy progressed and after birth her posterior legs began causing pain, down the backs of both legs to the bottoms of both feet This is present more often on the right than the left, but overall leg pain is less common, and less troublesome than the back pain. Interestingly her back pain additionally radiates up the back to both shoulder blades and creates numbness across the trapezii bilaterally. She has been to chiropractic, without much ultimate relief, physical therapy again without much ultimate relief, has been on gabapentin at 1200 mg per day which gives some relief, but with no particular improvement over time she spoke to you and you ordered an MRI of the lumbar spine. There were some findings and so she was referred for surgical consideration.    Other symptoms include occasional leakage of urine and stool which began about 2 months ago. No coordination or balance troubles no difficulty with walking.    At last visit I thought based on exam she had some relatively mild spondylosis in her back, but had pain which appeared to " "be emanating from the SI joints. I elected to try for bilateral SI injections and have her return. I couldn't explain where her leg symptoms were coming from based on her imaging.  Nor the bowel and bladder symptoms. The lumbar MRI was fairly benign, but there was something seen on the  film behind the body of T7 which appeared to be a T2 hyperintense signal and dedicated imaging was suggested.  I imaged the thoracic spine and had her return with that today as well.  She has had very good response to the SI injections, back pain resolved for about 3 weeks. The hot spot/lesion seen on the lumbar  film behind T7 is not seen on the dedicated thoracic MRI.      Current Outpatient Prescriptions:      ibuprofen (ADVIL/MOTRIN) 600 MG tablet, Take 600 mg by mouth every 6 hours as needed for moderate pain, Disp: , Rfl:      gabapentin (NEURONTIN) 300 MG capsule, Take 1 tablet (300 mg) by mouth in AM/morning, 1 tablet (300 mg) at noon and 2 tablets (600 mg) at bedtime for sleep, Disp: 120 capsule, Rfl: 0     levonorgestrel (MIRENA, 52 MG,) 20 MCG/24HR IUD, 1 each (20 mcg) by Intrauterine route once for 1 dose, Disp: 1 each, Rfl: 0  No current facility-administered medications for this visit.     Facility-Administered Medications Ordered in Other Visits:      bupivacaine (MARCAINE) 0.125 % injection (diluted from stock concentration by MD or CRNA), , EPIDURAL, PRN, Honl, Adam Fernando MD, 10 mL at 03/23/17 0130    Allergies   Allergen Reactions     No Known Drug Allergies        Problem list, Past medical history, family, social: is reviewed in Epic.     OBJECTIVE:   /74  Pulse 82  Temp 98.4  F (36.9  C) (Oral)  Resp 24  Ht 1.74 m (5' 8.5\")  Wt 110.2 kg (243 lb)  LMP 01/29/2018  SpO2 97%  Breastfeeding? No  BMI 36.41 kg/m2    Imaging:  These are the pertinent radiologist's findings from:    Bilateral SI joint injections CDI  1/26/18  1. Bilateral SI joint injections with steroid and anesthetic.  2. " Initial response to injected anesthetic was 75% pain relief.    MRI thoracic with and without 2/9/18  1.  No abnormal signal intensity or enhancement seen within the  thoracic spinal cord, thecal sac or vertebral column.  2.  No significant spinal canal stenosis or neural foraminal narrowing  at any level.    MRI lumbar W/WO 12/13/17 @ [facility]   1. Lumbar spondylosis with tiny right foraminal disc protrusion at  L4-5 contacting and possibly impinging the exiting right L4 nerve  root.  2. Mild neural foraminal stenosis on the right at L4-5 and on the left  L5-S1. No significant spinal canal stenosis.  3. Mild right L5-S1 facet joint active arthropathy.  4. Incompletely characterized and visualized rounded signal  abnormality posterior to the T7 vertebral body on the  images. If  clinically indicated, consider thoracic spine MRI for further  evaluation.  See the full report in EPIC/Media tab.  I personally reviewed the images with the patient.      Exam:  *   Well developed, well nourished female found seated comfortably in exam room chair.  She is able to sit and rise independently.    *  Mental Status  A&O X3.  Bright, alert, affable, interactive. Language fluid, fund of knowledge intact. Good historian.  Mood and affect congruent and WNL.  Able to heel and toe stand/walk.   DTR's Patellar and Achilles 1/4 and symmetric.   No fasciculations.  Muscle bulk and tone WNL.  No Clonus.  Sensation intact.    Lower Extremity Strength                   RIGHT                   LEFT     Iliopsoas                    5/5                      5/5       Quad                    5/5                        5/5       Hamstring                      5/5                        5/5         Gastrocs                    5/5                        5/5       Tib. Anterior                     5/5                        5/5       EHL                      5/5                        5/5       Babinski                 Down                          "             Down                   *  Structural Exam  Inspection of the spine reveals no scoliosis, exaggerated kyphosis or lordosis. Head is balanced over the pelvis in the coronal and sagittal plane.    Cervical spine ROM full. No spasming. No tenderness to palpation.  No Spurling's or L'Hermitte's.   Lumbar ROM full.  Able to doff and don she was without difficulty. No spasming,  no step offs. No SLR.    Bilateral SI tenderness. Moderately tender at the midline at around the L2 level. . Negative piriformis stretch tests.  No trochanteric bursal tenderness. Negative Caryn's.   Feet are warm  Gait narrow, smooth, no scissoring. No antalgia.     ASSESSMENT/PLAN:  1. Spondylosis of lumbar region without myelopathy or radiculopathy    2. Arthropathy of sacroiliac joint (H)    3. Mass of spinal cord (H)      The previously seen \"mass\" in the spinal cord is not present. This may have been an artifact. Overall I cannot explain the source of bowel or bladder symptoms, and recommend she return to your care for further evaluation, possible referral to urology or GI.    Her back pain responded very well to the SI injections, and since she did so well with that recommendations are for non-surgical care.  Neurosurgery generally does not treat these things but we see a fair amount of it and people come to us with back pain. Dedicated physical therapy for SI  Problems, weight loss, general reconditioning, anti-inflammatories are good options managed by a medical provider, such as primary care or PMR for example.     Overall I think she has rather mild spondylosis of the lumbar spine.  She describes symptoms of bilateral S1 radiculopathy, but there is no impingement at that level on the imaging, so I don't think her leg symptoms are coming from her spine, but neither are the piriformis tests positive.  EMGs may be useful down the road. However, in thinking about this she is clear that her back pain was the more troublesome of " the symptoms.       From the standpoint of neurosurgery there are no neurosurgical interventions we may offer him, and so she may return p.r.n. She was disappointed that we didn't have a more cohesive and definitive answer for her, but she does have our contact information and know that she may call at any time if anything else comes up  It's been a pleasure participating in the care of this nice patient. We thank you for your confidence in the [facility], Department of Neurosurgery.      This note was generated using voice recognition software. While edited for content some inaccurate phrasing may be found.    Again, thank you for allowing me to participate in the care of your patient.      Sincerely,    Laura Porter PA-C

## 2018-02-16 NOTE — PROGRESS NOTES
"  Neurosurgery Clinic Follow-up  Date of Visit:  2/16/18/2018  Referred for: therapy \"middle of back and hips\"  Referring Provider: Narciso Sanford MD     Dear Dr. Sanford,    We were happy to see Carrie Munoz, a pleasant 37 year old year old female for low back pain and bilateral posterior leg pain.     HPI: As you may recall her history begins prior to her pregnancy.  About 18 months ago she developed some bilateral low back pain. Initially it was really just in the low back, present when she was moving around or shifting positions or sitting in one position too long, then as her pregnancy progressed and after birth her posterior legs began causing pain, down the backs of both legs to the bottoms of both feet This is present more often on the right than the left, but overall leg pain is less common, and less troublesome than the back pain. Interestingly her back pain additionally radiates up the back to both shoulder blades and creates numbness across the trapezii bilaterally. She has been to chiropractic, without much ultimate relief, physical therapy again without much ultimate relief, has been on gabapentin at 1200 mg per day which gives some relief, but with no particular improvement over time she spoke to you and you ordered an MRI of the lumbar spine. There were some findings and so she was referred for surgical consideration.    Other symptoms include occasional leakage of urine and stool which began about 2 months ago. No coordination or balance troubles no difficulty with walking.    At last visit I thought based on exam she had some relatively mild spondylosis in her back, but had pain which appeared to be emanating from the SI joints. I elected to try for bilateral SI injections and have her return. I couldn't explain where her leg symptoms were coming from based on her imaging.  Nor the bowel and bladder symptoms. The lumbar MRI was fairly benign, but there was something seen on the  " "film behind the body of T7 which appeared to be a T2 hyperintense signal and dedicated imaging was suggested.  I imaged the thoracic spine and had her return with that today as well.  She has had very good response to the SI injections, back pain resolved for about 3 weeks. The hot spot/lesion seen on the lumbar  film behind T7 is not seen on the dedicated thoracic MRI.      Current Outpatient Prescriptions:      ibuprofen (ADVIL/MOTRIN) 600 MG tablet, Take 600 mg by mouth every 6 hours as needed for moderate pain, Disp: , Rfl:      gabapentin (NEURONTIN) 300 MG capsule, Take 1 tablet (300 mg) by mouth in AM/morning, 1 tablet (300 mg) at noon and 2 tablets (600 mg) at bedtime for sleep, Disp: 120 capsule, Rfl: 0     levonorgestrel (MIRENA, 52 MG,) 20 MCG/24HR IUD, 1 each (20 mcg) by Intrauterine route once for 1 dose, Disp: 1 each, Rfl: 0  No current facility-administered medications for this visit.     Facility-Administered Medications Ordered in Other Visits:      bupivacaine (MARCAINE) 0.125 % injection (diluted from stock concentration by MD or CRNA), , EPIDURAL, PRN, Honl, Adam Fernando MD, 10 mL at 03/23/17 0130    Allergies   Allergen Reactions     No Known Drug Allergies        Problem list, Past medical history, family, social: is reviewed in Epic.     OBJECTIVE:   /74  Pulse 82  Temp 98.4  F (36.9  C) (Oral)  Resp 24  Ht 1.74 m (5' 8.5\")  Wt 110.2 kg (243 lb)  LMP 01/29/2018  SpO2 97%  Breastfeeding? No  BMI 36.41 kg/m2    Imaging:  These are the pertinent radiologist's findings from:    Bilateral SI joint injections CDI  1/26/18  1. Bilateral SI joint injections with steroid and anesthetic.  2. Initial response to injected anesthetic was 75% pain relief.    MRI thoracic with and without 2/9/18  1.  No abnormal signal intensity or enhancement seen within the  thoracic spinal cord, thecal sac or vertebral column.  2.  No significant spinal canal stenosis or neural foraminal narrowing  at " any level.    MRI lumbar W/WO 12/13/17 @ [facility]   1. Lumbar spondylosis with tiny right foraminal disc protrusion at  L4-5 contacting and possibly impinging the exiting right L4 nerve  root.  2. Mild neural foraminal stenosis on the right at L4-5 and on the left  L5-S1. No significant spinal canal stenosis.  3. Mild right L5-S1 facet joint active arthropathy.  4. Incompletely characterized and visualized rounded signal  abnormality posterior to the T7 vertebral body on the  images. If  clinically indicated, consider thoracic spine MRI for further  evaluation.  See the full report in EPIC/Media tab.  I personally reviewed the images with the patient.      Exam:  *   Well developed, well nourished female found seated comfortably in exam room chair.  She is able to sit and rise independently.    *  Mental Status  A&O X3.  Bright, alert, affable, interactive. Language fluid, fund of knowledge intact. Good historian.  Mood and affect congruent and WNL.  Able to heel and toe stand/walk.   DTR's Patellar and Achilles 1/4 and symmetric.   No fasciculations.  Muscle bulk and tone WNL.  No Clonus.  Sensation intact.    Lower Extremity Strength                   RIGHT                   LEFT     Iliopsoas                    5/5                      5/5       Quad                    5/5                        5/5       Hamstring                      5/5                        5/5         Gastrocs                    5/5                        5/5       Tib. Anterior                     5/5                        5/5       EHL                      5/5                        5/5       Babinski                 Down                                      Down                   *  Structural Exam  Inspection of the spine reveals no scoliosis, exaggerated kyphosis or lordosis. Head is balanced over the pelvis in the coronal and sagittal plane.    Cervical spine ROM full. No spasming. No tenderness to palpation.  No Spurling's or  "L'Hermitte's.   Lumbar ROM full.  Able to doff and don she was without difficulty. No spasming,  no step offs. No SLR.    Bilateral SI tenderness. Moderately tender at the midline at around the L2 level. . Negative piriformis stretch tests.  No trochanteric bursal tenderness. Negative Caryn's.   Feet are warm  Gait narrow, smooth, no scissoring. No antalgia.     ASSESSMENT/PLAN:  1. Spondylosis of lumbar region without myelopathy or radiculopathy    2. Arthropathy of sacroiliac joint (H)    3. Mass of spinal cord (H)      The previously seen \"mass\" in the spinal cord is not present. This may have been an artifact. Overall I cannot explain the source of bowel or bladder symptoms, and recommend she return to your care for further evaluation, possible referral to urology or GI.    Her back pain responded very well to the SI injections, and since she did so well with that recommendations are for non-surgical care.  Neurosurgery generally does not treat these things but we see a fair amount of it and people come to us with back pain. Dedicated physical therapy for SI  Problems, weight loss, general reconditioning, anti-inflammatories are good options managed by a medical provider, such as primary care or PMR for example.     Overall I think she has rather mild spondylosis of the lumbar spine.  She describes symptoms of bilateral S1 radiculopathy, but there is no impingement at that level on the imaging, so I don't think her leg symptoms are coming from her spine, but neither are the piriformis tests positive.  EMGs may be useful down the road. However, in thinking about this she is clear that her back pain was the more troublesome of the symptoms.       From the standpoint of neurosurgery there are no neurosurgical interventions we may offer him, and so she may return p.r.n. She was disappointed that we didn't have a more cohesive and definitive answer for her, but she does have our contact information and know that she " may call at any time if anything else comes up  It's been a pleasure participating in the care of this nice patient. We thank you for your confidence in the [facility], Department of Neurosurgery.      Best Regards,    Laura Porter PA-C  [facility]  Department of Neurosurgery  Phone: [phone]  Fax: [phone]      This note was generated using voice recognition software. While edited for content some inaccurate phrasing may be found.

## 2018-02-16 NOTE — MR AVS SNAPSHOT
"              After Visit Summary   2/16/2018    Carrie Munoz    MRN: [id number]           Patient Information     Date Of Birth          1980        Visit Information        Provider Department      2/16/2018 12:30 PM Laura Porter PA-C [facility] Neurosurgery        Today's Diagnoses     Spondylosis of lumbar region without myelopathy or radiculopathy    -  1    Arthropathy of sacroiliac joint (H)        Mass of spinal cord (H)           Follow-ups after your visit        Follow-up notes from your care team     Return if symptoms worsen or fail to improve.      Who to contact     Please call your clinic at [phone] to:    Ask questions about your health    Make or cancel appointments    Discuss your medicines    Learn about your test results    Speak to your doctor            Additional Information About Your Visit        MyChart Information     idiag gives you secure access to your electronic health record. If you see a primary care provider, you can also send messages to your care team and make appointments. If you have questions, please call your primary care clinic.  If you do not have a primary care provider, please call [phone] and they will assist you.      idiag is an electronic gateway that provides easy, online access to your medical records. With idiag, you can request a clinic appointment, read your test results, renew a prescription or communicate with your care team.     To access your existing account, please contact your [facility] or call [phone] for assistance.        Care EveryWhere ID     This is your Care EveryWhere ID. This could be used by other organizations to access your [facility] medical records  [id number]        Your Vitals Were     Pulse Temperature Respirations Height Last Period Pulse Oximetry    82 98.4  F (36.9  C) (Oral) 24 1.74 m (5' 8.5\") 01/29/2018 97%    Breastfeeding? BMI (Body Mass Index)                No " 36.41 kg/m2           Blood Pressure from Last 3 Encounters:   02/16/18 116/74   02/09/18 106/80   01/11/18 (!) 128/91    Weight from Last 3 Encounters:   02/16/18 110.2 kg (243 lb)   02/09/18 110.2 kg (243 lb)   01/11/18 113.4 kg (250 lb)              Today, you had the following     No orders found for display       Primary Care Provider Office Phone # Fax #    [facility] [phone]       [address] [address]        Equal Access to Services     [facility] : Hadii tonny kauffman hadhaleigho Sodara, waaxda luqadaha, qaybta kaalmada adeorlandoda, myriam mario . So [facility] [phone].    ATENCIÓN: Si habla español, tiene a kapoor disposición servicios gratuitos de asistencia lingüística. Llame al [phone].    We comply with applicable federal civil rights laws and [place] laws. We do not discriminate on the basis of race, color, national origin, age, disability, sex, sexual orientation, or gender identity.            Thank you!     Thank you for choosing [facility]  for your care. Our goal is always to provide you with excellent care. Hearing back from our patients is one way we can continue to improve our services. Please take a few minutes to complete the written survey that you may receive in the mail after your visit with us. Thank you!             Your Updated Medication List - Protect others around you: Learn how to safely use, store and throw away your medicines at www.disposemymeds.org.          This list is accurate as of 2/16/18 11:59 PM.  Always use your most recent med list.                   Brand Name Dispense Instructions for use Diagnosis    gabapentin 300 MG capsule    NEURONTIN    120 capsule    Take 1 tablet (300 mg) by mouth in AM/morning, 1 tablet (300 mg) at noon and 2 tablets (600 mg) at bedtime for sleep    Acute midline low back pain with right-sided sciatica       ibuprofen 600 MG tablet    ADVIL/MOTRIN      Take 600 mg by mouth every 6 hours as needed for moderate pain        levonorgestrel 20 MCG/24HR IUD    MIRENA (52 MG)    1 each    1 each (20 mcg) by Intrauterine route once for 1 dose    Encounter for IUD insertion

## 2018-02-16 NOTE — NURSING NOTE
Chief Complaint   Patient presents with     RECHECK     [facility]- BACK PAIN, 1 MONTH F/U     Bhaskar Bailey, CMA

## 2018-03-11 ENCOUNTER — MYC MEDICAL ADVICE (OUTPATIENT)
Dept: FAMILY MEDICINE | Facility: CLINIC | Age: 38
End: 2018-03-11

## 2018-03-12 ENCOUNTER — MYC REFILL (OUTPATIENT)
Dept: FAMILY MEDICINE | Facility: CLINIC | Age: 38
End: 2018-03-12

## 2018-03-12 DIAGNOSIS — M54.41 ACUTE MIDLINE LOW BACK PAIN WITH RIGHT-SIDED SCIATICA: ICD-10-CM

## 2018-03-12 NOTE — TELEPHONE ENCOUNTER
I would first try Gabapentin 2 tabs AM, 1 at noon and 3 at night  She can also add ibuprofen to this

## 2018-03-12 NOTE — TELEPHONE ENCOUNTER
Dr. Sanford    See pt mychart message    Advise    Stephanie Prieto, RN   [facility]

## 2018-03-13 RX ORDER — GABAPENTIN 300 MG/1
CAPSULE ORAL
Qty: 120 CAPSULE | Refills: 0 | Status: SHIPPED | OUTPATIENT
Start: 2018-03-13 | End: 2018-03-13

## 2018-03-13 RX ORDER — GABAPENTIN 300 MG/1
CAPSULE ORAL
Qty: 150 CAPSULE | Refills: 1 | Status: SHIPPED | OUTPATIENT
Start: 2018-03-13 | End: 2018-06-14

## 2018-03-13 NOTE — TELEPHONE ENCOUNTER
Routing refill request to provider for review/approval because:  Drug not on the [facility] refill protocol      check last fill 1/2/18 for 120    Dose was increases by 2 daily on 3/12/18, instructions   Gabapentin 2 tabs AM, 1 at noon and 3 at night     Stephanie Prieto RN   [facility]

## 2018-03-13 NOTE — TELEPHONE ENCOUNTER
Message from [employer]:  Original authorizing provider: Narciso Sanford MD    Deweydaraabe Alexander would like a refill of the following medications:  gabapentin (NEURONTIN) 300 MG capsule [Narciso Sanford MD]    Preferred pharmacy: [place], [place] - [address]    Comment:  I need a renewal

## 2018-03-13 NOTE — TELEPHONE ENCOUNTER
Requested Prescriptions   Pending Prescriptions Disp Refills     gabapentin (NEURONTIN) 300 MG capsule 120 capsule 0     Sig: Take 1 tablet (300 mg) by mouth in AM/morning, 1 tablet (300 mg) at noon and 2 tablets (600 mg) at bedtime for sleep    There is no refill protocol information for this order   Last Written Prescription Date:  12/8/17  Last Fill Quantity: 120,  # refills: 0   Last office visit: 12/8/2017 with prescribing provider:  12/8/17   Future Office Visit:

## 2018-04-11 ENCOUNTER — OFFICE VISIT (OUTPATIENT)
Dept: FAMILY MEDICINE | Facility: CLINIC | Age: 38
End: 2018-04-11
Payer: MEDICAID

## 2018-04-11 VITALS
OXYGEN SATURATION: 97 % | SYSTOLIC BLOOD PRESSURE: 119 MMHG | TEMPERATURE: 98.8 F | HEART RATE: 99 BPM | DIASTOLIC BLOOD PRESSURE: 79 MMHG

## 2018-04-11 DIAGNOSIS — H66.002 ACUTE SUPPURATIVE OTITIS MEDIA OF LEFT EAR WITHOUT SPONTANEOUS RUPTURE OF TYMPANIC MEMBRANE, RECURRENCE NOT SPECIFIED: ICD-10-CM

## 2018-04-11 DIAGNOSIS — R07.0 THROAT PAIN: Primary | ICD-10-CM

## 2018-04-11 LAB
DEPRECATED S PYO AG THROAT QL EIA: NORMAL
SPECIMEN SOURCE: NORMAL

## 2018-04-11 PROCEDURE — 87081 CULTURE SCREEN ONLY: CPT | Performed by: NURSE PRACTITIONER

## 2018-04-11 PROCEDURE — 87880 STREP A ASSAY W/OPTIC: CPT | Performed by: NURSE PRACTITIONER

## 2018-04-11 PROCEDURE — 99213 OFFICE O/P EST LOW 20 MIN: CPT | Performed by: NURSE PRACTITIONER

## 2018-04-11 RX ORDER — AMOXICILLIN 875 MG
875 TABLET ORAL 2 TIMES DAILY
Qty: 20 TABLET | Refills: 0 | Status: SHIPPED | OUTPATIENT
Start: 2018-04-11 | End: 2018-05-04

## 2018-04-11 NOTE — PROGRESS NOTES
SUBJECTIVE:   Carrie Munoz is a 37 year old female who presents to clinic today for the following health issues:    Acute Illness   Acute illness concerns: sore throat, ear ache  Onset: 2 week    Fever: no    Chills/Sweats: YES- feels hot    Headache (location?): YES- occasionally     Sinus Pressure:YES    Conjunctivitis:  no    Ear Pain: YES: left    Rhinorrhea: no    Congestion: YES    Sore Throat: YES     Cough: YES    Wheeze: YES- sometimes    Decreased Appetite: YES    Nausea: no    Vomiting: no    Diarrhea:  no    Dysuria/Freq.: no- less frequent     Fatigue/Achiness: YES- fatigue    Sick/Strep Exposure: YES, 3 year old with a cold      Therapies Tried and outcome: Claritin D, apple cider vinegar, louie, lemon     Seasonal allergies    Problem list and histories reviewed & adjusted, as indicated.  Additional history: as documented    Patient Active Problem List   Diagnosis     Sinusitis, chronic     Breast lump on right side at 2:30  o'clock position     Sacroiliac joint pain     Sinus tachycardia     Irregular heart beat     Secondary amenorrhea     Past Surgical History:   Procedure Laterality Date     HC TOOTH EXTRACTION W/FORCEP  2003    all 4 together       Social History   Substance Use Topics     Smoking status: Never Smoker     Smokeless tobacco: Never Used     Alcohol use No     Family History   Problem Relation Age of Onset     Hypertension Paternal Grandmother      OSTEOPOROSIS Paternal Grandmother      Hypertension Paternal Aunt      Neurologic Disorder Father      sheehan's palsy     Hypertension Father      not on medication, smoker     Neurologic Disorder Mother      bell's palsy         Allergies   Allergen Reactions     No Known Drug Allergies      BP Readings from Last 3 Encounters:   04/11/18 119/79   02/16/18 116/74   02/09/18 106/80    Wt Readings from Last 3 Encounters:   02/16/18 243 lb (110.2 kg)   02/09/18 243 lb (110.2 kg)   01/11/18 250 lb (113.4 kg)                  Labs  reviewed in EPIC    Reviewed and updated as needed this visit by clinical staff       Reviewed and updated as needed this visit by Provider         ROS:  Constitutional, HEENT, cardiovascular, pulmonary, gi and gu systems are negative, except as otherwise noted.    OBJECTIVE:     /79 (BP Location: Left arm, Patient Position: Sitting, Cuff Size: Adult Large)  Pulse 99  Temp 98.8  F (37.1  C) (Oral)  SpO2 97%  There is no height or weight on file to calculate BMI.  GENERAL: mildly ill appearing, alert and in no distress  EYES: Eyes grossly normal to inspection, no discharge. Extraocular movements - intact, and PERRL  HENT: Normocephalic, ear canals- normal; TMs- left with erythema bulging and right with sm amt clear fluid normal landmarks; No sinus tenderness  Nose- normal with clear rhinnorhea; oropharynx clear without erythema or exudates, Mouth- no ulcers, no lesions and mucous membranes moist  NECK: no tenderness, no adenopathy, no asymmetry, no masses, no stiffness  RESP: lungs clear to auscultation - no rales, no rhonchi, no wheezes  CV: regular rates and rhythm, normal S1 S2, no S3 or S4 and no murmur, no click or rub - peripheral pulses normal and brisk cap refill   ABDOMEN: soft, no tenderness, no hepatosplenomegaly, no masses, normal bowel sounds  MS: extremities- no gross deformities noted, no edema  SKIN: no suspicious lesions, no rashes, good skin turgor  NEURO: strength and tone- normal, sensory exam- grossly normal, mentation appears normal       Diagnostic Test Results:  Results for orders placed or performed in visit on 04/11/18 (from the past 24 hour(s))   Strep, Rapid Screen   Result Value Ref Range    Specimen Description Throat     Rapid Strep A Screen       NEGATIVE: No Group A streptococcal antigen detected by immunoassay, await culture report.       ASSESSMENT/PLAN:         ICD-10-CM    1. Throat pain R07.0 Strep, Rapid Screen     Beta strep group A culture   2. Acute suppurative  otitis media of left ear without spontaneous rupture of tympanic membrane, recurrence not specified H66.002 amoxicillin (AMOXIL) 875 MG tablet   left ear discussed and will treat with antibiotics, finish entire course and follow up prn      Discussed viral versus bacterial illnesses along with typical course of progression, Culture pending   Symptom management: gargle salt water, honey in tea or warm water, plenty of rest and extra fluids. Reviewed signs of dehydration. See patient instructions     Patient Instructions   Drink plenty of extra fluids, honey soothes the cough (honey and lemon in tea is great), and cold fluids help keep swelling down.     Gargle salt water a few times a day, some studies show this can kill the virus sooner. Chicken noodle soup, and good nutrition in small amounts. Vitamin C and Zinc can help boost your immune system and can be taken short term while you fight this off. Extra fluids and rest also help your body fight off the virus.     Do saline washes with neti pot or saline nasal spray a few times throughout the day    Over the counter decongestants you can try:   Oxymetazolone nasal spray  (Afrin or generic is fine):  two sprays twice daily for 3-4 days then stop to avoid rebound congestion.    OR  Pseudoephedrine: 30mg three times daily by mouth for 1-2 weeks. You need to go to the Pharmacist counter and show your ID to get this medication. Do not take if you have high blood pressure, and this medication may also keep you awake.     I would also recommend to thin mucous:  Guaifenesin regularly until you are better    For pain and discomfort:  Ibuprofen 400 - 600mg three times daily for 5-7 days (anti-inflammatory) to decrease swelling and help with pain of sinuses, nose, throat and chest. If you have Diabetes or Kidney issues, do not take this or any other NSAID     Return to clinic at any time if you develop high fevers, if worsening or in 1-2 weeks if not improving.          Yesica  YASMEEN Wagner CNP  [facility]

## 2018-04-11 NOTE — MR AVS SNAPSHOT
After Visit Summary   4/11/2018    Carrie Munoz    MRN: [id number]           Patient Information     Date Of Birth          1980        Visit Information        Provider Department      4/11/2018 10:20 AM Yesica Navarro APRN [facility]        Today's Diagnoses     Throat pain    -  1    Acute suppurative otitis media of left ear without spontaneous rupture of tympanic membrane, recurrence not specified          Care Instructions    Drink plenty of extra fluids, honey soothes the cough (honey and lemon in tea is great), and cold fluids help keep swelling down.     Gargle salt water a few times a day, some studies show this can kill the virus sooner. Chicken noodle soup, and good nutrition in small amounts. Vitamin C and Zinc can help boost your immune system and can be taken short term while you fight this off. Extra fluids and rest also help your body fight off the virus.     Do saline washes with neti pot or saline nasal spray a few times throughout the day    Over the counter decongestants you can try:   Oxymetazolone nasal spray  (Afrin or generic is fine):  two sprays twice daily for 3-4 days then stop to avoid rebound congestion.    OR  Pseudoephedrine: 30mg three times daily by mouth for 1-2 weeks. You need to go to the Pharmacist counter and show your ID to get this medication. Do not take if you have high blood pressure, and this medication may also keep you awake.     I would also recommend to thin mucous:  Guaifenesin regularly until you are better    For pain and discomfort:  Ibuprofen 400 - 600mg three times daily for 5-7 days (anti-inflammatory) to decrease swelling and help with pain of sinuses, nose, throat and chest. If you have Diabetes or Kidney issues, do not take this or any other NSAID     Return to clinic at any time if you develop high fevers, if worsening or in 1-2 weeks if not improving.              Follow-ups after your visit        Who to  contact     If you have questions or need follow up information about today's clinic visit or your schedule please contact [facility] directly at [phone].  Normal or non-critical lab and imaging results will be communicated to you by [employer], letter or phone within 4 business days after the clinic has received the results. If you do not hear from us within 7 days, please contact the clinic through [employer] or phone. If you have a critical or abnormal lab result, we will notify you by phone as soon as possible.  Submit refill requests through [employer] or call your pharmacy and they will forward the refill request to us. Please allow 3 business days for your refill to be completed.          Additional Information About Your Visit        [employer] Information     [employer] gives you secure access to your electronic health record. If you see a primary care provider, you can also send messages to your care team and make appointments. If you have questions, please call your primary care clinic.  If you do not have a primary care provider, please call [phone] and they will assist you.        Care EveryWhere ID     This is your Care EveryWhere ID. This could be used by other organizations to access your [place] medical records  [id number]        Your Vitals Were     Pulse Temperature Pulse Oximetry             99 98.8  F (37.1  C) (Oral) 97%          Blood Pressure from Last 3 Encounters:   04/11/18 119/79   02/16/18 116/74   02/09/18 106/80    Weight from Last 3 Encounters:   02/16/18 243 lb (110.2 kg)   02/09/18 243 lb (110.2 kg)   01/11/18 250 lb (113.4 kg)              We Performed the Following     Beta strep group A culture     Strep, Rapid Screen          Today's Medication Changes          These changes are accurate as of 4/11/18 10:54 AM.  If you have any questions, ask your nurse or doctor.               Start taking these medicines.        Dose/Directions    amoxicillin 875 MG tablet    Commonly known as:  AMOXIL   Used for:  Acute suppurative otitis media of left ear without spontaneous rupture of tympanic membrane, recurrence not specified        Dose:  875 mg   Take 1 tablet (875 mg) by mouth 2 times daily   Quantity:  20 tablet   Refills:  0            Where to get your medicines      These medications were sent to [place], [place] - [address]  [address], [facility] [address]     Phone:  [phone]     amoxicillin 875 MG tablet                Primary Care Provider Office Phone # Fax #    [facility] [phone]       [address]  [facility] [address]        Equal Access to Services     JUANA MOMIN : Hadii tonny burciagao Sodara, waaxda luqadaha, qaybta kaalmada adeegyada, myriam levy. So [facility] [phone].    ATENCIÓN: Si habla español, tiene a kapoor disposición servicios gratuitos de asistencia lingüística. Llame al [phone].    We comply with applicable federal civil rights laws and [place] laws. We do not discriminate on the basis of race, color, national origin, age, disability, sex, sexual orientation, or gender identity.            Thank you!     Thank you for choosing [facility]  for your care. Our goal is always to provide you with excellent care. Hearing back from our patients is one way we can continue to improve our services. Please take a few minutes to complete the written survey that you may receive in the mail after your visit with us. Thank you!             Your Updated Medication List - Protect others around you: Learn how to safely use, store and throw away your medicines at www.disposemymeds.org.          This list is accurate as of 4/11/18 10:54 AM.  Always use your most recent med list.                   Brand Name Dispense Instructions for use Diagnosis    amoxicillin 875 MG tablet    AMOXIL    20 tablet    Take 1 tablet (875 mg)  by mouth 2 times daily    Acute suppurative otitis media of left ear without spontaneous rupture of tympanic membrane, recurrence not specified       gabapentin 300 MG capsule    NEURONTIN    150 capsule    Take 2 tablets (93551 mg) by mouth in AM/morning, 1 tablet (300 mg) at noon and 2 tablets (600 mg) at bedtime for sleep    Acute midline low back pain with right-sided sciatica       ibuprofen 600 MG tablet    ADVIL/MOTRIN     Take 600 mg by mouth every 6 hours as needed for moderate pain        levonorgestrel 20 MCG/24HR IUD    MIRENA (52 MG)    1 each    1 each (20 mcg) by Intrauterine route once for 1 dose    Encounter for IUD insertion

## 2018-04-11 NOTE — PATIENT INSTRUCTIONS
Drink plenty of extra fluids, honey soothes the cough (honey and lemon in tea is great), and cold fluids help keep swelling down.     Gargle salt water a few times a day, some studies show this can kill the virus sooner. Chicken noodle soup, and good nutrition in small amounts. Vitamin C and Zinc can help boost your immune system and can be taken short term while you fight this off. Extra fluids and rest also help your body fight off the virus.     Do saline washes with neti pot or saline nasal spray a few times throughout the day    Over the counter decongestants you can try:   Oxymetazolone nasal spray  (Afrin or generic is fine):  two sprays twice daily for 3-4 days then stop to avoid rebound congestion.    OR  Pseudoephedrine: 30mg three times daily by mouth for 1-2 weeks. You need to go to the Pharmacist counter and show your ID to get this medication. Do not take if you have high blood pressure, and this medication may also keep you awake.     I would also recommend to thin mucous:  Guaifenesin regularly until you are better    For pain and discomfort:  Ibuprofen 400 - 600mg three times daily for 5-7 days (anti-inflammatory) to decrease swelling and help with pain of sinuses, nose, throat and chest. If you have Diabetes or Kidney issues, do not take this or any other NSAID     Return to clinic at any time if you develop high fevers, if worsening or in 1-2 weeks if not improving.

## 2018-04-12 LAB
BACTERIA SPEC CULT: NORMAL
SPECIMEN SOURCE: NORMAL

## 2018-04-20 ENCOUNTER — MYC MEDICAL ADVICE (OUTPATIENT)
Dept: FAMILY MEDICINE | Facility: CLINIC | Age: 38
End: 2018-04-20

## 2018-04-20 NOTE — TELEPHONE ENCOUNTER
[employer] message sent to patient.    Dinora Quinn RN  [facility]

## 2018-04-27 ENCOUNTER — OFFICE VISIT (OUTPATIENT)
Dept: FAMILY MEDICINE | Facility: CLINIC | Age: 38
End: 2018-04-27
Payer: MEDICAID

## 2018-04-27 VITALS
BODY MASS INDEX: 36.49 KG/M2 | TEMPERATURE: 98.2 F | DIASTOLIC BLOOD PRESSURE: 81 MMHG | WEIGHT: 243.5 LBS | SYSTOLIC BLOOD PRESSURE: 121 MMHG | HEART RATE: 81 BPM | OXYGEN SATURATION: 98 %

## 2018-04-27 DIAGNOSIS — M67.432 GANGLION CYST OF WRIST, LEFT: ICD-10-CM

## 2018-04-27 DIAGNOSIS — H60.392 INFECTIVE OTITIS EXTERNA, LEFT: ICD-10-CM

## 2018-04-27 DIAGNOSIS — R09.82 POSTNASAL DRIP: ICD-10-CM

## 2018-04-27 DIAGNOSIS — M54.41 ACUTE MIDLINE LOW BACK PAIN WITH RIGHT-SIDED SCIATICA: Primary | ICD-10-CM

## 2018-04-27 PROCEDURE — 99214 OFFICE O/P EST MOD 30 MIN: CPT | Performed by: FAMILY MEDICINE

## 2018-04-27 RX ORDER — NEOMYCIN SULFATE, POLYMYXIN B SULFATE AND HYDROCORTISONE 10; 3.5; 1 MG/ML; MG/ML; [USP'U]/ML
4 SUSPENSION/ DROPS AURICULAR (OTIC) 4 TIMES DAILY
Qty: 10 ML | Refills: 0 | Status: SHIPPED | OUTPATIENT
Start: 2018-04-27 | End: 2018-05-04

## 2018-04-27 RX ORDER — LIDOCAINE 50 MG/G
PATCH TOPICAL
Qty: 30 PATCH | Refills: 0 | Status: SHIPPED | OUTPATIENT
Start: 2018-04-27 | End: 2018-08-13

## 2018-04-27 RX ORDER — FLUTICASONE PROPIONATE 50 MCG
1-2 SPRAY, SUSPENSION (ML) NASAL DAILY
Qty: 3 BOTTLE | Refills: 1 | Status: SHIPPED | OUTPATIENT
Start: 2018-04-27 | End: 2020-10-15

## 2018-04-27 NOTE — NURSING NOTE
"Chief Complaint   Patient presents with     Back Pain     Ear Problem       Initial /81 (BP Location: Left arm, Patient Position: Sitting, Cuff Size: Adult Large)  Pulse 81  Temp 98.2  F (36.8  C) (Oral)  Wt 243 lb 8 oz (110.5 kg)  SpO2 98%  BMI 36.49 kg/m2 Estimated body mass index is 36.49 kg/(m^2) as calculated from the following:    Height as of 2/16/18: 5' 8.5\" (1.74 m).    Weight as of this encounter: 243 lb 8 oz (110.5 kg).  Medication Reconciliation: complete     Fiona Rubio CMA    "

## 2018-04-27 NOTE — PROGRESS NOTES
"  SUBJECTIVE:   Carrie Munoz is a 37 year old female who presents to clinic today for the following health issues:    Back Pain Follow Up      Description:   Location of pain:  SI joint, middle of back up into shoulders   Character of pain: sharp  Pain radiation: Radiates into both legs   Since last visit, pain is:  worsened  New numbness or weakness in legs, not attributed to pain:  Numbness     Intensity: Currently 7/10, At its worst 10/10    History:   Pain interferes with job: YES  Therapies tried without relief: gabapentin, ibuprofen, chiropractor, massage and Physical Therapy  Therapies tried with relief: Cortisone shot helped for about 1 month            Accompanying Signs & Symptoms:  Risk of Fracture:  None  Risk of Cauda Equina:  None  Risk of Infection:  None  Risk of Cancer:  None        Acute Illness   Acute illness concerns: ear infection f/u   Onset: 1 month     Fever: no    Chills/Sweats: no    Headache (location?): YES    Sinus Pressure:YES    Conjunctivitis:  no    Ear Pain: YES: left    Rhinorrhea: no    Congestion: YES    Sore Throat: YES     Cough: YES    Wheeze: no    Decreased Appetite: no    Nausea: no    Vomiting: no    Diarrhea:  no    Dysuria/Freq.: no    Fatigue/Achiness: YES- \"always\"    Sick/Strep Exposure: no     Therapies Tried and outcome: Finished antibiotics, claritin D, ibuprofen           Has recurrence of ganglion cyst left wrist, wangts to see ortho    Problem list and histories reviewed & adjusted, as indicated.  Additional history: as documented    Labs reviewed in EPIC    Reviewed and updated as needed this visit by clinical staff  Allergies  Meds       Reviewed and updated as needed this visit by Provider         ROS:  Constitutional, HEENT, cardiovascular, pulmonary, gi and gu systems are negative, except as otherwise noted.    OBJECTIVE:     /81 (BP Location: Left arm, Patient Position: Sitting, Cuff Size: Adult Large)  Pulse 81  Temp 98.2  F (36.8  C) " (Oral)  Wt 243 lb 8 oz (110.5 kg)  SpO2 98%  BMI 36.49 kg/m2  Body mass index is 36.49 kg/(m^2).  GENERAL: alert and mild distress  HENT: normal cephalic/atraumatic, right ear: normal: no effusions, no erythema, normal landmarks, left ear: red and boggy canal, nose and mouth without ulcers or lesions, nasal mucosa edematous , rhinorrhea clear, oropharynx clear and oral mucous membranes moist  NECK: no adenopathy, no asymmetry, masses, or scars and thyroid normal to palpation  RESP: lungs clear to auscultation - no rales, rhonchi or wheezes  CV: regular rate and rhythm, normal S1 S2, no S3 or S4, no murmur, click or rub, no peripheral edema and peripheral pulses strong  ABDOMEN: soft, nontender, no hepatosplenomegaly, no masses and bowel sounds normal  MS: normal muscle tone, tenderness to palpation across low back, negative straight leg raising test and mildly tender 2-3 cm cytsic mass left wrist  SKIN: no suspicious lesions or rashes  NEURO: Normal strength and tone, mentation intact and speech normal    Diagnostic Test Results:  none     ASSESSMENT/PLAN:             1. Acute midline low back pain with right-sided sciatica  Was better after injection, pain now in LS region, has not responed to PHYSICAL THERAPY   - SPINE SURGERY REFERRAL  - lidocaine (LIDODERM) 5 % Patch; Apply up to 3 patches to painful area at once for up to 12 h within a 24 h period.  Remove after 12 hours.  Dispense: 30 patch; Refill: 0    2. Ganglion cyst of wrist, left  Worse  Follow up with consultant as planned.   - ORTHOPEDICS ADULT REFERRAL    3. Infective otitis externa, left  use  - neomycin-polymyxin-hydrocortisone (CORTISPORIN) 3.5-40655-5 otic suspension; Place 4 drops Into the left ear 4 times daily  Dispense: 10 mL; Refill: 0    4. Postnasal drip  Did not respond to claritin, try  - fluticasone (FLONASE) 50 MCG/ACT spray; Spray 1-2 sprays into both nostrils daily  Dispense: 3 Bottle; Refill: 1    See Patient  Instructions    Narciso Sanford MD  [facility]

## 2018-04-27 NOTE — MR AVS SNAPSHOT
After Visit Summary   4/27/2018    Carrie Munoz    MRN: [id number]           Patient Information     Date Of Birth          1980        Visit Information        Provider Department      4/27/2018 2:00 PM Narcios Sanford MD [facility]        Today's Diagnoses     Acute midline low back pain with right-sided sciatica    -  1    Ganglion cyst of wrist, left           Follow-ups after your visit        Additional Services     ORTHOPEDICS ADULT REFERRAL       Your provider has referred you to: [facility]: Orthopaedic [facility] [phone]   [email or web address]      Please be aware that coverage of these services is subject to the terms and limitations of your health insurance plan.  Call member services at your health plan with any benefit or coverage questions.      Please bring the following to your appointment:    >>   Any x-rays, CTs or MRIs which have been performed.  Contact the facility where they were done to arrange for  prior to your scheduled appointment.  Any new CT, MRI or other procedures ordered by your specialist must be performed at a [facility] or coordinated by your clinic's referral office.    >>   List of current medications   >>   This referral request   >>   Any documents/labs given to you for this referral            SPINE SURGERY REFERRAL       Please choose Medical Spine Specialist (unless patient was seen by a Medical Spine Specialist within the past 6 months).  Surgical Evaluation is advised if the patient presents with one or more of the following red flags:     **Cauda Equina Syndrome  **Evidence of Spinal Tumor  **Fracture  **Infection  **Loss of Bowel or Bladder Control  **Sudden or Progressive Weakness  **Any other documented emergent neurological condition resulting from a Lumbar Spinal Condition.    You have been referred to: Spine Lumbar: Medical Spine Specialist: [facility]: [place]  Orthopaedic Therapy Center [facility] [phone]   [email or web address]    Please be aware that coverage of these services is subject to the terms and limitations of your health insurance plan.  Call member services at your health plan with any benefit or coverage questions.      Please bring the following to your appointment:    **Any x-rays, CTs or MRIs which have been performed.  Contact the facility where they were done to arrange for  prior to your scheduled appointment.    **List of current medications   **This referral request   **Any documents/labs given to you regarding this referral                  Who to contact     If you have questions or need follow up information about today's clinic visit or your schedule please contact [facility] directly at [phone].  Normal or non-critical lab and imaging results will be communicated to you by MyChart, letter or phone within 4 business days after the clinic has received the results. If you do not hear from us within 7 days, please contact the clinic through [employer] or phone. If you have a critical or abnormal lab result, we will notify you by phone as soon as possible.  Submit refill requests through [employer] or call your pharmacy and they will forward the refill request to us. Please allow 3 business days for your refill to be completed.          Additional Information About Your Visit        [employer] Information     [employer] gives you secure access to your electronic health record. If you see a primary care provider, you can also send messages to your care team and make appointments. If you have questions, please call your primary care clinic.  If you do not have a primary care provider, please call [phone] and they will assist you.        Care EveryWhere ID     This is your Care EveryWhere ID. This could be used by other organizations to access your [facility]  records  [id number]        Your Vitals Were     Pulse Temperature Pulse Oximetry BMI (Body Mass Index)          81 98.2  F (36.8  C) (Oral) 98% 36.49 kg/m2         Blood Pressure from Last 3 Encounters:   04/27/18 121/81   04/11/18 119/79   02/16/18 116/74    Weight from Last 3 Encounters:   04/27/18 243 lb 8 oz (110.5 kg)   02/16/18 243 lb (110.2 kg)   02/09/18 243 lb (110.2 kg)              We Performed the Following     ORTHOPEDICS ADULT REFERRAL     SPINE SURGERY REFERRAL          Today's Medication Changes          These changes are accurate as of 4/27/18  2:34 PM.  If you have any questions, ask your nurse or doctor.               Start taking these medicines.        Dose/Directions    lidocaine 5 % Patch   Commonly known as:  LIDODERM   Used for:  Acute midline low back pain with right-sided sciatica        Apply up to 3 patches to painful area at once for up to 12 h within a 24 h period.  Remove after 12 hours.   Quantity:  30 patch   Refills:  0         These medicines have changed or have updated prescriptions.        Dose/Directions    gabapentin 300 MG capsule   Commonly known as:  NEURONTIN   This may have changed:  additional instructions   Used for:  Acute midline low back pain with right-sided sciatica        Take 2 tablets (98705 mg) by mouth in AM/morning, 1 tablet (300 mg) at noon and 2 tablets (600 mg) at bedtime for sleep   Quantity:  150 capsule   Refills:  1            Where to get your medicines      These medications were sent to [facility] [address]  [address], [facility] [address]     Phone:  [phone]     lidocaine 5 % Patch                Primary Care Provider Office Phone # Fax #    [facility] [phone]       [address]  [facility] [address]        Equal Access to Services     JUANA MOMIN AH: Anthony Hobbs, kimberly javier, myriam loya  mo de leonreneradhika beardenGracelo ah. So [facility] [phone].    ATENCIÓN: Si raadla nuzhat, tiene a kapoor disposición servicios gratuitos de asistencia lingüística. Omar tracey [phone].    We comply with applicable federal civil rights laws and [place] laws. We do not discriminate on the basis of race, color, national origin, age, disability, sex, sexual orientation, or gender identity.            Thank you!     Thank you for choosing [facility]  for your care. Our goal is always to provide you with excellent care. Hearing back from our patients is one way we can continue to improve our services. Please take a few minutes to complete the written survey that you may receive in the mail after your visit with us. Thank you!             Your Updated Medication List - Protect others around you: Learn how to safely use, store and throw away your medicines at www.disposemymeds.org.          This list is accurate as of 4/27/18  2:34 PM.  Always use your most recent med list.                   Brand Name Dispense Instructions for use Diagnosis    amoxicillin 875 MG tablet    AMOXIL    20 tablet    Take 1 tablet (875 mg) by mouth 2 times daily    Acute suppurative otitis media of left ear without spontaneous rupture of tympanic membrane, recurrence not specified       gabapentin 300 MG capsule    NEURONTIN    150 capsule    Take 2 tablets (01524 mg) by mouth in AM/morning, 1 tablet (300 mg) at noon and 2 tablets (600 mg) at bedtime for sleep    Acute midline low back pain with right-sided sciatica       ibuprofen 600 MG tablet    ADVIL/MOTRIN     Take 600 mg by mouth every 6 hours as needed for moderate pain        levonorgestrel 20 MCG/24HR IUD    MIRENA (52 MG)    1 each    1 each (20 mcg) by Intrauterine route once for 1 dose    Encounter for IUD insertion       lidocaine 5 % Patch    LIDODERM    30 patch    Apply up to 3 patches to painful area at once for up to 12 h within a 24 h period.  Remove after 12 hours.     Acute midline low back pain with right-sided sciatica

## 2018-05-01 ENCOUNTER — TELEPHONE (OUTPATIENT)
Dept: FAMILY MEDICINE | Facility: CLINIC | Age: 38
End: 2018-05-01

## 2018-05-01 NOTE — TELEPHONE ENCOUNTER
Prior Authorization Retail Medication Request    Medication/Dose: lidocaine (LIDODERM) 5 % Patch  ICD code (if different than what is on RX):    Previously Tried and Failed:  unknown  Rationale:  Adjudication reject    Ins[place]- [id number]  Insurance ID:  [id number]      Pharmacy Information (if different than what is on RX)  Name:  [facility]  Phone:  [phone]

## 2018-05-02 ENCOUNTER — MYC MEDICAL ADVICE (OUTPATIENT)
Dept: FAMILY MEDICINE | Facility: CLINIC | Age: 38
End: 2018-05-02

## 2018-05-02 NOTE — TELEPHONE ENCOUNTER
PA Initiation    Medication: lidocaine (LIDODERM) 5 % Patch - INITIATED  Insurance Company: [place] Medicaid ([facility]) - Phone [phone] Fax [phone]  Pharmacy Filling the Rx: [place], [place] - [address]  Filling Pharmacy Phone: [phone]  Filling Pharmacy Fax:    Start Date: 5/2/2018

## 2018-05-03 NOTE — TELEPHONE ENCOUNTER
PRIOR AUTHORIZATION DENIED    Medication: lidocaine (LIDODERM) 5 % Patch - DENIED    Denial Date: 5/2/2018    Denial Rational:       Appeal Information:

## 2018-05-03 NOTE — TELEPHONE ENCOUNTER
Pt aware that this prescription not covered.    Dinora Quinn RN  [facility]

## 2018-05-04 ENCOUNTER — RADIANT APPOINTMENT (OUTPATIENT)
Dept: GENERAL RADIOLOGY | Facility: CLINIC | Age: 38
End: 2018-05-04
Attending: FAMILY MEDICINE
Payer: MEDICAID

## 2018-05-04 ENCOUNTER — OFFICE VISIT (OUTPATIENT)
Dept: ORTHOPEDICS | Facility: CLINIC | Age: 38
End: 2018-05-04
Payer: MEDICAID

## 2018-05-04 VITALS — BODY MASS INDEX: 35.99 KG/M2 | WEIGHT: 243 LBS | HEIGHT: 69 IN

## 2018-05-04 DIAGNOSIS — R22.32 MASS OF LEFT WRIST: ICD-10-CM

## 2018-05-04 DIAGNOSIS — M25.532 LEFT WRIST PAIN: Primary | ICD-10-CM

## 2018-05-04 ASSESSMENT — ENCOUNTER SYMPTOMS
SINUS CONGESTION: 1
MUSCLE CRAMPS: 1
MUSCLE WEAKNESS: 1
STIFFNESS: 1
SINUS PAIN: 1
TROUBLE SWALLOWING: 1
SORE THROAT: 1
NECK PAIN: 1
ARTHRALGIAS: 1
MYALGIAS: 1
BACK PAIN: 1

## 2018-05-04 NOTE — LETTER
"  5/4/2018      RE: Carrie Munoz  [address]   [facility] [address]        Subjective:   Carrie Munoz is a 37 year old female who follows up with a ganglion cyst on the left wrist. She had the previous cyst drained on 1/12/17. Two and a half weeks ago the cyst came back.  Picking up baby and thought, geez my wrist hurts.  Carpal tunnel during pregnancy.  Symptoms are improved regarding that problem.    Dorsal wrist 1 cm x 1 cm swelling    Date of injury: None  Date last seen: Visit date not found     PAST MEDICAL, SOCIAL, SURGICAL AND FAMILY HISTORY: She  has a past medical history of Breast disorder; Breast lump on right side at 2:30  o'clock position (5/4/2011); Cardiac abnormality; Depressive disorder; Mental disorder; and Unspecified sinusitis (chronic).  She  has a past surgical history that includes TOOTH EXTRACTION W/FORCEP (2003).  Her family history includes Hypertension in her father, paternal aunt, and paternal grandmother; Neurologic Disorder in her father and mother; OSTEOPOROSIS in her paternal grandmother.  She reports that she has never smoked. She has never used smokeless tobacco. She reports that she does not drink alcohol or use illicit drugs.    ALLERGIES: She is allergic to no known drug allergies.    CURRENT MEDICATIONS: She has a current medication list which includes the following prescription(s): fluticasone, gabapentin, ibuprofen, lidocaine, lidocaine, and levonorgestrel, and the following Facility-Administered Medications: bupivacaine.     REVIEW OF SYSTEMS: 10 point review of systems is negative except as noted above.     Exam:   Ht 5' 8.5\" (1.74 m)  Wt 243 lb (110.2 kg)  BMI 36.41 kg/m2           CONSTITUTIONAL: healthy, alert, no distress and cooperative  HEAD: Normocephalic. No masses, lesions, tenderness or abnormalities  SKIN: no suspicious lesions or rashes  GAIT: normal  NEUROLOGIC: Non-focal  PSYCHIATRIC: affect normal/bright and mentation appears " normal.    MUSCULOSKELETAL: left wrist mass  WRIST:  Inspection: swelling 1 cm x 1cm dorsal wrist  Palpation: Tender: dorsal wrist  Non-tender: distal radius, distal ulna, 1st dorsal compartment, flexor tendons  Range of Motion: normal  Strength: no deficits  Special tests: negative Tinel's at carpal tunnel.  , negative Phalen's.      ELBOW:  elbow exam not done         Assessment/Plan:   Pt is a 36 yo AA female with PMhx of ganglion cyst presenting with left dorsal wrist swelling  1. Left dorsal wrist mass- suspected ganglion, drained previously and gel substance removed.  Pt doing baby cares and noticed increased left wrist pain and return of swelling  MRI left wrist, referral to hand surgery for excision    RTC prn    X-RAY INTERPRETATION:   Left wrist xr- normal    Ruth Jenkins MD

## 2018-05-04 NOTE — MR AVS SNAPSHOT
After Visit Summary   5/4/2018    Carrie Munoz    MRN: [id number]           Patient Information     Date Of Birth          1980        Visit Information        Provider Department      5/4/2018 12:45 PM Ruth Jenkins MD [facility]        Today's Diagnoses     Left wrist pain    -  1    Mass of left wrist           Follow-ups after your visit        Additional Services     ORTHOPEDICS ADULT REFERRAL       Your provider has referred you to: PREFERRED PROVIDERS: hand    Please be aware that coverage of these services is subject to the terms and limitations of your health insurance plan.  Call member services at your health plan with any benefit or coverage questions.      Please bring the following to your appointment:    >>   Any x-rays, CTs or MRIs which have been performed.  Contact the facility where they were done to arrange for  prior to your scheduled appointment.    >>   List of current medications   >>   This referral request   >>   Any documents/labs given to you for this referral                  Follow-up notes from your care team     Return if symptoms worsen or fail to improve.      Your next 10 appointments already scheduled     May 08, 2018  3:15 PM CDT   MR UPPER EXTREMITY JOINT WRIST LEFT with [id number]   [facility] ([facility])    [address] [place] [address] [phone]           Take your medicines as usual, unless your doctor tells you not to. Bring a list of your current medicines to your exam (including vitamins, minerals and over-the-counter drugs).  You may or may not receive intravenous (IV) contrast for this exam pending the discretion of the Radiologist.  You do not need to do anything special to prepare.  The MRI machine uses a strong magnet. Please wear clothes without metal (snaps, zippers). A sweatsuit works well, or we may give you a hospital gown.  Please  remove any body piercings and hair extensions before you arrive. You will also remove watches, jewelry, hairpins, wallets, dentures, partial dental plates and hearing aids. You may wear contact lenses, and you may be able to wear your rings. We have a safe place to keep your personal items, but it is safer to leave them at home.  **IMPORTANT** THE INSTRUCTIONS BELOW ARE ONLY FOR THOSE PATIENTS WHO HAVE BEEN PRESCRIBED SEDATION OR GENERAL ANESTHESIA DURING THEIR MRI PROCEDURE:  IF YOUR DOCTOR PRESCRIBED ORAL SEDATION (take medicine to help you relax during your exam):   You must get the medicine from your doctor (oral medication) before you arrive. Bring the medicine to the exam. Do not take it at home. You ll be told when to take it upon arriving for your exam.   Arrive one hour early. Bring someone who can take you home after the test. Your medicine will make you sleepy. After the exam, you may not drive, take a bus or take a taxi by yourself.  IF YOUR DOCTOR PRESCRIBED IV SEDATION:   Arrive one hour early. Bring someone who can take you home after the test. Your medicine will make you sleepy. After the exam, you may not drive, take a bus or take a taxi by yourself.   No eating 6 hours before your exam. You may have clear liquids up until 4 hours before your exam. (Clear liquids include water, clear tea, black coffee and fruit juice without pulp.)  IF YOUR DOCTOR PRESCRIBED ANESTHESIA (be asleep for your exam):   Arrive 1 1/2 hours early. Bring someone who can take you home after the test. You may not drive, take a bus or take a taxi by yourself.   No eating 8 hours before your exam. You may have clear liquids up until 4 hours before your exam. (Clear liquids include water, clear tea, black coffee and fruit juice without pulp.)   You will spend four to five hours in the recovery room.  Please call the Imaging Department at your exam site with any questions.            May 08, 2018  4:30 PM CDT   (Arrive by 4:00 PM)  "  NEW SPINE with Doe Grossman MD   [facility] ([facility] [place])    [address] [address] [phone]            May 09, 2018 10:15 AM CDT   (Arrive by 10:00 AM)   NEW HAND with Randall Maradiaga MD   [facility] ([facility] [place])    [address] [address] [phone]              Future tests that were ordered for you today     Open Future Orders        Priority Expected Expires Ordered    MR Upper Extremity Joint Wrist Left Routine  5/4/2019 5/4/2018            Who to contact     Please call your clinic at [phone] to:    Ask questions about your health    Make or cancel appointments    Discuss your medicines    Learn about your test results    Speak to your doctor            Additional Information About Your Visit        [employer] Information     [employer] gives you secure access to your electronic health record. If you see a primary care provider, you can also send messages to your care team and make appointments. If you have questions, please call your primary care clinic.  If you do not have a primary care provider, please call [phone] and they will assist you.      [employer] is an electronic gateway that provides easy, online access to your medical records. With [employer], you can request a clinic appointment, read your test results, renew a prescription or communicate with your care team.     To access your existing account, please contact your [facility] or call [phone] for assistance.        Care EveryWhere ID     This is your Care EveryWhere ID. This could be used by other organizations to access your [place] medical records  [id number]        Your Vitals Were     Height BMI (Body Mass Index)                5' 8.5\" (1.74 m) 36.41 kg/m2           Blood Pressure from Last 3 Encounters:   04/27/18 121/81 "   04/11/18 119/79   02/16/18 116/74    Weight from Last 3 Encounters:   05/04/18 243 lb (110.2 kg)   04/27/18 243 lb 8 oz (110.5 kg)   02/16/18 243 lb (110.2 kg)              We Performed the Following     ORTHOPEDICS ADULT REFERRAL          Today's Medication Changes          These changes are accurate as of 5/4/18  1:42 PM.  If you have any questions, ask your nurse or doctor.               These medicines have changed or have updated prescriptions.        Dose/Directions    gabapentin 300 MG capsule   Commonly known as:  NEURONTIN   This may have changed:  additional instructions   Used for:  Acute midline low back pain with right-sided sciatica        Take 2 tablets (90129 mg) by mouth in AM/morning, 1 tablet (300 mg) at noon and 2 tablets (600 mg) at bedtime for sleep   Quantity:  150 capsule   Refills:  1         Stop taking these medicines if you haven't already. Please contact your care team if you have questions.     amoxicillin 875 MG tablet   Commonly known as:  AMOXIL   Stopped by:  Ruth Jenkins MD           neomycin-polymyxin-hydrocortisone [id number] otic suspension   Commonly known as:  CORTISPORIN   Stopped by:  Ruth Jenkins MD                    Primary Care Provider Office Phone # Fax #    [facility] [phone]       [address] [place], [place] [id number]        Equal Access to Services     [facility] AH: Hadii tonny kauffman hadhaleigho Sodara, waaxda luqadaha, qaybta kaalmada rafaelda, waxay ken mario . So [facility] [phone].    ATENCIÓN: Si habla español, tiene a kapoor disposición servicios gratuitos de asistencia lingüística. Llame al [phone].    We comply with applicable federal civil rights laws and [place] laws. We do not discriminate on the basis of race, color, national origin, age, disability, sex, sexual orientation, or gender identity.            Thank you!     Thank you for choosing [facility]  for your care. Our goal is always to provide you with excellent care. Hearing back from our patients is one way we can continue to improve our services. Please take a few minutes to complete the written survey that you may receive in the mail after your visit with us. Thank you!             Your Updated Medication List - Protect others around you: Learn how to safely use, store and throw away your medicines at www.disposemymeds.org.          This list is accurate as of 5/4/18  1:42 PM.  Always use your most recent med list.                   Brand Name Dispense Instructions for use Diagnosis    fluticasone 50 MCG/ACT spray    FLONASE    3 Bottle    Spray 1-2 sprays into both nostrils daily    Postnasal drip       gabapentin 300 MG capsule    NEURONTIN    150 capsule    Take 2 tablets (18661 mg) by mouth in AM/morning, 1 tablet (300 mg) at noon and 2 tablets (600 mg) at bedtime for sleep    Acute midline low back pain with right-sided sciatica       ibuprofen 600 MG tablet    ADVIL/MOTRIN     Take 600 mg by mouth every 6 hours as needed for moderate pain        levonorgestrel 20 MCG/24HR IUD    MIRENA (52 MG)    1 each    1 each (20 mcg) by Intrauterine route once for 1 dose    Encounter for IUD insertion       * lidocaine 5 % Patch    LIDODERM    30 patch    Apply up to 3 patches to painful area at once for up to 12 h within a 24 h period.  Remove after 12 hours.    Acute midline low back pain with right-sided sciatica       * Lidocaine 4 % Patch    LIDOCARE    30 patch    Apply 3 patches to painful area for up to 12 hours in 24 hours period of time, remove after 12 hours    Sacroiliac joint pain, Acute bilateral low back pain with right-sided sciatica       * Notice:  This list has 2 medication(s) that are the same as other medications prescribed for you. Read the directions carefully, and ask your doctor or other care provider to review them with you.

## 2018-05-04 NOTE — PROGRESS NOTES
" Subjective:   Carrie Munoz is a 37 year old female who follows up with a ganglion cyst on the left wrist. She had the previous cyst drained on 1/12/17. Two and a half weeks ago the cyst came back.  Picking up baby and thought, geez my wrist hurts.  Carpal tunnel during pregnancy.  Symptoms are improved regarding that problem.    Dorsal wrist 1 cm x 1 cm swelling    Date of injury: None  Date last seen: Visit date not found     PAST MEDICAL, SOCIAL, SURGICAL AND FAMILY HISTORY: She  has a past medical history of Breast disorder; Breast lump on right side at 2:30  o'clock position (5/4/2011); Cardiac abnormality; Depressive disorder; Mental disorder; and Unspecified sinusitis (chronic).  She  has a past surgical history that includes TOOTH EXTRACTION W/FORCEP (2003).  Her family history includes Hypertension in her father, paternal aunt, and paternal grandmother; Neurologic Disorder in her father and mother; OSTEOPOROSIS in her paternal grandmother.  She reports that she has never smoked. She has never used smokeless tobacco. She reports that she does not drink alcohol or use illicit drugs.    ALLERGIES: She is allergic to no known drug allergies.    CURRENT MEDICATIONS: She has a current medication list which includes the following prescription(s): fluticasone, gabapentin, ibuprofen, lidocaine, lidocaine, and levonorgestrel, and the following Facility-Administered Medications: bupivacaine.     REVIEW OF SYSTEMS: 10 point review of systems is negative except as noted above.     Exam:   Ht 5' 8.5\" (1.74 m)  Wt 243 lb (110.2 kg)  BMI 36.41 kg/m2           CONSTITUTIONAL: healthy, alert, no distress and cooperative  HEAD: Normocephalic. No masses, lesions, tenderness or abnormalities  SKIN: no suspicious lesions or rashes  GAIT: normal  NEUROLOGIC: Non-focal  PSYCHIATRIC: affect normal/bright and mentation appears normal.    MUSCULOSKELETAL: left wrist mass  WRIST:  Inspection: swelling 1 cm x 1cm dorsal " wrist  Palpation: Tender: dorsal wrist  Non-tender: distal radius, distal ulna, 1st dorsal compartment, flexor tendons  Range of Motion: normal  Strength: no deficits  Special tests: negative Tinel's at carpal tunnel.  , negative Phalen's.      ELBOW:  elbow exam not done         Assessment/Plan:   Pt is a 36 yo AA female with PMhx of ganglion cyst presenting with left dorsal wrist swelling  1. Left dorsal wrist mass- suspected ganglion, drained previously and gel substance removed.  Pt doing baby cares and noticed increased left wrist pain and return of swelling  MRI left wrist, referral to hand surgery for excision    RTC prn    X-RAY INTERPRETATION:   Left wrist xr- normal

## 2018-05-06 NOTE — TELEPHONE ENCOUNTER
FUTURE VISIT INFORMATION      FUTURE VISIT INFORMATION:    Date: 5/8/18    Time:     Location:   REFERRAL INFORMATION:    Referring provider:  Dr. Narciso Sanford    Referring providers clinic:  [facility]    Reason for visit/diagnosis  Mid back pain    RECORDS REQUESTED FROM:       Clinic name Comments Records Status Imaging Status   [facility] ACute midline LBP-   Internal Internal    Health Lumbar spondys-  Internal Internal     RECORDS STATUS      RECORDS RECEIVED FROM:  [facility]   DATE RECEIVED: 12/8/17   NOTES STATUS DETAILS   OFFICE NOTE from referring provider Internal 12/8/18   OFFICE NOTE from other specialist Internal -Neurosurgery 1/11/18   DISCHARGE SUMMARY from hospital N/A    DISCHARGE REPORT from the ER N/A    OPERATIVE REPORT N/A    MEDICATION LIST Internal    IMPLANT RECORD/STICKER N/A    LABS     CBC/DIFF N/A    CULTURES N/A    INJECTIONS DONE IN RADIOLOGY N/A    MRI Internal 2/9/18 T- spine  12/13/17- L-spine   CT SCAN N/A    XRAYS (IMAGES & REPORTS)     TUMOR     PATHOLOGY  Slides & report N/A

## 2018-05-07 NOTE — TELEPHONE ENCOUNTER
FUTURE VISIT INFORMATION      FUTURE VISIT INFORMATION:    Date: 5/9/18    Time:     Location: [facility]   REFERRAL INFORMATION:    Referring provider:  Dr. Jenkins     Referring providers clinic: [facility]     Reason for visit/diagnosis  Mass on left wrist     RECORDS REQUESTED FROM:       Clinic name Comments Records Status Imaging Status   [facility]  5/4/18 Internal  Internal      RECORDS STATUS

## 2018-05-08 ENCOUNTER — PRE VISIT (OUTPATIENT)
Dept: ORTHOPEDICS | Facility: CLINIC | Age: 38
End: 2018-05-08

## 2018-05-08 ENCOUNTER — RADIANT APPOINTMENT (OUTPATIENT)
Dept: MRI IMAGING | Facility: CLINIC | Age: 38
End: 2018-05-08
Attending: FAMILY MEDICINE
Payer: MEDICAID

## 2018-05-08 ENCOUNTER — OFFICE VISIT (OUTPATIENT)
Dept: ORTHOPEDICS | Facility: CLINIC | Age: 38
End: 2018-05-08
Payer: MEDICAID

## 2018-05-08 VITALS
BODY MASS INDEX: 37.37 KG/M2 | TEMPERATURE: 98.2 F | SYSTOLIC BLOOD PRESSURE: 116 MMHG | OXYGEN SATURATION: 100 % | HEART RATE: 72 BPM | WEIGHT: 249.4 LBS | DIASTOLIC BLOOD PRESSURE: 83 MMHG

## 2018-05-08 DIAGNOSIS — M53.3 SACROILIAC JOINT PAIN: Primary | ICD-10-CM

## 2018-05-08 DIAGNOSIS — R22.32 MASS OF LEFT WRIST: ICD-10-CM

## 2018-05-08 RX ORDER — GADOBUTROL 604.72 MG/ML
10 INJECTION INTRAVENOUS ONCE
Status: COMPLETED | OUTPATIENT
Start: 2018-05-08 | End: 2018-05-08

## 2018-05-08 RX ADMIN — GADOBUTROL 10 ML: 604.72 INJECTION INTRAVENOUS at 15:30

## 2018-05-08 NOTE — PROGRESS NOTES
Chief Complaint:   Chief Complaint   Patient presents with     Consult     Referred by PCP for mid back pain x 1.5 years. Patient has tried PT, Chiropractor, Massage with no relief. Patient one epidural steroid injection done this Feb 2018 with 1 month of relief. Patient is now on Gabapentin with minimal relief.        History of Present Illness:  Carrie is a 37 year old female who presents for evaluation of low back pain.  Symptoms have been present for about 18 months without clear precipitating event.  Pain is located in the bilateral upper sacroiliac region with radiation to the bilateral posterior thigh, posterior leg and bottom of the feet.  Pain is present all the time.  Chiropractic treatment was not helpful.  She had bilateral SI joint injections at [facility] on 1/26/2018.  Review of the procedural note indicates 75% relief of her symptoms during the period of the local anesthetic action.  She reports sustained relief for 4-6 weeks follow the injection, but then the pain returned to her baseline level.    Of note, she did have a salmonella GI infection in July of 2016 when pregnant, and had no low back symptoms prior to this.     Past Medical History:  Sinusitis, chronic  Breast lump on right side at 2:30  o'clock position  Sacroiliac joint pain  Sinus tachycardia  Irregular heart beat  Secondary amenorrhea    Social History:  Social History     Social History     Marital status: Single     Spouse name: N/A     Number of children: 0     Years of education: 13 1/2     Occupational History     teller [employer]     Social History Main Topics     Smoking status: Never Smoker     Smokeless tobacco: Never Used     Alcohol use No     Drug use: No     Sexual activity: Not Currently     Partners: Male     Birth control/ protection: None     Other Topics Concern     Parent/Sibling W/ Cabg, Mi Or Angioplasty Before 65f 55m? 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  Lives with her aunt, and gets along well.  In limited contact with her mother, in contact with father a lot.  Born in [place], and [place] 5, [place] 11.  Father in the .                       Family History:  Family History   Problem Relation Age of Onset     Hypertension Paternal Grandmother      OSTEOPOROSIS Paternal Grandmother      Hypertension Paternal Aunt      Neurologic Disorder Father      sheehan's palsy     Hypertension Father      not on medication, smoker     Neurologic Disorder Mother      bell's palsy           Physical Exam:  Blood pressure 116/83, pulse 72, temperature 98.2  F (36.8  C), temperature source Oral, weight 113.1 kg (249 lb 6.4 oz), SpO2 100 %, not currently breastfeeding.  Oswestry (KULWANT) Questionnaire    OSWESTRY DISABILITY INDEX 5/4/2018   Count 10   Sum 28   Oswestry Score (%) 56   Some recent data might be hidden       General:  Alert, cooperative and in no acute distress.  Mood and affect are appropriate.   Cardiovascular: Posterior tibial pulses are palpable bilaterally.  Back: Lumbar flexion is full.  Lumbar extension is full, but slightly increases her pain.  Straight leg raise is negative bilaterally.    Neuro: 2+ Achilles and patellar reflexes bilaterally.   Sensation is intact to light touch over the L3-S1 dermatomes bilaterally.  5/5 strength throughout the bilateral lower extremities.     Imaging: Thoracic and lumbar MRIs reviewed in EPIC      Diagnostic Impression:  SI joint pain, suspect related to reactive arthritis secondary to Salmonella infection in July 2016.    Plan:  Recommended Rheumatology  consult to evaluate for any inflammatory arthritis,  patient follow up as needed.       Review of Systems:  Answers for HPI/ROS submitted by the patient on 5/4/2018   General Symptoms: No  Skin Symptoms: No  HENT Symptoms: Yes  EYE SYMPTOMS: No  HEART SYMPTOMS: No  LUNG SYMPTOMS: No  INTESTINAL SYMPTOMS: No  URINARY SYMPTOMS: No  GYNECOLOGIC SYMPTOMS: No  BREAST SYMPTOMS: No  SKELETAL SYMPTOMS: Yes  BLOOD SYMPTOMS: No  NERVOUS SYSTEM SYMPTOMS: No  MENTAL HEALTH SYMPTOMS: No  Ear pain: Yes  Congestion: Yes  Sinus pain: Yes  Trouble swallowing: Yes  Sore throat: Yes  Back pain: Yes  Muscle aches: Yes  Neck pain: Yes  Joint pain: Yes  Muscle cramps: Yes  Muscle weakness: Yes  Joint stiffness: Yes

## 2018-05-08 NOTE — MR AVS SNAPSHOT
After Visit Summary   5/8/2018    Carrie Munoz    MRN: [id number]           Patient Information     Date Of Birth          1980        Visit Information        Provider Department      5/8/2018 4:30 PM Doe Grossman MD [facility]        Today's Diagnoses     Sacroiliac joint pain    -  1       Follow-ups after your visit        Additional Services     RHEUMATOLOGY REFERRAL       Your provider has referred you to: [facility]: Rheumatology Clinic [facility] [phone]   [email or web address]    Please be aware that coverage of these services is subject to the terms and limitations of your health insurance plan.  Call member services at your health plan with any benefit or coverage questions.      Please bring the following with you to your appointment:    (1) Any X-Rays, CTs or MRIs which have been performed.  Contact the facility where they were done to arrange for  prior to your scheduled appointment.    (2) List of current medications   (3) This referral request   (4) Any documents/labs given to you for this referral                  Your next 10 appointments already scheduled     May 09, 2018 10:15 AM CDT   (Arrive by 10:00 AM)   NEW HAND with Randall Maradiaga MD   [facility] ([facility])    [address] [place] [address]  4th [facility] [address] [phone]              Who to contact     Please call your clinic at [phone] to:    Ask questions about your health    Make or cancel appointments    Discuss your medicines    Learn about your test results    Speak to your doctor            Additional Information About Your Visit        MyChart Information     [employer] gives you secure access to your electronic health record. If you see a primary care provider, you can also send messages to your care team and make appointments. If you have questions, please call your primary  care clinic.  If you do not have a primary care provider, please call [phone] and they will assist you.      [employer] is an electronic gateway that provides easy, online access to your medical records. With [employer], you can request a clinic appointment, read your test results, renew a prescription or communicate with your care team.     To access your existing account, please contact your [facility] or call [phone] for assistance.        Care EveryWhere ID     This is your Care EveryWhere ID. This could be used by other organizations to access your [place] medical records  [id number]        Your Vitals Were     Pulse Temperature Pulse Oximetry BMI (Body Mass Index)          72 98.2  F (36.8  C) (Oral) 100% 37.37 kg/m2         Blood Pressure from Last 3 Encounters:   05/08/18 116/83   04/27/18 121/81   04/11/18 119/79    Weight from Last 3 Encounters:   05/08/18 113.1 kg (249 lb 6.4 oz)   05/04/18 110.2 kg (243 lb)   04/27/18 110.5 kg (243 lb 8 oz)              We Performed the Following     RHEUMATOLOGY REFERRAL          Today's Medication Changes          These changes are accurate as of 5/8/18  6:51 PM.  If you have any questions, ask your nurse or doctor.               These medicines have changed or have updated prescriptions.        Dose/Directions    gabapentin 300 MG capsule   Commonly known as:  NEURONTIN   This may have changed:  additional instructions   Used for:  Acute midline low back pain with right-sided sciatica        Take 2 tablets (86218 mg) by mouth in AM/morning, 1 tablet (300 mg) at noon and 2 tablets (600 mg) at bedtime for sleep   Quantity:  150 capsule   Refills:  1                Primary Care Provider Office Phone # Fax #    Narciso Sanford [phone]       [address] [facility] [address]  [facility] [address]        Equal Access to Services     JUANA MOMIN : Anthony Hobbs, kimberly javier, apolinar figueroa  myriam tiradorafy moisesantoinette bearden'aan ah. So [facility] [phone].    ATENCIÓN: Si tamara noland, tiene a kapoor disposición servicios gratuitos de asistencia lingüística. Omar al [phone].    We comply with applicable federal civil rights laws and [place] laws. We do not discriminate on the basis of race, color, national origin, age, disability, sex, sexual orientation, or gender identity.            Thank you!     Thank you for choosing [facility]  for your care. Our goal is always to provide you with excellent care. Hearing back from our patients is one way we can continue to improve our services. Please take a few minutes to complete the written survey that you may receive in the mail after your visit with us. Thank you!             Your Updated Medication List - Protect others around you: Learn how to safely use, store and throw away your medicines at www.disposemymeds.org.          This list is accurate as of 5/8/18  6:51 PM.  Always use your most recent med list.                   Brand Name Dispense Instructions for use Diagnosis    fluticasone 50 MCG/ACT spray    FLONASE    3 Bottle    Spray 1-2 sprays into both nostrils daily    Postnasal drip       gabapentin 300 MG capsule    NEURONTIN    150 capsule    Take 2 tablets (18577 mg) by mouth in AM/morning, 1 tablet (300 mg) at noon and 2 tablets (600 mg) at bedtime for sleep    Acute midline low back pain with right-sided sciatica       ibuprofen 600 MG tablet    ADVIL/MOTRIN     Take 600 mg by mouth every 6 hours as needed for moderate pain        levonorgestrel 20 MCG/24HR IUD    MIRENA (52 MG)    1 each    1 each (20 mcg) by Intrauterine route once for 1 dose    Encounter for IUD insertion       * lidocaine 5 % Patch    LIDODERM    30 patch    Apply up to 3 patches to painful area at once for up to 12 h within a 24 h period.  Remove after 12 hours.    Acute midline low back pain with right-sided sciatica       * Lidocaine 4 %  Patch    LIDOCARE    30 patch    Apply 3 patches to painful area for up to 12 hours in 24 hours period of time, remove after 12 hours    Sacroiliac joint pain, Acute bilateral low back pain with right-sided sciatica       * Notice:  This list has 2 medication(s) that are the same as other medications prescribed for you. Read the directions carefully, and ask your doctor or other care provider to review them with you.

## 2018-05-08 NOTE — NURSING NOTE
Reason For Visit:   Chief Complaint   Patient presents with     Consult     Referred by PCP for mid back pain x 1.5 years. Patient has tried PT, Chiropractor, Massage with no relief. Patient one epidural steroid injection done this Feb 2018 with 1 month of relief. Patient is now on Gabapentin with minimal relief.        Primary MD: Narciso Sanford  Ref. MD: Narciso Sanford    ?  No  Occupation [employer].  Currently working? Yes.  Work status?  Full time.  Date of injury: 2017  Type of injury: Pregnancy.  Date of surgery: None  Type of surgery: NA.  Smoker: No    /83 (BP Location: Left arm, Patient Position: Sitting, Cuff Size: Adult Large)  Pulse 72  Temp 98.2  F (36.8  C) (Oral)  Wt 113.1 kg (249 lb 6.4 oz)  SpO2 100%  BMI 37.37 kg/m2    Pain Assessment  Patient Currently in Pain: Yes  0-10 Pain Scale: 7  Primary Pain Location: Back  Pain Orientation: Mid  Pain Descriptors: Aching  Aggravating Factors: Walking    Oswestry (KULWANT) Questionnaire    OSWESTRY DISABILITY INDEX 5/4/2018   Count 10   Sum 28   Oswestry Score (%) 56   Some recent data might be hidden            Neck Disability Index (NDI) Questionnaire    Neck Disability Index (NDI) 5/4/2018   Neck Disability Index: Count 9   NDI: Total Score = SUM (points for all 10 findings) 21   Neck Disability in Percent = (Total Score) / 50 * 100 46.66 (%)   Some recent data might be hidden              Visual Analog Pain Scale  Back Pain Scale 0-10: 7  Right leg pain: 8  Left leg pain: 8    Milnor Robert CMA

## 2018-05-08 NOTE — TELEPHONE ENCOUNTER
Patient is being referred by Ruth Jenkins for left wrist ganglion cyst.    Patient is new to this provider.  Patient has outside records have been placed in chart prep folder    Patient is coming to clinic for initial consultation.      No additional orders are needed this visit.     Patient visit type and questionnaires have been reviewed and are correct for this appointment? Yes     Pinch and : yes    Allie Langston, ATC

## 2018-05-08 NOTE — LETTER
5/8/2018       RE: Carrie Munoz  [address]   [facility] [address]     Dear Colleague,    Thank you for referring your patient, Carrie Munoz, to the [facility] at [facility]. Please see a copy of my visit note below.    Chief Complaint:   Chief Complaint   Patient presents with     Consult     Referred by PCP for mid back pain x 1.5 years. Patient has tried PT, Chiropractor, Massage with no relief. Patient one epidural steroid injection done this Feb 2018 with 1 month of relief. Patient is now on Gabapentin with minimal relief.        History of Present Illness:  Carrie is a 37 year old female who presents for evaluation of low back pain.  Symptoms have been present for about 18 months without clear precipitating event.  Pain is located in the bilateral upper sacroiliac region with radiation to the bilateral posterior thigh, posterior leg and bottom of the feet.  Pain is present all the time.  Chiropractic treatment was not helpful.  She had bilateral SI joint injections at [facility] on 1/26/2018.  Review of the procedural note indicates 75% relief of her symptoms during the period of the local anesthetic action.  She reports sustained relief for 4-6 weeks follow the injection, but then the pain returned to her baseline level.    Of note, she did have a salmonella GI infection in July of 2016 when pregnant, and had no low back symptoms prior to this.     Past Medical History:  Sinusitis, chronic  Breast lump on right side at 2:30  o'clock position  Sacroiliac joint pain  Sinus tachycardia  Irregular heart beat  Secondary amenorrhea    Social History:  Social History     Social History     Marital status: Single     Spouse name: N/A     Number of children: 0     Years of education: 13 1/2     Occupational History     teller [employer]     Social History Main Topics     Smoking status: Never Smoker     Smokeless tobacco: Never Used     Alcohol  use No     Drug use: No     Sexual activity: Not Currently     Partners: Male     Birth control/ protection: None     Other Topics Concern     Parent/Sibling W/ Cabg, Mi Or Angioplasty Before 65f 55m? 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  Lives with her aunt, and gets along well.  In limited contact with her mother, in contact with father a lot.  Born [place], and [place] 5[place] 11.  Father in the .                       Family History:  Family History   Problem Relation Age of Onset     Hypertension Paternal Grandmother      OSTEOPOROSIS Paternal Grandmother      Hypertension Paternal Aunt      Neurologic Disorder Father      sheehan's palsy     Hypertension Father      not on medication, smoker     Neurologic Disorder Mother      bell's palsy     Physical Exam:  Blood pressure 116/83, pulse 72, temperature 98.2  F (36.8  C), temperature source Oral, weight 113.1 kg (249 lb 6.4 oz), SpO2 100 %, not currently breastfeeding.  Oswestry (KULWANT) Questionnaire    OSWESTRY DISABILITY INDEX 5/4/2018   Count 10   Sum 28   Oswestry Score (%) 56   Some recent data might be hidden     General:  Alert, cooperative and in no acute distress.  Mood and affect are appropriate.   Cardiovascular: Posterior tibial pulses are palpable bilaterally.  Back: Lumbar flexion is full.  Lumbar extension is full, but slightly increases her pain.  Straight leg raise is negative bilaterally.    Neuro: 2+ Achilles and patellar reflexes bilaterally.   Sensation is intact to light touch over the L3-S1 dermatomes  bilaterally.  5/5 strength throughout the bilateral lower extremities.     Imaging: Thoracic and lumbar MRIs reviewed in EPIC    Diagnostic Impression:  SI joint pain, suspect related to reactive arthritis secondary to Salmonella infection in July 2016.    Plan:  Recommended Rheumatology consult to evaluate for any inflammatory arthritis,  patient follow up as needed.     Review of Systems:  Answers for HPI/ROS submitted by the patient on 5/4/2018     Again, thank you for allowing me to participate in the care of your patient.      Sincerely,    Doe Grossman MD

## 2018-05-09 ENCOUNTER — OFFICE VISIT (OUTPATIENT)
Dept: ORTHOPEDICS | Facility: CLINIC | Age: 38
End: 2018-05-09
Payer: MEDICAID

## 2018-05-09 ENCOUNTER — PRE VISIT (OUTPATIENT)
Dept: ORTHOPEDICS | Facility: CLINIC | Age: 38
End: 2018-05-09

## 2018-05-09 VITALS — BODY MASS INDEX: 37.74 KG/M2 | HEIGHT: 68 IN | WEIGHT: 249 LBS

## 2018-05-09 DIAGNOSIS — M67.432 GANGLION OF WRIST, LEFT: Primary | ICD-10-CM

## 2018-05-09 NOTE — LETTER
5/9/2018     RE: Carrie Munoz  [address]   [facility] [address]     Dear Colleague,    Thank you for referring your patient, Carrie Munoz, to the [facility] at [facility]. Please see a copy of my visit note below.    Orthopedic Surgery Consult / History and Physical    Primary care provider: Narciso Sanford           Chief Concern:   Left dorsal wrist mass           History of Present Illness:   This patient is a right-hand dominant 37 year old female with no significant medical history who presents with the above.    Patient reports she first noticed the mass on her left dorsal wrist approximately 5 years ago, but came on atraumatically.  It does appear to wax and wane in size.  She had it drained approximately 1.5 years ago after which it did not recur until approximately 1.5 months ago, when it came back out of the blue.  After recurrence it now does cause her pain especially with extension of the wrist.  She is very interested in removal at this time. She denies any numbness or tingling, she denies any masses.          Symptom Profile  Location of symptoms:   Left dorsal wrist  Onset:   Atraumatic  Duration of symptoms:   5 years  Quality of symptoms:   Achy  Severity:   Moderate  Exacerbating:    Rest extension  Alleviate:   Rest, prior drainage          Past Medical History:     Past Medical History:   Diagnosis Date     Breast disorder     biopsy 2011(?) benign     Breast lump on right side at 2:30  o'clock position 5/4/2011     Cardiac abnormality     tachycardia during pregnancy     Depressive disorder      Mental disorder     depression, anxiety     Unspecified sinusitis (chronic)             Past Surgical History:     Past Surgical History:   Procedure Laterality Date     HC TOOTH EXTRACTION W/FORCEP  2003    all 4 together            Social History:     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    Social History   Substance Use Topics     Smoking status: Never Smoker     Smokeless tobacco: Never Used     Alcohol use No            Family History:   Denies family history of bleeding or clotting disorders  Family History   Problem Relation Age of Onset     Hypertension Paternal Grandmother      OSTEOPOROSIS Paternal Grandmother      Hypertension Paternal Aunt      Neurologic Disorder Father      sheehan's palsy     Hypertension Father      not on medication, smoker     Neurologic Disorder Mother      bell's palsy            Allergies:     Allergies   Allergen Reactions     No Known Drug Allergies             Medications:   Anticoagulants: None  Current Outpatient Prescriptions   Medication     fluticasone (FLONASE) 50 MCG/ACT spray     gabapentin (NEURONTIN) 300 MG capsule     ibuprofen (ADVIL/MOTRIN) 600 MG tablet     levonorgestrel (MIRENA, 52 MG,) 20 MCG/24HR IUD     Lidocaine (LIDOCARE) 4 % Patch     lidocaine (LIDODERM) 5 % Patch     No current facility-administered medications for this visit.      Facility-Administered Medications Ordered in Other Visits   Medication     bupivacaine (MARCAINE) 0.125 % injection (diluted from stock concentration by MD or CRNA)              Physical Exam:   General: awake, alert, cooperative, no apparent distress, appears stated age  HEENT: normal  Respiratory: breathing non-labored  Cardiovascular: peripheral pulses palpable and symmetric, capillary refill < 2sec  Skin: no rashes or lesions  Neurological: CN II-XII grossly intact  Musculoskeletal:     LUE       Skin c/d/i   Does have a large fluid-filled mass over the dorsal aspect of the right wrist, between 2nd and fourth extensor compartment.  No tenderness at SL interval. No erythema or skin changes overlying.  Mass transilluminates.        SILT R/U/M, does note mild decreased sensation on the radial aspect of index and ulnar aspect of small finger, does not follow any dermatome or peripheral  nerve distribution.   Negative Checo's compression test, negative Phalen. Negative tinels at carpal or cubital tunnel. Negative Licea shift test.        Motor:  FPL, EPL, abductors with 5/5 strength       Perfusion:  Warm and well perfused, palpable radial pulse             Data:   Imagin2018 x-ray left wrist independently reviewed. negative ulnar variance.  Otherwise no osseous abnormalities appreciated.    2018 MRI left wrist independently reviewed.  This does show what appears to be a fluid-filled cyst arising from the scapholunate interval extending dorsally, overlying the fourth extensor compartment. Possible partial thickness tearing SL ligament.              Assessment and Plan:   Assessment:  37-year-old female with likely left wrist dorsal ganglion cyst.  Recurrence status post aspiration.  Patient would like to have the mass removed at this point. MRI also with possible partial thickness tearing SL ligament but no clinical instability, symptoms consistent with ganglion cyst.      Plan:  1. Discussed the risks benefits and alternatives of mass removal.  We did note that there is a risk of recurrence, though this is low.  Also noted the risk of infection, scar formation.  2. Patient does wish to proceed at this point with surgery.  We will plan this in the near future at a convenient time for her.      Seen with Dr. Maradiaga, documentation by:    Meliton Smith MD  Orthopaedic Surgery PGY-4     I, Randall Maradiaga, saw and evaluated this patient with the resident and agree with the resident s findings and plan of care as documented in the resident s note. In brief, this is a 38 yo woman with a left dorsal wrist ganglion, h/o aspiration, transilluminates. I discussed options with her. The first would be to continue observation. These sometimes involute on their own. The second would be to drain the cyst again. The third is to consider surgery, which would involve excising the mass via an open  incision. Open excision has a lower recurrence rate than aspiration but poses surgical risks. I have described the surgical procedure, post-operative protocol, and expected outcomes.  I also discussed the risks of surgery including but  not limited to, bleeding, infection, nerve or vessel damage, wound healing problems, persistent pain, recurrence of the mass, and the possibility for further surgery. After a full discussion of risks, benefits, and alternatives to surgery, the patient expressed understanding and elected to proceed with wrist mass excision. Informed consent was obtained. My office will contact the patient to schedule surgery.    Again, thank you for allowing me to participate in the care of your patient.      Sincerely,    Randall Maradiaga MD

## 2018-05-09 NOTE — NURSING NOTE
"Reason For Visit:   Chief Complaint   Patient presents with     Hand Pain     pt states she is here for her left wrist she has a ganglion cyst it was drained 01/12/2017.       Primary MD: Narciso Sanford  Ref. MD:     ?  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            Ht 1.727 m (5' 8\")  Wt 112.9 kg (249 lb)  BMI 37.86 kg/m2      Pain Assessment  Patient Currently in Pain: Yes  0-10 Pain Scale: 6  Primary Pain Location: Wrist  Pain Orientation: Left    Hand Dominance Evaluation  Hand Dominance: Right          QuickDASH Assessment  No flowsheet data found.       Allergies   Allergen Reactions     No Known Drug Allergies        Anatoly Koenig CMA    "

## 2018-05-09 NOTE — NURSING NOTE
Teaching Flowsheet   Relevant Diagnosis: Left dorsal wrist ganglion  Teaching Topic: Excision left dorsal wrist ganglion     Person(s) involved in teaching:   Patient     Motivation Level:  Asks Questions: Yes  Eager to Learn: Yes  Cooperative: Yes  Receptive (willing/able to accept information): Yes  Any cultural factors/Buddhism beliefs that may influence understanding or compliance? No     Patient demonstrates understanding of the following:  Reason for the appointment, diagnosis and treatment plan: Yes  Knowledge of proper use of medications and conditions for which they are ordered (with special attention to potential side effects or drug interactions): Yes  Which situations necessitate calling provider and whom to contact: Yes     Teaching Concerns Addressed:   Comments: Patient understands she will need a pre-op H&P done within 30 days of surgery.  She will see her PCP, Dr. Sanford, for this.     Nutritional needs and diet plan: Yes  Pain management techniques: Yes  Wound Care: Yes  How and/when to access community resources: Yes     Instructional Materials Used/Given: Pre-op packet given and reviewed.  Antiseptic soap given.  Patient understands she is to be NPO after midnight.  She understand she will need a  on the day of surgery.  She will see Dr. Maradiaga 2 weeks post op.  She has our phone number for any further questions or concerns.       Time spent with patient: 15 minutes.

## 2018-05-09 NOTE — MR AVS SNAPSHOT
After Visit Summary   5/9/2018    Carrie Munoz    MRN: [id number]           Patient Information     Date Of Birth          1980        Visit Information        Provider Department      5/9/2018 10:15 AM Randall Maradiaga MD [facility]        Today's Diagnoses     Ganglion of wrist, left    -  1       Follow-ups after your visit        Your next 10 appointments already scheduled     May 21, 2018  8:30 AM CDT   Pre-Op physical with Narciso Sanford MD   [facility] ([facility])    [address] [place] [address] [address]  [facility] [address] [phone]            May 24, 2018   Procedure with Randall Maradiaga MD   [facility] ([facility])    [address] [place] [address]  5th Floor  [facility] [address] [phone]           Located in the Clinics and Surgery Center at [address] [place] [address].   parking is very convenient and highly recommended.  is a $6 flat rate fee.  Both  and self parkers should enter the main arrival plaza from [facility]; parking attendants will direct you based on your parking preference.            Jun 06, 2018  3:45 PM CDT   (Arrive by 3:30 PM)   RETURN HAND with Randall Maradiaga MD   [facility] ([facility])    [address] [place] [address]  4th Floor  [facility] [address] [phone]              Who to contact     Please call your clinic at [phone] to:    Ask questions about your health    Make or cancel appointments    Discuss your medicines    Learn about your test results    Speak to your doctor            Additional Information About Your Visit        MyChart Information     [employer] gives you secure access to your electronic health record. If you see a primary care provider, you can also send messages to your care team and make appointments. If you have questions,  "please call your primary care clinic.  If you do not have a primary care provider, please call [phone] and they will assist you.      [employer] is an electronic gateway that provides easy, online access to your medical records. With [employer], you can request a clinic appointment, read your test results, renew a prescription or communicate with your care team.     To access your existing account, please contact your [facility] or call [phone] for assistance.        Care EveryWhere ID     This is your Care EveryWhere ID. This could be used by other organizations to access your [place] medical records  [id number]        Your Vitals Were     Height BMI (Body Mass Index)                1.727 m (5' 8\") 37.86 kg/m2           Blood Pressure from Last 3 Encounters:   05/08/18 116/83   04/27/18 121/81   04/11/18 119/79    Weight from Last 3 Encounters:   05/09/18 112.9 kg (249 lb)   05/08/18 113.1 kg (249 lb 6.4 oz)   05/04/18 110.2 kg (243 lb)              We Performed the Following     Aleja-Operative Worksheet (Hand)          Today's Medication Changes          These changes are accurate as of 5/9/18 11:59 PM.  If you have any questions, ask your nurse or doctor.               These medicines have changed or have updated prescriptions.        Dose/Directions    gabapentin 300 MG capsule   Commonly known as:  NEURONTIN   This may have changed:  additional instructions   Used for:  Acute midline low back pain with right-sided sciatica        Take 2 tablets (86089 mg) by mouth in AM/morning, 1 tablet (300 mg) at noon and 2 tablets (600 mg) at bedtime for sleep   Quantity:  150 capsule   Refills:  1                Primary Care Provider Office Phone # Fax #    Narciso Sanford [phone]       [address] [place] [address]        Equal Access to Services     JUANA MOMIN AH: Anthony Hobbs, kimberly javier, apolinar tirado, " myriam milnerrafy bearden'aan ah. So [facility] [phone].    ATENCIÓN: Si tamara noland, tiene a kapoor disposición servicios gratuitos de asistencia lingüística. Omar tracey [phone].    We comply with applicable federal civil rights laws and [place] laws. We do not discriminate on the basis of race, color, national origin, age, disability, sex, sexual orientation, or gender identity.            Thank you!     Thank you for choosing [facility]  for your care. Our goal is always to provide you with excellent care. Hearing back from our patients is one way we can continue to improve our services. Please take a few minutes to complete the written survey that you may receive in the mail after your visit with us. Thank you!             Your Updated Medication List - Protect others around you: Learn how to safely use, store and throw away your medicines at www.disposemymeds.org.          This list is accurate as of 5/9/18 11:59 PM.  Always use your most recent med list.                   Brand Name Dispense Instructions for use Diagnosis    fluticasone 50 MCG/ACT spray    FLONASE    3 Bottle    Spray 1-2 sprays into both nostrils daily    Postnasal drip       gabapentin 300 MG capsule    NEURONTIN    150 capsule    Take 2 tablets (82980 mg) by mouth in AM/morning, 1 tablet (300 mg) at noon and 2 tablets (600 mg) at bedtime for sleep    Acute midline low back pain with right-sided sciatica       ibuprofen 600 MG tablet    ADVIL/MOTRIN     Take 600 mg by mouth every 6 hours as needed for moderate pain        levonorgestrel 20 MCG/24HR IUD    MIRENA (52 MG)    1 each    1 each (20 mcg) by Intrauterine route once for 1 dose    Encounter for IUD insertion       * lidocaine 5 % Patch    LIDODERM    30 patch    Apply up to 3 patches to painful area at once for up to 12 h within a 24 h period.  Remove after 12 hours.    Acute midline low back pain with right-sided sciatica       * Lidocaine 4 % Patch     LIDOCARE    30 patch    Apply 3 patches to painful area for up to 12 hours in 24 hours period of time, remove after 12 hours    Sacroiliac joint pain, Acute bilateral low back pain with right-sided sciatica       * Notice:  This list has 2 medication(s) that are the same as other medications prescribed for you. Read the directions carefully, and ask your doctor or other care provider to review them with you.

## 2018-05-09 NOTE — PROGRESS NOTES
Orthopedic Surgery Consult / History and Physical    Primary care provider: Narciso Sanford           Chief Concern:   Left dorsal wrist mass           History of Present Illness:   This patient is a right-hand dominant 37 year old female with no significant medical history who presents with the above.    Patient reports she first noticed the mass on her left dorsal wrist approximately 5 years ago, but came on atraumatically.  It does appear to wax and wane in size.  She had it drained approximately 1.5 years ago after which it did not recur until approximately 1.5 months ago, when it came back out of the blue.  After recurrence it now does cause her pain especially with extension of the wrist.  She is very interested in removal at this time. She denies any numbness or tingling, she denies any masses.          Symptom Profile  Location of symptoms:   Left dorsal wrist  Onset:   Atraumatic  Duration of symptoms:   5 years  Quality of symptoms:   Achy  Severity:   Moderate  Exacerbating:    Rest extension  Alleviate:   Rest, prior drainage          Past Medical History:     Past Medical History:   Diagnosis Date     Breast disorder     biopsy 2011(?) benign     Breast lump on right side at 2:30  o'clock position 5/4/2011     Cardiac abnormality     tachycardia during pregnancy     Depressive disorder      Mental disorder     depression, anxiety     Unspecified sinusitis (chronic)             Past Surgical History:     Past Surgical History:   Procedure Laterality Date     HC TOOTH EXTRACTION W/FORCEP  2003    all 4 together            Social History:     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    Social History   Substance Use Topics     Smoking status: Never Smoker     Smokeless tobacco: Never Used     Alcohol use No            Family History:   Denies family history of bleeding or clotting disorders  Family History   Problem Relation Age of Onset     Hypertension  Paternal Grandmother      OSTEOPOROSIS Paternal Grandmother      Hypertension Paternal Aunt      Neurologic Disorder Father      sheehan's palsy     Hypertension Father      not on medication, smoker     Neurologic Disorder Mother      bell's palsy            Allergies:     Allergies   Allergen Reactions     No Known Drug Allergies             Medications:   Anticoagulants: None  Current Outpatient Prescriptions   Medication     fluticasone (FLONASE) 50 MCG/ACT spray     gabapentin (NEURONTIN) 300 MG capsule     ibuprofen (ADVIL/MOTRIN) 600 MG tablet     levonorgestrel (MIRENA, 52 MG,) 20 MCG/24HR IUD     Lidocaine (LIDOCARE) 4 % Patch     lidocaine (LIDODERM) 5 % Patch     No current facility-administered medications for this visit.      Facility-Administered Medications Ordered in Other Visits   Medication     bupivacaine (MARCAINE) 0.125 % injection (diluted from stock concentration by MD or CRNA)              Physical Exam:   General: awake, alert, cooperative, no apparent distress, appears stated age  HEENT: normal  Respiratory: breathing non-labored  Cardiovascular: peripheral pulses palpable and symmetric, capillary refill < 2sec  Skin: no rashes or lesions  Neurological: CN II-XII grossly intact  Musculoskeletal:     LUE       Skin c/d/i   Does have a large fluid-filled mass over the dorsal aspect of the right wrist, between 2nd and fourth extensor compartment.  No tenderness at SL interval. No erythema or skin changes overlying.  Mass transilluminates.        SILT R/U/M, does note mild decreased sensation on the radial aspect of index and ulnar aspect of small finger, does not follow any dermatome or peripheral nerve distribution.   Negative Checo's compression test, negative Phalen. Negative tinels at carpal or cubital tunnel. Negative Licea shift test.        Motor:  FPL, EPL, abductors with 5/5 strength       Perfusion:  Warm and well perfused, palpable radial pulse             Data:   Imagin2018  x-ray left wrist independently reviewed. negative ulnar variance.  Otherwise no osseous abnormalities appreciated.    5/8/2018 MRI left wrist independently reviewed.  This does show what appears to be a fluid-filled cyst arising from the scapholunate interval extending dorsally, overlying the fourth extensor compartment. Possible partial thickness tearing SL ligament.              Assessment and Plan:   Assessment:  37-year-old female with likely left wrist dorsal ganglion cyst.  Recurrence status post aspiration.  Patient would like to have the mass removed at this point. MRI also with possible partial thickness tearing SL ligament but no clinical instability, symptoms consistent with ganglion cyst.      Plan:  1. Discussed the risks benefits and alternatives of mass removal.  We did note that there is a risk of recurrence, though this is low.  Also noted the risk of infection, scar formation.  2. Patient does wish to proceed at this point with surgery.  We will plan this in the near future at a convenient time for her.      Seen with Dr. Maradiaga, documentation by:    Meliton Smith MD  Orthopaedic Surgery PGY-4     I, Randall Maradiaga, saw and evaluated this patient with the resident and agree with the resident s findings and plan of care as documented in the resident s note. In brief, this is a 36 yo woman with a left dorsal wrist ganglion, h/o aspiration, transilluminates. I discussed options with her. The first would be to continue observation. These sometimes involute on their own. The second would be to drain the cyst again. The third is to consider surgery, which would involve excising the mass via an open incision. Open excision has a lower recurrence rate than aspiration but poses surgical risks. I have described the surgical procedure, post-operative protocol, and expected outcomes.  I also discussed the risks of surgery including but  not limited to, bleeding, infection, nerve or vessel damage, wound  healing problems, persistent pain, recurrence of the mass, and the possibility for further surgery. After a full discussion of risks, benefits, and alternatives to surgery, the patient expressed understanding and elected to proceed with wrist mass excision. Informed consent was obtained. My office will contact the patient to schedule surgery.

## 2018-05-18 NOTE — PROGRESS NOTES
[address] [address]  [phone]  Dept: [phone]    PRE-OP EVALUATION:  Today's date: 2018    Carrie Munoz (: 1980) presents for pre-operative evaluation assessment as requested by Dr. Maradiaga.  She requires evaluation and anesthesia risk assessment prior to undergoing surgery/procedure for treatment of ganglion cyst .    Fax number for surgical facility: Available electronically   Primary Physician: Narciso Sanford  Type of Anesthesia Anticipated: Local with MAC    Patient has a Health Care Directive or Living Will:  NO    Preop Questions 2018   Who is doing your surgery? Randall Maradiaga    What are you having done? Cyst removed from left wrist    Date of Surgery/Procedure:    Facility or Hospital where procedure/surgery will be performed: [facility]   1.  Do you have a history of Heart attack, stroke, stent, coronary bypass surgery, or other heart surgery? No   2.  Do you ever have any pain or discomfort in your chest? No   3.  Do you have a history of  Heart Failure? No   4.   Are you troubled by shortness of breath when:  walking on a level surface, or up a slight hill, or at night? No   5.  Do you currently have a cold, bronchitis or other respiratory infection? No   6.  Do you have a cough, shortness of breath, or wheezing? No   7.  Do you sometimes get pains in the calves of your legs when you walk? No   8. Do you or anyone in your family have previous history of blood clots? No   9.  Do you or does anyone in your family have a serious bleeding problem such as prolonged bleeding following surgeries or cuts? No   10. Have you ever had problems with anemia or been told to take iron pills? No   11. Have you had any abnormal blood loss such as black, tarry or bloody stools, or abnormal vaginal bleeding? No   12. Have you ever had a blood transfusion? No   13. Have you or any of your  "relatives ever had problems with anesthesia? No   14. Do you have sleep apnea, excessive snoring or daytime drowsiness? No   15. Do you have any prosthetic heart valves? No   16. Do you have prosthetic joints? No   17. Is there any chance that you may be pregnant? No         HPI:     HPI related to upcoming procedure: enklarging cyst left wrist      See problem list for active medical problems.  Problems all longstanding and stable, except as noted/documented.  See ROS for pertinent symptoms related to these conditions.                                                                                                                                                          .  Having ear pain , left > right  not betetr with cortisporin drops    MEDICAL HISTORY:     Patient Active Problem List    Diagnosis Date Noted     Secondary amenorrhea 01/02/2018     Priority: Medium     Irregular heart beat 02/24/2017     Priority: Medium     2/24/2017 - 2/24/2017 - Per cardiology \"indirect tests to rule out PE and DVT, all normal. I did not see anything unusual on her cardiac echo. NO special precautions besides the usual from cardiac standpoint. If she develops any sudden worsening of her shortness of breath, I would have a low threshold to do CT scan and rule out PE. I did not have a high enough suspicion when I saw her to pursue a CT scan\".        Sinus tachycardia 02/07/2017     Priority: Medium     Sacroiliac joint pain 10/31/2016     Priority: Medium     Breast lump on right side at 2:30  o'clock position 05/04/2011     Priority: Medium     Sinusitis, chronic 10/22/2003     Priority: Medium     Problem list name updated by automated process. Provider to review        Past Medical History:   Diagnosis Date     Breast disorder     biopsy 2011(?) benign     Breast lump on right side at 2:30  o'clock position 5/4/2011     Cardiac abnormality     tachycardia during pregnancy     Depressive disorder      Mental disorder     " depression, anxiety     Unspecified sinusitis (chronic)      Past Surgical History:   Procedure Laterality Date     HC TOOTH EXTRACTION W/FORCEP  2003    all 4 together     Current Outpatient Prescriptions   Medication Sig Dispense Refill     azithromycin (ZITHROMAX) 250 MG tablet Two tablets first day, then one tablet daily for four days. 6 tablet 0     fluticasone (FLONASE) 50 MCG/ACT spray Spray 1-2 sprays into both nostrils daily 3 Bottle 1     gabapentin (NEURONTIN) 300 MG capsule Take 2 tablets (65504 mg) by mouth in AM/morning, 1 tablet (300 mg) at noon and 2 tablets (600 mg) at bedtime for sleep (Patient taking differently: Take 2 tablets (55185 mg) by mouth in AM/morning, 1 tablet (300 mg) at noon and 2 tablets (900 mg) at bedtime for sleep) 150 capsule 1     ibuprofen (ADVIL/MOTRIN) 600 MG tablet Take 600 mg by mouth every 6 hours as needed for moderate pain       Lidocaine (LIDOCARE) 4 % Patch Apply 3 patches to painful area for up to 12 hours in 24 hours period of time, remove after 12 hours 30 patch 0     lidocaine (LIDODERM) 5 % Patch Apply up to 3 patches to painful area at once for up to 12 h within a 24 h period.  Remove after 12 hours. 30 patch 0     levonorgestrel (MIRENA, 52 MG,) 20 MCG/24HR IUD 1 each (20 mcg) by Intrauterine route once for 1 dose 1 each 0     OTC products: None, except as noted above    Allergies   Allergen Reactions     No Known Drug Allergies       Latex Allergy: NO    Social History   Substance Use Topics     Smoking status: Never Smoker     Smokeless tobacco: Never Used     Alcohol use No     History   Drug Use No       REVIEW OF SYSTEMS:   CONSTITUTIONAL: NEGATIVE for fever, chills, change in weight  ENT/MOUTH: POSITIVE for earache left  RESP: NEGATIVE for significant cough or SOB  CV: NEGATIVE for chest pain, palpitations or peripheral edema    EXAM:   /78  Pulse 79  Temp 98.6  F (37  C) (Oral)  Wt 246 lb 4.8 oz (111.7 kg)  SpO2 97%  BMI 37.45 kg/m2  GENERAL  APPEARANCE: healthy, alert and no distress  EYES: PERRL, conjunctivae and sclerae normal and Cerumenosis is noted.  Wax is removed by syringing    Canal red, .weepy , swollen left   TM retracted , mild.ly red Instructions for home care to prevent wax buildup are given.    mouth without ulcers or lesions  RESP: lungs clear to auscultation - no rales, rhonchi or wheezes  CV: regular rate and rhythm, normal S1 S2, no S3 or S4 and no murmur, click or rub   ABDOMEN: soft, nontender, no HSM or masses and bowel sounds normal  NEURO: Normal strength and tone, sensory exam grossly normal, mentation intact and speech normal    DIAGNOSTICS:   No labs or EKG required for low risk surgery (cataract, skin procedure, breast biopsy, etc)    Recent Labs   Lab Test  03/24/17   0639  03/23/17   2328  03/21/17   1130  02/27/17   0002  07/11/16   1031   HGB  9.3*  10.7*  11.6*  11.7  13.8   PLT   --   151  164  166  190   NA   --    --    --   141  137   POTASSIUM   --    --    --   3.6  3.9   CR   --   0.69   --   0.42*  0.63        IMPRESSION:   Reason for surgery/procedure: enlarging cyst left wrist  Diagnosis/reason for consult: pre op    The proposed surgical procedure is considered LOW risk.    REVISED CARDIAC RISK INDEX  The patient has the following serious cardiovascular risks for perioperative complications such as (MI, PE, VFib and 3  AV Block):  No serious cardiac risks  INTERPRETATION: 0 risks: Class I (very low risk - 0.4% complication rate)    The patient has the following additional risks for perioperative complications:  No identified additional risks  Encounter Diagnoses   Name Primary?     Preop general physical exam Yes     Ganglion cyst of wrist, left      Bilateral impacted cerumen      Infective otitis externa, left      OME (otitis media with effusion), left           ICD-10-CM    1. Preop general physical exam Z01.818 CBC with platelets   2. Ganglion cyst of wrist, left M67.432 CBC with platelets   3. Bilateral  impacted cerumen H61.23 REMOVE IMPACTED CERUMEN   4. Infective otitis externa, left H60.392 azithromycin (ZITHROMAX) 250 MG tablet   5. OME (otitis media with effusion), left H65.92 azithromycin (ZITHROMAX) 250 MG tablet       RECOMMENDATIONS:     --Consult hospital rounder / IM to assist post-op medical management  Orders Placed This Encounter     REMOVE IMPACTED CERUMEN     CBC with platelets     Last Lab Result: Hemoglobin (g/dL)       Date                     Value                 03/24/2017               9.3 (L)          ----------     azithromycin (ZITHROMAX) 250 MG tablet     Sig: Two tablets first day, then one tablet daily for four days.     Dispense:  6 tablet     Refill:  0       --Patient is to take all scheduled medications on the day of surgery EXCEPT for modifications listed below.    APPROVAL GIVEN to proceed with proposed procedure, without further diagnostic evaluation       Signed Electronically by: Narciso Sanford MD    Copy of this evaluation report is provided to requesting physician.    [facility] Preop Guidelines    Revised Cardiac Risk Index

## 2018-05-21 ENCOUNTER — OFFICE VISIT (OUTPATIENT)
Dept: FAMILY MEDICINE | Facility: CLINIC | Age: 38
End: 2018-05-21
Payer: MEDICAID

## 2018-05-21 VITALS
WEIGHT: 246.3 LBS | BODY MASS INDEX: 37.45 KG/M2 | SYSTOLIC BLOOD PRESSURE: 112 MMHG | HEART RATE: 79 BPM | TEMPERATURE: 98.6 F | DIASTOLIC BLOOD PRESSURE: 78 MMHG | OXYGEN SATURATION: 97 %

## 2018-05-21 DIAGNOSIS — H61.23 BILATERAL IMPACTED CERUMEN: ICD-10-CM

## 2018-05-21 DIAGNOSIS — M67.432 GANGLION CYST OF WRIST, LEFT: ICD-10-CM

## 2018-05-21 DIAGNOSIS — H60.392 INFECTIVE OTITIS EXTERNA, LEFT: ICD-10-CM

## 2018-05-21 DIAGNOSIS — Z01.818 PREOP GENERAL PHYSICAL EXAM: Primary | ICD-10-CM

## 2018-05-21 DIAGNOSIS — H65.92 OME (OTITIS MEDIA WITH EFFUSION), LEFT: ICD-10-CM

## 2018-05-21 LAB
ERYTHROCYTE [DISTWIDTH] IN BLOOD BY AUTOMATED COUNT: 12.2 % (ref 10–15)
HCT VFR BLD AUTO: 41.5 % (ref 35–47)
HGB BLD-MCNC: 13.6 G/DL (ref 11.7–15.7)
MCH RBC QN AUTO: 29.8 PG (ref 26.5–33)
MCHC RBC AUTO-ENTMCNC: 32.8 G/DL (ref 31.5–36.5)
MCV RBC AUTO: 91 FL (ref 78–100)
PLATELET # BLD AUTO: 187 10E9/L (ref 150–450)
RBC # BLD AUTO: 4.57 10E12/L (ref 3.8–5.2)
WBC # BLD AUTO: 4.5 10E9/L (ref 4–11)

## 2018-05-21 PROCEDURE — 85027 COMPLETE CBC AUTOMATED: CPT | Performed by: FAMILY MEDICINE

## 2018-05-21 PROCEDURE — 36415 COLL VENOUS BLD VENIPUNCTURE: CPT | Performed by: FAMILY MEDICINE

## 2018-05-21 PROCEDURE — 69210 REMOVE IMPACTED EAR WAX UNI: CPT | Mod: 50 | Performed by: FAMILY MEDICINE

## 2018-05-21 PROCEDURE — 99214 OFFICE O/P EST MOD 30 MIN: CPT | Mod: 25 | Performed by: FAMILY MEDICINE

## 2018-05-21 RX ORDER — AZITHROMYCIN 250 MG/1
TABLET, FILM COATED ORAL
Qty: 6 TABLET | Refills: 0 | Status: SHIPPED | OUTPATIENT
Start: 2018-05-21 | End: 2018-06-29

## 2018-05-21 NOTE — MR AVS SNAPSHOT
After Visit Summary   5/21/2018    Carrie Munoz    MRN: [id number]           Patient Information     Date Of Birth          1980        Visit Information        Provider Department      5/21/2018 8:30 AM Narciso Sanford MD [facility]        Today's Diagnoses     Preop general physical exam    -  1    Ganglion cyst of wrist, left        Bilateral impacted cerumen        Infective otitis externa, left        OME (otitis media with effusion), left          Care Instructions      Before Your Surgery      Call your surgeon if there is any change in your health. This includes signs of a cold or flu (such as a sore throat, runny nose, cough, rash or fever).    Do not smoke, drink alcohol or take over the counter medicine (unless your surgeon or primary care doctor tells you to) for the 24 hours before and after surgery.    If you take prescribed drugs: Follow your doctor s orders about which medicines to take and which to stop until after surgery.    Eating and drinking prior to surgery: follow the instructions from your surgeon    Take a shower or bath the night before surgery. Use the soap your surgeon gave you to gently clean your skin. If you do not have soap from your surgeon, use your regular soap. Do not shave or scrub the surgery site.  Wear clean pajamas and have clean sheets on your bed.           Follow-ups after your visit        Your next 10 appointments already scheduled     May 24, 2018   Procedure with Randall Maradiaga MD   [facility] ([facility])    [address] [place] [address]  [address] [place] [id number]   [phone]           Located in the Clinics and Surgery Center at [address] [place] [address].   parking is very convenient and highly recommended.  is a $6 flat rate fee.  Both  and self parkers should enter the main arrival plaza from [facility]; parking  attendants will direct you based on your parking preference.            Jun 06, 2018  3:45 PM CDT   (Arrive by 3:30 PM)   RETURN HAND with Randall Maradiaga MD   [facility] ([facility])    [address] [facility]  [address]th [facility] [address] [phone]              Who to contact     If you have questions or need follow up information about today's clinic visit or your schedule please contact [facility] [place] at [phone].  Normal or non-critical lab and imaging results will be communicated to you by [employer], letter or phone within 4 business days after the clinic has received the results. If you do not hear from us within 7 days, please contact the clinic through [employer] or phone. If you have a critical or abnormal lab result, we will notify you by phone as soon as possible.  Submit refill requests through [employer] or call your pharmacy and they will forward the refill request to us. Please allow 3 business days for your refill to be completed.          Additional Information About Your Visit        [employer] Information     [employer] gives you secure access to your electronic health record. If you see a primary care provider, you can also send messages to your care team and make appointments. If you have questions, please call your primary care clinic.  If you do not have a primary care provider, please call [phone] and they will assist you.        Care EveryWhere ID     This is your Care EveryWhere ID. This could be used by other organizations to access your [facility] medical records  [id number]        Your Vitals Were     Pulse Temperature Pulse Oximetry BMI (Body Mass Index)          79 98.6  F (37  C) (Oral) 97% 37.45 kg/m2         Blood Pressure from Last 3 Encounters:   05/21/18 112/78   05/08/18 116/83   04/27/18 121/81    Weight from Last 3 Encounters:   05/21/18 246 lb 4.8 oz (111.7 kg)   05/09/18 249 lb (112.9 kg)   05/08/18  249 lb 6.4 oz (113.1 kg)              We Performed the Following     CBC with platelets     REMOVE IMPACTED CERUMEN          Today's Medication Changes          These changes are accurate as of 5/21/18  9:19 AM.  If you have any questions, ask your nurse or doctor.               Start taking these medicines.        Dose/Directions    azithromycin 250 MG tablet   Commonly known as:  ZITHROMAX   Used for:  Infective otitis externa, left, OME (otitis media with effusion), left   Started by:  Narciso Sanford MD        Two tablets first day, then one tablet daily for four days.   Quantity:  6 tablet   Refills:  0         These medicines have changed or have updated prescriptions.        Dose/Directions    gabapentin 300 MG capsule   Commonly known as:  NEURONTIN   This may have changed:  additional instructions   Used for:  Acute midline low back pain with right-sided sciatica        Take 2 tablets (35209 mg) by mouth in AM/morning, 1 tablet (300 mg) at noon and 2 tablets (600 mg) at bedtime for sleep   Quantity:  150 capsule   Refills:  1            Where to get your medicines      These medications were sent to [facility] [address]  [address], [facility] [address]     Phone:  [phone]     azithromycin 250 MG tablet                Primary Care Provider Office Phone # Fax #    Narciso Sanford [phone]       [address]  [facility] [address]        Equal Access to Services     JUANA MOMIN : Hadii tonny kauffman hadasho Soomaali, waaxda luqadaha, qaybta kaalmada adeegyada, myriam levy. So [facility] [phone].    ATENCIÓN: Si habla español, tiene a kapoor disposición servicios gratuitos de asistencia lingüística. Omar al [phone].    We comply with applicable federal civil rights laws and [place] laws. We do not discriminate on the basis of race, color, national origin, age, disability, sex,  sexual orientation, or gender identity.            Thank you!     Thank you for choosing [facility]  for your care. Our goal is always to provide you with excellent care. Hearing back from our patients is one way we can continue to improve our services. Please take a few minutes to complete the written survey that you may receive in the mail after your visit with us. Thank you!             Your Updated Medication List - Protect others around you: Learn how to safely use, store and throw away your medicines at www.disposemymeds.org.          This list is accurate as of 5/21/18  9:19 AM.  Always use your most recent med list.                   Brand Name Dispense Instructions for use Diagnosis    azithromycin 250 MG tablet    ZITHROMAX    6 tablet    Two tablets first day, then one tablet daily for four days.    Infective otitis externa, left, OME (otitis media with effusion), left       fluticasone 50 MCG/ACT spray    FLONASE    3 Bottle    Spray 1-2 sprays into both nostrils daily    Postnasal drip       gabapentin 300 MG capsule    NEURONTIN    150 capsule    Take 2 tablets (21810 mg) by mouth in AM/morning, 1 tablet (300 mg) at noon and 2 tablets (600 mg) at bedtime for sleep    Acute midline low back pain with right-sided sciatica       ibuprofen 600 MG tablet    ADVIL/MOTRIN     Take 600 mg by mouth every 6 hours as needed for moderate pain        levonorgestrel 20 MCG/24HR IUD    MIRENA (52 MG)    1 each    1 each (20 mcg) by Intrauterine route once for 1 dose    Encounter for IUD insertion       * lidocaine 5 % Patch    LIDODERM    30 patch    Apply up to 3 patches to painful area at once for up to 12 h within a 24 h period.  Remove after 12 hours.    Acute midline low back pain with right-sided sciatica       * Lidocaine 4 % Patch    LIDOCARE    30 patch    Apply 3 patches to painful area for up to 12 hours in 24 hours period of time, remove after 12 hours    Sacroiliac joint pain, Acute  bilateral low back pain with right-sided sciatica       * Notice:  This list has 2 medication(s) that are the same as other medications prescribed for you. Read the directions carefully, and ask your doctor or other care provider to review them with you.

## 2018-05-23 ENCOUNTER — ANESTHESIA EVENT (OUTPATIENT)
Dept: SURGERY | Facility: AMBULATORY SURGERY CENTER | Age: 38
End: 2018-05-23

## 2018-05-24 ENCOUNTER — ANESTHESIA (OUTPATIENT)
Dept: SURGERY | Facility: AMBULATORY SURGERY CENTER | Age: 38
End: 2018-05-24

## 2018-05-24 ENCOUNTER — SURGERY (OUTPATIENT)
Age: 38
End: 2018-05-24

## 2018-05-24 ENCOUNTER — HOSPITAL ENCOUNTER (OUTPATIENT)
Facility: AMBULATORY SURGERY CENTER | Age: 38
End: 2018-05-24
Attending: ORTHOPAEDIC SURGERY
Payer: MEDICAID

## 2018-05-24 VITALS
BODY MASS INDEX: 37.35 KG/M2 | WEIGHT: 246.4 LBS | TEMPERATURE: 97.2 F | OXYGEN SATURATION: 96 % | HEIGHT: 68 IN | DIASTOLIC BLOOD PRESSURE: 75 MMHG | RESPIRATION RATE: 16 BRPM | SYSTOLIC BLOOD PRESSURE: 107 MMHG

## 2018-05-24 DIAGNOSIS — Z98.890 POST-OPERATIVE STATE: Primary | ICD-10-CM

## 2018-05-24 LAB — HCG SERPL QL: NEGATIVE

## 2018-05-24 PROCEDURE — 88304 TISSUE EXAM BY PATHOLOGIST: CPT | Performed by: ORTHOPAEDIC SURGERY

## 2018-05-24 RX ORDER — PROPOFOL 10 MG/ML
INJECTION, EMULSION INTRAVENOUS CONTINUOUS PRN
Status: DISCONTINUED | OUTPATIENT
Start: 2018-05-24 | End: 2018-05-24

## 2018-05-24 RX ORDER — DEXAMETHASONE SODIUM PHOSPHATE 4 MG/ML
INJECTION, SOLUTION INTRA-ARTICULAR; INTRALESIONAL; INTRAMUSCULAR; INTRAVENOUS; SOFT TISSUE PRN
Status: DISCONTINUED | OUTPATIENT
Start: 2018-05-24 | End: 2018-05-24

## 2018-05-24 RX ORDER — NALOXONE HYDROCHLORIDE 0.4 MG/ML
.1-.4 INJECTION, SOLUTION INTRAMUSCULAR; INTRAVENOUS; SUBCUTANEOUS
Status: DISCONTINUED | OUTPATIENT
Start: 2018-05-24 | End: 2018-05-25 | Stop reason: HOSPADM

## 2018-05-24 RX ORDER — PROPOFOL 10 MG/ML
INJECTION, EMULSION INTRAVENOUS PRN
Status: DISCONTINUED | OUTPATIENT
Start: 2018-05-24 | End: 2018-05-24

## 2018-05-24 RX ORDER — HYDROCODONE BITARTRATE AND ACETAMINOPHEN 5; 325 MG/1; MG/1
1 TABLET ORAL EVERY 6 HOURS PRN
Qty: 8 TABLET | Refills: 0 | Status: SHIPPED | OUTPATIENT
Start: 2018-05-24 | End: 2018-06-29

## 2018-05-24 RX ORDER — LIDOCAINE HYDROCHLORIDE 10 MG/ML
INJECTION, SOLUTION INFILTRATION; PERINEURAL PRN
Status: DISCONTINUED | OUTPATIENT
Start: 2018-05-24 | End: 2018-05-24 | Stop reason: HOSPADM

## 2018-05-24 RX ORDER — OXYCODONE HYDROCHLORIDE 5 MG/1
5 TABLET ORAL EVERY 4 HOURS PRN
Status: DISCONTINUED | OUTPATIENT
Start: 2018-05-24 | End: 2018-05-25 | Stop reason: HOSPADM

## 2018-05-24 RX ORDER — KETOROLAC TROMETHAMINE 30 MG/ML
INJECTION, SOLUTION INTRAMUSCULAR; INTRAVENOUS PRN
Status: DISCONTINUED | OUTPATIENT
Start: 2018-05-24 | End: 2018-05-24

## 2018-05-24 RX ORDER — FENTANYL CITRATE 50 UG/ML
INJECTION, SOLUTION INTRAMUSCULAR; INTRAVENOUS PRN
Status: DISCONTINUED | OUTPATIENT
Start: 2018-05-24 | End: 2018-05-24

## 2018-05-24 RX ORDER — GABAPENTIN 300 MG/1
300 CAPSULE ORAL ONCE
Status: COMPLETED | OUTPATIENT
Start: 2018-05-24 | End: 2018-05-24

## 2018-05-24 RX ORDER — ONDANSETRON 2 MG/ML
INJECTION INTRAMUSCULAR; INTRAVENOUS PRN
Status: DISCONTINUED | OUTPATIENT
Start: 2018-05-24 | End: 2018-05-24

## 2018-05-24 RX ORDER — ONDANSETRON 2 MG/ML
4 INJECTION INTRAMUSCULAR; INTRAVENOUS EVERY 30 MIN PRN
Status: DISCONTINUED | OUTPATIENT
Start: 2018-05-24 | End: 2018-05-25 | Stop reason: HOSPADM

## 2018-05-24 RX ORDER — FENTANYL CITRATE 50 UG/ML
25-50 INJECTION, SOLUTION INTRAMUSCULAR; INTRAVENOUS
Status: DISCONTINUED | OUTPATIENT
Start: 2018-05-24 | End: 2018-05-25 | Stop reason: HOSPADM

## 2018-05-24 RX ORDER — ACETAMINOPHEN 325 MG/1
975 TABLET ORAL ONCE
Status: COMPLETED | OUTPATIENT
Start: 2018-05-24 | End: 2018-05-24

## 2018-05-24 RX ORDER — ONDANSETRON 4 MG/1
4 TABLET, ORALLY DISINTEGRATING ORAL EVERY 30 MIN PRN
Status: DISCONTINUED | OUTPATIENT
Start: 2018-05-24 | End: 2018-05-25 | Stop reason: HOSPADM

## 2018-05-24 RX ORDER — SODIUM CHLORIDE, SODIUM LACTATE, POTASSIUM CHLORIDE, CALCIUM CHLORIDE 600; 310; 30; 20 MG/100ML; MG/100ML; MG/100ML; MG/100ML
INJECTION, SOLUTION INTRAVENOUS CONTINUOUS
Status: DISCONTINUED | OUTPATIENT
Start: 2018-05-24 | End: 2018-05-25 | Stop reason: HOSPADM

## 2018-05-24 RX ORDER — BUPIVACAINE HYDROCHLORIDE 5 MG/ML
INJECTION, SOLUTION PERINEURAL PRN
Status: DISCONTINUED | OUTPATIENT
Start: 2018-05-24 | End: 2018-05-24 | Stop reason: HOSPADM

## 2018-05-24 RX ADMIN — SODIUM CHLORIDE, SODIUM LACTATE, POTASSIUM CHLORIDE, CALCIUM CHLORIDE: 600; 310; 30; 20 INJECTION, SOLUTION INTRAVENOUS at 10:28

## 2018-05-24 RX ADMIN — Medication 0.5 MG: at 11:34

## 2018-05-24 RX ADMIN — FENTANYL CITRATE 50 MCG: 50 INJECTION, SOLUTION INTRAMUSCULAR; INTRAVENOUS at 10:40

## 2018-05-24 RX ADMIN — GABAPENTIN 300 MG: 300 CAPSULE ORAL at 09:31

## 2018-05-24 RX ADMIN — LIDOCAINE HYDROCHLORIDE 9.5 ML: 10 INJECTION, SOLUTION INFILTRATION; PERINEURAL at 11:19

## 2018-05-24 RX ADMIN — KETOROLAC TROMETHAMINE 30 MG: 30 INJECTION, SOLUTION INTRAMUSCULAR; INTRAVENOUS at 11:29

## 2018-05-24 RX ADMIN — PROPOFOL 50 MCG/KG/MIN: 10 INJECTION, EMULSION INTRAVENOUS at 10:52

## 2018-05-24 RX ADMIN — FENTANYL CITRATE 50 MCG: 50 INJECTION, SOLUTION INTRAMUSCULAR; INTRAVENOUS at 11:46

## 2018-05-24 RX ADMIN — OXYCODONE HYDROCHLORIDE 5 MG: 5 TABLET ORAL at 11:47

## 2018-05-24 RX ADMIN — SODIUM CHLORIDE, SODIUM LACTATE, POTASSIUM CHLORIDE, CALCIUM CHLORIDE: 600; 310; 30; 20 INJECTION, SOLUTION INTRAVENOUS at 09:31

## 2018-05-24 RX ADMIN — ACETAMINOPHEN 975 MG: 325 TABLET ORAL at 09:31

## 2018-05-24 RX ADMIN — PROPOFOL 50 MG: 10 INJECTION, EMULSION INTRAVENOUS at 11:08

## 2018-05-24 RX ADMIN — ONDANSETRON 4 MG: 2 INJECTION INTRAMUSCULAR; INTRAVENOUS at 10:40

## 2018-05-24 RX ADMIN — DEXAMETHASONE SODIUM PHOSPHATE 4 MG: 4 INJECTION, SOLUTION INTRA-ARTICULAR; INTRALESIONAL; INTRAMUSCULAR; INTRAVENOUS; SOFT TISSUE at 10:40

## 2018-05-24 RX ADMIN — PROPOFOL 30 MG: 10 INJECTION, EMULSION INTRAVENOUS at 10:52

## 2018-05-24 RX ADMIN — BUPIVACAINE HYDROCHLORIDE 9.5 ML: 5 INJECTION, SOLUTION PERINEURAL at 11:18

## 2018-05-24 NOTE — ANESTHESIA POSTPROCEDURE EVALUATION
Patient: Carrie Munoz    Procedure(s):  Excision Left Dorsal Wirst Ganglion - Wound Class: I-Clean    Diagnosis:Left Dorsal Wrist Ganglion  Diagnosis Additional Information: No value filed.    Anesthesia Type:  MAC    Note:  Anesthesia Post Evaluation    Patient location during evaluation: Phase 2  Patient participation: Able to fully participate in evaluation  Level of consciousness: awake and alert  Pain management: adequate  Airway patency: patent  Cardiovascular status: acceptable and stable  Respiratory status: room air and acceptable  Hydration status: acceptable  PONV: none     Anesthetic complications: None          Last vitals:  Vitals:    05/24/18 1140 05/24/18 1155 05/24/18 1210   BP: 117/81 108/61 107/75   Resp: 16 16 16   Temp: 36.3  C (97.3  F)  36.2  C (97.2  F)   SpO2: 95% 95% 96%         Electronically Signed By: Diana Huff MD  May 24, 2018  1:34 PM

## 2018-05-24 NOTE — ANESTHESIA PREPROCEDURE EVALUATION
Anesthesia Evaluation     .             ROS/MED HX    ENT/Pulmonary:  - neg pulmonary ROS     Neurologic:  - neg neurologic ROS     Cardiovascular: Comment: Global and regional left ventricular function is hyperkinetic with an EF of  65-70%.  The right ventricle is hyperdynamic.  The IVC is upper limit of normal with reduced respiratory variability.  Estimated mean right atrial pressure is 3-8 mmHg.  No pericardial effusion is present.    Had holter with sinus tachycardia revealed in 2017        METS/Exercise Tolerance:     Hematologic:  - neg hematologic  ROS       Musculoskeletal:         GI/Hepatic:  - neg GI/hepatic ROS       Renal/Genitourinary:  - ROS Renal section negative       Endo: Comment: BMI 37.54 - neg endo ROS   (+) Obesity, .      Psychiatric:  - neg psychiatric ROS       Infectious Disease:  - neg infectious disease ROS       Malignancy:         Other:                     Physical Exam  Normal systems: dental    Airway   Mallampati: II  TM distance: >3 FB  Neck ROM: full    Dental     Cardiovascular       Pulmonary                     Anesthesia Plan      History & Physical Review  History and physical reviewed and following examination; no interval change.    ASA Status:  2 .    NPO Status:  > 8 hours    Plan for MAC Reason for MAC:  Deep or markedly invasive procedure (G8)    Risks, benefits, and alternatives of MAC discussed with patient. They understand the risks including possible recall of events in the room. They understand there is a possibility that conversion to general anesthesia may be needed. Patient consents.        Postoperative Care      Consents  Anesthetic plan, risks, benefits and alternatives discussed with:  Patient..                          .

## 2018-05-24 NOTE — IP AVS SNAPSHOT
[facility]    [address] [place] [address]    Phone:  [phone]    Fax:  [phone]                                       After Visit Summary   5/24/2018    Carrie Munoz    MRN: [id number]           After Visit Summary Signature Page     I have received my discharge instructions, and my questions have been answered. I have discussed any challenges I see with this plan with the nurse or doctor.    ..........................................................................................................................................  Patient/Patient Representative Signature      ..........................................................................................................................................  Patient Representative Print Name and Relationship to Patient    ..................................................               ................................................  Date                                            Time    ..........................................................................................................................................  Reviewed by Signature/Title    ...................................................              ..............................................  Date                                                            Time

## 2018-05-24 NOTE — IP AVS SNAPSHOT
MRN:[id number]                      After Visit Summary   5/24/2018    Carrie Munoz    MRN: [id number]           Thank you!     Thank you for choosing [facility] for your care. Our goal is always to provide you with excellent care. Hearing back from our patients is one way we can continue to improve our services. Please take a few minutes to complete the written survey that you may receive in the mail after you visit with us. Thank you!        Patient Information     Date Of Birth          1980        About your hospital stay     You were admitted on:  May 24, 2018 You last received care in the:  [facility]    You were discharged on:  May 24, 2018       Who to Call     For medical emergencies, please call 911.  For non-urgent questions about your medical care, please call your primary care provider or clinic, [phone]  For questions related to your surgery, please call your surgery clinic        Attending Provider     Provider Specialty    Randall Maradiaga MD Orthopedics       Primary Care Provider Office Phone # Fax #    Narciso Dayo [place] [phone]      Your next 10 appointments already scheduled     Jun 06, 2018  3:45 PM CDT   (Arrive by 3:30 PM)   RETURN HAND with Randall Maradiaga MD   [facility] ([facility])    [address] [place] [id number] [phone]              Further instructions from your care team       Instructions for after your surgery    Leave your splint on and keep it dry. Do not remove it or get it wet.     Keep your operative arm elevated as much as possible. This will help with pain and swelling.     Do not use your operative arm for any lifting, pushing, pulling, weight bearing, or other activities.     Wear your sling as needed for comfort. If you choose to wear the sling, be sure to take it off and range your shoulder and elbow  "(if not included in the splint) a few times a day so they do not get stiff. (If you have received a regional block, wear the sling at all times until the block wears off.)     You will have a follow-up appointment scheduled with Dr. Maradiaga or Yue. If you do not know when this appointment is, please call Dr. Maradiaga's office to find out.     Call Dr. Maradiaga's office if you experience any of the following:   Fevers, chills, increasing wound drainage, pain that is not controlled with the medications you have been prescribing, swelling that is not controlled with elevation, problems with your splint, or any other questions or concerns.       [facility]  Home Care Following Anesthesia  For 24 hours after surgery:  1. Get plenty of rest.  A responsible adult must stay with you for at least 24 hours after you leave the surgery center.  2. Do not drive or use heavy equipment.  If you have weakness or tingling, don't drive or use heavy equipment until this feeling goes away.   3. Do not drink alcohol.   4. Avoid strenuous or risky activities.  Ask for help when climbing stairs.  5. You may feel lightheaded.  IF so, sit for a few minutes before standing.  Have someone help you get up.   6. If you have nausea (feel sick to your stomach): Drink only clear liquids such as apple juice, ginger ale, broth or 7-Up.  Rest may also help.  Be sure to drink enough fluids.  Move to a regular diet as you feel able.   7. You may have a slight fever.  Call the doctor if your fever is over 100 F (37.7 C) (taken under the tongue) or lasts longer than 24 hours.  8. You may have a dry mouth, a sore throat, muscle aches or trouble sleeping. These should go away after 24 hours.  9. Do not make important or legal decisions.        Today you received a Marcaine or bupivacaine block to numb the nerves near your surgery site.  This is a block using local anesthetic or \"numbing\" medication injected around " "the nerves to anesthetize or \"numb\" the area supplied by those nerves.  This block is injected into the muscle layer near your surgical site.  The medication may numb the location where you had surgery for 6-18 hours, but may last up to 24 hours.  If your surgical site is an arm or leg you should be careful with your affected limb, since it is possible to injure your limb without being aware of it due to the numbing.  Until full feeling returns, you should guard against bumping or hitting your limb, and avoid extreme hot or cold temperatures on the skin.  As the block wears off, the feeling will return as a tingling or prickly sensation near your surgical site.  You will experience more discomfort from your incision as the feeling returns.  You may want to take a pain pill (a narcotic or Tylenol if this was prescribed by your surgeon) when you start to experience mild pain before the pain beccomes more severe.  If your pain medications do not control your pain you should notifiy your surgeon.    Tips for taking pain medications  To get the best pain relief possible, remember these points:    Take pain medications as directed, before pain becomes severe.    Pain medication can upset your stomach: taking it with food may help.    Constipation is a common side effect of pain medication. Drink plenty of  fluids.    Eat foods high in fiber. Take a stool softener if recommended by your doctor or pharmacist.    Do not drink alcohol, drive or operate machinery while taking pain medications.    Ask about other ways to control pain, such as with heat, ice or relaxation.    Tylenol/Acetaminophen Consumption  To help encourage the safe use of acetaminophen, the makers of TYLENOL  have lowered the maximum daily dose for single-ingredient Extra Strength TYLENOL  (acetaminophen) products sold in the [place] from 8 pills per day (4,000 mg) to 6 pills per day (3,000 mg). The dosing interval has also changed from 2 pills every 4-6 hours " "to 2 pills every 6 hours.    If you feel your pain relief is insufficient, you may take Tylenol/Acetaminophen in addition to your narcotic pain medication.     Be careful not to exceed 3,000 mg of Tylenol/Acetaminophen in a 24 hour period from all sources.    If you are taking extra strength Tylenol/acetaminophen (500 mg), the maximum dose is 6 tablets in 24 hours.    If you are taking regular strength acetaminophen (325 mg), the maximum dose is 9 tablets in 24 hours.    Call a doctor for any of the followin. Signs of infection (fever, growing tenderness at the surgery site, a large amount of drainage or bleeding, severe pain, foul-smelling drainage, redness, swelling).  2. It has been over 8 to 10 hours since surgery and you are still not able to urinate (pass water).  3. Headache for over 24 hours.  4. Numbness, tingling or weakness the day after surgery (if you had spinal anesthesia).  Your doctor is:       Dr. Randall Maradiaga, Orthopaedics: [phone]               Or dial [phone] and ask for the resident on call for:  Orthopaedics  For emergency care, call the:  [facility] Emergency Department: [phone] (TTY for hearing impaired: [phone])                    Pending Results     No orders found from 2018 to 2018.            Admission Information     Date & Time Provider Department Dept. Phone    2018 Randall Maradiaga MD [facility] [phone]      Your Vitals Were     Blood Pressure Temperature Respirations Height Weight Last Period    118 98.7  F (37.1  C) (Oral) 16 1.727 m (5' 8\") 111.8 kg (246 lb 6.4 oz) 2018 (Approximate)    Pulse Oximetry BMI (Body Mass Index)                96% 37.46 kg/m2          KDPOFhart Information     [employer] gives you secure access to your electronic health record. If you see a primary care provider, you can also send messages to your care team and make appointments. If you have questions, please call your " primary care clinic.  If you do not have a primary care provider, please call [phone] and they will assist you.      The [employer] Hub is an electronic gateway that provides easy, online access to your medical records. With [employer] [facility], you can request a clinic appointment, read your test results, renew a prescription or communicate with your care team.     To access your existing account, please contact your [facility] or call [phone] for assistance.        Care EveryWhere ID     This is your Care EveryWhere ID. This could be used by other organizations to access your [facility] medical records  [id number]        Equal Access to Services     JUANA MOMIN : Anthony Hobbs, kimberly javier, apolinar tirado, myriam mario . So [facility] [phone].    ATENCIÓN: Si habla español, tiene a kapoor disposición servicios gratuitos de asistencia lingüística. [facility] [phone].    We comply with applicable federal civil rights laws and [place] laws. We do not discriminate on the basis of race, color, national origin, age, disability, sex, sexual orientation, or gender identity.               Review of your medicines      START taking        Dose / Directions    HYDROcodone-acetaminophen 5-325 MG per tablet   Commonly known as:  NORCO   Used for:  Post-operative state        Dose:  1 tablet   Take 1 tablet by mouth every 6 hours as needed for severe pain   Quantity:  8 tablet   Refills:  0         CONTINUE these medicines which may have CHANGED, or have new prescriptions. If we are uncertain of the size of tablets/capsules you have at home, strength may be listed as something that might have changed.        Dose / Directions    gabapentin 300 MG capsule   Commonly known as:  NEURONTIN   This may have changed:  additional instructions   Used for:  Acute midline low back pain with right-sided sciatica        Take 2 tablets (49863 mg) by mouth in  AM/morning, 1 tablet (300 mg) at noon and 2 tablets (600 mg) at bedtime for sleep   Quantity:  150 capsule   Refills:  1         CONTINUE these medicines which have NOT CHANGED        Dose / Directions    azithromycin 250 MG tablet   Commonly known as:  ZITHROMAX   Used for:  Infective otitis externa, left, OME (otitis media with effusion), left        Two tablets first day, then one tablet daily for four days.   Quantity:  6 tablet   Refills:  0       fluticasone 50 MCG/ACT spray   Commonly known as:  FLONASE   Used for:  Postnasal drip        Dose:  1-2 spray   Spray 1-2 sprays into both nostrils daily   Quantity:  3 Bottle   Refills:  1       ibuprofen 600 MG tablet   Commonly known as:  ADVIL/MOTRIN        Dose:  600 mg   Take 600 mg by mouth every 6 hours as needed for moderate pain   Refills:  0       levonorgestrel 20 MCG/24HR IUD   Commonly known as:  MIRENA (52 MG)   Used for:  Encounter for IUD insertion        Dose:  1 each   1 each (20 mcg) by Intrauterine route once for 1 dose   Quantity:  1 each   Refills:  0       * lidocaine 5 % Patch   Commonly known as:  LIDODERM   Used for:  Acute midline low back pain with right-sided sciatica        Apply up to 3 patches to painful area at once for up to 12 h within a 24 h period.  Remove after 12 hours.   Quantity:  30 patch   Refills:  0       * Lidocaine 4 % Patch   Commonly known as:  LIDOCARE   Used for:  Sacroiliac joint pain, Acute bilateral low back pain with right-sided sciatica        Apply 3 patches to painful area for up to 12 hours in 24 hours period of time, remove after 12 hours   Quantity:  30 patch   Refills:  0       * Notice:  This list has 2 medication(s) that are the same as other medications prescribed for you. Read the directions carefully, and ask your doctor or other care provider to review them with you.         Where to get your medicines      Some of these will need a paper prescription and others can be bought over the counter. Ask  your nurse if you have questions.     Bring a paper prescription for each of these medications     HYDROcodone-acetaminophen 5-325 MG per tablet                Protect others around you: Learn how to safely use, store and throw away your medicines at www.disposemymeds.org.        Information about OPIOIDS     PRESCRIPTION OPIOIDS: WHAT YOU NEED TO KNOW   You have a prescription for an opioid (narcotic) pain medicine. Opioids can cause addiction. If you have a history of chemical dependency of any type, you are at a higher risk of becoming addicted to opioids. Only take this medicine after all other options have been tried. Take it for as short a time and as few doses as possible.     Do not:    Drive. If you drive while taking these medicines, you could be arrested for driving under the influence (DUI).    Operate heavy machinery    Do any other dangerous activities while taking these medicines.     Drink any alcohol while taking these medicines.      Take with any other medicines that contain acetaminophen. Read all labels carefully. Look for the word  acetaminophen  or  Tylenol.  Ask your pharmacist if you have questions or are unsure.    Store your pills in a secure place, locked if possible. We will not replace any lost or stolen medicine. If you don t finish your medicine, please throw away (dispose) as directed by your pharmacist. The [place] [employer]ution [employer] has more information about safe disposal: https://www.pca.[facility].[place].us/living-green/managing-unwanted-medications    All opioids tend to cause constipation. Drink plenty of water and eat foods that have a lot of fiber, such as fruits, vegetables, prune juice, apple juice and high-fiber cereal. Take a laxative (Miralax, milk of magnesia, Colace, Senna) if you don t move your bowels at least every other day.              Medication List: This is a list of all your medications and when to take them. Check marks below indicate your daily home  schedule. Keep this list as a reference.      Medications           Morning Afternoon Evening Bedtime As Needed    azithromycin 250 MG tablet   Commonly known as:  ZITHROMAX   Two tablets first day, then one tablet daily for four days.                                fluticasone 50 MCG/ACT spray   Commonly known as:  FLONASE   Spray 1-2 sprays into both nostrils daily                                gabapentin 300 MG capsule   Commonly known as:  NEURONTIN   Take 2 tablets (26345 mg) by mouth in AM/morning, 1 tablet (300 mg) at noon and 2 tablets (600 mg) at bedtime for sleep   Last time this was given:  300 mg on 5/24/2018  9:31 AM                                HYDROcodone-acetaminophen 5-325 MG per tablet   Commonly known as:  NORCO   Take 1 tablet by mouth every 6 hours as needed for severe pain                                ibuprofen 600 MG tablet   Commonly known as:  ADVIL/MOTRIN   Take 600 mg by mouth every 6 hours as needed for moderate pain                                levonorgestrel 20 MCG/24HR IUD   Commonly known as:  MIRENA (52 MG)   1 each (20 mcg) by Intrauterine route once for 1 dose                                * lidocaine 5 % Patch   Commonly known as:  LIDODERM   Apply up to 3 patches to painful area at once for up to 12 h within a 24 h period.  Remove after 12 hours.                                * Lidocaine 4 % Patch   Commonly known as:  LIDOCARE   Apply 3 patches to painful area for up to 12 hours in 24 hours period of time, remove after 12 hours                                * Notice:  This list has 2 medication(s) that are the same as other medications prescribed for you. Read the directions carefully, and ask your doctor or other care provider to review them with you.

## 2018-05-24 NOTE — DISCHARGE INSTRUCTIONS
Instructions for after your surgery    Leave your splint on and keep it dry. Do not remove it or get it wet.     Keep your operative arm elevated as much as possible. This will help with pain and swelling.     Do not use your operative arm for any lifting, pushing, pulling, weight bearing, or other activities.     Wear your sling as needed for comfort. If you choose to wear the sling, be sure to take it off and range your shoulder and elbow (if not included in the splint) a few times a day so they do not get stiff. (If you have received a regional block, wear the sling at all times until the block wears off.)     You will have a follow-up appointment scheduled with Dr. Maradiaga or Yue. If you do not know when this appointment is, please call Dr. Maradiaga's office to find out.     Call Dr. Maradiaga's office if you experience any of the following:   Fevers, chills, increasing wound drainage, pain that is not controlled with the medications you have been prescribing, swelling that is not controlled with elevation, problems with your splint, or any other questions or concerns.       [facility]  Home Care Following Anesthesia  For 24 hours after surgery:  1. Get plenty of rest.  A responsible adult must stay with you for at least 24 hours after you leave the surgery center.  2. Do not drive or use heavy equipment.  If you have weakness or tingling, don't drive or use heavy equipment until this feeling goes away.   3. Do not drink alcohol.   4. Avoid strenuous or risky activities.  Ask for help when climbing stairs.  5. You may feel lightheaded.  IF so, sit for a few minutes before standing.  Have someone help you get up.   6. If you have nausea (feel sick to your stomach): Drink only clear liquids such as apple juice, ginger ale, broth or 7-Up.  Rest may also help.  Be sure to drink enough fluids.  Move to a regular diet as you feel able.   7. You may have a slight fever.  Call the  "doctor if your fever is over 100 F (37.7 C) (taken under the tongue) or lasts longer than 24 hours.  8. You may have a dry mouth, a sore throat, muscle aches or trouble sleeping. These should go away after 24 hours.  9. Do not make important or legal decisions.        Today you received a Marcaine or bupivacaine block to numb the nerves near your surgery site.  This is a block using local anesthetic or \"numbing\" medication injected around the nerves to anesthetize or \"numb\" the area supplied by those nerves.  This block is injected into the muscle layer near your surgical site.  The medication may numb the location where you had surgery for 6-18 hours, but may last up to 24 hours.  If your surgical site is an arm or leg you should be careful with your affected limb, since it is possible to injure your limb without being aware of it due to the numbing.  Until full feeling returns, you should guard against bumping or hitting your limb, and avoid extreme hot or cold temperatures on the skin.  As the block wears off, the feeling will return as a tingling or prickly sensation near your surgical site.  You will experience more discomfort from your incision as the feeling returns.  You may want to take a pain pill (a narcotic or Tylenol if this was prescribed by your surgeon) when you start to experience mild pain before the pain beccomes more severe.  If your pain medications do not control your pain you should notifiy your surgeon.    Tips for taking pain medications  To get the best pain relief possible, remember these points:    Take pain medications as directed, before pain becomes severe.    Pain medication can upset your stomach: taking it with food may help.    Constipation is a common side effect of pain medication. Drink plenty of  fluids.    Eat foods high in fiber. Take a stool softener if recommended by your doctor or pharmacist.    Do not drink alcohol, drive or operate machinery while taking pain " medications.    Ask about other ways to control pain, such as with heat, ice or relaxation.    Tylenol/Acetaminophen Consumption  To help encourage the safe use of acetaminophen, the makers of TYLENOL  have lowered the maximum daily dose for single-ingredient Extra Strength TYLENOL  (acetaminophen) products sold in the [place] from 8 pills per day (4,000 mg) to 6 pills per day (3,000 mg). The dosing interval has also changed from 2 pills every 4-6 hours to 2 pills every 6 hours.    If you feel your pain relief is insufficient, you may take Tylenol/Acetaminophen in addition to your narcotic pain medication.     Be careful not to exceed 3,000 mg of Tylenol/Acetaminophen in a 24 hour period from all sources.    If you are taking extra strength Tylenol/acetaminophen (500 mg), the maximum dose is 6 tablets in 24 hours.    If you are taking regular strength acetaminophen (325 mg), the maximum dose is 9 tablets in 24 hours.    Call a doctor for any of the followin. Signs of infection (fever, growing tenderness at the surgery site, a large amount of drainage or bleeding, severe pain, foul-smelling drainage, redness, swelling).  2. It has been over 8 to 10 hours since surgery and you are still not able to urinate (pass water).  3. Headache for over 24 hours.  4. Numbness, tingling or weakness the day after surgery (if you had spinal anesthesia).  Your doctor is:       Dr. Randall Maradiaga, Orthopaedics: [phone]               Or dial [phone] and ask for the resident on call for:  Orthopaedics  For emergency care, call the:  [facility] Emergency Department: [phone] (TTY for hearing impaired: [phone])

## 2018-05-24 NOTE — OP NOTE
DATE OF SERVICE:  05/24/18      PREOPERATIVE DIAGNOSIS:  left dorsal wrist ganglion.      POSTOPERATIVE DIAGNOSIS:  left dorsal wrist ganglion.      PROCEDURE:  Excision left dorsal wrist ganglion     ATTENDING SURGEON:  Randall Maradiaga MD      ASSISTANT:  None      ANESTHESIA:  MAC plus local consisting of 20 mL of 0.5% Marcaine plain and 1% lidocaine plain injected in a 1:1 ratio.      ESTIMATED BLOOD LOSS:  1 mL.      TOURNIQUET TIME:  20 minutes.      FINDINGS: Large dorsal wrist ganglion filled with clear jelly-like contents and extending into scapholunate interval    SPECIMENS:  Mass to pathology     DRAINS:  None.      IMPLANTS:  None.      COMPLICATIONS:  None apparent.      BRIEF HISTORY:  The patient is a 37-year-old female who presented with a chief complaint of a dorsal wrist mass. The patient Had undergoing prior aspiration of the mass. The patient requested surgical excision of the mass. I had a discussion with the patient regarding treatment options that would include continued observation, aspiration, and excision of the mass.  I have described the procedure, postoperative protocol and expected outcomes.  I also described the risks, which include bleeding, infection, nerve or vessel damage, wound healing problems, stiffness, recurrence of the mass, persistent numbness, persistent pain, and the possibility for further surgery.  Anesthetic risks are rare but include breathing problems, heart problems, stroke, and death.  After full discussion of risks, benefits and alternatives to surgery, the patient has elected to proceed and informed consent was obtained.      DESCRIPTION OF PROCEDURE:  The patient was identified in the preoperative holding area.  The consent was reviewed and signed and the operative site was identified and marked. The history and physical was reviewed.  The patient was brought to the operating room and transferred to the operating table in a supine position. The arm was placed onto  an arm table. A forearm tourniquet was placed. Sedation was administered.  The operative arm was prepped and draped in a standard sterile fashion.  A timeout was performed, verifying the correct patient, procedure, site and side.  Preoperative prophylactic antibiotics were administered. 10 mL of local anesthetic was injected as above just proximal to the planned surgical incision site. An Esmarch was used to exsanguinate the limb and the tourniquet was inflated to 250 mmHg.      A transverse incision was made over the mass. Blunt longitudinal dissection was taken down to the level of the mass. The mass was located between the second and fourth dorsal wrist compartments, which were respectively retracted radially and ulnarly. The mass and its pedicle were isolated circumferentially from the surrounding soft tissues. At this point, the patient reported pain, so an additional 10 mL of local anesthetic was infiltrated into the surgical wound. This appeared to provide adequate analgesia. The mass and pedicle were then excised together with their capsular attachment. The edges of the capsule were then cauterized. The wound was thoroughly irrigated with normal saline.  The tourniquet was deflated and hemostasis was achieved.  The deep tissue was closed with interrupted 4-0 Vicryl suture, and the skin was closed with a running 4-0 subcuticular Monocryl.  Steristrips were applied. Soft sterile dressings were applied of 4x4s, and sterile cast padding. The patient was placed in a plaster volar slab splint. She was then awoken from the procedure and there were no immediate complications. She was brought to the recovery room in stable condition.  All sponge and needle counts were correct at the end of the case.       POSTOPERATIVE PLAN:  The patient will discharge to home today with oral pain medications.  She will elevate and ice.  She will remain in the splint until follow-up.  The patient will follow-up in 10-14 days  for a  wound check.

## 2018-05-24 NOTE — ANESTHESIA CARE TRANSFER NOTE
Patient: Carrie Munoz    Procedure(s):  Excision Left Dorsal Wirst Ganglion - Wound Class: I-Clean    Diagnosis: Left Dorsal Wrist Ganglion  Diagnosis Additional Information: No value filed.    Anesthesia Type:   MAC     Note:  Airway :Room Air  Patient transferred to:Phase II  Comments: VSS/WNL. Responds well.Handoff Report: Identifed the Patient, Identified the Reponsible Provider, Reviewed the pertinent medical history, Discussed the surgical course, Reviewed Intra-OP anesthesia mangement and issues during anesthesia, Set expectations for post-procedure period and Allowed opportunity for questions and acknowledgement of understanding      Vitals: (Last set prior to Anesthesia Care Transfer)    CRNA VITALS  5/24/2018 1106 - 5/24/2018 1141      5/24/2018             Resp Rate (set): 10                Electronically Signed By: YASMEEN Mercedes CRNA  May 24, 2018  11:41 AM

## 2018-05-29 ENCOUNTER — TELEPHONE (OUTPATIENT)
Dept: ORTHOPEDICS | Facility: CLINIC | Age: 38
End: 2018-05-29

## 2018-05-29 ENCOUNTER — OFFICE VISIT (OUTPATIENT)
Dept: ORTHOPEDICS | Facility: CLINIC | Age: 38
End: 2018-05-29
Payer: MEDICAID

## 2018-05-29 DIAGNOSIS — Z09 SURGICAL FOLLOW-UP CARE: Primary | ICD-10-CM

## 2018-05-29 NOTE — PROGRESS NOTES
The patients post -op splint was removed. Incision looks to be healing without signs of infection. The patient was placed in a new clean dry well fitting post-op splint. The patient states the splint fits comfortable. The patient was reminded to not get the splint wet. The patient was given our contact information for any questions or concerns.

## 2018-05-29 NOTE — MR AVS SNAPSHOT
After Visit Summary   5/29/2018    Carrie Munoz    MRN: [id number]           Patient Information     Date Of Birth          1980        Visit Information        Provider Department      5/29/2018 5:00 PM Tech, [facility]        Today's Diagnoses     Surgical follow-up care    -  1       Follow-ups after your visit        Your next 10 appointments already scheduled     Jun 06, 2018  3:45 PM CDT   (Arrive by 3:30 PM)   RETURN HAND with Randall Maradiaga MD   [facility] ([facility])    [address] [place] [id number] [phone]              Who to contact     Please call your clinic at [phone] to:    Ask questions about your health    Make or cancel appointments    Discuss your medicines    Learn about your test results    Speak to your doctor            Additional Information About Your Visit        MyChart Information     [employer] gives you secure access to your electronic health record. If you see a primary care provider, you can also send messages to your care team and make appointments. If you have questions, please call your primary care clinic.  If you do not have a primary care provider, please call [phone] and they will assist you.      [employer] is an electronic gateway that provides easy, online access to your medical records. With [employer], you can request a clinic appointment, read your test results, renew a prescription or communicate with your care team.     To access your existing account, please contact your [facility] or call [phone] for assistance.        Care EveryWhere ID     This is your Care EveryWhere ID. This could be used by other organizations to access your [place] medical records  [id number]         Blood Pressure from Last 3 Encounters:   05/24/18 107/75   05/21/18 112/78   05/08/18 116/83    Weight from Last 3  Encounters:   05/24/18 111.8 kg (246 lb 6.4 oz)   05/21/18 111.7 kg (246 lb 4.8 oz)   05/09/18 112.9 kg (249 lb)              Today, you had the following     No orders found for display         Today's Medication Changes          These changes are accurate as of 5/29/18  5:25 PM.  If you have any questions, ask your nurse or doctor.               These medicines have changed or have updated prescriptions.        Dose/Directions    gabapentin 300 MG capsule   Commonly known as:  NEURONTIN   This may have changed:  additional instructions   Used for:  Acute midline low back pain with right-sided sciatica        Take 2 tablets (39546 mg) by mouth in AM/morning, 1 tablet (300 mg) at noon and 2 tablets (600 mg) at bedtime for sleep   Quantity:  150 capsule   Refills:  1                Primary Care Provider Office Phone # Fax #    Narciso Dayo Sanford [phone]       [address] [facility] [address]  [facility] [address]        Equal Access to Services     [facility] : Hadii aad ku hadasho Soomaali, waaxda luqadaha, qaybta kaalmada adeegyada, waxay idiin haylo mario . So [facility] [phone].    ATENCIÓN: Si habla español, tiene a kapoor disposición servicios gratuitos de asistencia lingüística. [facility] [phone].    We comply with applicable federal civil rights laws and [place] laws. We do not discriminate on the basis of race, color, national origin, age, disability, sex, sexual orientation, or gender identity.            Thank you!     Thank you for choosing [facility]  for your care. Our goal is always to provide you with excellent care. Hearing back from our patients is one way we can continue to improve our services. Please take a few minutes to complete the written survey that you may receive in the mail after your visit with us. Thank you!             Your Updated Medication List - Protect others around you: Learn how to safely use, store and throw away your  medicines at www.disposemymeds.org.          This list is accurate as of 5/29/18  5:25 PM.  Always use your most recent med list.                   Brand Name Dispense Instructions for use Diagnosis    azithromycin 250 MG tablet    ZITHROMAX    6 tablet    Two tablets first day, then one tablet daily for four days.    Infective otitis externa, left, OME (otitis media with effusion), left       fluticasone 50 MCG/ACT spray    FLONASE    3 Bottle    Spray 1-2 sprays into both nostrils daily    Postnasal drip       gabapentin 300 MG capsule    NEURONTIN    150 capsule    Take 2 tablets (07866 mg) by mouth in AM/morning, 1 tablet (300 mg) at noon and 2 tablets (600 mg) at bedtime for sleep    Acute midline low back pain with right-sided sciatica       HYDROcodone-acetaminophen 5-325 MG per tablet    NORCO    8 tablet    Take 1 tablet by mouth every 6 hours as needed for severe pain    Post-operative state       ibuprofen 600 MG tablet    ADVIL/MOTRIN     Take 600 mg by mouth every 6 hours as needed for moderate pain        levonorgestrel 20 MCG/24HR IUD    MIRENA (52 MG)    1 each    1 each (20 mcg) by Intrauterine route once for 1 dose    Encounter for IUD insertion       * lidocaine 5 % Patch    LIDODERM    30 patch    Apply up to 3 patches to painful area at once for up to 12 h within a 24 h period.  Remove after 12 hours.    Acute midline low back pain with right-sided sciatica       * Lidocaine 4 % Patch    LIDOCARE    30 patch    Apply 3 patches to painful area for up to 12 hours in 24 hours period of time, remove after 12 hours    Sacroiliac joint pain, Acute bilateral low back pain with right-sided sciatica       * Notice:  This list has 2 medication(s) that are the same as other medications prescribed for you. Read the directions carefully, and ask your doctor or other care provider to review them with you.

## 2018-05-29 NOTE — TELEPHONE ENCOUNTER
Pt called re: loosened splint and pt enc to come into clinic and get on the cast tech schedule for a re-fitting of the splint/new splint. Pt says they are on their way to clinic now.    Oliverio Navarro

## 2018-06-01 ENCOUNTER — ALLIED HEALTH/NURSE VISIT (OUTPATIENT)
Dept: ORTHOPEDICS | Facility: CLINIC | Age: 38
End: 2018-06-01
Payer: MEDICAID

## 2018-06-01 DIAGNOSIS — Z09 SURGICAL FOLLOW-UP CARE: Primary | ICD-10-CM

## 2018-06-01 NOTE — PROGRESS NOTES
Carrie contacted us with complaints of her LEFT short arm splint problems. She says that the distal and proximal edges were digging into her skin. She was advised to come in for an examination. Upon examination. Her splint is found to be ill fitting. Her splint is removed.Her surgical incision is found to be clean and dry, with no signs of infection. A small superficial abrasion is noted on her volar aspect of her forearm. This is also clean and dry with no signs of infection. A new, better fitting short arm volar OrthoGlass splint is applied in the standard manner. She is reminded on proper splint care. She will contact us if she has any questions/concerns prior to her follow up.   
no

## 2018-06-01 NOTE — MR AVS SNAPSHOT
After Visit Summary   6/1/2018    Carrie Munoz    MRN: [id number]           Patient Information     Date Of Birth          1980        Visit Information        Provider Department      6/1/2018 9:00 AM Nurse, Regan BAZZI [facility]        Today's Diagnoses     Surgical follow-up care    -  1       Follow-ups after your visit        Your next 10 appointments already scheduled     Jun 06, 2018  3:45 PM CDT   (Arrive by 3:30 PM)   RETURN HAND with Randall Maradiaga MD   [facility] ([facility])    [address] [place] [id number] [phone]              Who to contact     Please call your clinic at [phone] to:    Ask questions about your health    Make or cancel appointments    Discuss your medicines    Learn about your test results    Speak to your doctor            Additional Information About Your Visit        MyChart Information     [employer] gives you secure access to your electronic health record. If you see a primary care provider, you can also send messages to your care team and make appointments. If you have questions, please call your primary care clinic.  If you do not have a primary care provider, please call [phone] and they will assist you.      [employer] is an electronic gateway that provides easy, online access to your medical records. With [employer], you can request a clinic appointment, read your test results, renew a prescription or communicate with your care team.     To access your existing account, please contact your [facility] or call [phone] for assistance.        Care EveryWhere ID     This is your Care EveryWhere ID. This could be used by other organizations to access your [place] medical records  [id number]         Blood Pressure from Last 3 Encounters:   05/24/18 107/75   05/21/18 112/78   05/08/18 116/83    Weight from Last 3  Encounters:   05/24/18 111.8 kg (246 lb 6.4 oz)   05/21/18 111.7 kg (246 lb 4.8 oz)   05/09/18 112.9 kg (249 lb)              We Performed the Following     APPLY SHORT ARM SPLINT STATIC     Short Arm Splint, Adult (11 Years Or Older), Fiberglass          Today's Medication Changes          These changes are accurate as of 6/1/18  9:17 AM.  If you have any questions, ask your nurse or doctor.               These medicines have changed or have updated prescriptions.        Dose/Directions    gabapentin 300 MG capsule   Commonly known as:  NEURONTIN   This may have changed:  additional instructions   Used for:  Acute midline low back pain with right-sided sciatica        Take 2 tablets (49698 mg) by mouth in AM/morning, 1 tablet (300 mg) at noon and 2 tablets (600 mg) at bedtime for sleep   Quantity:  150 capsule   Refills:  1                Primary Care Provider Office Phone # Fax #    Narciso Dayo Sanford [phone]       [address] [address]        Equal Access to Services     [facility]: Hadii tonny ku hadasho Soomaali, waaxda luqadaha, qaybta kaalmada adeegyamaile, myriam mario . So [facility] [phone].    ATENCIÓN: Si habla español, tiene a kapoor disposición servicios gratuitos de asistencia lingüística. Emanuelame al [phone].    We comply with applicable federal civil rights laws and [place] laws. We do not discriminate on the basis of race, color, national origin, age, disability, sex, sexual orientation, or gender identity.            Thank you!     Thank you for choosing [facility]  for your care. Our goal is always to provide you with excellent care. Hearing back from our patients is one way we can continue to improve our services. Please take a few minutes to complete the written survey that you may receive in the mail after your visit with us. Thank you!             Your Updated Medication List - Protect others around  you: Learn how to safely use, store and throw away your medicines at www.disposemymeds.org.          This list is accurate as of 6/1/18  9:17 AM.  Always use your most recent med list.                   Brand Name Dispense Instructions for use Diagnosis    azithromycin 250 MG tablet    ZITHROMAX    6 tablet    Two tablets first day, then one tablet daily for four days.    Infective otitis externa, left, OME (otitis media with effusion), left       fluticasone 50 MCG/ACT spray    FLONASE    3 Bottle    Spray 1-2 sprays into both nostrils daily    Postnasal drip       gabapentin 300 MG capsule    NEURONTIN    150 capsule    Take 2 tablets (94790 mg) by mouth in AM/morning, 1 tablet (300 mg) at noon and 2 tablets (600 mg) at bedtime for sleep    Acute midline low back pain with right-sided sciatica       HYDROcodone-acetaminophen 5-325 MG per tablet    NORCO    8 tablet    Take 1 tablet by mouth every 6 hours as needed for severe pain    Post-operative state       ibuprofen 600 MG tablet    ADVIL/MOTRIN     Take 600 mg by mouth every 6 hours as needed for moderate pain        levonorgestrel 20 MCG/24HR IUD    MIRENA (52 MG)    1 each    1 each (20 mcg) by Intrauterine route once for 1 dose    Encounter for IUD insertion       * lidocaine 5 % Patch    LIDODERM    30 patch    Apply up to 3 patches to painful area at once for up to 12 h within a 24 h period.  Remove after 12 hours.    Acute midline low back pain with right-sided sciatica       * Lidocaine 4 % Patch    LIDOCARE    30 patch    Apply 3 patches to painful area for up to 12 hours in 24 hours period of time, remove after 12 hours    Sacroiliac joint pain, Acute bilateral low back pain with right-sided sciatica       * Notice:  This list has 2 medication(s) that are the same as other medications prescribed for you. Read the directions carefully, and ask your doctor or other care provider to review them with you.

## 2018-06-04 LAB — COPATH REPORT: NORMAL

## 2018-06-06 ENCOUNTER — OFFICE VISIT (OUTPATIENT)
Dept: ORTHOPEDICS | Facility: CLINIC | Age: 38
End: 2018-06-06
Payer: MEDICAID

## 2018-06-06 VITALS — WEIGHT: 246 LBS | BODY MASS INDEX: 37.28 KG/M2 | HEIGHT: 68 IN

## 2018-06-06 DIAGNOSIS — M67.432 GANGLION OF WRIST, LEFT: Primary | ICD-10-CM

## 2018-06-06 NOTE — LETTER
"6/6/2018     RE: Carrie Munoz  [address] [facility] [address]     Dear Colleague,    Thank you for referring your patient, Carrie Munoz, to the [facility] at [facility]. Please see a copy of my visit note below.    Date of Service: Jun 6, 2018    Reason for visit: Postoperative follow-up    Date of Surgery: 5.24.18    Procedure Performed: Excision left  dorsal wrist ganglion.     Interval events: Carrie Munoz comes to see me in follow-up today from the above surgery. Pain is improving. She IS NOT requiring narcotic pain medication . No fevers or chills. No numbess or tingling. No other complaints today.     Physical examination:  Height 1.727 m (5' 8\"), weight 111.6 kg (246 lb), not currently breastfeeding.    Well-developed, well-nourished and in no acute distress.  Alert and oriented to surroundings.  On examination of the left wrist, incision is healing well. There is no erythema, drainage, or dehiscence. Sensation is intact in median, radial and ulnar nerve distributions. Thumb opposition is intact. Patient can actively flex and extend all digits and thumb. Interosseous muscles, EPL, and FDP-2 fire. Fingers are warm and well-perfused. No evidence of mass recurrence.  Wrist range of motion is as follows: Wrist extension 45 , wrist flexion 45  .     Pathology: Ganglion cyst    Assessment: Progressing appropriately following the above procedure    Plan:  Splint was removed today, suture tails cut, and steristrips applied. Patient can wash incision with soap and water daily and pat dry. Steri-Strips will fall off on their own. No soaking wound in water, such as swimming, tub baths, or dishwashing, into wound completely healed, usually 1-2 more weeks. No lifting greater than 5 pounds. Patient was given a removable wrist splint to be worn as needed for the next 2 weeks. Patient should follow-up with me in 4 weeks' time. The patient knows to " return to clinic sooner should any questions, concerns, or other issues arise.     Again, thank you for allowing me to participate in the care of your patient.      Sincerely,    Randall Maradiaga MD

## 2018-06-06 NOTE — PROGRESS NOTES
"Date of Service: Jun 6, 2018    Reason for visit: Postoperative follow-up    Date of Surgery: 5.24.18    Procedure Performed: Excision left  dorsal wrist ganglion.     Interval events: Carrie Munoz comes to see me in follow-up today from the above surgery. Pain is improving. She IS NOT requiring narcotic pain medication . No fevers or chills. No numbess or tingling. No other complaints today.     Physical examination:  Height 1.727 m (5' 8\"), weight 111.6 kg (246 lb), not currently breastfeeding.    Well-developed, well-nourished and in no acute distress.  Alert and oriented to surroundings.  On examination of the left wrist, incision is healing well. There is no erythema, drainage, or dehiscence. Sensation is intact in median, radial and ulnar nerve distributions. Thumb opposition is intact. Patient can actively flex and extend all digits and thumb. Interosseous muscles, EPL, and FDP-2 fire. Fingers are warm and well-perfused. No evidence of mass recurrence.  Wrist range of motion is as follows: Wrist extension 45 , wrist flexion 45 .     Pathology: Ganglion cyst    Assessment: Progressing appropriately following the above procedure    Plan:  Splint was removed today, suture tails cut, and steristrips applied. Patient can wash incision with soap and water daily and pat dry. Steri-Strips will fall off on their own. No soaking wound in water, such as swimming, tub baths, or dishwashing, into wound completely healed, usually 1-2 more weeks. No lifting greater than 5 pounds. Patient was given a removable wrist splint to be worn as needed for the next 2 weeks. Patient should follow-up with me in 4 weeks' time. The patient knows to return to clinic sooner should any questions, concerns, or other issues arise.     "

## 2018-06-06 NOTE — MR AVS SNAPSHOT
"              After Visit Summary   6/6/2018    Carrie Munoz    MRN: [id number]           Patient Information     Date Of Birth          1980        Visit Information        Provider Department      6/6/2018 3:45 PM Randall Maradiaga MD [facility]        Today's Diagnoses     Ganglion of wrist, left    -  1       Follow-ups after your visit        Your next 10 appointments already scheduled     Jul 02, 2018  8:00 AM CDT   (Arrive by 7:45 AM)   RETURN HAND with Randall Maradiaga MD   [facility] ([facility])    [address] [place] [address] [phone]              Who to contact     Please call your clinic at [phone] to:    Ask questions about your health    Make or cancel appointments    Discuss your medicines    Learn about your test results    Speak to your doctor            Additional Information About Your Visit        MyChart Information     [employer] gives you secure access to your electronic health record. If you see a primary care provider, you can also send messages to your care team and make appointments. If you have questions, please call your primary care clinic.  If you do not have a primary care provider, please call [phone] and they will assist you.      [employer] is an electronic gateway that provides easy, online access to your medical records. With [employer], you can request a clinic appointment, read your test results, renew a prescription or communicate with your care team.     To access your existing account, please contact your [facility] or call [phone] for assistance.        Care EveryWhere ID     This is your Care EveryWhere ID. This could be used by other organizations to access your [place] medical records  [id number]        Your Vitals Were     Height BMI (Body Mass Index)                1.727 m (5' 8\") 37.4 kg/m2           Blood Pressure " from Last 3 Encounters:   05/24/18 107/75   05/21/18 112/78   05/08/18 116/83    Weight from Last 3 Encounters:   06/06/18 111.6 kg (246 lb)   05/24/18 111.8 kg (246 lb 6.4 oz)   05/21/18 111.7 kg (246 lb 4.8 oz)              Today, you had the following     No orders found for display         Today's Medication Changes          These changes are accurate as of 6/6/18  4:12 PM.  If you have any questions, ask your nurse or doctor.               These medicines have changed or have updated prescriptions.        Dose/Directions    gabapentin 300 MG capsule   Commonly known as:  NEURONTIN   This may have changed:  additional instructions   Used for:  Acute midline low back pain with right-sided sciatica        Take 2 tablets (54047 mg) by mouth in AM/morning, 1 tablet (300 mg) at noon and 2 tablets (600 mg) at bedtime for sleep   Quantity:  150 capsule   Refills:  1                Primary Care Provider Office Phone # Fax #    Narciso Dayo Sanford [phone]       [address] [address]        Equal Access to Services     [facility]: Hadii tonny burciagao Sodara, waaxda luqreba, qaybta kaalmada adereta, myriam mario . So [facility] [phone].    ATENCIÓN: Si habla español, tiene a kapoor disposición servicios gratuitos de asistencia lingüística. [facility] [phone].    We comply with applicable federal civil rights laws and [place] laws. We do not discriminate on the basis of race, color, national origin, age, disability, sex, sexual orientation, or gender identity.            Thank you!     Thank you for choosing [facility]  for your care. Our goal is always to provide you with excellent care. Hearing back from our patients is one way we can continue to improve our services. Please take a few minutes to complete the written survey that you may receive in the mail after your visit with us. Thank you!             Your Updated  Medication List - Protect others around you: Learn how to safely use, store and throw away your medicines at www.disposemymeds.org.          This list is accurate as of 6/6/18  4:12 PM.  Always use your most recent med list.                   Brand Name Dispense Instructions for use Diagnosis    azithromycin 250 MG tablet    ZITHROMAX    6 tablet    Two tablets first day, then one tablet daily for four days.    Infective otitis externa, left, OME (otitis media with effusion), left       fluticasone 50 MCG/ACT spray    FLONASE    3 Bottle    Spray 1-2 sprays into both nostrils daily    Postnasal drip       gabapentin 300 MG capsule    NEURONTIN    150 capsule    Take 2 tablets (06212 mg) by mouth in AM/morning, 1 tablet (300 mg) at noon and 2 tablets (600 mg) at bedtime for sleep    Acute midline low back pain with right-sided sciatica       HYDROcodone-acetaminophen 5-325 MG per tablet    NORCO    8 tablet    Take 1 tablet by mouth every 6 hours as needed for severe pain    Post-operative state       ibuprofen 600 MG tablet    ADVIL/MOTRIN     Take 600 mg by mouth every 6 hours as needed for moderate pain        levonorgestrel 20 MCG/24HR IUD    MIRENA (52 MG)    1 each    1 each (20 mcg) by Intrauterine route once for 1 dose    Encounter for IUD insertion       * lidocaine 5 % Patch    LIDODERM    30 patch    Apply up to 3 patches to painful area at once for up to 12 h within a 24 h period.  Remove after 12 hours.    Acute midline low back pain with right-sided sciatica       * Lidocaine 4 % Patch    LIDOCARE    30 patch    Apply 3 patches to painful area for up to 12 hours in 24 hours period of time, remove after 12 hours    Sacroiliac joint pain, Acute bilateral low back pain with right-sided sciatica       * Notice:  This list has 2 medication(s) that are the same as other medications prescribed for you. Read the directions carefully, and ask your doctor or other care provider to review them with you.

## 2018-06-06 NOTE — NURSING NOTE
"Reason For Visit:   Chief Complaint   Patient presents with     Surgical Followup     The patient is following up today after a left dorsal wrist ganglion excision. DOS:5/24/18       Primary MD: Narciso Sanford  Ref. MD: Established    ?  No    Age: 37 year old      Date of surgery: 5/24/18  Type of surgery: right dorsal ganglion excision.        Ht 1.727 m (5' 8\")  Wt 111.6 kg (246 lb)  BMI 37.4 kg/m2      Pain Assessment  Patient Currently in Pain: Denies               QuickDASH Assessment  No flowsheet data found.       Allergies   Allergen Reactions     No Known Drug Allergies        Allie Langston, ATC  "

## 2018-06-12 ENCOUNTER — MYC MEDICAL ADVICE (OUTPATIENT)
Dept: FAMILY MEDICINE | Facility: CLINIC | Age: 38
End: 2018-06-12

## 2018-06-12 DIAGNOSIS — M54.41 ACUTE MIDLINE LOW BACK PAIN WITH RIGHT-SIDED SCIATICA: ICD-10-CM

## 2018-06-14 RX ORDER — GABAPENTIN 300 MG/1
CAPSULE ORAL
Qty: 150 CAPSULE | Refills: 1 | Status: SHIPPED | OUTPATIENT
Start: 2018-06-14 | End: 2018-09-24

## 2018-06-14 RX ORDER — IBUPROFEN 600 MG/1
600 TABLET, FILM COATED ORAL EVERY 6 HOURS PRN
Qty: 120 TABLET | Refills: 1 | Status: SHIPPED | OUTPATIENT
Start: 2018-06-14 | End: 2018-09-24

## 2018-06-14 NOTE — TELEPHONE ENCOUNTER
Dr. Sanford--    Patient sent [employer] messages requesting refills of:     Ibuprofen 600 mg tablets  Patient reported medication that she takes for back pain.     Gabapentin 300 mg capsules   Per , last filled for #150, 30 days supply on 5/8/18    Please review/sign or please advise.     Thank you!  Lashaun Rosas RN

## 2018-06-29 ENCOUNTER — OFFICE VISIT (OUTPATIENT)
Dept: FAMILY MEDICINE | Facility: CLINIC | Age: 38
End: 2018-06-29
Payer: MEDICAID

## 2018-06-29 VITALS
OXYGEN SATURATION: 97 % | WEIGHT: 237 LBS | SYSTOLIC BLOOD PRESSURE: 110 MMHG | HEART RATE: 92 BPM | TEMPERATURE: 98.6 F | DIASTOLIC BLOOD PRESSURE: 74 MMHG | BODY MASS INDEX: 36.04 KG/M2

## 2018-06-29 DIAGNOSIS — I49.9 IRREGULAR HEART BEAT: ICD-10-CM

## 2018-06-29 DIAGNOSIS — Z13.6 CARDIOVASCULAR SCREENING; LDL GOAL LESS THAN 130: Primary | ICD-10-CM

## 2018-06-29 LAB
ALBUMIN SERPL-MCNC: 4.3 G/DL (ref 3.4–5)
ALP SERPL-CCNC: 68 U/L (ref 40–150)
ALT SERPL W P-5'-P-CCNC: 33 U/L (ref 0–50)
ANION GAP SERPL CALCULATED.3IONS-SCNC: 12 MMOL/L (ref 3–14)
AST SERPL W P-5'-P-CCNC: 23 U/L (ref 0–45)
BILIRUB SERPL-MCNC: 0.5 MG/DL (ref 0.2–1.3)
BUN SERPL-MCNC: 11 MG/DL (ref 7–30)
CALCIUM SERPL-MCNC: 9.2 MG/DL (ref 8.5–10.1)
CHLORIDE SERPL-SCNC: 107 MMOL/L (ref 94–109)
CHOLEST SERPL-MCNC: 135 MG/DL
CO2 SERPL-SCNC: 22 MMOL/L (ref 20–32)
CREAT SERPL-MCNC: 0.71 MG/DL (ref 0.52–1.04)
GFR SERPL CREATININE-BSD FRML MDRD: >90 ML/MIN/1.7M2
GLUCOSE SERPL-MCNC: 98 MG/DL (ref 70–99)
HDLC SERPL-MCNC: 36 MG/DL
LDLC SERPL CALC-MCNC: 70 MG/DL
NONHDLC SERPL-MCNC: 99 MG/DL
POTASSIUM SERPL-SCNC: 4 MMOL/L (ref 3.4–5.3)
PROT SERPL-MCNC: 8 G/DL (ref 6.8–8.8)
SODIUM SERPL-SCNC: 141 MMOL/L (ref 133–144)
TRIGL SERPL-MCNC: 144 MG/DL

## 2018-06-29 PROCEDURE — 36415 COLL VENOUS BLD VENIPUNCTURE: CPT | Performed by: FAMILY MEDICINE

## 2018-06-29 PROCEDURE — 80053 COMPREHEN METABOLIC PANEL: CPT | Performed by: FAMILY MEDICINE

## 2018-06-29 PROCEDURE — 99212 OFFICE O/P EST SF 10 MIN: CPT | Performed by: FAMILY MEDICINE

## 2018-06-29 PROCEDURE — 80061 LIPID PANEL: CPT | Performed by: FAMILY MEDICINE

## 2018-06-29 NOTE — PROGRESS NOTES
SUBJECTIVE:  Carrie Munoz, a 37 year old female scheduled an appointment to discuss the following issues:     CARDIOVASCULAR SCREENING; LDL GOAL LESS THAN 130  history of Irregular heart beat, now doing well    Medical, social, surgical, and family histories reviewed.    ROS:  CONSTITUTIONAL: NEGATIVE for fever, chills  EYES: NEGATIVE for vision changes   RESP: NEGATIVE for significant cough or SOB  CV: NEGATIVE for chest pain   GI: NEGATIVE for nausea, abdominal pain, heartburn, or change in bowel habits  : NEGATIVE for frequency, dysuria, or hematuria  MUSCULOSKELETAL: NEGATIVE for significant arthralgias or myalgia  NEURO: NEGATIVE for weakness, dizziness or paresthesias or headache    OBJECTIVE:  /74 (BP Location: Left arm, Patient Position: Sitting, Cuff Size: Adult Large)  Pulse 92  Temp 98.6  F (37  C) (Oral)  Wt 237 lb (107.5 kg)  SpO2 97%  BMI 36.04 kg/m2  EXAM:  GENERAL APPEARANCE: healthy, alert and no distress  CV: regular rates and rhythm, normal S1 S2, no S3 or S4 and no murmur, click or rub -    ASSESSMENT/PLAN:  (Z13.6) CARDIOVASCULAR SCREENING; LDL GOAL LESS THAN 130  (primary encounter diagnosis)  Comment: lab sent  Plan: Lipid panel reflex to direct LDL Fasting            (I49.9) Irregular heart beat  Comment: call if recurs  Plan: Comprehensive metabolic panel (BMP + Alb, Alk         Phos, ALT, AST, Total. Bili, TP)        Narciso Sanford MD

## 2018-06-29 NOTE — MR AVS SNAPSHOT
After Visit Summary   6/29/2018    Carrie Munoz    MRN: [id number]           Patient Information     Date Of Birth          1980        Visit Information        Provider Department      6/29/2018 8:30 AM Narciso Sanford MD [facility]        Today's Diagnoses     CARDIOVASCULAR SCREENING; LDL GOAL LESS THAN 130    -  1    Irregular heart beat           Follow-ups after your visit        Your next 10 appointments already scheduled     Jul 02, 2018  8:00 AM CDT   (Arrive by 7:45 AM)   RETURN HAND with Randall Maradiaga MD   [facility] ([facility] [place])    [address] [place] [address]  [address] [facility] [address] [phone]              Who to contact     If you have questions or need follow up information about today's clinic visit or your schedule please contact [facility] directly at [phone].  Normal or non-critical lab and imaging results will be communicated to you by MyChart, letter or phone within 4 business days after the clinic has received the results. If you do not hear from us within 7 days, please contact the clinic through [employer] or phone. If you have a critical or abnormal lab result, we will notify you by phone as soon as possible.  Submit refill requests through [employer] or call your pharmacy and they will forward the refill request to us. Please allow 3 business days for your refill to be completed.          Additional Information About Your Visit        MyChart Information     [employer] gives you secure access to your electronic health record. If you see a primary care provider, you can also send messages to your care team and make appointments. If you have questions, please call your primary care clinic.  If you do not have a primary care provider, please call [phone] and they will assist you.        Care EveryWhere ID     This is your Care EveryWhere ID. This could be used by other  organizations to access your [place] medical records  [id number]        Your Vitals Were     Pulse Temperature Pulse Oximetry BMI (Body Mass Index)          92 98.6  F (37  C) (Oral) 97% 36.04 kg/m2         Blood Pressure from Last 3 Encounters:   06/29/18 110/74   05/24/18 107/75   05/21/18 112/78    Weight from Last 3 Encounters:   06/29/18 237 lb (107.5 kg)   06/06/18 246 lb (111.6 kg)   05/24/18 246 lb 6.4 oz (111.8 kg)              We Performed the Following     Comprehensive metabolic panel (BMP + Alb, Alk Phos, ALT, AST, Total. Bili, TP)     Lipid panel reflex to direct LDL Fasting          Today's Medication Changes          These changes are accurate as of 6/29/18 11:02 AM.  If you have any questions, ask your nurse or doctor.               Stop taking these medicines if you haven't already. Please contact your care team if you have questions.     azithromycin 250 MG tablet   Commonly known as:  ZITHROMAX           HYDROcodone-acetaminophen 5-325 MG per tablet   Commonly known as:  NORCO                    Primary Care Provider Office Phone # Fax #    Narciso Dayo Sanford [phone]       [address] [place] [address]        Equal Access to Services     JUANA MOMIN : Hadii tonny kauffman hadasho Soomaali, waaxda luqadaha, qaybta kaalmada adeegyada, waxgladys levy. So [facility] [phone].    ATENCIÓN: Si habla español, tiene a kapoor disposición servicios gratuitos de asistencia lingüística. Llame al [phone].    We comply with applicable federal civil rights laws and [place] laws. We do not discriminate on the basis of race, color, national origin, age, disability, sex, sexual orientation, or gender identity.            Thank you!     Thank you for choosing [facility]  for your care. Our goal is always to provide you with excellent care. Hearing back from our patients is one way we can continue to improve our services. Please take a  few minutes to complete the written survey that you may receive in the mail after your visit with us. Thank you!             Your Updated Medication List - Protect others around you: Learn how to safely use, store and throw away your medicines at www.disposemymeds.org.          This list is accurate as of 6/29/18 11:02 AM.  Always use your most recent med list.                   Brand Name Dispense Instructions for use Diagnosis    fluticasone 50 MCG/ACT spray    FLONASE    3 Bottle    Spray 1-2 sprays into both nostrils daily    Postnasal drip       gabapentin 300 MG capsule    NEURONTIN    150 capsule    Take 2 tablets (14754 mg) by mouth in AM/morning, 1 tablet (300 mg) at noon and 2 tablets (900 mg) at bedtime for sleep    Acute midline low back pain with right-sided sciatica       ibuprofen 600 MG tablet    ADVIL/MOTRIN    120 tablet    Take 1 tablet (600 mg) by mouth every 6 hours as needed for moderate pain    Acute midline low back pain with right-sided sciatica       levonorgestrel 20 MCG/24HR IUD    MIRENA (52 MG)    1 each    1 each (20 mcg) by Intrauterine route once for 1 dose    Encounter for IUD insertion       * lidocaine 5 % Patch    LIDODERM    30 patch    Apply up to 3 patches to painful area at once for up to 12 h within a 24 h period.  Remove after 12 hours.    Acute midline low back pain with right-sided sciatica       * Lidocaine 4 % Patch    LIDOCARE    30 patch    Apply 3 patches to painful area for up to 12 hours in 24 hours period of time, remove after 12 hours    Sacroiliac joint pain, Acute bilateral low back pain with right-sided sciatica       * Notice:  This list has 2 medication(s) that are the same as other medications prescribed for you. Read the directions carefully, and ask your doctor or other care provider to review them with you.

## 2018-07-02 ENCOUNTER — OFFICE VISIT (OUTPATIENT)
Dept: ORTHOPEDICS | Facility: CLINIC | Age: 38
End: 2018-07-02
Payer: MEDICAID

## 2018-07-02 VITALS — WEIGHT: 240.6 LBS | HEIGHT: 68 IN | BODY MASS INDEX: 36.46 KG/M2

## 2018-07-02 DIAGNOSIS — M67.432 GANGLION OF WRIST, LEFT: Primary | ICD-10-CM

## 2018-07-02 NOTE — NURSING NOTE
"Reason For Visit:   Chief Complaint   Patient presents with     RECHECK     Left wrist ganglion cyst.       Primary MD: Narciso Sanford  Ref. MD:     ?  No    Age: 37 year old        Date of surgery: 05/24/2018  Type of surgery:  Excision left dorsal wrist ganglion.        Ht 1.727 m (5' 8\")  Wt 109.1 kg (240 lb 9.6 oz)  BMI 36.58 kg/m2      Pain Assessment  Patient Currently in Pain: No    Hand Dominance Evaluation  Hand Dominance: Right          QuickDASH Assessment  QuickDASH Main 7/2/2018   1.Open a tight or new jar. Moderate difficulty   2. Do heavy household chores (e.g., wash walls, floors) Moderate difficulty   3. Carry a shopping bag or briefcase. Moderate difficulty   4. Wash your back. Moderate difficulty   5. Use a knife to cut food. Moderate difficulty   6. Recreational activities in which you take some force or impact through your arm, shoulder or hand (e.g., golf, hammering, tennis, etc.). Severe difficulty   7. During the past week, to what extent has your arm, shoulder or hand problem interfered with your normal social activities with family, friends, neighbours or groups? Moderately   8. During the past week, were you limited in your work or other regular daily activities as a result of your arm, shoulder or hand problem? Moderately limited          Allergies   Allergen Reactions     No Known Drug Allergies        Anatoly Koenig CMA  "

## 2018-07-02 NOTE — MR AVS SNAPSHOT
"              After Visit Summary   7/2/2018    Carrie Munoz    MRN: [id number]           Patient Information     Date Of Birth          1980        Visit Information        Provider Department      7/2/2018 8:00 AM Randall Maradiaga MD [facility]        Today's Diagnoses     Ganglion of wrist, left    -  1       Follow-ups after your visit        Your next 10 appointments already scheduled     Aug 13, 2018  8:30 AM CDT   (Arrive by 8:15 AM)   New Patient Visit with Riki Burk MD   [facility] ([email or web address] [place])    [address] [place] [address]  [address] [place] [address] [phone]              Who to contact     Please call your clinic at [phone] to:    Ask questions about your health    Make or cancel appointments    Discuss your medicines    Learn about your test results    Speak to your doctor            Additional Information About Your Visit        MyChart Information     [employer] gives you secure access to your electronic health record. If you see a primary care provider, you can also send messages to your care team and make appointments. If you have questions, please call your primary care clinic.  If you do not have a primary care provider, please call [phone] and they will assist you.      [employer] is an electronic gateway that provides easy, online access to your medical records. With [employer], you can request a clinic appointment, read your test results, renew a prescription or communicate with your care team.     To access your existing account, please contact your [facility] or call [phone] for assistance.        Care EveryWhere ID     This is your Care EveryWhere ID. This could be used by other organizations to access your [place] medical records  [id number]        Your Vitals Were     Height BMI (Body Mass Index)                1.727 m (5' 8\") 36.58 kg/m2           Blood Pressure " from Last 3 Encounters:   06/29/18 110/74   05/24/18 107/75   05/21/18 112/78    Weight from Last 3 Encounters:   07/02/18 109.1 kg (240 lb 9.6 oz)   06/29/18 107.5 kg (237 lb)   06/06/18 111.6 kg (246 lb)              Today, you had the following     No orders found for display       Primary Care Provider Office Phone # Fax #    Narciso Daoy Sanford [phone]       [address] [facility] [address]  [facility] [address]        Equal Access to Services     [facility]: Hadii tonny kauffman hadtessa Sodara, waaxda erikaadaha, qaemelia kaalmada celestino, myriam mario . So [facility] [phone].    ATENCIÓN: Si habla español, tiene a kapoor disposición servicios gratuitos de asistencia lingüística. [facility] [phone].    We comply with applicable federal civil rights laws and [place] laws. We do not discriminate on the basis of race, color, national origin, age, disability, sex, sexual orientation, or gender identity.            Thank you!     [facility] for choosing [facility]  for your care. Our goal is always to provide you with excellent care. Hearing back from our patients is one way we can continue to improve our services. Please take a few minutes to complete the written survey that you may receive in the mail after your visit with us. Thank you!             Your Updated Medication List - Protect others around you: Learn how to safely use, store and throw away your medicines at www.disposemymeds.org.          This list is accurate as of 7/2/18  8:43 AM.  Always use your most recent med list.                   Brand Name Dispense Instructions for use Diagnosis    fluticasone 50 MCG/ACT spray    FLONASE    3 Bottle    Spray 1-2 sprays into both nostrils daily    Postnasal drip       gabapentin 300 MG capsule    NEURONTIN    150 capsule    Take 2 tablets (44352 mg) by mouth in AM/morning, 1 tablet (300 mg) at noon and 2 tablets (900 mg) at bedtime for sleep    Acute  midline low back pain with right-sided sciatica       ibuprofen 600 MG tablet    ADVIL/MOTRIN    120 tablet    Take 1 tablet (600 mg) by mouth every 6 hours as needed for moderate pain    Acute midline low back pain with right-sided sciatica       levonorgestrel 20 MCG/24HR IUD    MIRENA (52 MG)    1 each    1 each (20 mcg) by Intrauterine route once for 1 dose    Encounter for IUD insertion       * lidocaine 5 % Patch    LIDODERM    30 patch    Apply up to 3 patches to painful area at once for up to 12 h within a 24 h period.  Remove after 12 hours.    Acute midline low back pain with right-sided sciatica       * Lidocaine 4 % Patch    LIDOCARE    30 patch    Apply 3 patches to painful area for up to 12 hours in 24 hours period of time, remove after 12 hours    Sacroiliac joint pain, Acute bilateral low back pain with right-sided sciatica       * Notice:  This list has 2 medication(s) that are the same as other medications prescribed for you. Read the directions carefully, and ask your doctor or other care provider to review them with you.

## 2018-07-02 NOTE — PROGRESS NOTES
"[facility]  Orthopedics  Randall Maradiaga MD  2018     Name: Carrie Munoz  MRN: [id number]  Age: 37 year old  : 1980  Referring provider: Randall Maradiaga     Chief Complaint:   RECHECK (Left wrist ganglion cyst.)       Date of Surgery: 18    Procedure Performed: Excision left  dorsal wrist ganglion    History of Present Illness:   Carrie Munoz is a 37 year old female 6 weeks status-post excision left  dorsal wrist ganglion who presents for postoperative evaluation. I last saw her on 18 at which time she reported not to take any more narcotic pain medication.     Today she reports that her left wrist is sore. SHe feels she may be has been doing more activity than she should have. She moved a lot of heavy computers at work the other day which seemed to flare things up. Reports numbness around the incision but none in the fingers or remainder of the hand. She has been taking gabapentin and ibuprofen for her back pain, which also helps alleviate pain with her wrist. Overall, she is satisfied with the results of surgery and feels that she is improving over time.    Physical Examination:  Ht 1.727 m (5' 8\")  Wt 109.1 kg (240 lb 9.6 oz)  BMI 36.58 kg/m2  General: Healthy appearing female. Affect appropriate. Normal gait. Alert and oriented to surroundings.   Left Upper Extremity: Radial pulses are palpable. Surgical incision  healing well without evidence of induration, erythema, or drainage. Thumb opposition is intact. Patient can actively flex and extend all digits and thumb. Interosseous muscles, EPL, and FDP-2 fire. Fingers are warm and well-perfused. Wrist range of motion is 90 degrees flexion and 70 degrees extension. symmetric pronation and supination to contralateral side.  Diminished sensation around incision, but  sensation intact in median, radial, and ulnar nerve distributions distally.    Assessment:   37 year old female status-post excision left  dorsal wrist ganglion " progressing appropriately    Plan:   I discussed scar massage with her. She should do this 3-4 times a day. No formal activity restrictions. However, she should let pain be her guide and advance gradually. Follow up as needed. If she does not feel that she is able to do all of her activities without discomfort in the next 1-2 months, I would like her to see me back in clinic at which point we will discuss hand therapy for a strengthening and proprioceptive program. I did offer to schedule a formal follow-up visit with her today but she prefers to wait and see how things go.      Scribe Disclosure:   I, Erik Shields, am serving as a scribe to document services personally performed by Randall Maradiaga MD at this visit, based upon the provider's statements to me. All documentation has been reviewed by the aforementioned provider prior to being entered into the official medical record.     Portions of this medical record were completed by a scribe. UPON MY REVIEW AND AUTHENTICATION BY ELECTRONIC SIGNATURE, this confirms (a) I performed the applicable clinical services, and (b) the record is accurate.

## 2018-07-02 NOTE — LETTER
"2018       RE: Carrie Munoz  [address] [facility] [address]     Dear Colleague,    Thank you for referring your patient, Carrie Munoz, to the [facility] ORTHOPAEDIC CLINIC at [facility]. Please see a copy of my visit note below.    [facility]  Orthopedics  Randall Maradiaga MD  2018     Name: Carrie Munoz  MRN: [id number]  Age: 37 year old  : 1980  Referring provider: Randall Maradiaga     Chief Complaint:   RECHECK (Left wrist ganglion cyst.)       Date of Surgery: 18    Procedure Performed: Excision left  dorsal wrist ganglion    History of Present Illness:   Carrie Munoz is a 37 year old female 6 weeks status-post excision left  dorsal wrist ganglion who presents for postoperative evaluation. I last saw her on 18 at which time she reported not to take any more narcotic pain medication.     Today she reports that her left wrist is sore. SHe feels she may be has been doing more activity than she should have. She moved a lot of heavy computers at work the other day which seemed to flare things up. Reports numbness around the incision but none in the fingers or remainder of the hand. She has been taking gabapentin and ibuprofen for her back pain, which also helps alleviate pain with her wrist. Overall, she is satisfied with the results of surgery and feels that she is improving over time.    Physical Examination:  Ht 1.727 m (5' 8\")  Wt 109.1 kg (240 lb 9.6 oz)  BMI 36.58 kg/m2  General: Healthy appearing female. Affect appropriate. Normal gait. Alert and oriented to surroundings.   Left Upper Extremity: Radial pulses are palpable. Surgical incision  healing well without evidence of induration, erythema, or drainage. Thumb opposition is intact. Patient can actively flex and extend all digits and thumb. Interosseous muscles, EPL, and FDP-2 fire. Fingers are warm and well-perfused. Wrist range of motion is 90 degrees flexion and " 70 degrees extension. symmetric pronation and supination to contralateral side.  Diminished sensation around incision, but  sensation intact in median, radial, and ulnar nerve distributions distally.    Assessment:   37 year old female status-post excision left  dorsal wrist ganglion progressing appropriately    Plan:   I discussed scar massage with her. She should do this 3-4 times a day. No formal activity restrictions. However, she should let pain be her guide and advance gradually. Follow up as needed. If she does not feel that she is able to do all of her activities without discomfort in the next 1-2 months, I would like her to see me back in clinic at which point we will discuss hand therapy for a strengthening and proprioceptive program. I did offer to schedule a formal follow-up visit with her today but she prefers to wait and see how things go.      Scribe Disclosure:   I, Erik Shields, am serving as a scribe to document services personally performed by Ranadll Maradiaga MD at this visit, based upon the provider's statements to me. All documentation has been reviewed by the aforementioned provider prior to being entered into the official medical record.     Portions of this medical record were completed by a scribe. UPON MY REVIEW AND AUTHENTICATION BY ELECTRONIC SIGNATURE, this confirms (a) I performed the applicable clinical services, and (b) the record is accurate.    Again, thank you for allowing me to participate in the care of your patient.      Sincerely,    Randall Maradiaga MD

## 2018-08-13 ENCOUNTER — OFFICE VISIT (OUTPATIENT)
Dept: RHEUMATOLOGY | Facility: CLINIC | Age: 38
End: 2018-08-13
Attending: INTERNAL MEDICINE

## 2018-08-13 VITALS
BODY MASS INDEX: 36.77 KG/M2 | OXYGEN SATURATION: 98 % | TEMPERATURE: 98.4 F | HEIGHT: 68 IN | RESPIRATION RATE: 16 BRPM | WEIGHT: 242.6 LBS | DIASTOLIC BLOOD PRESSURE: 78 MMHG | HEART RATE: 86 BPM | SYSTOLIC BLOOD PRESSURE: 111 MMHG

## 2018-08-13 DIAGNOSIS — M53.3 SACROILIAC JOINT PAIN: ICD-10-CM

## 2018-08-13 PROCEDURE — G0463 HOSPITAL OUTPT CLINIC VISIT: HCPCS | Mod: ZF

## 2018-08-13 ASSESSMENT — PAIN SCALES - GENERAL: PAINLEVEL: SEVERE PAIN (7)

## 2018-08-13 NOTE — MR AVS SNAPSHOT
After Visit Summary   8/13/2018    Carrie Munoz    MRN: [id number]           Patient Information     Date Of Birth          1980        Visit Information        Provider Department      8/13/2018 8:30 AM Riki Burk MD [facility] Rheumatology        Today's Diagnoses     Sacroiliac joint pain          Care Instructions    -Get labs and MRI when able  -continue gabapentin  -daily low dose aerobic exercise  -Return to clinic in 3 months          Follow-ups after your visit        Follow-up notes from your care team     Return in about 3 months (around 11/13/2018).      Who to contact     If you have questions or need follow up information about today's clinic visit or your schedule please contact [facility] RHEUMATOLOGY directly at [phone].  Normal or non-critical lab and imaging results will be communicated to you by ERCOMhart, letter or phone within 4 business days after the clinic has received the results. If you do not hear from us within 7 days, please contact the clinic through TrekkSoftt or phone. If you have a critical or abnormal lab result, we will notify you by phone as soon as possible.  Submit refill requests through [employer] or call your pharmacy and they will forward the refill request to us. Please allow 3 business days for your refill to be completed.          Additional Information About Your Visit        ERCOMharNutrigreen Information     [employer] gives you secure access to your electronic health record. If you see a primary care provider, you can also send messages to your care team and make appointments. If you have questions, please call your primary care clinic.  If you do not have a primary care provider, please call [phone] and they will assist you.        Care EveryWhere ID     This is your Care EveryWhere ID. This could be used by other organizations to access your [place] medical records  [id number]        Your Vitals Were     Pulse Temperature Respirations Height  "Pulse Oximetry BMI (Body Mass Index)    86 98.4  F (36.9  C) (Oral) 16 1.727 m (5' 8\") 98% 36.89 kg/m2       Blood Pressure from Last 3 Encounters:   08/13/18 111/78   06/29/18 110/74   05/24/18 107/75    Weight from Last 3 Encounters:   08/13/18 110 kg (242 lb 9.6 oz)   07/02/18 109.1 kg (240 lb 9.6 oz)   06/29/18 107.5 kg (237 lb)                 Today's Medication Changes          These changes are accurate as of 8/13/18 11:59 PM.  If you have any questions, ask your nurse or doctor.               These medicines have changed or have updated prescriptions.        Dose/Directions    gabapentin 300 MG capsule   Commonly known as:  NEURONTIN   This may have changed:    - how much to take  - how to take this  - when to take this  - additional instructions   Used for:  Acute midline low back pain with right-sided sciatica        Take 2 tablets (57680 mg) by mouth in AM/morning, 1 tablet (300 mg) at noon and 2 tablets (900 mg) at bedtime for sleep   Quantity:  150 capsule   Refills:  1                Primary Care Provider Office Phone # Fax #    Narciso Dayo Sanford [phone]       [address] [address]        Equal Access to Services     JUANA MOMIN : Hadii tonny kauffman hadasho Sodara, waaxda luqadaha, qaybta kaalmada celestino, myriam levy. So [facility] [phone].    ATENCIÓN: Si habla español, tiene a kapoor disposición servicios gratuitos de asistencia lingüística. Llame al [phone].    We comply with applicable federal civil rights laws and [place] laws. We do not discriminate on the basis of race, color, national origin, age, disability, sex, sexual orientation, or gender identity.            Thank you!     Thank you for choosing [facility] RHEUMATOLOGY  for your care. Our goal is always to provide you with excellent care. Hearing back from our patients is one way we can continue to improve our services. Please take a few minutes to complete " the written survey that you may receive in the mail after your visit with us. Thank you!             Your Updated Medication List - Protect others around you: Learn how to safely use, store and throw away your medicines at www.disposemymeds.org.          This list is accurate as of 8/13/18 11:59 PM.  Always use your most recent med list.                   Brand Name Dispense Instructions for use Diagnosis    fluticasone 50 MCG/ACT spray    FLONASE    3 Bottle    Spray 1-2 sprays into both nostrils daily    Postnasal drip       gabapentin 300 MG capsule    NEURONTIN    150 capsule    Take 2 tablets (76709 mg) by mouth in AM/morning, 1 tablet (300 mg) at noon and 2 tablets (900 mg) at bedtime for sleep    Acute midline low back pain with right-sided sciatica       ibuprofen 600 MG tablet    ADVIL/MOTRIN    120 tablet    Take 1 tablet (600 mg) by mouth every 6 hours as needed for moderate pain    Acute midline low back pain with right-sided sciatica       levonorgestrel 20 MCG/24HR IUD    MIRENA (52 MG)    1 each    1 each (20 mcg) by Intrauterine route once for 1 dose    Encounter for IUD insertion

## 2018-08-13 NOTE — PROGRESS NOTES
"  Rheumatology Clinic Visit 1     Carrie Munoz MRN# [id number]   YOB: 1980 Age: 37 year old     Date of Visit: 08/17/2018  Primary care provider: Narciso Sanford          Assessment and Plan:   #Polyarticular pain  #Diffuse tenderness to palpation  #Non-restorative sleep, fatigue  #Difficulty concentrating    Her diffuse tenderness to palpation and chronic non-restorative sleep, mental \"fog\" and arthralgias are all highly consistent with fibromyalgia. Since fibromyalgia is in part a diagnosis of exclusion, we will need to rule out an inflammatory disorder. We will check inflammatory markers, RF/CCP as she does have small, symmetric joint pains, and an ROBERTO as she has sicca symptoms. A spondyloarthropathy seems less likely, but as her pain started in her SI joints and she has some eye redness/pain, we should image her hip and check for HLA B27.   We discussed the importance of daily, low intensity exercise. If all tests come back negative, she would likely benefit from an SNRI.   She currently has a lapse in insurance, so she will get that corrected before getting labs and imaging.    -MRI pelvis  -Check: RF, CCP, ROBERTO, ESR, CRP, HLA-B27  -daily, low intensity exercise  -Continue gabapentin  -Start duloxetine 30mg/day if all tests negative  -RTC 3 months          Active Problem List:     Patient Active Problem List    Diagnosis Date Noted     Secondary amenorrhea 01/02/2018     Priority: Medium     Irregular heart beat 02/24/2017     Priority: Medium     2/24/2017 - 2/24/2017 - Per cardiology \"indirect tests to rule out PE and DVT, all normal. I did not see anything unusual on her cardiac echo. NO special precautions besides the usual from cardiac standpoint. If she develops any sudden worsening of her shortness of breath, I would have a low threshold to do CT scan and rule out PE. I did not have a high enough suspicion when I saw her to pursue a CT scan\".        Sinus tachycardia " 02/07/2017     Priority: Medium     Sacroiliac joint pain 10/31/2016     Priority: Medium     Breast lump on right side at 2:30  o'clock position 05/04/2011     Priority: Medium     Sinusitis, chronic 10/22/2003     Priority: Medium     Problem list name updated by automated process. Provider to review              History of Present Illness:   Carrie Munoz is a 37 year old  female who presents for evaluation of lower back pain.     Her symptoms started two years ago, while pregnant around the start of the 2nd trimester. She developed pain in SI joint.   She had difficulty walking d/t pain.  She ended up on modified bed rest because she couldn't move. She had vaginal delivery 10lbs after three days of labor. After delivery, she still had difficulty walking d/t pain in her lower back. At 6 weeks post-partem she started PT for 2-3 months. This did not help at all. She started seeing a chiropracter throughout pregnancy. This helped at first, but stopped working during the time she was working with PT. She has not seen her chiropracter since November 2017. She received injections in SI joints in Feb 2018 that helped for a month then pain came worse than before.     Currently, the pain is located over SI joint that shoots down back of legs at times. At other times she has pain in the knee and ankles that is hard to determine if it is a radiating pain or if it is a separate pain. The pain is sharp in the hips. In the ankles, she has a surge of pain and then it aches after. She has fairly constant pain in knees. The knees are achy at rest, then shooting pain for 10-15 minutes when she starts walking, then it can improve. The pain, overall, is worse in morning and night, and it worsens with activity.      She has been taking gabapentin which helps her sleep. She takes 600mg ibuprofen bid. This helps. Tylenol, heat, cold, do not.     She had a ganglion cyst in her left wrist that had been there for years.  She had the cyst drained while she was pregnant, and had this removed in May.     She has RP to wrist?   She has had red, painful eyes that started in May. She had two days of red eyes. They are sore throughout the eye, worst at back. Sometimes hurts with movement.   Long standing acid reflux.   She has heart palpitations a couple times/week that typically lasts ~20minutes. Feels the fast heart rate. This can trigger a panic, so she breathes deeply, which will help.   She has constant dry mouth, and she carries bottle of water with her.  She has frequent HA, primarily behind eyes and in back of head.   She has had intermittent tingling in her toes after back pain started.   She has intermittent weakness in her fingers.     Serology  - None tested    Last DEXA: No results found.  Calcium/VitD: Not indicated  Bisphosphonate: Not indicated    HepBcore Ab: Not in EMR  HepBsurfaceA16  HepC: Not in EMR  HIV: 16  Tb: Not in EMR    Vaccinations: (best done 2 weeks before therapy, or hold for 2 weeks, (MTX, abatacept, ritux worst)   Flu: 17 (yearly)  Td(ap): 16 (1 dose Tdap, then Td every 10 years)  Shingrix: NA (>50 years, 2 doses at least 4 weeks apart, can be given with pneumo vaccine)  PCV13: NA (give first)  PPSV23:  NA (give at least 8 weeks after PCV13, 2nd dose 5 years after first)  HPV: NA (Females 26 and under, males 21 and under; 3 doses: 0, 1-2, 6 months    The ASCVD Risk score (Kristen DC Jr, et al., 2013) failed to calculate for the following reasons:    The 2013 ASCVD risk score is only valid for ages 40 to 79 (re-calculate q5 years, 1.5 multiplier for RA)           Review of Systems:     A complete, 10-point ROS was negative except as in Interval History          Past Medical History:     Past Medical History:   Diagnosis Date     Breast disorder     biopsy (?) benign     Breast lump on right side at 2:30  o'clock position 2011     Cardiac abnormality     tachycardia during  pregnancy     Depressive disorder      Mental disorder     depression, anxiety     Unspecified sinusitis (chronic)      Past Surgical History:   Procedure Laterality Date     EXCISE GANGLION WRIST Left 5/24/2018    Procedure: EXCISE GANGLION WRIST;  Excision Left Dorsal Wirst Ganglion;  Surgeon: Randall Maradiaga MD;  Location: [facility] OR      TOOTH EXTRACTION W/FORCEP  2003    all 4 together            Social History:     Social History     Occupational History     teller [employer]     Social History Main Topics     Smoking status: Never Smoker     Smokeless tobacco: Never Used     Alcohol use No     Drug use: No     Sexual activity: Not Currently     Partners: Male     Birth control/ protection: None   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          Family History:     Family History   Problem Relation Age of Onset     Hypertension Paternal Grandmother      Osteoperosis Paternal Grandmother      Hypertension Paternal Aunt      Neurologic Disorder Father      sheehan's palsy     Hypertension Father      not on medication, smoker     Neurologic Disorder Mother      bell's palsy     Cousin with SLE.   Aunt with MS.          Allergies:     Allergies   Allergen Reactions     No Known Drug Allergies             Medications:     Current Outpatient Prescriptions   Medication Sig Dispense Refill     gabapentin (NEURONTIN) 300 MG capsule Take 2 tablets (25154 mg) by mouth in AM/morning, 1 tablet (300 mg) at noon and 2 tablets (900 mg) at bedtime for sleep (Patient taking differently: Take 600 mg by mouth At Bedtime ) 150 capsule 1     ibuprofen (ADVIL/MOTRIN) 600 MG tablet Take 1 tablet (600 mg) by mouth every 6 hours as needed for moderate pain 120 tablet 1     levonorgestrel (MIRENA, 52 MG,) 20 MCG/24HR IUD 1 each (20 mcg) by Intrauterine route once for 1 dose 1 each 0     fluticasone (FLONASE) 50 MCG/ACT spray Spray 1-2 sprays into both nostrils daily (Patient not taking: Reported on  "6/29/2018) 3 Bottle 1            Physical Exam:   Blood pressure 111/78, pulse 86, temperature 98.4  F (36.9  C), temperature source Oral, resp. rate 16, height 1.727 m (5' 8\"), weight 110 kg (242 lb 9.6 oz), SpO2 98 %, not currently breastfeeding.  Wt Readings from Last 4 Encounters:   08/13/18 110 kg (242 lb 9.6 oz)   07/02/18 109.1 kg (240 lb 9.6 oz)   06/29/18 107.5 kg (237 lb)   06/06/18 111.6 kg (246 lb)       Constitutional: well-developed, appearing stated age; cooperative  Skin: no nail pitting, alopecia, rash, nodules or lesions, acanthosis nigricans on neck  Eyes: nl EOM, vision, conjunctiva, sclera, tear pool  ENT: nl external ears, nose, hearing, lips, gums, throat  No mucous membrane lesions, good dentition  Neck: no mass, non-tender thyroid  Resp: lungs clear to auscultation, breathing comfortably on room air  CV: RRR, no murmurs, rubs or gallops, no edema  GI: soft, non-tender, non-distended  Lymph: no cervical, supraclavicular, or epitrochlear nodes  Neuro: nl cranial nerves, strength, sensation  Psych: nl judgement, orientation, memory, affect.    MS: The TMJ, neck, shoulder, elbow, wrist, MCP/PIP/DIP, spine, hip, knee, ankle, and MTP/IP joints were examined and found normal. No active synovitis or altered joint anatomy. Full joint ROM. Normal  strength. No dactylitis,  tenosynovitis, enthesopathy.  Exceptions as follows: diffuse ttp 14/18 fibromyalgia tender points, L 2-3 finger swelling, b/l ankle swelling, non-radicular pain in hip with straight leg raise, VIMAL with normal ROM and pain in hip at max rom.          Data:     Results for orders placed or performed in visit on 06/29/18   Lipid panel reflex to direct LDL Fasting   Result Value Ref Range    Cholesterol 135 <200 mg/dL    Triglycerides 144 <150 mg/dL    HDL Cholesterol 36 (L) >49 mg/dL    LDL Cholesterol Calculated 70 <100 mg/dL    Non HDL Cholesterol 99 <130 mg/dL   Comprehensive metabolic panel (BMP + Alb, Alk Phos, ALT, AST, " Total. Bili, TP)   Result Value Ref Range    Sodium 141 133 - 144 mmol/L    Potassium 4.0 3.4 - 5.3 mmol/L    Chloride 107 94 - 109 mmol/L    Carbon Dioxide 22 20 - 32 mmol/L    Anion Gap 12 3 - 14 mmol/L    Glucose 98 70 - 99 mg/dL    Urea Nitrogen 11 7 - 30 mg/dL    Creatinine 0.71 0.52 - 1.04 mg/dL    GFR Estimate >90 >60 mL/min/1.7m2    GFR Estimate If Black >90 >60 mL/min/1.7m2    Calcium 9.2 8.5 - 10.1 mg/dL    Bilirubin Total 0.5 0.2 - 1.3 mg/dL    Albumin 4.3 3.4 - 5.0 g/dL    Protein Total 8.0 6.8 - 8.8 g/dL    Alkaline Phosphatase 68 40 - 150 U/L    ALT 33 0 - 50 U/L    AST 23 0 - 45 U/L     December 2017 lumbar spine MRI   Impression:  1. Lumbar spondylosis with tiny right foraminal disc protrusion at  L4-5 contacting and possibly impinging the exiting right L4 nerve  root.  2. Mild neural foraminal stenosis on the right at L4-5 and on the left  L5-S1. No significant spinal canal stenosis.  3. Mild right L5-S1 facet joint active arthropathy.  4. Incompletely characterized and visualized rounded signal  abnormality posterior to the T7 vertebral body on the  images. If  clinically indicated      Reviewed Rheumatology lab flowsheet    Riki Amado  I saw this patient with the Rheumatology Fellow. I agree with the findings and recommendations.    Jan Sky M.D.  Staff Rheumatologist, [facility]  Pager [phone]

## 2018-08-13 NOTE — PATIENT INSTRUCTIONS
-Get labs and MRI when able  -continue gabapentin  -daily low dose aerobic exercise  -Return to clinic in 3 months

## 2018-08-13 NOTE — NURSING NOTE
"Chief Complaint   Patient presents with     Consult     SI joint pain       /78 (BP Location: Right arm, Patient Position: Sitting, Cuff Size: Adult Large)  Pulse 86  Temp 98.4  F (36.9  C) (Oral)  Resp 16  Ht 1.727 m (5' 8\")  Wt 110 kg (242 lb 9.6 oz)  SpO2 98%  BMI 36.89 kg/m2    Ebony Silva [facility]  8/13/2018 8:38 AM      "

## 2018-08-13 NOTE — LETTER
"8/13/2018      RE: Carrie Munoz  [address] [facility] [address]         Rheumatology Clinic Visit 1     Carrie Munoz MRN# [id number]   YOB: 1980 Age: 37 year old     Date of Visit: 08/17/2018  Primary care provider: Narciso Sanford          Assessment and Plan:   #Polyarticular pain  #Diffuse tenderness to palpation  #Non-restorative sleep, fatigue  #Difficulty concentrating    Her diffuse tenderness to palpation and chronic non-restorative sleep, mental \"fog\" and arthralgias are all highly consistent with fibromyalgia. Since fibromyalgia is in part a diagnosis of exclusion, we will need to rule out an inflammatory disorder. We will check inflammatory markers, RF/CCP as she does have small, symmetric joint pains, and an ROBERTO as she has sicca symptoms. A spondyloarthropathy seems less likely, but as her pain started in her SI joints and she has some eye redness/pain, we should image her hip and check for HLA B27.   We discussed the importance of daily, low intensity exercise. If all tests come back negative, she would likely benefit from an SNRI.   She currently has a lapse in insurance, so she will get that corrected before getting labs and imaging.    -MRI pelvis  -Check: RF, CCP, ROBERTO, ESR, CRP, HLA-B27  -daily, low intensity exercise  -Continue gabapentin  -Start duloxetine 30mg/day if all tests negative  -RTC 3 months          Active Problem List:     Patient Active Problem List    Diagnosis Date Noted     Secondary amenorrhea 01/02/2018     Priority: Medium     Irregular heart beat 02/24/2017     Priority: Medium     2/24/2017 - 2/24/2017 - Per cardiology \"indirect tests to rule out PE and DVT, all normal. I did not see anything unusual on her cardiac echo. NO special precautions besides the usual from cardiac standpoint. If she develops any sudden worsening of her shortness of breath, I would have a low threshold to do CT scan and rule out PE. I did not have a high " "enough suspicion when I saw her to pursue a CT scan\".        Sinus tachycardia 02/07/2017     Priority: Medium     Sacroiliac joint pain 10/31/2016     Priority: Medium     Breast lump on right side at 2:30  o'clock position 05/04/2011     Priority: Medium     Sinusitis, chronic 10/22/2003     Priority: Medium     Problem list name updated by automated process. Provider to review              History of Present Illness:   Carrie Munoz is a 37 year old  female who presents for evaluation of lower back pain.     Her symptoms started two years ago, while pregnant around the start of the 2nd trimester. She developed pain in SI joint.   She had difficulty walking d/t pain.  She ended up on modified bed rest because she couldn't move. She had vaginal delivery 10lbs after three days of labor. After delivery, she still had difficulty walking d/t pain in her lower back. At 6 weeks post-partem she started PT for 2-3 months. This did not help at all. She started seeing a chiropracter throughout pregnancy. This helped at first, but stopped working during the time she was working with PT. She has not seen her chiropracter since November 2017. She received injections in SI joints in Feb 2018 that helped for a month then pain came worse than before.     Currently, the pain is located over SI joint that shoots down back of legs at times. At other times she has pain in the knee and ankles that is hard to determine if it is a radiating pain or if it is a separate pain. The pain is sharp in the hips. In the ankles, she has a surge of pain and then it aches after. She has fairly constant pain in knees. The knees are achy at rest, then shooting pain for 10-15 minutes when she starts walking, then it can improve. The pain, overall, is worse in morning and night, and it worsens with activity.      She has been taking gabapentin which helps her sleep. She takes 600mg ibuprofen bid. This helps. Tylenol, heat, cold, do " not.     She had a ganglion cyst in her left wrist that had been there for years. She had the cyst drained while she was pregnant, and had this removed in May.     She has RP to wrist?   She has had red, painful eyes that started in May. She had two days of red eyes. They are sore throughout the eye, worst at back. Sometimes hurts with movement.   Long standing acid reflux.   She has heart palpitations a couple times/week that typically lasts ~20minutes. Feels the fast heart rate. This can trigger a panic, so she breathes deeply, which will help.   She has constant dry mouth, and she carries bottle of water with her.  She has frequent HA, primarily behind eyes and in back of head.   She has had intermittent tingling in her toes after back pain started.   She has intermittent weakness in her fingers.     Serology  - None tested    Last DEXA: No results found.  Calcium/VitD: Not indicated  Bisphosphonate: Not indicated    HepBcore Ab: Not in EMR  HepBsurfaceA16  HepC: Not in EMR  HIV: 16  Tb: Not in EMR    Vaccinations: (best done 2 weeks before therapy, or hold for 2 weeks, (MTX, abatacept, ritux worst)   Flu: 17 (yearly)  Td(ap): 16 (1 dose Tdap, then Td every 10 years)  Shingrix: NA (>50 years, 2 doses at least 4 weeks apart, can be given with pneumo vaccine)  PCV13: NA (give first)  PPSV23:  NA (give at least 8 weeks after PCV13, 2nd dose 5 years after first)  HPV: NA (Females 26 and under, males 21 and under; 3 doses: 0, 1-2, 6 months    The ASCVD Risk score ([place] DYLAN Jr, et al., 2013) failed to calculate for the following reasons:    The 2013 ASCVD risk score is only valid for ages 40 to 79 (re-calculate q5 years, 1.5 multiplier for RA)         Past Medical History:     Past Medical History:   Diagnosis Date     Breast disorder     biopsy (?) benign     Breast lump on right side at 2:30  o'clock position 2011     Cardiac abnormality     tachycardia during pregnancy     Depressive  disorder      Mental disorder     depression, anxiety     Unspecified sinusitis (chronic)      Past Surgical History:   Procedure Laterality Date     EXCISE GANGLION WRIST Left 5/24/2018    Procedure: EXCISE GANGLION WRIST;  Excision Left Dorsal Wirst Ganglion;  Surgeon: Randall Maradiaga MD;  [facility]      TOOTH EXTRACTION W/FORCEP  2003    all 4 together            Social History:     Social History     Occupational History     teller [employer]     Social History Main Topics     Smoking status: Never Smoker     Smokeless tobacco: Never Used     Alcohol use No     Drug use: No     Sexual activity: Not Currently     Partners: Male     Birth control/ protection: None   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          Family History:     Family History   Problem Relation Age of Onset     Hypertension Paternal Grandmother      Osteoperosis Paternal Grandmother      Hypertension Paternal Aunt      Neurologic Disorder Father      sheehan's palsy     Hypertension Father      not on medication, smoker     Neurologic Disorder Mother      bell's palsy     Cousin with SLE.   Aunt with MS.          Allergies:     Allergies   Allergen Reactions     No Known Drug Allergies             Medications:     Current Outpatient Prescriptions   Medication Sig Dispense Refill     gabapentin (NEURONTIN) 300 MG capsule Take 2 tablets (35356 mg) by mouth in AM/morning, 1 tablet (300 mg) at noon and 2 tablets (900 mg) at bedtime for sleep (Patient taking differently: Take 600 mg by mouth At Bedtime ) 150 capsule 1     ibuprofen (ADVIL/MOTRIN) 600 MG tablet Take 1 tablet (600 mg) by mouth every 6 hours as needed for moderate pain 120 tablet 1     levonorgestrel (MIRENA, 52 MG,) 20 MCG/24HR IUD 1 each (20 mcg) by Intrauterine route once for 1 dose 1 each 0     fluticasone (FLONASE) 50 MCG/ACT spray Spray 1-2 sprays into both nostrils daily (Patient not taking: Reported on 6/29/2018) 3 Bottle 1             "Physical Exam:   Blood pressure 111/78, pulse 86, temperature 98.4  F (36.9  C), temperature source Oral, resp. rate 16, height 1.727 m (5' 8\"), weight 110 kg (242 lb 9.6 oz), SpO2 98 %, not currently breastfeeding.  Wt Readings from Last 4 Encounters:   08/13/18 110 kg (242 lb 9.6 oz)   07/02/18 109.1 kg (240 lb 9.6 oz)   06/29/18 107.5 kg (237 lb)   06/06/18 111.6 kg (246 lb)       Constitutional: well-developed, appearing stated age; cooperative  Skin: no nail pitting, alopecia, rash, nodules or lesions, acanthosis nigricans on neck  Eyes: nl EOM, vision, conjunctiva, sclera, tear pool  ENT: nl external ears, nose, hearing, lips, gums, throat  No mucous membrane lesions, good dentition  Neck: no mass, non-tender thyroid  Resp: lungs clear to auscultation, breathing comfortably on room air  CV: RRR, no murmurs, rubs or gallops, no edema  GI: soft, non-tender, non-distended  Lymph: no cervical, supraclavicular, or epitrochlear nodes  Neuro: nl cranial nerves, strength, sensation  Psych: nl judgement, orientation, memory, affect.    MS: The TMJ, neck, shoulder, elbow, wrist, MCP/PIP/DIP, spine, hip, knee, ankle, and MTP/IP joints were examined and found normal. No active synovitis or altered joint anatomy. Full joint ROM. Normal  strength. No dactylitis,  tenosynovitis, enthesopathy.  Exceptions as follows: diffuse ttp 14/18 fibromyalgia tender points, L 2-3 finger swelling, b/l ankle swelling, non-radicular pain in hip with straight leg raise, VIMAL with normal ROM and pain in hip at max rom.          Data:     Results for orders placed or performed in visit on 06/29/18   Lipid panel reflex to direct LDL Fasting   Result Value Ref Range    Cholesterol 135 <200 mg/dL    Triglycerides 144 <150 mg/dL    HDL Cholesterol 36 (L) >49 mg/dL    LDL Cholesterol Calculated 70 <100 mg/dL    Non HDL Cholesterol 99 <130 mg/dL   Comprehensive metabolic panel (BMP + Alb, Alk Phos, ALT, AST, Total. Bili, TP)   Result Value Ref " Range    Sodium 141 133 - 144 mmol/L    Potassium 4.0 3.4 - 5.3 mmol/L    Chloride 107 94 - 109 mmol/L    Carbon Dioxide 22 20 - 32 mmol/L    Anion Gap 12 3 - 14 mmol/L    Glucose 98 70 - 99 mg/dL    Urea Nitrogen 11 7 - 30 mg/dL    Creatinine 0.71 0.52 - 1.04 mg/dL    GFR Estimate >90 >60 mL/min/1.7m2    GFR Estimate If Black >90 >60 mL/min/1.7m2    Calcium 9.2 8.5 - 10.1 mg/dL    Bilirubin Total 0.5 0.2 - 1.3 mg/dL    Albumin 4.3 3.4 - 5.0 g/dL    Protein Total 8.0 6.8 - 8.8 g/dL    Alkaline Phosphatase 68 40 - 150 U/L    ALT 33 0 - 50 U/L    AST 23 0 - 45 U/L     December 2017 lumbar spine MRI   Impression:  1. Lumbar spondylosis with tiny right foraminal disc protrusion at  L4-5 contacting and possibly impinging the exiting right L4 nerve  root.  2. Mild neural foraminal stenosis on the right at L4-5 and on the left  L5-S1. No significant spinal canal stenosis.  3. Mild right L5-S1 facet joint active arthropathy.  4. Incompletely characterized and visualized rounded signal  abnormality posterior to the T7 vertebral body on the  images. If  clinically indicated      Reviewed Rheumatology lab flowsheet    Riki Amado  I saw this patient with the Rheumatology Fellow. I agree with the findings and recommendations.    aJn Sky M.D.  Staff Rheumatologist, [facility]  Pager [phone]

## 2018-08-13 NOTE — LETTER
"8/13/2018       RE: Carrie Munoz  [address] [facility] [address]     Dear Colleague,    Thank you for referring your patient, Carrie Munoz, to the [facility] RHEUMATOLOGY at [facility]. Please see a copy of my visit note below.      Rheumatology Clinic Visit 1     Carrie Munoz MRN# [id number]   YOB: 1980 Age: 37 year old     Date of Visit: 08/17/2018  Primary care provider: Narciso Sanford          Assessment and Plan:   #Polyarticular pain  #Diffuse tenderness to palpation  #Non-restorative sleep, fatigue  #Difficulty concentrating    Her diffuse tenderness to palpation and chronic non-restorative sleep, mental \"fog\" and arthralgias are all highly consistent with fibromyalgia. Since fibromyalgia is in part a diagnosis of exclusion, we will need to rule out an inflammatory disorder. We will check inflammatory markers, RF/CCP as she does have small, symmetric joint pains, and an ROBERTO as she has sicca symptoms. A spondyloarthropathy seems less likely, but as her pain started in her SI joints and she has some eye redness/pain, we should image her hip and check for HLA B27.   We discussed the importance of daily, low intensity exercise. If all tests come back negative, she would likely benefit from an SNRI.   She currently has a lapse in insurance, so she will get that corrected before getting labs and imaging.    -MRI pelvis  -Check: RF, CCP, ROBERTO, ESR, CRP, HLA-B27  -daily, low intensity exercise  -Continue gabapentin  -Start duloxetine 30mg/day if all tests negative  -RTC 3 months          Active Problem List:     Patient Active Problem List    Diagnosis Date Noted     Secondary amenorrhea 01/02/2018     Priority: Medium     Irregular heart beat 02/24/2017     Priority: Medium     2/24/2017 - 2/24/2017 - Per cardiology \"indirect tests to rule out PE and DVT, all normal. I did not see anything unusual on her cardiac echo. NO special " "precautions besides the usual from cardiac standpoint. If she develops any sudden worsening of her shortness of breath, I would have a low threshold to do CT scan and rule out PE. I did not have a high enough suspicion when I saw her to pursue a CT scan\".        Sinus tachycardia 02/07/2017     Priority: Medium     Sacroiliac joint pain 10/31/2016     Priority: Medium     Breast lump on right side at 2:30  o'clock position 05/04/2011     Priority: Medium     Sinusitis, chronic 10/22/2003     Priority: Medium     Problem list name updated by automated process. Provider to review              History of Present Illness:   Carrie Munoz is a 37 year old  female who presents for evaluation of lower back pain.     Her symptoms started two years ago, while pregnant around the start of the 2nd trimester. She developed pain in SI joint.   She had difficulty walking d/t pain.  She ended up on modified bed rest because she couldn't move. She had vaginal delivery 10lbs after three days of labor. After delivery, she still had difficulty walking d/t pain in her lower back. At 6 weeks post-partem she started PT for 2-3 months. This did not help at all. She started seeing a chiropracter throughout pregnancy. This helped at first, but stopped working during the time she was working with PT. She has not seen her chiropracter since November 2017. She received injections in SI joints in Feb 2018 that helped for a month then pain came worse than before.     Currently, the pain is located over SI joint that shoots down back of legs at times. At other times she has pain in the knee and ankles that is hard to determine if it is a radiating pain or if it is a separate pain. The pain is sharp in the hips. In the ankles, she has a surge of pain and then it aches after. She has fairly constant pain in knees. The knees are achy at rest, then shooting pain for 10-15 minutes when she starts walking, then it can improve. The " pain, overall, is worse in morning and night, and it worsens with activity.      She has been taking gabapentin which helps her sleep. She takes 600mg ibuprofen bid. This helps. Tylenol, heat, cold, do not.     She had a ganglion cyst in her left wrist that had been there for years. She had the cyst drained while she was pregnant, and had this removed in May.     She has RP to wrist?   She has had red, painful eyes that started in May. She had two days of red eyes. They are sore throughout the eye, worst at back. Sometimes hurts with movement.   Long standing acid reflux.   She has heart palpitations a couple times/week that typically lasts ~20minutes. Feels the fast heart rate. This can trigger a panic, so she breathes deeply, which will help.   She has constant dry mouth, and she carries bottle of water with her.  She has frequent HA, primarily behind eyes and in back of head.   She has had intermittent tingling in her toes after back pain started.   She has intermittent weakness in her fingers.     Serology  - None tested    Last DEXA: No results found.  Calcium/VitD: Not indicated  Bisphosphonate: Not indicated    HepBcore Ab: Not in EMR  HepBsurfaceA16  HepC: Not in EMR  HIV: 16  Tb: Not in EMR    Vaccinations: (best done 2 weeks before therapy, or hold for 2 weeks, (MTX, abatacept, ritux worst)   Flu: 17 (yearly)  Td(ap): 16 (1 dose Tdap, then Td every 10 years)  Shingrix: NA (>50 years, 2 doses at least 4 weeks apart, can be given with pneumo vaccine)  PCV13: NA (give first)  PPSV23:  NA (give at least 8 weeks after PCV13, 2nd dose 5 years after first)  HPV: NA (Females 26 and under, males 21 and under; 3 doses: 0, 1-2, 6 months    The ASCVD Risk score ([place] DYLAN Jr, et al., 2013) failed to calculate for the following reasons:    The 2013 ASCVD risk score is only valid for ages 40 to 79 (re-calculate q5 years, 1.5 multiplier for RA)         Past Medical History:     Past Medical History:    Diagnosis Date     Breast disorder     biopsy 2011(?) benign     Breast lump on right side at 2:30  o'clock position 5/4/2011     Cardiac abnormality     tachycardia during pregnancy     Depressive disorder      Mental disorder     depression, anxiety     Unspecified sinusitis (chronic)      Past Surgical History:   Procedure Laterality Date     EXCISE GANGLION WRIST Left 5/24/2018    Procedure: EXCISE GANGLION WRIST;  Excision Left Dorsal Wirst Ganglion;  Surgeon: Randall Maradiaga MD;  Location: [facility]     HC TOOTH EXTRACTION W/FORCEP  2003    all 4 together            Social History:     Social History     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          Family History:     Family History   Problem Relation Age of Onset     Hypertension Paternal Grandmother      Osteoperosis Paternal Grandmother      Hypertension Paternal Aunt      Neurologic Disorder Father      sheehan's palsy     Hypertension Father      not on medication, smoker     Neurologic Disorder Mother      bell's palsy     Cousin with SLE.   Aunt with MS.          Allergies:     Allergies   Allergen Reactions     No Known Drug Allergies             Medications:     Current Outpatient Prescriptions   Medication Sig Dispense Refill     gabapentin (NEURONTIN) 300 MG capsule Take 2 tablets (40508 mg) by mouth in AM/morning, 1 tablet (300 mg) at noon and 2 tablets (900 mg) at bedtime for sleep (Patient taking differently: Take 600 mg by mouth At Bedtime ) 150 capsule 1     ibuprofen (ADVIL/MOTRIN) 600 MG tablet Take 1 tablet (600 mg) by mouth every 6 hours as needed for moderate pain 120 tablet 1     levonorgestrel (MIRENA, 52 MG,) 20 MCG/24HR IUD 1 each (20  "mcg) by Intrauterine route once for 1 dose 1 each 0     fluticasone (FLONASE) 50 MCG/ACT spray Spray 1-2 sprays into both nostrils daily (Patient not taking: Reported on 6/29/2018) 3 Bottle 1            Physical Exam:   Blood pressure 111/78, pulse 86, temperature 98.4  F (36.9  C), temperature source Oral, resp. rate 16, height 1.727 m (5' 8\"), weight 110 kg (242 lb 9.6 oz), SpO2 98 %, not currently breastfeeding.  Wt Readings from Last 4 Encounters:   08/13/18 110 kg (242 lb 9.6 oz)   07/02/18 109.1 kg (240 lb 9.6 oz)   06/29/18 107.5 kg (237 lb)   06/06/18 111.6 kg (246 lb)       Constitutional: well-developed, appearing stated age; cooperative  Skin: no nail pitting, alopecia, rash, nodules or lesions, acanthosis nigricans on neck  Eyes: nl EOM, vision, conjunctiva, sclera, tear pool  ENT: nl external ears, nose, hearing, lips, gums, throat  No mucous membrane lesions, good dentition  Neck: no mass, non-tender thyroid  Resp: lungs clear to auscultation, breathing comfortably on room air  CV: RRR, no murmurs, rubs or gallops, no edema  GI: soft, non-tender, non-distended  Lymph: no cervical, supraclavicular, or epitrochlear nodes  Neuro: nl cranial nerves, strength, sensation  Psych: nl judgement, orientation, memory, affect.    MS: The TMJ, neck, shoulder, elbow, wrist, MCP/PIP/DIP, spine, hip, knee, ankle, and MTP/IP joints were examined and found normal. No active synovitis or altered joint anatomy. Full joint ROM. Normal  strength. No dactylitis,  tenosynovitis, enthesopathy.  Exceptions as follows: diffuse ttp 14/18 fibromyalgia tender points, L 2-3 finger swelling, b/l ankle swelling, non-radicular pain in hip with straight leg raise, VIMAL with normal ROM and pain in hip at max rom.          Data:     Results for orders placed or performed in visit on 06/29/18   Lipid panel reflex to direct LDL Fasting   Result Value Ref Range    Cholesterol 135 <200 mg/dL    Triglycerides 144 <150 mg/dL    HDL " Cholesterol 36 (L) >49 mg/dL    LDL Cholesterol Calculated 70 <100 mg/dL    Non HDL Cholesterol 99 <130 mg/dL   Comprehensive metabolic panel (BMP + Alb, Alk Phos, ALT, AST, Total. Bili, TP)   Result Value Ref Range    Sodium 141 133 - 144 mmol/L    Potassium 4.0 3.4 - 5.3 mmol/L    Chloride 107 94 - 109 mmol/L    Carbon Dioxide 22 20 - 32 mmol/L    Anion Gap 12 3 - 14 mmol/L    Glucose 98 70 - 99 mg/dL    Urea Nitrogen 11 7 - 30 mg/dL    Creatinine 0.71 0.52 - 1.04 mg/dL    GFR Estimate >90 >60 mL/min/1.7m2    GFR Estimate If Black >90 >60 mL/min/1.7m2    Calcium 9.2 8.5 - 10.1 mg/dL    Bilirubin Total 0.5 0.2 - 1.3 mg/dL    Albumin 4.3 3.4 - 5.0 g/dL    Protein Total 8.0 6.8 - 8.8 g/dL    Alkaline Phosphatase 68 40 - 150 U/L    ALT 33 0 - 50 U/L    AST 23 0 - 45 U/L     December 2017 lumbar spine MRI   Impression:  1. Lumbar spondylosis with tiny right foraminal disc protrusion at  L4-5 contacting and possibly impinging the exiting right L4 nerve  root.  2. Mild neural foraminal stenosis on the right at L4-5 and on the left  L5-S1. No significant spinal canal stenosis.  3. Mild right L5-S1 facet joint active arthropathy.  4. Incompletely characterized and visualized rounded signal  abnormality posterior to the T7 vertebral body on the  images. If  clinically indicated    Reviewed Rheumatology lab flowsheet    Riki Amado  I saw this patient with the Rheumatology Fellow. I agree with the findings and recommendations.    Jan Sky M.D.  Staff Rheumatologist, [facility]  Pager [phone]

## 2018-09-12 ENCOUNTER — MYC MEDICAL ADVICE (OUTPATIENT)
Dept: FAMILY MEDICINE | Facility: CLINIC | Age: 38
End: 2018-09-12

## 2018-09-12 NOTE — TELEPHONE ENCOUNTER
Dr. Sanford,     Please see [place] message below.     Cued letter.     Please review/sign or advise.     Dinora Quinn RN  [facility]

## 2018-09-12 NOTE — LETTER
[address] [address]  [phone]      9/12/2018    RE: Carrie Munoz  [address]   [facility] [address]      To Whom It May Concern:       Carrie Munoz is a patient under my medical care. She was last seen in our clinic on 06/29/18. Due to Carrie's chronic back issues, I am recommending that she has a firm supportive mattress on the bed that she sleeps on.     If you have any further questions, please contact me at my office.       Sincerely,           Dr. Narciso Sanford

## 2018-09-13 NOTE — TELEPHONE ENCOUNTER
Letter printed and faxed to Salvador at [phone].    Dinora Quinn RN  [facility]

## 2018-09-21 ENCOUNTER — RESULTS ONLY (OUTPATIENT)
Dept: OTHER | Facility: CLINIC | Age: 38
End: 2018-09-21

## 2018-09-21 ENCOUNTER — RADIANT APPOINTMENT (OUTPATIENT)
Dept: MRI IMAGING | Facility: CLINIC | Age: 38
End: 2018-09-21
Attending: INTERNAL MEDICINE
Payer: COMMERCIAL

## 2018-09-21 DIAGNOSIS — M54.41 ACUTE MIDLINE LOW BACK PAIN WITH RIGHT-SIDED SCIATICA: ICD-10-CM

## 2018-09-21 DIAGNOSIS — M53.3 SACROILIAC JOINT PAIN: ICD-10-CM

## 2018-09-21 LAB
ALBUMIN SERPL-MCNC: 4 G/DL (ref 3.4–5)
ALP SERPL-CCNC: 70 U/L (ref 40–150)
ALT SERPL W P-5'-P-CCNC: 34 U/L (ref 0–50)
ANION GAP SERPL CALCULATED.3IONS-SCNC: 7 MMOL/L (ref 3–14)
AST SERPL W P-5'-P-CCNC: 18 U/L (ref 0–45)
BILIRUB SERPL-MCNC: 0.4 MG/DL (ref 0.2–1.3)
BUN SERPL-MCNC: 13 MG/DL (ref 7–30)
CALCIUM SERPL-MCNC: 8.7 MG/DL (ref 8.5–10.1)
CHLORIDE SERPL-SCNC: 106 MMOL/L (ref 94–109)
CO2 SERPL-SCNC: 25 MMOL/L (ref 20–32)
CREAT SERPL-MCNC: 0.6 MG/DL (ref 0.52–1.04)
CRP SERPL-MCNC: 7.3 MG/L (ref 0–8)
ERYTHROCYTE [SEDIMENTATION RATE] IN BLOOD BY WESTERGREN METHOD: 12 MM/H (ref 0–20)
GFR SERPL CREATININE-BSD FRML MDRD: >90 ML/MIN/1.7M2
GLUCOSE SERPL-MCNC: 96 MG/DL (ref 70–99)
HBV CORE AB SERPL QL IA: NONREACTIVE
HCV AB SERPL QL IA: NONREACTIVE
POTASSIUM SERPL-SCNC: 3.9 MMOL/L (ref 3.4–5.3)
PROT SERPL-MCNC: 7.7 G/DL (ref 6.8–8.8)
RHEUMATOID FACT SER NEPH-ACNC: <20 IU/ML (ref 0–20)
SODIUM SERPL-SCNC: 138 MMOL/L (ref 133–144)

## 2018-09-23 LAB — CCP AB SER IA-ACNC: <1 U/ML

## 2018-09-24 ENCOUNTER — MYC REFILL (OUTPATIENT)
Dept: FAMILY MEDICINE | Facility: CLINIC | Age: 38
End: 2018-09-24

## 2018-09-24 DIAGNOSIS — M54.41 ACUTE MIDLINE LOW BACK PAIN WITH RIGHT-SIDED SCIATICA: ICD-10-CM

## 2018-09-24 LAB
ANA SER QL IF: NEGATIVE
HLA-B27 QL NAA+PROBE: NORMAL

## 2018-09-25 NOTE — TELEPHONE ENCOUNTER
Message from MyChart:  Original authorizing provider: Narciso Sanford MD    Carrie Munoz would like a refill of the following medications:  ibuprofen (ADVIL/MOTRIN) 600 MG tablet [Narciso Sanford MD]  gabapentin (NEURONTIN) 300 MG capsule [Narciso Sanford MD]    Preferred pharmacy: [place], [place] - [address]    Comment:

## 2018-09-25 NOTE — TELEPHONE ENCOUNTER
"Requested Prescriptions   Pending Prescriptions Disp Refills     ibuprofen (ADVIL/MOTRIN) 600 MG tablet 120 tablet 1    Last Written Prescription Date:  06/14/2018  Last Fill Quantity: 120,  # refills: 1   Last office visit: 6/29/2018 with prescribing provider:  06/29/2018   Future Office Visit:   Sig: Take 1 tablet (600 mg) by mouth every 6 hours as needed for moderate pain    NSAID Medications Passed    9/25/2018  9:32 AM       Passed - Blood pressure under 140/90 in past 12 months    BP Readings from Last 3 Encounters:   08/13/18 111/78   06/29/18 110/74   05/24/18 107/75                Passed - Normal ALT on file in past 12 months    Recent Labs   Lab Test  09/21/18   0816   ALT  34            Passed - Normal AST on file in past 12 months    Recent Labs   Lab Test  09/21/18   0816   AST  18            Passed - Recent (12 mo) or future (30 days) visit within the authorizing provider's specialty    Patient had office visit in the last 12 months or has a visit in the next 30 days with authorizing provider or within the authorizing provider's specialty.  See \"Patient Info\" tab in inbasket, or \"Choose Columns\" in Meds & Orders section of the refill encounter.           Passed - Patient is age 6-64 years       Passed - Normal CBC on file in past 12 months    Recent Labs   Lab Test  05/21/18   0858   WBC  4.5   RBC  4.57   HGB  13.6   HCT  41.5   PLT  187       For [facility] ONLY: [id number] = WBC, [id number] = RBC         Passed - No active pregnancy on record       Passed - Normal serum creatinine on file in past 12 months    Recent Labs   Lab Test  09/21/18   0816   CR  0.60            Passed - No positive pregnancy test in past 12 months        Controlled Substance Refill Request for gabapentin (NEURONTIN) 300 MG capsule  Problem List Complete:  No     PROVIDER TO CONSIDER COMPLETION OF PROBLEM LIST AND OVERVIEW/CONTROLLED SUBSTANCE AGREEMENT    Last Written Prescription Date:  06/14/2018  Last Fill Quantity: 150,   # " refills: 1    Last Office Visit with [facility] care provider: 06/29/2018    Future Office visit:     Controlled substance agreement on file: No.     Processing:  Fax Rx to [facility] pharmacy pharmacy   checked in past 3 months?  No, route to RN

## 2018-09-26 LAB
B LOCUS: NORMAL
B27TEST METHOD: NORMAL

## 2018-09-26 RX ORDER — IBUPROFEN 600 MG/1
600 TABLET, FILM COATED ORAL EVERY 6 HOURS PRN
Qty: 120 TABLET | Refills: 1 | Status: SHIPPED | OUTPATIENT
Start: 2018-09-26 | End: 2019-02-28

## 2018-09-26 RX ORDER — GABAPENTIN 300 MG/1
CAPSULE ORAL
Qty: 150 CAPSULE | Refills: 1 | Status: SHIPPED | OUTPATIENT
Start: 2018-09-26 | End: 2020-10-15

## 2018-09-26 NOTE — TELEPHONE ENCOUNTER
For ibuprofen:  Prescription approved per [facility] Refill Protocol.    For gabapentin:  Routing refill request to provider for review/approval because:  Drug not on the [facility] refill protocol       Lyric Spivey RN, BSN

## 2018-11-05 ENCOUNTER — TELEPHONE (OUTPATIENT)
Dept: PHARMACY | Facility: CLINIC | Age: 38
End: 2018-11-05

## 2018-11-05 ENCOUNTER — OFFICE VISIT (OUTPATIENT)
Dept: RHEUMATOLOGY | Facility: CLINIC | Age: 38
End: 2018-11-05
Payer: COMMERCIAL

## 2018-11-05 VITALS
DIASTOLIC BLOOD PRESSURE: 77 MMHG | SYSTOLIC BLOOD PRESSURE: 114 MMHG | BODY MASS INDEX: 35.85 KG/M2 | WEIGHT: 235.8 LBS | OXYGEN SATURATION: 98 % | HEART RATE: 79 BPM | TEMPERATURE: 97.9 F

## 2018-11-05 DIAGNOSIS — M79.7 FIBROMYALGIA: ICD-10-CM

## 2018-11-05 DIAGNOSIS — M53.3 SACROILIAC JOINT PAIN: ICD-10-CM

## 2018-11-05 DIAGNOSIS — Z23 NEED FOR PROPHYLACTIC VACCINATION AND INOCULATION AGAINST INFLUENZA: Primary | ICD-10-CM

## 2018-11-05 PROCEDURE — G0463 HOSPITAL OUTPT CLINIC VISIT: HCPCS | Mod: 25,ZF

## 2018-11-05 PROCEDURE — 90686 IIV4 VACC NO PRSV 0.5 ML IM: CPT | Mod: ZF | Performed by: STUDENT IN AN ORGANIZED HEALTH CARE EDUCATION/TRAINING PROGRAM

## 2018-11-05 PROCEDURE — 25000128 H RX IP 250 OP 636: Mod: ZF | Performed by: STUDENT IN AN ORGANIZED HEALTH CARE EDUCATION/TRAINING PROGRAM

## 2018-11-05 PROCEDURE — G0008 ADMIN INFLUENZA VIRUS VAC: HCPCS | Mod: ZF

## 2018-11-05 RX ORDER — DULOXETIN HYDROCHLORIDE 30 MG/1
30 CAPSULE, DELAYED RELEASE ORAL 2 TIMES DAILY
Qty: 90 CAPSULE | Refills: 3 | Status: SHIPPED | OUTPATIENT
Start: 2018-11-05 | End: 2020-10-15

## 2018-11-05 RX ADMIN — INFLUENZA A VIRUS A/MICHIGAN/45/2015 X-275 (H1N1) ANTIGEN (FORMALDEHYDE INACTIVATED), INFLUENZA A VIRUS A/SINGAPORE/INFIMH-16-0019/2016 IVR-186 (H3N2) ANTIGEN (FORMALDEHYDE INACTIVATED), INFLUENZA B VIRUS B/PHUKET/3073/2013 ANTIGEN (FORMALDEHYDE INACTIVATED), AND INFLUENZA B VIRUS B/MARYLAND/15/2016 BX-69A ANTIGEN (FORMALDEHYDE INACTIVATED) 0.5 ML: 15; 15; 15; 15 INJECTION, SUSPENSION INTRAMUSCULAR at 11:54

## 2018-11-05 ASSESSMENT — PAIN SCALES - GENERAL: PAINLEVEL: SEVERE PAIN (7)

## 2018-11-05 NOTE — TELEPHONE ENCOUNTER
[facility] Prior Authorization Team Request    Medication: Duloxetine 30  Dosing: Take 1 capsule by mouth twice a day  NDC (required for Medicaid members):     Insurance   BIN:[id number]  PCN:[id number]  Grp: [id number]  ID:[id number]    CoverMyMeds Key (if applicable):     Additional documentation:     Filling Pharmacy: [facility]   Phone Number: [phone]  Contact: KEVIN PHARM TAL PA (P [id number]) please send all responses to this pool.  Pharmacy NPI (required for Medicaid members):

## 2018-11-05 NOTE — PROGRESS NOTES
"  Rheumatology Clinic Visit 1     Carrie Munoz MRN# [id number]   YOB: 1980 Age: 37 year old     Date of Visit: 11/05/2018  Primary care provider: Narciso Sanford          Assessment and Plan:   #Fibromyalgia  #Pregnancy-related pelvic girdle pain    Ms. Munoz appears to have two problems that, unfortunately, likely result in synergy of their symptoms. With her diffuse tenderness to palpation, non-restorative sleep, mental \"fog\", exercise intolerance and otherwise essentially negative work-up, she meets criteria for fibromyalgia. She would likely benefit from the addition of duloxetine to help manage her symptoms.     In regards to her continued posterior/lateral pelvic pain, this most likely is due to pregnancy associated pelvic girdle pain. While these symptoms typically resolve in the months after delivery, in severe cases, which she seems to have had, symptoms can persist for much longer. Her MRI does show changes that could be consistent with an inflammatory process (e.g. A spondlyoarthropathy), but in the absence of other evidence of inflammation and a negative HLA-B27, I think this is unlikely. Additionally, in cases of severe pregnancy-related pelvic girdle pain, MRI changes can persist for >1 year.    Specialized physical therapy is recommended for pregnancy associated pelvic girdle pain.    As she does not have evidence of an inflammatory condition, we will not need to continue to see her, but are always happy to see her again if needed.     -Continue daily, low intensity exercise  -Continue gabapentin  -Start duloxetine 30mg/day   -PT for pregnancy associated pelvic girdle pain  -prn f/u appt with rheumatology. Will refer back to PCP.    I saw this patient with the Rheumatology Fellow. I agree with the findings and recommendations.    Jan Sky M.D.  Staff Rheumatologist, [facility]  Pager [phone]            Active Problem List:     Patient Active Problem List    " "Diagnosis Date Noted     Secondary amenorrhea 01/02/2018     Priority: Medium     Irregular heart beat 02/24/2017     Priority: Medium     2/24/2017 - 2/24/2017 - Per cardiology \"indirect tests to rule out PE and DVT, all normal. I did not see anything unusual on her cardiac echo. NO special precautions besides the usual from cardiac standpoint. If she develops any sudden worsening of her shortness of breath, I would have a low threshold to do CT scan and rule out PE. I did not have a high enough suspicion when I saw her to pursue a CT scan\".        Sinus tachycardia 02/07/2017     Priority: Medium     Sacroiliac joint pain 10/31/2016     Priority: Medium     Breast lump on right side at 2:30  o'clock position 05/04/2011     Priority: Medium     Sinusitis, chronic 10/22/2003     Priority: Medium     Problem list name updated by automated process. Provider to review              History of Present Illness:   Initial HPI  Carrie Munoz is a 37 year old  female who presents for evaluation of lower back pain.     Her symptoms started two years ago, while pregnant around the start of the 2nd trimester. She developed pain in SI joint.   She had difficulty walking d/t pain.  She ended up on modified bed rest because she couldn't move. She had vaginal delivery 10lbs after three days of labor. After delivery, she still had difficulty walking d/t pain in her lower back. At 6 weeks post-partem she started PT for 2-3 months. This did not help at all. She started seeing a chiropracter throughout pregnancy. This helped at first, but stopped working during the time she was working with PT. She has not seen her chiropracter since November 2017. She received injections in SI joints in Feb 2018 that helped for a month then pain came worse than before.     Currently, the pain is located over SI joint that shoots down back of legs at times. At other times she has pain in the knee and ankles that is hard to determine " if it is a radiating pain or if it is a separate pain. The pain is sharp in the hips. In the ankles, she has a surge of pain and then it aches after. She has fairly constant pain in knees. The knees are achy at rest, then shooting pain for 10-15 minutes when she starts walking, then it can improve. The pain, overall, is worse in morning and night, and it worsens with activity.    She has been taking gabapentin which helps her sleep. She takes 600mg ibuprofen bid. This helps. Tylenol, heat, cold, do not.   She had a ganglion cyst in her left wrist that had been there for years. She had the cyst drained while she was pregnant, and had this removed in May.   She has RP to wrist?   She has had red, painful eyes that started in May. She had two days of red eyes. They are sore throughout the eye, worst at back. Sometimes hurts with movement.   Long standing acid reflux.   She has heart palpitations a couple times/week that typically lasts ~20minutes. Feels the fast heart rate. This can trigger a panic, so she breathes deeply, which will help.   She has constant dry mouth, and she carries bottle of water with her.  She has frequent HA, primarily behind eyes and in back of head.   She has had intermittent tingling in her toes after back pain started.   She has intermittent weakness in her fingers.     Interval History  11-5-18  Since her last visit, she had negative serologies and inflammatory markers. She had an MRI pelvis which found some evidence of asymmetric inflammation and more prominent sclerosis. These results were discussed in detail at this visit. Overall, she is doing slightly better. She has lost 10lbs due to dietary improvements which has made her feel better overall. She got a egg foam roller for her bed, which has helped her sleep. She also has been taking gabapentin 900mg at bedtime. She recently tried circuit training and this sidelined her for 2 weeks. She plans to start doing water aerobics.   Her back  pain is still very bothersome. This really has not improved much since we last saw her. She will only take ibuprofen when she is in a lot of pain as this makes her very drowsy.       Serology  Postive:  Negative: RF, CCP, ROBERTO, HLA-B27    Last DEXA: No results found.  Calcium/VitD: Not indicated  Bisphosphonate: Not indicated    HepBcore Ab: Neg 9/21/18  HepBsurfaceAg: Neg 7/21/16  HepC: Neg 9/21/18  HIV: Neg 7/21/16  Tb: Not in EMR    Vaccinations: (best done 2 weeks before therapy, or hold for 2 weeks, (MTX, abatacept, ritux worst)   Flu: 9/22/17 (yearly)  Td(ap): 12/29/16 (1 dose Tdap, then Td every 10 years)  Shingrix: NA (>50 years, 2 doses at least 4 weeks apart, can be given with pneumo vaccine)  PCV13: NA (give first)  PPSV23:  NA (give at least 8 weeks after PCV13, 2nd dose 5 years after first)  HPV: NA (Females 26 and under, males 21 and under; 3 doses: 0, 1-2, 6 months    The ASCVD Risk score (Kristen DC Jr, et al., 2013) failed to calculate for the following reasons:    The 2013 ASCVD risk score is only valid for ages 40 to 79 (re-calculate q5 years, 1.5 multiplier for RA)           Review of Systems:     A complete, 10-point ROS was negative except as in Interval History          Past Medical History:     Past Medical History:   Diagnosis Date     Breast disorder     biopsy 2011(?) benign     Breast lump on right side at 2:30  o'clock position 5/4/2011     Cardiac abnormality     tachycardia during pregnancy     Depressive disorder      Mental disorder     depression, anxiety     Unspecified sinusitis (chronic)      Past Surgical History:   Procedure Laterality Date     EXCISE GANGLION WRIST Left 5/24/2018    Procedure: EXCISE GANGLION WRIST;  Excision Left Dorsal Wirst Ganglion;  Surgeon: Randall Maradiaga MD;  Location:  OR      TOOTH EXTRACTION W/FORCEP  2003    all 4 together            Social History:     Social History     Occupational History     teller [employer]     Social History Main  Topics     Smoking status: Never Smoker     Smokeless tobacco: Never Used     Alcohol use No     Drug use: No     Sexual activity: Not Currently     Partners: Male     Birth control/ protection: None   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          Family History:     Family History   Problem Relation Age of Onset     Hypertension Paternal Grandmother      Osteoporosis Paternal Grandmother      Hypertension Paternal Aunt      Neurologic Disorder Father      sheehan's palsy     Hypertension Father      not on medication, smoker     Neurologic Disorder Mother      bell's palsy     Cousin with SLE.   Aunt with MS.          Allergies:     Allergies   Allergen Reactions     No Known Drug Allergies             Medications:     Current Outpatient Prescriptions   Medication Sig Dispense Refill     fluticasone (FLONASE) 50 MCG/ACT spray Spray 1-2 sprays into both nostrils daily (Patient not taking: Reported on 6/29/2018) 3 Bottle 1     gabapentin (NEURONTIN) 300 MG capsule Take 2 tablets (61530 mg) by mouth in AM/morning, 1 tablet (300 mg) at noon and 2 tablets (900 mg) at bedtime for sleep 150 capsule 1     ibuprofen (ADVIL/MOTRIN) 600 MG tablet Take 1 tablet (600 mg) by mouth every 6 hours as needed for moderate pain 120 tablet 1     levonorgestrel (MIRENA, 52 MG,) 20 MCG/24HR IUD 1 each (20 mcg) by Intrauterine route once for 1 dose 1 each 0            Physical Exam:   not currently breastfeeding.  Wt Readings from Last 4 Encounters:   08/13/18 110 kg (242 lb 9.6 oz)   07/02/18 109.1 kg (240 lb 9.6 oz)   06/29/18 107.5 kg (237 lb)   06/06/18 111.6 kg (246 lb)       Constitutional: well-developed, appearing stated age; cooperative  Skin: no nail pitting, alopecia, rash, nodules or lesions, acanthosis nigricans on neck  Eyes: nl EOM, vision, conjunctiva, sclera, tear pool  ENT: nl external ears, nose, hearing, lips, gums, throat  No mucous membrane lesions, good dentition  Neck: no mass,  non-tender thyroid  Resp: lungs clear to auscultation, breathing comfortably on room air  CV: RRR, no murmurs, rubs or gallops, no edema  GI: soft, non-tender, non-distended  Lymph: no cervical, supraclavicular, or epitrochlear nodes  Neuro: nl cranial nerves, strength, sensation  Psych: nl judgement, orientation, memory, affect.    MS: The TMJ, neck, shoulder, elbow, wrist, MCP/PIP/DIP, spine, hip, knee, ankle, and MTP/IP joints were examined and found normal. No active synovitis or altered joint anatomy. Full joint ROM. Normal  strength. No dactylitis,  tenosynovitis, enthesopathy.  Exceptions as follows: diffuse ttp 14/18 fibromyalgia tender points (swelling at last appt resolved)         Data:     Results for orders placed or performed in visit on 09/21/18   **Comprehensive metabolic panel FUTURE anytime   Result Value Ref Range    Sodium 138 133 - 144 mmol/L    Potassium 3.9 3.4 - 5.3 mmol/L    Chloride 106 94 - 109 mmol/L    Carbon Dioxide 25 20 - 32 mmol/L    Anion Gap 7 3 - 14 mmol/L    Glucose 96 70 - 99 mg/dL    Urea Nitrogen 13 7 - 30 mg/dL    Creatinine 0.60 0.52 - 1.04 mg/dL    GFR Estimate >90 >60 mL/min/1.7m2    GFR Estimate If Black >90 >60 mL/min/1.7m2    Calcium 8.7 8.5 - 10.1 mg/dL    Bilirubin Total 0.4 0.2 - 1.3 mg/dL    Albumin 4.0 3.4 - 5.0 g/dL    Protein Total 7.7 6.8 - 8.8 g/dL    Alkaline Phosphatase 70 40 - 150 U/L    ALT 34 0 - 50 U/L    AST 18 0 - 45 U/L   Erythrocyte sedimentation rate auto   Result Value Ref Range    Sed Rate 12 0 - 20 mm/h   Hepatitis B core antibody   Result Value Ref Range    Hepatitis B Core Elke Nonreactive NR^Nonreactive   Hepatitis C antibody   Result Value Ref Range    Hepatitis C Antibody Nonreactive NR^Nonreactive   Anti Nuclear Elke IgG by IFA with Reflex   Result Value Ref Range    ROBERTO interpretation Negative NEG^Negative   Cyclic Citrullinated Peptide Antibody IgG   Result Value Ref Range    Cyclic Citrullinated Peptide Antibody, IgG <1 <7 U/mL    Rheumatoid factor   Result Value Ref Range    Rheumatoid Factor <20 <20 IU/mL   CRP inflammation   Result Value Ref Range    CRP Inflammation 7.3 0.0 - 8.0 mg/L   HLA-B27 Typing   Result Value Ref Range    HLA B27       Specimen received - Immunology report to follow upon completion.     December 2017 lumbar spine MRI   Impression:  1. Lumbar spondylosis with tiny right foraminal disc protrusion at  L4-5 contacting and possibly impinging the exiting right L4 nerve  root.  2. Mild neural foraminal stenosis on the right at L4-5 and on the left  L5-S1. No significant spinal canal stenosis.  3. Mild right L5-S1 facet joint active arthropathy.  4. Incompletely characterized and visualized rounded signal  abnormality posterior to the T7 vertebral body on the  images. If  clinically indicated      Reviewed Rheumatology lab flowsheet    Riki Amado

## 2018-11-05 NOTE — PATIENT INSTRUCTIONS
-Start duloxetine today  -Follow-up with Dr. Sanford in 3 months  -Physical therapy  -continue gabapentin, low-intensity exercise, good sleep hygeine, and healthy diet.   -No need to schedule Rheum follow-up

## 2018-11-05 NOTE — NURSING NOTE
Chief Complaint   Patient presents with     RECHECK     Sacroiliac joint pain      /77  Pulse 79  Temp 97.9  F (36.6  C) (Oral)  Wt 107 kg (235 lb 12.8 oz)  SpO2 98%  BMI 35.85 kg/m2  Margarette Crump MA

## 2018-11-06 NOTE — TELEPHONE ENCOUNTER
Central Prior Authorization Team   Phone: [phone]      PA Initiation    Medication: DULoxetine (CYMBALTA) 30 MG EC capsule  Insurance Company: [employer] - Phone [phone] Fax [phone]  Pharmacy Filling the Rx: [facility] [place], [place] - [address] [place] [address] [id number]  Filling Pharmacy Phone: [phone]  Filling Pharmacy Fax:    Start Date: 11/6/2018

## 2018-11-06 NOTE — TELEPHONE ENCOUNTER
Prior Authorization Approval          Authorization Effective Date: 11/5/2018  Authorization Expiration Date: 11/6/2019  Medication: DULoxetine (CYMBALTA) 30 MG EC capsule- P/A APPROVED  Approved Dose/Quantity: 60  Reference #: CASE ID [id number]   Insurance Company: [employer] - Phone [phone] Fax [phone]  Expected CoPay:       CoPay Card Available:      Foundation Assistance Needed:    Which Pharmacy is filling the prescription (Not needed for infusion/clinic administered): [facility] [place], [place] - [address] [place] [address] [id number]  Pharmacy Notified: Yes - spoke to pharmacy, they were able to get it processed with me on the phone  Patient Notified:

## 2018-11-27 ENCOUNTER — OFFICE VISIT (OUTPATIENT)
Dept: FAMILY MEDICINE | Facility: CLINIC | Age: 38
End: 2018-11-27
Payer: COMMERCIAL

## 2018-11-27 VITALS
OXYGEN SATURATION: 96 % | HEIGHT: 68 IN | DIASTOLIC BLOOD PRESSURE: 77 MMHG | SYSTOLIC BLOOD PRESSURE: 116 MMHG | TEMPERATURE: 97.8 F | RESPIRATION RATE: 16 BRPM | WEIGHT: 239 LBS | HEART RATE: 85 BPM | BODY MASS INDEX: 36.22 KG/M2

## 2018-11-27 DIAGNOSIS — H69.93 DYSFUNCTION OF BOTH EUSTACHIAN TUBES: ICD-10-CM

## 2018-11-27 DIAGNOSIS — H92.01 OTALGIA, RIGHT: Primary | ICD-10-CM

## 2018-11-27 PROCEDURE — 99213 OFFICE O/P EST LOW 20 MIN: CPT | Performed by: FAMILY MEDICINE

## 2018-11-27 NOTE — PROGRESS NOTES
SUBJECTIVE:   Carrie Munoz is a 38 year old female who presents to clinic today for the following health issues:      Ear infection     Duration: one week     Description (location/character/radiation): primarily right ear     Intensity:  moderate    Accompanying signs and symptoms: headaches, body ache, nausea and sinus     History (similar episodes/previous evaluation): None    Precipitating or alleviating factors: None    Therapies tried and outcome: nasal spray flonase and ibuprofen did not help      Pressure sensation on both ears. Worse on the right. Denies any changes to her hearing.     Problem list and histories reviewed & adjusted, as indicated.  Additional history: as documented    Patient Active Problem List   Diagnosis     Sinusitis, chronic     Breast lump on right side at 2:30  o'clock position     Sinus tachycardia     Irregular heart beat     Secondary amenorrhea     Fibromyalgia     Past Surgical History:   Procedure Laterality Date     EXCISE GANGLION WRIST Left 5/24/2018    Procedure: EXCISE GANGLION WRIST;  Excision Left Dorsal Wirst Ganglion;  Surgeon: Randall Maradiaga MD;  Location:  OR      TOOTH EXTRACTION W/FORCEP  2003    all 4 together       Social History   Substance Use Topics     Smoking status: Never Smoker     Smokeless tobacco: Never Used     Alcohol use No     Family History   Problem Relation Age of Onset     Hypertension Paternal Grandmother      Osteoporosis Paternal Grandmother      Hypertension Paternal Aunt      Neurologic Disorder Father      sheehan's palsy     Hypertension Father      not on medication, smoker     Neurologic Disorder Mother      bell's palsy           Reviewed and updated as needed this visit by clinical staff       Reviewed and updated as needed this visit by Provider         ROS:  Constitutional, HEENT, cardiovascular, pulmonary, gi and gu systems are negative, except as otherwise noted.    OBJECTIVE:     /77  Pulse 85  Temp 97.8  F  "(36.6  C) (Oral)  Resp 16  Ht 5' 8\" (1.727 m)  Wt 239 lb (108.4 kg)  SpO2 96%  BMI 36.34 kg/m2  Body mass index is 36.34 kg/(m^2).  GENERAL: healthy, alert and no distress  EYES: Eyes grossly normal to inspection, PERRL and conjunctivae and sclerae normal  HENT:clear fluid effusions in both ears. TMs intact bilaterally. No erythema was noted.   NECK: no adenopathy, no asymmetry, masses, or scars and thyroid normal to palpation  RESP: lungs clear to auscultation - no rales, rhonchi or wheezes  CV: regular rate and rhythm, normal S1 S2, no S3 or S4, no murmur, click or rub, no peripheral edema and peripheral pulses strong    Diagnostic Test Results:  none     ASSESSMENT/PLAN:       ICD-10-CM    1. Otalgia, right H92.01    2. Dysfunction of both eustachian tubes H69.83     I advised her to continue trying to autoinsufflate the ears several times per day.  I advised her that 80% of patients will have resolution of their eustachian tube dysfunction after approximately 2 months of symptoms.  If she does not get better, she should return to clinic for re-evaluation.     barry Huggins MD  [facility]    "

## 2018-11-27 NOTE — NURSING NOTE
"Chief Complaint   Patient presents with     Ear Problem     /77  Pulse 85  Temp 97.8  F (36.6  C) (Oral)  Resp 16  Ht 5' 8\" (1.727 m)  Wt 239 lb (108.4 kg)  SpO2 96%  BMI 36.34 kg/m2 Estimated body mass index is 36.34 kg/(m^2) as calculated from the following:    Height as of this encounter: 5' 8\" (1.727 m).    Weight as of this encounter: 239 lb (108.4 kg).  bp completed using cuff size: regular       Health Maintenance addressed:  NONE    n/a    Fabiana Robles MA     "

## 2018-11-27 NOTE — PATIENT INSTRUCTIONS
Earache, No Infection (Adult)  Earaches can happen without an infection. This occurs when air and fluid build up behind the eardrum causing a feeling of fullness and discomfort and reduced hearing. This is called otitis media with effusion (OME) or serous otitis media. It means there is fluid in the middle ear. It is not the same as acute otitis media, which is typically from infection.  OME can happen when you have a cold if congestion blocks the passage that drains the middle ear. This passage is called the eustachian tube. OME may also occur with nasal allergies or after a bacterial middle ear infection.    The pain or discomfort may come and go. You may hear clicking or popping sounds when you chew or swallow. You may feel that your balance is off. Or you may hear ringing in the ear.  It often takes from several weeks up to 3 months for the fluid to clear on its own. Oral pain relievers and ear drops help if there is pain. Decongestants and antihistamines sometimes help. Antibiotics don't help since there is no infection. Your doctor may prescribe a nasal spray to help reduce swelling in the nose and eustachian tube. This can allow the ear to drain.  If your OME doesn't improve after 3 months, surgery may be used to drain the fluid and insert a small tube in the eardrum to allow continued drainage.  Because the middle ear fluid can become infected, it is important to watch for signs of an ear infection which may develop later. These signs include increased ear pain, fever, or drainage from the ear.  Home care  The following guidelines will help you care for yourself at home:    You may use over-the-counter medicine as directed to control pain, unless another medicine was prescribed. If you have chronic liver or kidney disease or ever had a stomach ulcer or GI bleeding, talk with your doctor before using these medicines. Aspirin should never be used in anyone under 18 years of age who is ill with a fever. It  may cause severe liver damage.    You may use over-the-counter decongestants such as phenylephrine or pseudoephedrine. But they are not always helpful. Don't use nasal spray decongestants more than 3 days. Longer use can make congestion worse. Prescription nasal sprays from your doctor don't typically have those restrictions.    Antihistamines may help if you are also having allergy symptoms.    You may use medicines such as guaifenesin to thin mucus and promote drainage.  Follow-up care  Follow up with your healthcare provider or as advised if you are not feeling better after 3 days.  When to seek medical advice  Call your healthcare provider right away if any of the following occur:    Your ear pain gets worse or does not start to improve     Fever of 100.4 F (38 C) or higher, or as directed by your healthcare provider    Fluid or blood draining from the ear    Headache or sinus pain    Stiff neck    Unusual drowsiness or confusion  Date Last Reviewed: 10/1/2016    5510-0372 [employer]. [address] [place] [address], [place] [address]. All rights reserved. This information is not intended as a substitute for professional medical care. Always follow your healthcare professional's instructions.

## 2018-11-27 NOTE — MR AVS SNAPSHOT
After Visit Summary   11/27/2018    Carrie Munoz    MRN: [id number]           Patient Information     Date Of Birth          1980        Visit Information        Provider Department      11/27/2018 4:40 PM Erin Huggins MD [facility]        Today's Diagnoses     Otalgia, right    -  1    Dysfunction of both eustachian tubes          Care Instructions      Earache, No Infection (Adult)  Earaches can happen without an infection. This occurs when air and fluid build up behind the eardrum causing a feeling of fullness and discomfort and reduced hearing. This is called otitis media with effusion (OME) or serous otitis media. It means there is fluid in the middle ear. It is not the same as acute otitis media, which is typically from infection.  OME can happen when you have a cold if congestion blocks the passage that drains the middle ear. This passage is called the eustachian tube. OME may also occur with nasal allergies or after a bacterial middle ear infection.    The pain or discomfort may come and go. You may hear clicking or popping sounds when you chew or swallow. You may feel that your balance is off. Or you may hear ringing in the ear.  It often takes from several weeks up to 3 months for the fluid to clear on its own. Oral pain relievers and ear drops help if there is pain. Decongestants and antihistamines sometimes help. Antibiotics don't help since there is no infection. Your doctor may prescribe a nasal spray to help reduce swelling in the nose and eustachian tube. This can allow the ear to drain.  If your OME doesn't improve after 3 months, surgery may be used to drain the fluid and insert a small tube in the eardrum to allow continued drainage.  Because the middle ear fluid can become infected, it is important to watch for signs of an ear infection which may develop later. These signs include increased ear pain, fever, or drainage from the ear.  Home care  The  following guidelines will help you care for yourself at home:    You may use over-the-counter medicine as directed to control pain, unless another medicine was prescribed. If you have chronic liver or kidney disease or ever had a stomach ulcer or GI bleeding, talk with your doctor before using these medicines. Aspirin should never be used in anyone under 18 years of age who is ill with a fever. It may cause severe liver damage.    You may use over-the-counter decongestants such as phenylephrine or pseudoephedrine. But they are not always helpful. Don't use nasal spray decongestants more than 3 days. Longer use can make congestion worse. Prescription nasal sprays from your doctor don't typically have those restrictions.    Antihistamines may help if you are also having allergy symptoms.    You may use medicines such as guaifenesin to thin mucus and promote drainage.  Follow-up care  Follow up with your healthcare provider or as advised if you are not feeling better after 3 days.  When to seek medical advice  Call your healthcare provider right away if any of the following occur:    Your ear pain gets worse or does not start to improve     Fever of 100.4 F (38 C) or higher, or as directed by your healthcare provider    Fluid or blood draining from the ear    Headache or sinus pain    Stiff neck    Unusual drowsiness or confusion  Date Last Reviewed: 10/1/2016    6243-0897 [employer]. [address] [place] [address]. All rights reserved. This information is not intended as a substitute for professional medical care. Always follow your healthcare professional's instructions.                Follow-ups after your visit        Follow-up notes from your care team     Return in about 3 days (around 11/30/2018).      Your next 10 appointments already scheduled     Nov 28, 2018  2:40 PM CST   (Arrive by 2:25 PM)   KACIE For Women Only with Margi Vivas, PT   [place] [facility] ([facility]  )    [address] [place] [id number]   [phone]            Nov 28, 2018  5:30 PM CST   SHORT with YASMEEN Sanders CNP   [facility] [place] ([facility] [place])    [address] [place] [address]   [phone]            Dec 05, 2018  9:30 AM CST   KACIE For Women Only with Margi Vivas PT   [place] [facility] ([facility]  )    [address] [place] [id number]   [phone]            Dec 12, 2018  9:30 AM CST   KACIE For Women Only with Margi Vivas PT   [facility] ([facility]  )    [address] [place] [address]   [phone]            Dec 19, 2018  9:30 AM CST   KACIE For Women Only with Margi Vivas PT   [facility] ([facility]  )    [address] [place] [address]   [phone]              Who to contact     If you have questions or need follow up information about today's clinic visit or your schedule please contact [facility] directly at [phone].  Normal or non-critical lab and imaging results will be communicated to you by [employer], letter or phone within 4 business days after the clinic has received the results. If you do not hear from us within 7 days, please contact the clinic through [employer] or phone. If you have a critical or abnormal lab result, we will notify you by phone as soon as possible.  Submit refill requests through [employer] or call your pharmacy and they will forward the refill request to us. Please allow 3 business days for your refill to be completed.          Additional Information About Your Visit        [employer] Information     [employer] gives you secure access to your electronic health record. If you see a primary care provider, you can also send messages to your  "care team and make appointments. If you have questions, please call your primary care clinic.  If you do not have a primary care provider, please call [phone] and they will assist you.        Care EveryWhere ID     This is your Care EveryWhere ID. This could be used by other organizations to access your [facility] medical records  [id number]        Your Vitals Were     Pulse Temperature Respirations Height Pulse Oximetry BMI (Body Mass Index)    85 97.8  F (36.6  C) (Oral) 16 5' 8\" (1.727 m) 96% 36.34 kg/m2       Blood Pressure from Last 3 Encounters:   11/27/18 116/77   11/05/18 114/77   08/13/18 111/78    Weight from Last 3 Encounters:   11/27/18 239 lb (108.4 kg)   11/05/18 235 lb 12.8 oz (107 kg)   08/13/18 242 lb 9.6 oz (110 kg)              Today, you had the following     No orders found for display       Primary Care Provider Office Phone # Fax #    Narciso Dayo [facility] [phone]       [address]  [facility] [address]        Equal Access to Services     [facility]: Hadii aad ku hadasho Sojaniaali, waaxda luqadaha, qaybta kaalmada adeegyada, myriam mario . So [facility] [phone].    ATENCIÓN: Si habla español, tiene a kapoor disposición servicios gratuitos de asistencia lingüística. [facility] [phone].    We comply with applicable federal civil rights laws and [place] laws. We do not discriminate on the basis of race, color, national origin, age, disability, sex, sexual orientation, or gender identity.            Thank you!     Thank you for choosing [facility]  for your care. Our goal is always to provide you with excellent care. Hearing back from our patients is one way we can continue to improve our services. Please take a few minutes to complete the written survey that you may receive in the mail after your visit with us. Thank you!             Your Updated Medication List - Protect others around you: Learn how to safely use, " store and throw away your medicines at www.disposemymeds.org.          This list is accurate as of 11/27/18  5:03 PM.  Always use your most recent med list.                   Brand Name Dispense Instructions for use Diagnosis    DULoxetine 30 MG capsule    CYMBALTA    90 capsule    Take 1 capsule (30 mg) by mouth 2 times daily    Fibromyalgia, Sacroiliac joint pain       fluticasone 50 MCG/ACT nasal spray    FLONASE    3 Bottle    Spray 1-2 sprays into both nostrils daily    Postnasal drip       gabapentin 300 MG capsule    NEURONTIN    150 capsule    Take 2 tablets (84799 mg) by mouth in AM/morning, 1 tablet (300 mg) at noon and 2 tablets (900 mg) at bedtime for sleep    Acute midline low back pain with right-sided sciatica       ibuprofen 600 MG tablet    ADVIL/MOTRIN    120 tablet    Take 1 tablet (600 mg) by mouth every 6 hours as needed for moderate pain    Acute midline low back pain with right-sided sciatica       levonorgestrel 20 MCG/24HR IUD    MIRENA (52 MG)    1 each    1 each (20 mcg) by Intrauterine route once for 1 dose    Encounter for IUD insertion

## 2018-11-28 ENCOUNTER — THERAPY VISIT (OUTPATIENT)
Dept: PHYSICAL THERAPY | Facility: CLINIC | Age: 38
End: 2018-11-28
Payer: COMMERCIAL

## 2018-11-28 DIAGNOSIS — M53.3 SACROILIAC JOINT PAIN: Primary | ICD-10-CM

## 2018-11-28 DIAGNOSIS — M99.05 SOMATIC DYSFUNCTION OF PELVIS REGION: ICD-10-CM

## 2018-11-28 DIAGNOSIS — M54.50 BILATERAL LOW BACK PAIN WITHOUT SCIATICA: ICD-10-CM

## 2018-11-28 PROCEDURE — 97162 PT EVAL MOD COMPLEX 30 MIN: CPT | Mod: GP | Performed by: PHYSICAL THERAPIST

## 2018-11-28 PROCEDURE — 97112 NEUROMUSCULAR REEDUCATION: CPT | Mod: GP | Performed by: PHYSICAL THERAPIST

## 2018-11-28 PROCEDURE — G8979 MOBILITY GOAL STATUS: HCPCS | Mod: GP | Performed by: PHYSICAL THERAPIST

## 2018-11-28 PROCEDURE — G8978 MOBILITY CURRENT STATUS: HCPCS | Mod: GP | Performed by: PHYSICAL THERAPIST

## 2018-11-28 PROCEDURE — 97530 THERAPEUTIC ACTIVITIES: CPT | Mod: GP | Performed by: PHYSICAL THERAPIST

## 2018-11-28 NOTE — PROGRESS NOTES
[facility]  Physical Exam  General   [facility]        Physical Therapy Initial Examination/Evaluation November 28, 2018   Carrie Munoz is a 38 year old female referred to physical therapy by Dr. Riki Burk for treatment of SI joint pain, pelvic girdle pain  with Precautions/Restrictions/MD instructions E&T   Therapist Assessment:   Clinical Impression: Pt presents to [facility] with primary complaint of SI joint pain and bilat hip pain that has been chronic since pregnancy.  Per clinical examination, pt with limited ROM in lumbar spine and L hip.  Decreased core strength noted.  Pt's pain reproduced with SLR on the L, but she does report that pain changes side each day.  Pt will benefit from skilled physical therapy for core stabilization program as well as possible assessment of pelvic floor.    Subjective: Pt had baby in March 2017. She had SI issues during and after pregnancy. She has continued to see chiropractor but still having issues. She is no longer wearing the SI belt as she does not feel it is helpful.  Patient had had no anterior pelvic pain, pain localized to SI and bilat hips.  The side that the pain is worse on varies.  Pain is present at all times. Pt had injection in bilat SI joints and pain relieved for 1 month but then returned with worse intensity.  Pt did try workout at the beginning of October and tried circuits but then couldn't walk for almost 2 weeks. Pt has had bloodwork and everything was normal. Pt does not have incontinence issues, but does note that she goes to the bathroom frequently and cannot wait long after getting the urge. Pt had a 10# baby and was in labor for 3 days.   DOI/onset: 11/5/2018-MD order   Mechanism of injury: Pregnancy and birth    DOS NA   Related PMH: SI joint issues Previous treatment: PT, chiropractor   Imaging: X-ray, MRI (has had 4 in the past year); Disc protrusion in lumbar spine, Edema in SI joint    Chief Complaint: low back &  bilat hip pain    Pain: rest 4 /10, activity `0/10  Described as: tight, sharp/shooting Alleviated by: unknown Exacerbated by: a lot of movement or exercise Progression of symptoms since initial onset: Worsening  Time of day when pain is worse: none noted   Sleeping: Not asked this session    Social history: Lives alone with almost 2 year old daughter; will be starting a new position at the [employer] next month  Occupation: [employer]  Job duties: Computer work, driving, lifting/carrying, repetitive tasks   Current HEP/exercise regimen: Planning on getting membership for [employer] and wants to try swimming   Patient's goals are Decrease pain    Other pertinent PMH: Depression, Fibromyalgia, overweight General health as reported by patient: Good    Return to MD: prn      Lumbar Spine Evaluation  Static Posture    Lumbar Spine: Posterior pelvic tilt    R hip in ER    Dynamic Movement Screen  Single leg stance observations: Pt hesitant to complete  Double limb squat observations: Too painful today       Hip ROM   R: 38ER, 28 IR  L : 34 ER, 14 IR (L side is more painful today)      Trunk Range of Motion  Flexion: 50% ROM pain   Extension: 50% pain   Side bendin% with pain on ipsilateral side  Rotation: 75% to the R; 25% with pain             Hip and Knee Strength   MMT Dorsiflexion  Plantarflexion  Great Toe Extension  Knee Extension  Knee Flexion   Left 5/5 5/5 5/5 5/5 5/5   Right 5/5 5/5 5/5 5/5 5/5     Special Tests  Neural tension: Neg Slump, +SLR on the L  Dermatomes: WNL  Myotomes: WNL        Assessment/Plan:    Patient is a 38 year old female with lumbar, sacral, pelvic and both sides hip complaints.    Patient has the following significant findings with corresponding treatment plan.                Diagnosis 1:  SI joint pain, Pelvic Girdle pain   Pain -  hot/cold therapy, manual therapy, self management, education, directional preference exercise and home program  Decreased ROM/flexibility - manual therapy,  therapeutic exercise, therapeutic activity and home program  Decreased strength - therapeutic exercise, therapeutic activities and home program  Impaired muscle performance - electric stimulation, neuro re-education and home program  Decreased function - therapeutic activities and home program  Impaired posture - neuro re-education, therapeutic activities and home program    Therapy Evaluation Codes:   1) History comprised of:   Personal factors that impact the plan of care:      Living environment, Past/current experiences and Time since onset of symptoms.    Comorbidity factors that impact the plan of care are:      Depression, Fibromyalgia and Overweight.     Medications impacting care: Pain and Sleep.  2) Examination of Body Systems comprised of:   Body structures and functions that impact the plan of care:      Hip, Lumbar spine, Pelvis and Sacral illiac joint.   Activity limitations that impact the plan of care are:      Bathing, Bending, Cooking, Driving, Dressing, Lifting, Sitting, Sports, Squatting/kneeling, Stairs, Standing, Walking, Working and Sleeping.  3) Clinical presentation characteristics are:   Evolving/Changing.  4) Decision-Making    Moderate complexity using standardized patient assessment instrument and/or measureable assessment of functional outcome.  Cumulative Therapy Evaluation is: Moderate complexity.    Previous and current functional limitations:  (See Goal Flow Sheet for this information)    Short term and Long term goals: (See Goal Flow Sheet for this information)     Communication ability:  Patient appears to be able to clearly communicate and understand verbal and written communication and follow directions correctly.  Treatment Explanation - The following has been discussed with the patient:   RX ordered/plan of care  Anticipated outcomes  Possible risks and side effects  This patient would benefit from PT intervention to resume normal activities.   Rehab potential is  good.    Frequency:  1 X week, once daily  Duration:  for 6 weeks  Discharge Plan:  Achieve all LTG.  Independent in home treatment program.  Reach maximal therapeutic benefit.    Please refer to the daily flowsheet for treatment today, total treatment time and time spent performing 1:1 timed codes.

## 2018-11-28 NOTE — MR AVS SNAPSHOT
After Visit Summary   11/28/2018    Carrie Munoz    MRN: [id number]           Patient Information     Date Of Birth          1980        Visit Information        Provider Department      11/28/2018 2:40 PM Margi Vivas PT [facility]        Today's Diagnoses     Sacroiliac joint pain    -  1    Somatic dysfunction of pelvis region        Bilateral low back pain without sciatica           Follow-ups after your visit        Your next 10 appointments already scheduled     Nov 28, 2018  5:30 PM CST   SHORT with YASMEEN Sanders CNP   [facility] ([facility])    [address] [place] [id number]   [phone]            Dec 05, 2018  9:30 AM CST   KACIE For Women Only with Margi Vivas PT   [facility] ([facility]  )    [address] [place] [id number] [phone]            Dec 12, 2018  9:30 AM CST   KACIE For Women Only with Margi Vivas PT   [facility] ([facility]  )    [address] [place] [phone]            Dec 19, 2018  9:30 AM CST   KACIE For Women Only with Margi Vivas PT   [facility] ([facility]  )    [address] [place] [phone]              Who to contact     If you have questions or need follow up information about today's clinic visit or your schedule please contact [facility] directly at [phone].  Normal or non-critical lab and imaging results will be communicated to you by MyChart, letter or phone within 4 business days after the clinic has received the results. If you do not hear from us within 7 days, please contact the clinic through MyChart or phone. If you  have a critical or abnormal lab result, we will notify you by phone as soon as possible.  Submit refill requests through [employer] or call your pharmacy and they will forward the refill request to us. Please allow 3 business days for your refill to be completed.          Additional Information About Your Visit        [employer] Information     [employer] gives you secure access to your electronic health record. If you see a primary care provider, you can also send messages to your care team and make appointments. If you have questions, please call your primary care clinic.  If you do not have a primary care provider, please call [phone] and they will assist you.        Care EveryWhere ID     This is your Care EveryWhere ID. This could be used by other organizations to access your [place] medical records  [id number]         Blood Pressure from Last 3 Encounters:   11/27/18 116/77   11/05/18 114/77   08/13/18 111/78    Weight from Last 3 Encounters:   11/27/18 108.4 kg (239 lb)   11/05/18 107 kg (235 lb 12.8 oz)   08/13/18 110 kg (242 lb 9.6 oz)              We Performed the Following     HC PT EVAL, MODERATE COMPLEXITY     KACIE INITIAL EVAL REPORT     NEUROMUSCULAR RE-EDUCATION     THERAPEUTIC ACTIVITIES        Primary Care Provider Office Phone # Fax #    Narciso Dayo Sanford [phone]       [address] [address]        Equal Access to Services     JUNAA MOMIN : Hadii tonny ku hadasho Sodara, waaxda luqadaha, qaybta kaalmada celestino, myriam mario . So [facility] [phone].    ATENCIÓN: Si habla español, tiene a kapoor disposición servicios gratuitos de asistencia lingüística. Omar al [phone].    We comply with applicable federal civil rights laws and [place] laws. We do not discriminate on the basis of race, color, national origin, age, disability, sex, sexual orientation, or gender identity.            Thank you!     Thank you for choosing  [facility]  for your care. Our goal is always to provide you with excellent care. Hearing back from our patients is one way we can continue to improve our services. Please take a few minutes to complete the written survey that you may receive in the mail after your visit with us. Thank you!             Your Updated Medication List - Protect others around you: Learn how to safely use, store and throw away your medicines at www.disposemymeds.org.          This list is accurate as of 11/28/18  5:09 PM.  Always use your most recent med list.                   Brand Name Dispense Instructions for use Diagnosis    DULoxetine 30 MG capsule    CYMBALTA    90 capsule    Take 1 capsule (30 mg) by mouth 2 times daily    Fibromyalgia, Sacroiliac joint pain       fluticasone 50 MCG/ACT nasal spray    FLONASE    3 Bottle    Spray 1-2 sprays into both nostrils daily    Postnasal drip       gabapentin 300 MG capsule    NEURONTIN    150 capsule    Take 2 tablets (09243 mg) by mouth in AM/morning, 1 tablet (300 mg) at noon and 2 tablets (900 mg) at bedtime for sleep    Acute midline low back pain with right-sided sciatica       ibuprofen 600 MG tablet    ADVIL/MOTRIN    120 tablet    Take 1 tablet (600 mg) by mouth every 6 hours as needed for moderate pain    Acute midline low back pain with right-sided sciatica       levonorgestrel 20 MCG/24HR IUD    MIRENA (52 MG)    1 each    1 each (20 mcg) by Intrauterine route once for 1 dose    Encounter for IUD insertion

## 2018-12-05 ENCOUNTER — THERAPY VISIT (OUTPATIENT)
Dept: PHYSICAL THERAPY | Facility: CLINIC | Age: 38
End: 2018-12-05
Payer: COMMERCIAL

## 2018-12-05 DIAGNOSIS — M54.50 BILATERAL LOW BACK PAIN WITHOUT SCIATICA: ICD-10-CM

## 2018-12-05 DIAGNOSIS — M99.05 SOMATIC DYSFUNCTION OF PELVIS REGION: ICD-10-CM

## 2018-12-05 DIAGNOSIS — M53.3 SACROILIAC JOINT PAIN: Primary | ICD-10-CM

## 2018-12-05 PROCEDURE — 97112 NEUROMUSCULAR REEDUCATION: CPT | Mod: GP | Performed by: PHYSICAL THERAPIST

## 2018-12-05 PROCEDURE — G8978 MOBILITY CURRENT STATUS: HCPCS | Mod: GP | Performed by: PHYSICAL THERAPIST

## 2018-12-05 PROCEDURE — G8979 MOBILITY GOAL STATUS: HCPCS | Mod: GP | Performed by: PHYSICAL THERAPIST

## 2018-12-05 NOTE — PROGRESS NOTES
Baseline PF tone:hyper  PF Response quality: moderate  PF Power: Center: 2   Endurance: Maximum contraction in seconds: 10 with decreased strength the longer she held  Quick contraction repetitions prior to fatigue: 10 difficulty fully relaxing the more contractions she did  Specificity/accessory muscles: Abdominals, adductors    Reproduction of symptoms, especially with palpation over R hip

## 2018-12-05 NOTE — MR AVS SNAPSHOT
After Visit Summary   12/5/2018    Carrie Munoz    MRN: [id number]           Patient Information     Date Of Birth          1980        Visit Information        Provider Department      12/5/2018 9:30 AM Margi Vivas PT [facility]        Today's Diagnoses     Sacroiliac joint pain    -  1    Bilateral low back pain without sciatica        Somatic dysfunction of pelvis region           Follow-ups after your visit        Your next 10 appointments already scheduled     Dec 12, 2018  9:30 AM CST   KACIE For Women Only with Margi Vivas PT   [facility] Therapy ([facility]  )    [address] [facility] [phone]            Dec 19, 2018  9:30 AM CST   KACIE For Women Only with Margi Vivas PT   [facility] Therapy ([facility]  )    [address] [facility] [phone]              Who to contact     If you have questions or need follow up information about today's clinic visit or your schedule please contact [facility] PH[facility] THERAP[facility] directly at [phone].  Normal or non-critical lab and imaging results will be communicated to you by MyChart, letter or phone within 4 business days after the clinic has received the results. If you do not hear from us within 7 days, please contact the clinic through [employer] or phone. If you have a critical or abnormal lab result, we will notify you by phone as soon as possible.  Submit refill requests through [employer] or call your pharmacy and they will forward the refill request to us. Please allow 3 business days for your refill to be completed.          Additional Information About Your Visit        [employer] Information     [employer] gives you secure access to your electronic health record. If you see a primary care provider,  you can also send messages to your care team and make appointments. If you have questions, please call your primary care clinic.  If you do not have a primary care provider, please call [phone] and they will assist you.        Care EveryWhere ID     This is your Care EveryWhere ID. This could be used by other organizations to access your [place] medical records  [id number]         Blood Pressure from Last 3 Encounters:   11/27/18 116/77   11/05/18 114/77   08/13/18 111/78    Weight from Last 3 Encounters:   11/27/18 108.4 kg (239 lb)   11/05/18 107 kg (235 lb 12.8 oz)   08/13/18 110 kg (242 lb 9.6 oz)              We Performed the Following     NEUROMUSCULAR RE-EDUCATION        Primary Care Provider Office Phone # Fax #    Narciso Dayo Sanford [phone]       [address]  [facility] [address]        Equal Access to Services     [facility] Heart[facility] : Hadii aad ku hadasho Soomaali, waaxda luqadaha, qaybta kaalmada adeegyada, waxay idiin hayeduardon mildred mario . So [facility] [phone].    ATENCIÓN: Si tamara noland, tiene a kapoor disposición servicios gratuitos de asistencia lingüística. Llame al [phone].    We comply with applicable federal civil rights laws and [place] laws. We do not discriminate on the basis of race, color, national origin, age, disability, sex, sexual orientation, or gender identity.            Thank you!     Thank you for choosing [facility] THERAP[facility]  for your care. Our goal is always to provide you with excellent care. Hearing back from our patients is one way we can continue to improve our services. Please take a few minutes to complete the written survey that you may receive in the mail after your visit with us. Thank you!             Your Updated Medication List - Protect others around you: Learn how to safely use, store and throw away your medicines at www.disposemymeds.org.          This list is accurate  as of 12/5/18  1:48 PM.  Always use your most recent med list.                   Brand Name Dispense Instructions for use Diagnosis    DULoxetine 30 MG capsule    CYMBALTA    90 capsule    Take 1 capsule (30 mg) by mouth 2 times daily    Fibromyalgia, Sacroiliac joint pain       fluticasone 50 MCG/ACT nasal spray    FLONASE    3 Bottle    Spray 1-2 sprays into both nostrils daily    Postnasal drip       gabapentin 300 MG capsule    NEURONTIN    150 capsule    Take 2 tablets (58988 mg) by mouth in AM/morning, 1 tablet (300 mg) at noon and 2 tablets (900 mg) at bedtime for sleep    Acute midline low back pain with right-sided sciatica       ibuprofen 600 MG tablet    ADVIL/MOTRIN    120 tablet    Take 1 tablet (600 mg) by mouth every 6 hours as needed for moderate pain    Acute midline low back pain with right-sided sciatica       levonorgestrel 20 MCG/24HR IUD    MIRENA (52 MG)    1 each    1 each (20 mcg) by Intrauterine route once for 1 dose    Encounter for IUD insertion

## 2018-12-13 ENCOUNTER — MYC REFILL (OUTPATIENT)
Dept: FAMILY MEDICINE | Facility: CLINIC | Age: 38
End: 2018-12-13

## 2018-12-13 DIAGNOSIS — M54.41 ACUTE MIDLINE LOW BACK PAIN WITH RIGHT-SIDED SCIATICA: ICD-10-CM

## 2018-12-13 RX ORDER — GABAPENTIN 300 MG/1
CAPSULE ORAL
Qty: 0.01 CAPSULE | Refills: 0 | OUTPATIENT
Start: 2018-12-13

## 2018-12-13 NOTE — TELEPHONE ENCOUNTER
Per chart review, last prescription written 09/26/2018.     Medication refused with note to pharmacy relaying message above.    Tatyana Gao, RN  Triage Nurse

## 2018-12-19 ENCOUNTER — THERAPY VISIT (OUTPATIENT)
Dept: PHYSICAL THERAPY | Facility: CLINIC | Age: 38
End: 2018-12-19
Payer: COMMERCIAL

## 2018-12-19 DIAGNOSIS — M54.50 BILATERAL LOW BACK PAIN WITHOUT SCIATICA: ICD-10-CM

## 2018-12-19 PROCEDURE — G8978 MOBILITY CURRENT STATUS: HCPCS | Mod: GP | Performed by: PHYSICAL THERAPIST

## 2018-12-19 PROCEDURE — 97112 NEUROMUSCULAR REEDUCATION: CPT | Mod: GP | Performed by: PHYSICAL THERAPIST

## 2018-12-19 PROCEDURE — G8979 MOBILITY GOAL STATUS: HCPCS | Mod: GP | Performed by: PHYSICAL THERAPIST

## 2019-02-04 ENCOUNTER — MYC MEDICAL ADVICE (OUTPATIENT)
Dept: FAMILY MEDICINE | Facility: CLINIC | Age: 39
End: 2019-02-04

## 2019-02-04 NOTE — TELEPHONE ENCOUNTER
[employer] message sent to patient    Immunization record printed and placed at     Tatyana Gao, RN  Triage Nurse

## 2019-02-07 ENCOUNTER — MYC MEDICAL ADVICE (OUTPATIENT)
Dept: FAMILY MEDICINE | Facility: CLINIC | Age: 39
End: 2019-02-07

## 2019-02-07 DIAGNOSIS — Z01.84 IMMUNITY TO MEASLES, MUMPS, AND RUBELLA DETERMINED BY SEROLOGIC TEST: Primary | ICD-10-CM

## 2019-02-07 DIAGNOSIS — Z01.84 IMMUNITY TO HEPATITIS B VIRUS DEMONSTRATED BY SEROLOGIC TEST: ICD-10-CM

## 2019-02-07 NOTE — TELEPHONE ENCOUNTER
Dr. Sanford,    Okay with patient having lab only appt for MMR and hep B titers? (pt needs them for work).    Cued lab orders. Please review/sign or advise.    Dinora Quinn RN  [facility]

## 2019-02-08 DIAGNOSIS — Z01.84 IMMUNITY TO MEASLES, MUMPS, AND RUBELLA DETERMINED BY SEROLOGIC TEST: ICD-10-CM

## 2019-02-08 DIAGNOSIS — Z01.84 IMMUNITY TO HEPATITIS B VIRUS DEMONSTRATED BY SEROLOGIC TEST: Primary | ICD-10-CM

## 2019-02-08 PROCEDURE — 86765 RUBEOLA ANTIBODY: CPT | Performed by: FAMILY MEDICINE

## 2019-02-08 PROCEDURE — 86762 RUBELLA ANTIBODY: CPT | Performed by: FAMILY MEDICINE

## 2019-02-08 PROCEDURE — 36415 COLL VENOUS BLD VENIPUNCTURE: CPT | Performed by: FAMILY MEDICINE

## 2019-02-08 PROCEDURE — 86706 HEP B SURFACE ANTIBODY: CPT | Performed by: FAMILY MEDICINE

## 2019-02-08 PROCEDURE — 86735 MUMPS ANTIBODY: CPT | Performed by: FAMILY MEDICINE

## 2019-02-11 LAB
HBV SURFACE AB SERPL IA-ACNC: 127.19 M[IU]/ML
MEV IGG SER QL IA: 3.8 AI (ref 0–0.8)
MUV IGG SER QL IA: 3 AI (ref 0–0.8)
RUBV IGG SERPL IA-ACNC: 70 IU/ML

## 2019-02-28 ENCOUNTER — MYC REFILL (OUTPATIENT)
Dept: FAMILY MEDICINE | Facility: CLINIC | Age: 39
End: 2019-02-28

## 2019-02-28 DIAGNOSIS — M54.41 ACUTE MIDLINE LOW BACK PAIN WITH RIGHT-SIDED SCIATICA: ICD-10-CM

## 2019-03-01 NOTE — TELEPHONE ENCOUNTER
"Requested Prescriptions   Pending Prescriptions Disp Refills     ibuprofen (ADVIL/MOTRIN) 600 MG tablet 120 tablet 1     Sig: Take 1 tablet (600 mg) by mouth every 6 hours as needed for moderate pain    NSAID Medications Passed - 2/28/2019 10:26 PM       Passed - Blood pressure under 140/90 in past 12 months    BP Readings from Last 3 Encounters:   11/27/18 116/77   11/05/18 114/77   08/13/18 111/78                Passed - Normal ALT on file in past 12 months    Recent Labs   Lab Test 09/21/18  0816   ALT 34            Passed - Normal AST on file in past 12 months    Recent Labs   Lab Test 09/21/18  0816   AST 18            Passed - Recent (12 mo) or future (30 days) visit within the authorizing provider's specialty    Patient had office visit in the last 12 months or has a visit in the next 30 days with authorizing provider or within the authorizing provider's specialty.  See \"Patient Info\" tab in inbasket, or \"Choose Columns\" in Meds & Orders section of the refill encounter.             Passed - Patient is age 6-64 years       Passed - Normal CBC on file in past 12 months    Recent Labs   Lab Test 05/21/18  0858   WBC 4.5   RBC 4.57   HGB 13.6   HCT 41.5                   Passed - Medication is active on med list       Passed - No active pregnancy on record       Passed - Normal serum creatinine on file in past 12 months    Recent Labs   Lab Test 09/21/18  0816   CR 0.60            Passed - No positive pregnancy test in past 12 months          "

## 2019-03-25 RX ORDER — IBUPROFEN 600 MG/1
600 TABLET, FILM COATED ORAL EVERY 6 HOURS PRN
Qty: 120 TABLET | Refills: 1 | Status: SHIPPED | OUTPATIENT
Start: 2019-03-25 | End: 2020-10-29

## 2019-03-25 NOTE — TELEPHONE ENCOUNTER
Prescription approved per [facility] Refill Protocol.    Dinora Quinn RN  [facility]

## 2019-09-27 ENCOUNTER — APPOINTMENT (OUTPATIENT)
Dept: CT IMAGING | Facility: CLINIC | Age: 39
End: 2019-09-27
Attending: EMERGENCY MEDICINE
Payer: COMMERCIAL

## 2019-09-27 ENCOUNTER — HOSPITAL ENCOUNTER (EMERGENCY)
Facility: CLINIC | Age: 39
Discharge: HOME | End: 2019-09-27
Attending: EMERGENCY MEDICINE | Admitting: EMERGENCY MEDICINE
Payer: COMMERCIAL

## 2019-09-27 ENCOUNTER — TELEPHONE (OUTPATIENT)
Dept: PULMONOLOGY | Facility: CLINIC | Age: 39
End: 2019-09-27

## 2019-09-27 ENCOUNTER — APPOINTMENT (OUTPATIENT)
Dept: GENERAL RADIOLOGY | Facility: CLINIC | Age: 39
End: 2019-09-27
Attending: EMERGENCY MEDICINE
Payer: COMMERCIAL

## 2019-09-27 VITALS
SYSTOLIC BLOOD PRESSURE: 112 MMHG | TEMPERATURE: 100.9 F | RESPIRATION RATE: 16 BRPM | DIASTOLIC BLOOD PRESSURE: 73 MMHG | BODY MASS INDEX: 36.04 KG/M2 | HEART RATE: 78 BPM | WEIGHT: 237 LBS | OXYGEN SATURATION: 97 %

## 2019-09-27 DIAGNOSIS — R51.9 HEADACHE DISORDER: ICD-10-CM

## 2019-09-27 DIAGNOSIS — R00.0 TACHYCARDIA, UNSPECIFIED: ICD-10-CM

## 2019-09-27 DIAGNOSIS — J18.9 LUNG INFECTION: Primary | ICD-10-CM

## 2019-09-27 DIAGNOSIS — R59.0 MEDIASTINAL LYMPHADENOPATHY: ICD-10-CM

## 2019-09-27 DIAGNOSIS — R91.8 PULMONARY NODULES: ICD-10-CM

## 2019-09-27 LAB
ALBUMIN SERPL-MCNC: 3.9 G/DL (ref 3.4–5)
ALBUMIN UR-MCNC: NEGATIVE MG/DL
ALP SERPL-CCNC: 61 U/L (ref 40–150)
ALT SERPL W P-5'-P-CCNC: 37 U/L (ref 0–50)
ANION GAP SERPL CALCULATED.3IONS-SCNC: 10 MMOL/L (ref 3–14)
APPEARANCE UR: CLEAR
AST SERPL W P-5'-P-CCNC: 31 U/L (ref 0–45)
BACTERIA #/AREA URNS HPF: ABNORMAL /HPF
BASOPHILS # BLD AUTO: 0 10E9/L (ref 0–0.2)
BASOPHILS NFR BLD AUTO: 0 %
BILIRUB SERPL-MCNC: 0.5 MG/DL (ref 0.2–1.3)
BILIRUB UR QL STRIP: NEGATIVE
BUN SERPL-MCNC: 8 MG/DL (ref 7–30)
CALCIUM SERPL-MCNC: 8.3 MG/DL (ref 8.5–10.1)
CHLORIDE SERPL-SCNC: 105 MMOL/L (ref 94–109)
CO2 SERPL-SCNC: 22 MMOL/L (ref 20–32)
COLOR UR AUTO: YELLOW
CREAT SERPL-MCNC: 0.61 MG/DL (ref 0.52–1.04)
D DIMER PPP FEU-MCNC: 0.6 UG/ML FEU (ref 0–0.5)
DIFFERENTIAL METHOD BLD: ABNORMAL
EOSINOPHIL # BLD AUTO: 0 10E9/L (ref 0–0.7)
EOSINOPHIL NFR BLD AUTO: 0 %
ERYTHROCYTE [DISTWIDTH] IN BLOOD BY AUTOMATED COUNT: 11.7 % (ref 10–15)
GFR SERPL CREATININE-BSD FRML MDRD: >90 ML/MIN/{1.73_M2}
GLUCOSE SERPL-MCNC: 101 MG/DL (ref 70–99)
GLUCOSE UR STRIP-MCNC: NEGATIVE MG/DL
HCG UR QL: NEGATIVE
HCT VFR BLD AUTO: 40.5 % (ref 35–47)
HGB BLD-MCNC: 13.9 G/DL (ref 11.7–15.7)
HGB UR QL STRIP: ABNORMAL
IMM GRANULOCYTES # BLD: 0 10E9/L (ref 0–0.4)
IMM GRANULOCYTES NFR BLD: 0 %
INTERNAL QC OK POCT: YES
KETONES UR STRIP-MCNC: 10 MG/DL
LACTATE BLD-SCNC: 1.3 MMOL/L (ref 0.7–2)
LEUKOCYTE ESTERASE UR QL STRIP: NEGATIVE
LYMPHOCYTES # BLD AUTO: 0.6 10E9/L (ref 0.8–5.3)
LYMPHOCYTES NFR BLD AUTO: 13.7 %
MCH RBC QN AUTO: 30.5 PG (ref 26.5–33)
MCHC RBC AUTO-ENTMCNC: 34.3 G/DL (ref 31.5–36.5)
MCV RBC AUTO: 89 FL (ref 78–100)
MONOCYTES # BLD AUTO: 0.3 10E9/L (ref 0–1.3)
MONOCYTES NFR BLD AUTO: 7.1 %
MUCOUS THREADS #/AREA URNS LPF: PRESENT /LPF
NEUTROPHILS # BLD AUTO: 3.2 10E9/L (ref 1.6–8.3)
NEUTROPHILS NFR BLD AUTO: 79.2 %
NITRATE UR QL: NEGATIVE
NRBC # BLD AUTO: 0 10*3/UL
NRBC BLD AUTO-RTO: 0 /100
PH UR STRIP: 6 PH (ref 5–7)
PLATELET # BLD AUTO: 185 10E9/L (ref 150–450)
POTASSIUM SERPL-SCNC: 3.8 MMOL/L (ref 3.4–5.3)
PROT SERPL-MCNC: 7.7 G/DL (ref 6.8–8.8)
RBC # BLD AUTO: 4.56 10E12/L (ref 3.8–5.2)
RBC #/AREA URNS AUTO: 2 /HPF (ref 0–2)
SODIUM SERPL-SCNC: 137 MMOL/L (ref 133–144)
SOURCE: ABNORMAL
SP GR UR STRIP: 1.01 (ref 1–1.03)
SQUAMOUS #/AREA URNS AUTO: 2 /HPF (ref 0–1)
UROBILINOGEN UR STRIP-MCNC: NORMAL MG/DL (ref 0–2)
WBC # BLD AUTO: 4.1 10E9/L (ref 4–11)
WBC #/AREA URNS AUTO: 1 /HPF (ref 0–5)

## 2019-09-27 PROCEDURE — 99285 EMERGENCY DEPT VISIT HI MDM: CPT | Mod: 25

## 2019-09-27 PROCEDURE — 85379 FIBRIN DEGRADATION QUANT: CPT | Performed by: EMERGENCY MEDICINE

## 2019-09-27 PROCEDURE — 96361 HYDRATE IV INFUSION ADD-ON: CPT

## 2019-09-27 PROCEDURE — 96375 TX/PRO/DX INJ NEW DRUG ADDON: CPT

## 2019-09-27 PROCEDURE — 25000128 H RX IP 250 OP 636: Performed by: EMERGENCY MEDICINE

## 2019-09-27 PROCEDURE — 85025 COMPLETE CBC W/AUTO DIFF WBC: CPT | Performed by: EMERGENCY MEDICINE

## 2019-09-27 PROCEDURE — 96374 THER/PROPH/DIAG INJ IV PUSH: CPT | Mod: 59

## 2019-09-27 PROCEDURE — 71046 X-RAY EXAM CHEST 2 VIEWS: CPT

## 2019-09-27 PROCEDURE — 80053 COMPREHEN METABOLIC PANEL: CPT | Performed by: EMERGENCY MEDICINE

## 2019-09-27 PROCEDURE — 99285 EMERGENCY DEPT VISIT HI MDM: CPT | Mod: 25 | Performed by: EMERGENCY MEDICINE

## 2019-09-27 PROCEDURE — 81001 URINALYSIS AUTO W/SCOPE: CPT | Performed by: EMERGENCY MEDICINE

## 2019-09-27 PROCEDURE — 25000132 ZZH RX MED GY IP 250 OP 250 PS 637: Performed by: EMERGENCY MEDICINE

## 2019-09-27 PROCEDURE — 83605 ASSAY OF LACTIC ACID: CPT | Performed by: EMERGENCY MEDICINE

## 2019-09-27 PROCEDURE — 93010 ELECTROCARDIOGRAM REPORT: CPT | Mod: Z6 | Performed by: EMERGENCY MEDICINE

## 2019-09-27 PROCEDURE — 71275 CT ANGIOGRAPHY CHEST: CPT

## 2019-09-27 PROCEDURE — 93005 ELECTROCARDIOGRAM TRACING: CPT

## 2019-09-27 PROCEDURE — 81025 URINE PREGNANCY TEST: CPT | Performed by: EMERGENCY MEDICINE

## 2019-09-27 RX ORDER — KETOROLAC TROMETHAMINE 30 MG/ML
30 INJECTION, SOLUTION INTRAMUSCULAR; INTRAVENOUS ONCE
Status: COMPLETED | OUTPATIENT
Start: 2019-09-27 | End: 2019-09-27

## 2019-09-27 RX ORDER — IOPAMIDOL 755 MG/ML
100 INJECTION, SOLUTION INTRAVASCULAR ONCE
Status: COMPLETED | OUTPATIENT
Start: 2019-09-27 | End: 2019-09-27

## 2019-09-27 RX ORDER — ACETAMINOPHEN 500 MG
1000 TABLET ORAL ONCE
Status: COMPLETED | OUTPATIENT
Start: 2019-09-27 | End: 2019-09-27

## 2019-09-27 RX ORDER — DOXYCYCLINE 100 MG/1
100 CAPSULE ORAL 2 TIMES DAILY
Qty: 14 CAPSULE | Refills: 0 | Status: SHIPPED | OUTPATIENT
Start: 2019-09-27 | End: 2020-10-15

## 2019-09-27 RX ORDER — ONDANSETRON 2 MG/ML
4 INJECTION INTRAMUSCULAR; INTRAVENOUS ONCE
Status: COMPLETED | OUTPATIENT
Start: 2019-09-27 | End: 2019-09-27

## 2019-09-27 RX ADMIN — KETOROLAC TROMETHAMINE 30 MG: 30 INJECTION, SOLUTION INTRAMUSCULAR at 12:27

## 2019-09-27 RX ADMIN — IOPAMIDOL 72 ML: 755 INJECTION, SOLUTION INTRAVENOUS at 11:19

## 2019-09-27 RX ADMIN — ONDANSETRON 4 MG: 2 INJECTION INTRAMUSCULAR; INTRAVENOUS at 12:27

## 2019-09-27 RX ADMIN — ACETAMINOPHEN 1000 MG: 500 TABLET ORAL at 09:07

## 2019-09-27 RX ADMIN — SODIUM CHLORIDE 1000 ML: 9 INJECTION, SOLUTION INTRAVENOUS at 09:07

## 2019-09-27 ASSESSMENT — ENCOUNTER SYMPTOMS
SHORTNESS OF BREATH: 1
NECK PAIN: 0
MUSCULOSKELETAL NEGATIVE: 1
ABDOMINAL PAIN: 0
FEVER: 0
VOMITING: 0
NAUSEA: 0
FLANK PAIN: 0
CHILLS: 1
EYES NEGATIVE: 1
PSYCHIATRIC NEGATIVE: 1
HEADACHES: 0

## 2019-09-27 NOTE — TELEPHONE ENCOUNTER
Placed this patient in urgent apt on 10/2/19 with Dr Valdez with full pfts. Left voicemail with patient with apt time and date and location. Messaged Dr Valdez.

## 2019-09-27 NOTE — ED NOTES
Pt back from imaging and was started on IV fluid and given PO med.  Pt being monitored for effect.

## 2019-09-27 NOTE — ED AVS SNAPSHOT
[facility], [place], Emergency Department  [address]  [facility] [place] [address]  Phone:  [phone]  Fax:  [phone]                                    Carrie Munoz   MRN: [id number]    Department:  [facility], Emergency Department   Date of Visit:  9/27/2019           After Visit Summary Signature Page    I have received my discharge instructions, and my questions have been answered. I have discussed any challenges I see with this plan with the nurse or doctor.    ..........................................................................................................................................  Patient/Patient Representative Signature      ..........................................................................................................................................  Patient Representative Print Name and Relationship to Patient    ..................................................               ................................................  Date                                   Time    ..........................................................................................................................................  Reviewed by Signature/Title    ...................................................              ..............................................  Date                                               Time          22EPIC Rev 08/18

## 2019-09-27 NOTE — TELEPHONE ENCOUNTER
Left a second message with patient to get into urgent apt on 10/2/19 with PFTS with Dr Valdez. Requested return call to confirm apt.

## 2019-09-27 NOTE — TELEPHONE ENCOUNTER
"----- Message from Saira Jaimes MD sent at 9/27/2019 12:12 PM CDT -----  Regarding: needs urgent appointment  Patient seen at [place] today.  Found to have:    \"--Enlarged mediastinal and hilar lymph nodes. These are nonspecific  but could be reactive. A lymphoproliferative disorder, atypical  infection, or sarcoidosis are included in the differential.  --Incidental 6 mm left upper lobe pulmonary nodule.\"    Having low grade temp and chest pain.  Is being treated with doxy.  I think she needs to get seen in a week.      Please let me know that you received this message.    Thank you,    Saira"

## 2019-09-27 NOTE — ED PROVIDER NOTES
"  History     Chief Complaint   Patient presents with     Headache     headache and bodyaches started last night, no cough, hurts to take a deep breath     HPI  Carrie Munoz is a 38 year old  female who presents with a chief complaint of \"it hurts to breathe.\".  She states this started yesterday and she has felt hot and cold all night.  She also describes a bad frontal headache that started about 2 days ago.  She states she feels awful and her whole body aches.  She denies risk factors for pulmonary embolus, including no OCPs, no long trips, no surgeries, no recent immobilization.  She also denies pregnancy and states there is \"no way\"she is pregnant.  He is using an IUD for birth control . She denies abdominal pain or difficulty urinating.    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    Review of Systems   Constitutional: Positive for chills. Negative for fever.   Eyes: Negative.    Respiratory: Positive for shortness of breath.    Cardiovascular: Negative for chest pain.   Gastrointestinal: Negative for abdominal pain, nausea and vomiting.   Genitourinary: Negative for flank pain.   Musculoskeletal: Negative.  Negative for neck pain.        Body aches   Skin: Negative for rash.   Neurological: Negative for headaches.   Psychiatric/Behavioral: Negative.    All other systems reviewed and are negative.      Physical Exam   BP: 124/83  Pulse: 108  Temp: 100.9  F (38.3  C)  Resp: 16  Weight: 107.5 kg (237 lb)  SpO2: 98 %      Physical Exam  Physical Exam   Constitutional:   well nourished, well developed, tearful and crying  HENT:   Head: Normocephalic and atraumatic.   Eyes: Conjunctivae are normal. Pupils are equal, round, and reactive to light.   pharynx has no erythema or exudate, mucous membranes are moist  Neck:   no adenopathy, no bony tenderness, " "supple  Cardiovascular: tachycardia without murmurs or gallops  Pulmonary/Chest: Clear to auscultation bilaterally, with no wheezes or retractions. No respiratory distress. No stridor  GI: Soft with good bowel sounds. Obese.  Non-tender, non-distended, with no guarding, no rebound, no peritoneal signs.   Back:  No bony or CVA tenderness   Musculoskeletal:  no edema   Skin: Skin is warm and dry. No rash noted.   Neurological: alert and oriented to person, place, and time. Nonfocal exam  Psychiatric:  Crying and tearful mood and affect.   ED Course        Procedures             EKG Interpretation:      Interpreted by Dahlia Quarles MD  Time reviewed: 0848 am  Symptoms at time of EKG: see hpi   Rhythm: sinus tachycardia  Rate: 100-110  Axis: Normal  Ectopy: none  Conduction: normal  ST Segments/ T Waves: Non-specific ST-T wave changes  Q Waves: III  Comparison to prior: 3/22/2017: Sinus tachycardia with nonspecific ST-T wave changes, no Q waves in lead III.    Clinical Impression: Sinus tachycardia, rate of 104 bpm with nonspecific ST-T wave changes                Critical Care time:  none        Results for orders placed or performed during the hospital encounter of 09/27/19   XR Chest 2 Views    Narrative    CHEST TWO VIEWS 9/27/2019 9:01 AM     HISTORY: \"Hurts to breathe\".    COMPARISON: 6/30/2010    FINDINGS: Heart size and pulmonary vascularity are within normal  limits. The lungs are clear. No pneumothorax or pleural effusion.       Impression    IMPRESSION: No radiographic evidence of acute chest abnormality.     BRIAN MORRELL MD   CT Chest Pulmonary Embolism w Contrast    Narrative    CT CHEST PULMONARY EMBOLISM WITH CONTRAST   9/27/2019 11:27 AM     HISTORY: Elevated D-dimer.  Patient with pain on breathing difficulty  breathing and fever.    TECHNIQUE:  72mL of isovue 370. Radiation dose for this scan was  reduced using automated exposure control, adjustment of the mA and/or  kV according to patient " size, or iterative reconstruction technique.    COMPARISON: None.    FINDINGS:  No evidence of pulmonary embolism or acute thoracic aortic  abnormality. No significant coronary artery calcifications. No  pneumothorax. No pleural or pericardial effusion. The heart is  enlarged.    There is a 6 mm left upper lobe pulmonary nodule (series 5, image 66).  No other pulmonary nodule or mass. No evidence of pneumonia.    Enlarged right hilar lymph node measures 2.2 x 1.4 cm (series 4, image  56). There is an enlarged subcarinal lymph node that measures 2.6 x  1.7 cm (series 4, image 63). Prominent left hilar lymph nodes are  present. No axillary adenopathy.    Visualized portions of the upper abdomen unremarkable. No lytic or  blastic bone lesions.      Impression    IMPRESSION:  1. No evidence of pulmonary embolism.  2. Mild cardiomegaly.  3. Enlarged mediastinal and hilar lymph nodes. These are nonspecific  but could be reactive. A lymphoproliferative disorder, atypical  infection, or sarcoidosis are included in the differential.  4. Incidental 6 mm left upper lobe pulmonary nodule.  Recommendations for an incidental lung nodule = or > 6mm to 8mm:    Low risk patients: Initial follow-up CT at 6-12 months, then  consider CT at 18-24 months if no change.    High risk patients: Initial follow-up CT at 6-12 months, then CT at  18-24 months if no change.    *Low Risk: Minimal or absent history of smoking or other known risk  factors.  *Nonsolid (ground-glass) or partly solid nodules may require longer  follow-up to exclude indolent adenocarcinoma.  *Recommendations based on Guidelines for the Management of Incidental  Pulmonary Nodules Detected at CT: From the Fleischner Society 2017,  Radiology 2017.    BRIAN MORRELL MD   CBC with platelets differential   Result Value Ref Range    WBC 4.1 4.0 - 11.0 10e9/L    RBC Count 4.56 3.8 - 5.2 10e12/L    Hemoglobin 13.9 11.7 - 15.7 g/dL    Hematocrit 40.5 35.0 - 47.0 %    MCV 89 78  - 100 fl    MCH 30.5 26.5 - 33.0 pg    MCHC 34.3 31.5 - 36.5 g/dL    RDW 11.7 10.0 - 15.0 %    Platelet Count 185 150 - 450 10e9/L    Diff Method Automated Method     % Neutrophils 79.2 %    % Lymphocytes 13.7 %    % Monocytes 7.1 %    % Eosinophils 0.0 %    % Basophils 0.0 %    % Immature Granulocytes 0.0 %    Nucleated RBCs 0 0 /100    Absolute Neutrophil 3.2 1.6 - 8.3 10e9/L    Absolute Lymphocytes 0.6 (L) 0.8 - 5.3 10e9/L    Absolute Monocytes 0.3 0.0 - 1.3 10e9/L    Absolute Eosinophils 0.0 0.0 - 0.7 10e9/L    Absolute Basophils 0.0 0.0 - 0.2 10e9/L    Abs Immature Granulocytes 0.0 0 - 0.4 10e9/L    Absolute Nucleated RBC 0.0    Comprehensive metabolic panel   Result Value Ref Range    Sodium 137 133 - 144 mmol/L    Potassium 3.8 3.4 - 5.3 mmol/L    Chloride 105 94 - 109 mmol/L    Carbon Dioxide 22 20 - 32 mmol/L    Anion Gap 10 3 - 14 mmol/L    Glucose 101 (H) 70 - 99 mg/dL    Urea Nitrogen 8 7 - 30 mg/dL    Creatinine 0.61 0.52 - 1.04 mg/dL    GFR Estimate >90 >60 mL/min/[1.73_m2]    GFR Estimate If Black >90 >60 mL/min/[1.73_m2]    Calcium 8.3 (L) 8.5 - 10.1 mg/dL    Bilirubin Total 0.5 0.2 - 1.3 mg/dL    Albumin 3.9 3.4 - 5.0 g/dL    Protein Total 7.7 6.8 - 8.8 g/dL    Alkaline Phosphatase 61 40 - 150 U/L    ALT 37 0 - 50 U/L    AST 31 0 - 45 U/L   D dimer quantitative   Result Value Ref Range    D Dimer 0.6 (H) 0.0 - 0.50 ug/ml FEU   Lactic acid whole blood   Result Value Ref Range    Lactic Acid 1.3 0.7 - 2.0 mmol/L   EKG 12-lead, tracing only   Result Value Ref Range    Interpretation ECG Click View Image link to view waveform and result       Labs Ordered and Resulted from Time of ED Arrival Up to the Time of Departure from the ED   CBC WITH PLATELETS DIFFERENTIAL - Abnormal; Notable for the following components:       Result Value    Absolute Lymphocytes 0.6 (*)     All other components within normal limits   COMPREHENSIVE METABOLIC PANEL - Abnormal; Notable for the following components:    Glucose 101  (*)     Calcium 8.3 (*)     All other components within normal limits   D DIMER QUANTITATIVE - Abnormal; Notable for the following components:    D Dimer 0.6 (*)     All other components within normal limits   LACTIC ACID WHOLE BLOOD   UA MACROSCOPIC WITH REFLEX TO MICRO AND CULTURE   HCG QUAL URINE POCT            Assessments & Plan (with Medical Decision Making)       I have reviewed the nursing notes.  Emergency Department course:  The patient was seen and examined at 0828 am.  I treated the patient with a normal saline bolus IV and Tylenol p.o.for a temperature of 100.9.   EKG shows sinus tachycardia, rate of 104 bpm with nonspecific ST-T wave changes.    Lactate today is within normal limits at 1.3.  D-dimer is elevated.  CBC is unremarkable with a WBC of 4.1.  Comprehensive metabolic panel is essentially unremarkable as well.  Chest Xray is unremarkable.   Chest CT shows:   IMPRESSION:  1. No evidence of pulmonary embolism.  2. Mild cardiomegaly.  3. Enlarged mediastinal and hilar lymph nodes. These are nonspecific  but could be reactive. A lymphoproliferative disorder, atypical  infection, or sarcoidosis are included in the differential.  4. Incidental 6 mm left upper lobe pulmonary nodule.  Recommendations for an incidental lung nodule = or > 6mm to 8mm:    Low risk patients: Initial follow-up CT at 6-12 months, then  consider CT at 18-24 months if no change.    High risk patients: Initial follow-up CT at 6-12 months, then CT at  18-24 months if no change.    I consulted pulmonology regarding the CT findings and spoke with Dr. Jaimes.  Dr. Jaimes felt that this may represent sarcoid.  She recommends starting the patient on doxycycline should this be infectious.  She will attempt to arrange for follow-up in the pulmonology clinic for a sarcoid evaluation in a week or so.  The patient also has a left upper lobe pulmonary nodule.  I discussed that she needs a follow-up CT in 6 to 12 months.    The patient  "continued to complain of a headache.  I treated her with Toradol IV and Zofran IV for headache.  She states that her headache was \"tolerable\" at approximately 1330 pm.  She also tells me that the pulmonology clinic has already called her and scheduled an appointment for follow-up.    The patient is a 38-year-old female who presents with difficulty breathing, body aches and headaches and a low-grade fever.  Laboratory studies are essentially unremarkable and CT scan shows mediastinal adenopathy that may be sarcoidosis vs atypical infection.  She has already obtain a pulmonology clinic follow-up.  I prescribed doxycycline and she should stay out of the sun while taking this medication.  She can take ibuprofen or Tylenol as needed for pain.  She should keep her pulmonology appointment.  I counseled the patient in my usual and standard fashion for shortness of breath and fever and reasons to return to the Emergency Department.     I have reviewed the findings, diagnosis, plan and need for follow up with the patient.    New Prescriptions    DOXYCYCLINE HYCLATE (VIBRAMYCIN) 100 MG CAPSULE    Take 1 capsule (100 mg) by mouth 2 times daily       Final diagnoses:   Mediastinal lymphadenopathy - rule out sarcoid   Pulmonary nodules   Headache disorder     This note was created in part by the use of Dragon voice recognition dictation system. Inadvertent grammatical errors and typographical errors may still exist.  Dahlia Quarles MD    9/27/2019   [facility], [place], EMERGENCY DEPARTMENT     Dahlia Quarles MD  09/27/19 1601    "

## 2019-09-27 NOTE — DISCHARGE INSTRUCTIONS
Please make an appointment to follow up with the pulmonology clinic --call [phone]. Keep the appointment that you scheduled  You may have sarcoid and will need further work-up.  Take doxycycline as recommended.  Stay out of the sun when taking this medication.    You have a pulmonary nodule.  This will need a follow-up CT in 6 to 12 months.

## 2019-09-28 LAB — INTERPRETATION ECG - MUSE: NORMAL

## 2019-09-30 ENCOUNTER — ALLIED HEALTH/NURSE VISIT (OUTPATIENT)
Dept: NURSING | Facility: CLINIC | Age: 39
End: 2019-09-30
Payer: COMMERCIAL

## 2019-09-30 ENCOUNTER — HOSPITAL ENCOUNTER (EMERGENCY)
Facility: CLINIC | Age: 39
Discharge: HOME | End: 2019-09-30
Payer: COMMERCIAL

## 2019-09-30 ENCOUNTER — NURSE TRIAGE (OUTPATIENT)
Dept: FAMILY MEDICINE | Facility: CLINIC | Age: 39
End: 2019-09-30

## 2019-09-30 VITALS
TEMPERATURE: 99.5 F | DIASTOLIC BLOOD PRESSURE: 74 MMHG | WEIGHT: 238 LBS | SYSTOLIC BLOOD PRESSURE: 119 MMHG | RESPIRATION RATE: 14 BRPM | HEART RATE: 86 BPM | OXYGEN SATURATION: 97 % | BODY MASS INDEX: 36.19 KG/M2

## 2019-09-30 DIAGNOSIS — Z23 NEED FOR PROPHYLACTIC VACCINATION AND INOCULATION AGAINST INFLUENZA: Primary | ICD-10-CM

## 2019-09-30 PROCEDURE — 90686 IIV4 VACC NO PRSV 0.5 ML IM: CPT

## 2019-09-30 PROCEDURE — 99207 ZZC NO CHARGE NURSE ONLY: CPT

## 2019-09-30 PROCEDURE — 90471 IMMUNIZATION ADMIN: CPT

## 2019-09-30 ASSESSMENT — ENCOUNTER SYMPTOMS
SMELL DISTURBANCE: 0
LIGHT-HEADEDNESS: 1
WEIGHT GAIN: 0
EYE IRRITATION: 0
INCREASED ENERGY: 1
DYSPNEA ON EXERTION: 0
ALTERED TEMPERATURE REGULATION: 1
HEMOPTYSIS: 0
BACK PAIN: 1
FEVER: 1
SEIZURES: 0
HOARSE VOICE: 0
ORTHOPNEA: 1
NUMBNESS: 0
SINUS PAIN: 1
SPUTUM PRODUCTION: 0
SYNCOPE: 0
NIGHT SWEATS: 1
POLYPHAGIA: 0
POLYDIPSIA: 1
LEG PAIN: 1
JOINT SWELLING: 1
SNORES LOUDLY: 1
COUGH: 0
SPEECH CHANGE: 0
ARTHRALGIAS: 1
HYPOTENSION: 0
TASTE DISTURBANCE: 1
NECK MASS: 0
SLEEP DISTURBANCES DUE TO BREATHING: 1
HYPERTENSION: 0
PALPITATIONS: 0
EYE PAIN: 1
FATIGUE: 1
HEADACHES: 1
MEMORY LOSS: 0
TREMORS: 0
MUSCLE WEAKNESS: 1
DISTURBANCES IN COORDINATION: 0
WHEEZING: 0
EYE WATERING: 1
TINGLING: 0
LOSS OF CONSCIOUSNESS: 0
SHORTNESS OF BREATH: 1
DOUBLE VISION: 0
DECREASED APPETITE: 1
EYE REDNESS: 0
PARALYSIS: 0
WEIGHT LOSS: 0
NECK PAIN: 1
STIFFNESS: 1
CHILLS: 1
POSTURAL DYSPNEA: 0
WEAKNESS: 1
DIZZINESS: 0
SORE THROAT: 1
SINUS CONGESTION: 1
MUSCLE CRAMPS: 0
MYALGIAS: 1
TROUBLE SWALLOWING: 1
EXERCISE INTOLERANCE: 1
HALLUCINATIONS: 0

## 2019-09-30 NOTE — TELEPHONE ENCOUNTER
RECORDS RECEIVED FROM: Internal   DATE RECEIVED: 10.2.19   NOTES STATUS DETAILS   OFFICE NOTE from referring provider Internal 9.27.19 Dr. Quarles   OFFICE NOTE from other specialist N/A    DISCHARGE SUMMARY from hospital N/A    DISCHARGE REPORT from the ER Internal 9.27.19 Dr. Quarles, [facility]   MEDICATION LIST Internal    IMAGING  (NEED IMAGES AND REPORTS)     CT SCAN Internal 9.27.19   CHEST XRAY (CXR) Internal 9.27.19   TESTS     PULMONARY FUNCTION TESTING (PFT) Internal Scheduled for 10.2.19

## 2019-09-30 NOTE — TELEPHONE ENCOUNTER
"  Reason for Disposition    SEVERE chest pain    Difficulty breathing    Additional Information    Negative: Breathing stopped and hasn't returned    Negative: Choking on something    Negative: SEVERE difficulty breathing (e.g., struggling for each breath, speaks in single words, pulse > 120)    Negative: Bluish (or gray) lips or face    Negative: Difficult to awaken or acting confused (e.g., disoriented, slurred speech)    Negative: Passed out (i.e., fainted, collapsed and was not responding)    Negative: Wheezing started suddenly after medicine, an allergic food, or bee sting    Negative: Stridor    Negative: Slow, shallow and weak breathing    Negative: Sounds like a life-threatening emergency to the triager    Chest pain    Negative: Severe difficulty breathing (e.g., struggling for each breath, speaks in single words)    Negative: Passed out (i.e., fainted, collapsed and was not responding)    Negative: Chest pain lasting longer than 5 minutes and ANY of the following:* Over 50 years old* Over 30 years old and at least one cardiac risk factor (i.e., high blood pressure, diabetes, high cholesterol, obesity, smoker or strong family history of heart disease)* Pain is crushing, pressure-like, or heavy * Took nitroglycerin and chest pain was not relieved* History of heart disease (i.e., angina, heart attack, bypass surgery, angioplasty, CHF)    Negative: Visible sweat on face or sweat dripping down face    Negative: Sounds like a life-threatening emergency to the triager    Answer Assessment - Initial Assessment Questions  1. RESPIRATORY STATUS: \"Describe your breathing?\" (e.g., wheezing, shortness of breath, unable to speak, severe coughing)       Patient states it is difficult for her to breath, patient states she is unable to take a full deep breath related to pain  2. ONSET: \"When did this breathing problem begin?\"       Patient states issue started on Friday and she was seen in ED. Symptoms started to resolve " "but they have now returned  3. PATTERN \"Does the difficult breathing come and go, or has it been constant since it started?\"       Patient states chest pain in constant  4. SEVERITY: \"How bad is your breathing?\" (e.g., mild, moderate, severe)     - MILD: No SOB at rest, mild SOB with walking, speaks normally in sentences, can lay down, no retractions, pulse < 100.     - MODERATE: SOB at rest, SOB with minimal exertion and prefers to sit, cannot lie down flat, speaks in phrases, mild retractions, audible wheezing, pulse 100-120.     - SEVERE: Very SOB at rest, speaks in single words, struggling to breathe, sitting hunched forward, retractions, pulse > 120       Patient feels she is unable to take a full deep breath related to chest pain. Patient states she feels like she can't breath    Answer Assessment - Initial Assessment Questions  1. LOCATION: \"Where does it hurt?\"        Patient reports chest pain in center and R side of chest  2. RADIATION: \"Does the pain go anywhere else?\" (e.g., into neck, jaw, arms, back)      Declines radiating pain  3. ONSET: \"When did the chest pain begin?\" (Minutes, hours or days)       Patient started Friday, seen in ED and has returned this morning  4. PATTERN \"Does the pain come and go, or has it been constant since it started?\"  \"Does it get worse with exertion?\"       Patient states pain in constant  5. SEVERITY: \"How bad is the pain?\"  (e.g., Scale 1-10; mild, moderate, or severe)     - MILD (1-3): doesn't interfere with normal activities      - MODERATE (4-7): interferes with normal activities or awakens from sleep     - SEVERE (8-10): excruciating pain, unable to do any normal activities        Patient rating pain 8/10    Protocols used: CHEST PAIN-A-OH, BREATHING DIFFICULTY-A-OH      "

## 2019-10-02 ENCOUNTER — PRE VISIT (OUTPATIENT)
Dept: PULMONOLOGY | Facility: CLINIC | Age: 39
End: 2019-10-02

## 2019-10-02 ENCOUNTER — OFFICE VISIT (OUTPATIENT)
Dept: PULMONOLOGY | Facility: CLINIC | Age: 39
End: 2019-10-02
Attending: STUDENT IN AN ORGANIZED HEALTH CARE EDUCATION/TRAINING PROGRAM
Payer: COMMERCIAL

## 2019-10-02 VITALS
RESPIRATION RATE: 17 BRPM | OXYGEN SATURATION: 100 % | SYSTOLIC BLOOD PRESSURE: 109 MMHG | DIASTOLIC BLOOD PRESSURE: 77 MMHG | HEART RATE: 76 BPM | WEIGHT: 236 LBS | HEIGHT: 68 IN | BODY MASS INDEX: 35.77 KG/M2

## 2019-10-02 DIAGNOSIS — J18.9 LUNG INFECTION: Primary | ICD-10-CM

## 2019-10-02 DIAGNOSIS — R59.0 MEDIASTINAL LYMPHADENOPATHY: Primary | ICD-10-CM

## 2019-10-02 PROCEDURE — G0463 HOSPITAL OUTPT CLINIC VISIT: HCPCS | Mod: ZF

## 2019-10-02 ASSESSMENT — MIFFLIN-ST. JEOR: SCORE: 1808.99

## 2019-10-02 ASSESSMENT — PAIN SCALES - GENERAL: PAINLEVEL: SEVERE PAIN (6)

## 2019-10-02 NOTE — NURSING NOTE
Chief Complaint   Patient presents with     Consult     SOB     Medications reviewed and vital signs taken.   Sabino Carrero CMA

## 2019-10-02 NOTE — PATIENT INSTRUCTIONS
Come into lab to have histoplasmosis checked    Clinic will call with results    When we talk can discuss further procedures

## 2019-10-02 NOTE — LETTER
10/2/2019       RE: Carrie Munoz  [address]  [address]  [facility] [address]     Dear Colleague,    Thank you for referring your patient, Carrie Munoz, to the [facility] at [facility]. Please see a copy of my visit note below.    [facility]    Pulmonary, Allergy, Critical Care and Sleep Medicine    Initial Clinic Visit  10/01/2019    Carrie Munoz MRN# [id number]   Age: 38 year old YOB: 1980        Assessment and Recommendations:    Carrie Munoz is a 38 year old  female with a history of fibromyalgia who presented to the ED on 9/27/2019 with acute onset pleuritic chest pain day before, found to have enlarged mediastinal and hilar lymph nodes and urgently referred to Pulmonary.    Right Pleuritic Pain  Mediastinal and Hilar Adenopathy  CHADWICK Nodule  Acute onset right pleuritic pain accompanied by sweats, headache and abnormal CT. Normal CXR in 2010, no prior CTs for comparison. Given age and smoking history believe lung cancer very unlikely, lymphoma also less likely. Would be more suspicious of infection or Sarcoid. Symptoms do not totally fit with infection (not having cough, rhinorrhea), and did not feel improvement with antibiotic though could be viral. No clear exposures of concern but will rule out Histo. No other results thus far that would hint toward Sarcoid, labs and EKG in ED all normal, not sure if prior sinus tach during pregnancy is pertinent. Predominant symptom of severe chest pain seems atypical for sarcoidosis, not sure if this somehow ties in to her history of fibromyalgia. Discussed options of repeating CT in one month vs pursuing biopsy of lymph nodes. Patient not ready to make decision yet and needed to leave to get to .   - Histo antigen ordered, patient will return to complete, if positive will likely defer further workup  - Will review imaging  "with Interventional Pulmonology  - Patient wants to consider options of repeat imaging in one month vs biopsy, will discuss further when call with Histo results    Seen and discussed with Dr. Jimmy Valdez MD PhD  Pulmonary and Critical Care Fellow   Pager [phone]    Chief Complaint and History of Present Illness:    CC:  Right pleuritic chest pain    HPI:     History obtained from patient and chart review.     Presented to the Emergency Room on 9/27/2019 with complaints of pain with breathing. Onset previous day, accompanied by cold/hot flashes and body aches. In the ED noted to be tachycardic with a temp of 100.9. CT chest obtained with enlarged mediastinal and hilar lymph nodes, 6 mm CHADWICK nodule. Imaging discussed with Pulmonology who recommended initiation of antibiotics to treat for potential infection with urgent clinic visit for further evaluation.    Today patient reports that had started to have chest tightness in days to weeks prior to ED visit. On Sept 25 started to \"feel bad\" with body aches and headache. The next day (9/26) could not get comfortable and when lay down started to have significant pain on right side every time tried to take a breath. Pain was sharp and across most of right upper chest into her side and axilla. Did try to take ibuprofen, wondered if part of symptoms due to her fibromyalgia but it did not help. Next day when she had childcare went into the ED. Given Toradol which helped the pain, threw up from CT contrast and given Zofran. Otherwise denied feeling dyspnea (just hurt to breathe), no palpitations or heart racing.     Pain came back on Sat (9/27) and has continued. Today was better but doing PFTs made the pain worse again. Now having more dyspnea if tried to walk stairs. No significant cough, no hemoptysis. Sore throat over weekend that has improved. Is quite fatigued. Having drenching sweats a periods during the day and at night. Continues to have headache " that is centered around crown of head and moves forward toward forehead. States eyes hurt but thinks that is due to headache, denies any vision changes. No new numbness, tingling, skin lesions or rashes. Denies any recent physical activity that could have led to strained muscles.    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 Going to school through [employer]. Between work, school, and family denies having any free time or hobbies. No significant outdoor activities, no recent travel. Air quality at work has been checked and is ok. Reports landlordann did not known when asked about mold or water damage in her home. All prior TB tests have been negative. Never smoker, denies marijuana or vaping.     Prior history of fibromyalgia, and suspected pregnancy related pelvic girdle pain. Seen by Rheumatology 11/2018 with potential inflammation on MRI, negative HLA-B27. Prescribed treatment regimen of duloxetine, gabapentin, and specialized physical therapy. Back in 2/2017 during 3rd trimester evaluated by Cardiology due to sinus tachycardia. Echo with borderline RV enlargement, EF 65-70%. Holter with -150. Suspected due to pregnancy. Denies history of asthma as kid, did have frequent bronchitis and thinks this in part due to living with smokers.     Review of Systems:  Complete 12 point ROS negative unless mentioned in HPI    Histories, Prior to Admission Medications, Allergies:    Past Medical History:  Past Medical History:   Diagnosis Date     Breast disorder     biopsy 2011(?) benign     Breast lump on right side at 2:30  o'clock position 5/4/2011     Cardiac abnormality     tachycardia during pregnancy     Depressive disorder      Mental disorder     depression, anxiety     Unspecified sinusitis (chronic)      Past Surgical History:  Past Surgical History:   Procedure Laterality Date     EXCISE GANGLION WRIST Left 5/24/2018    Procedure: EXCISE GANGLION WRIST;  Excision Left Dorsal  Wirst Ganglion;  Surgeon: Randall Maradiaga MD;  Location:  OR      TOOTH EXTRACTION W/FORCEP  2003    all 4 together     Past Social History:  Social History     Socioeconomic History     Marital status: Single     Spouse name: Not on file     Number of children: 0     Years of education: 13 1/2     Highest education level: Not on file   Occupational History     Occupation: teller     Employer: [employer]   Social Needs     Financial resource strain: Not on file     Food insecurity:     Worry: Not on file     Inability: Not on file     Transportation needs:     Medical: Not on file     Non-medical: Not on file   Tobacco Use     Smoking status: Never Smoker     Smokeless tobacco: Never Used   Substance and Sexual Activity     Alcohol use: No     Drug use: No     Sexual activity: Not Currently     Partners: Male     Birth control/protection: None   Lifestyle     Physical activity:     Days per week: Not on file     Minutes per session: Not on file     Stress: Not on file   Relationships     Social connections:     Talks on phone: Not on file     Gets together: Not on file     Attends Confucianist service: Not on file     Active member of club or organization: Not on file     Attends meetings of clubs or organizations: Not on file     Relationship status: Not on file     Intimate partner violence:     Fear of current or ex partner: Not on file     Emotionally abused: Not on file     Physically abused: Not on file     Forced sexual activity: Not on file   Other Topics Concern     Parent/sibling w/ CABG, MI or angioplasty before 65F 55M? No   Social History Narrative    Risk Behaviors & Healthy habits    Balanced Diet  no     Prevent Osteoporosis  yes - lots of dairy    Regular Exercise  no     Dental Care  yes    Seat Belt use  yes    Self Breast Exam  no     Sexually Active  no , no STD concerns, but would like to be checked     Contraception  yes    Abuse  no     Guns  no     Sun Screen  no             Working at  [employer], 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  Lives with her aunt, and gets along well.  In limited contact with her mother, in contact with father a lot.  Born in [place], and moved [place] 5[place] 11.  Father in the .                     Family History:  Family History   Problem Relation Age of Onset     Hypertension Paternal Grandmother      Osteoporosis Paternal Grandmother      Hypertension Paternal Aunt      Neurologic Disorder Father         sheehan's palsy     Hypertension Father         not on medication, smoker     Neurologic Disorder Mother         bell's palsy     Medications:  Current Outpatient Medications   Medication     doxycycline hyclate (VIBRAMYCIN) 100 MG capsule     DULoxetine (CYMBALTA) 30 MG EC capsule     fluticasone (FLONASE) 50 MCG/ACT spray     gabapentin (NEURONTIN) 300 MG capsule     ibuprofen (ADVIL/MOTRIN) 600 MG tablet     levonorgestrel (MIRENA, 52 MG,) 20 MCG/24HR IUD     No current facility-administered medications for this visit.      Allergies:  Allergies   Allergen Reactions     No Known Drug Allergies      Physical Exam:       /77   Pulse 76   Resp 17   Ht 1.727 m (5' 8\")   Wt 107 kg (236 lb)   LMP 09/23/2019   SpO2 100%   BMI 35.88 kg/m     General: Sitting up, NAD  HEENT: anicteric, moist mucosa  Neck: no palpable lymphadenopathy  Chest: CTAB, no wheezing or crackles, no axillary adenopathy, tender to palpation of right upper chest and side  Cardiac: RRR no murmurs  Abdomen: Soft, flat  Extremities: No LE Edema  Neuro: A&Ox3, no focal deficits   Skin: no rash noted  Psych: Appropriate    Laboratory, imaging, and microbiologic data:    All laboratory and imaging data reviewed, pertinent results discussed above.     CT 9/27/2019  CT CHEST PULMONARY EMBOLISM WITH CONTRAST   9/27/2019 11:27 AM      HISTORY: Elevated D-dimer.  Patient with pain on breathing difficulty  breathing and fever.     TECHNIQUE:  72mL of isovue 370. " Radiation dose for this scan was  reduced using automated exposure control, adjustment of the mA and/or  kV according to patient size, or iterative reconstruction technique.     COMPARISON: None.     FINDINGS:  No evidence of pulmonary embolism or acute thoracic aortic  abnormality. No significant coronary artery calcifications. No  pneumothorax. No pleural or pericardial effusion. The heart is  enlarged.     There is a 6 mm left upper lobe pulmonary nodule (series 5, image 66).  No other pulmonary nodule or mass. No evidence of pneumonia.     Enlarged right hilar lymph node measures 2.2 x 1.4 cm (series 4, image  56). There is an enlarged subcarinal lymph node that measures 2.6 x  1.7 cm (series 4, image 63). Prominent left hilar lymph nodes are  present. No axillary adenopathy.     Visualized portions of the upper abdomen unremarkable. No lytic or  blastic bone lesions.                                                                      IMPRESSION:  1. No evidence of pulmonary embolism.  2. Mild cardiomegaly.  3. Enlarged mediastinal and hilar lymph nodes. These are nonspecific  but could be reactive. A lymphoproliferative disorder, atypical  infection, or sarcoidosis are included in the differential.  4. Incidental 6 mm left upper lobe pulmonary nodule.  Recommendations for an incidental lung nodule = or > 6mm to 8mm:    Low risk patients: Initial follow-up CT at 6-12 months, then  consider CT at 18-24 months if no change.    High risk patients: Initial follow-up CT at 6-12 months, then CT at  18-24 months if no change.     *Low Risk: Minimal or absent history of smoking or other known risk  factors.  *Nonsolid (ground-glass) or partly solid nodules may require longer  follow-up to exclude indolent adenocarcinoma.  *Recommendations based on Guidelines for the Management of Incidental  Pulmonary Nodules Detected at CT: From the Fleischner Society 2017,  Radiology 2017.    Most Recent [facility] Pulmonary Function  Testing    FVC-Pred   Date Value Ref Range Status   10/02/2019 4.02 L      FVC-Pre   Date Value Ref Range Status   10/02/2019 3.17 L      FVC-%Pred-Pre   Date Value Ref Range Status   10/02/2019 78 %      FEV1-Pre   Date Value Ref Range Status   10/02/2019 2.81 L      FEV1-%Pred-Pre   Date Value Ref Range Status   10/02/2019 83 %      FEV1FVC-Pred   Date Value Ref Range Status   10/02/2019 84 %      FEV1FVC-Pre   Date Value Ref Range Status   10/02/2019 88 %      No results found for: 20029  FEFMax-Pred   Date Value Ref Range Status   10/02/2019 7.72 L/sec      FEFMax-Pre   Date Value Ref Range Status   10/02/2019 9.33 L/sec      FEFMax-%Pred-Pre   Date Value Ref Range Status   10/02/2019 120 %      ExpTime-Pre   Date Value Ref Range Status   10/02/2019 5.81 sec      FIFMax-Pre   Date Value Ref Range Status   10/02/2019 5.82 L/sec      FEV1FEV6-Pred   Date Value Ref Range Status   10/02/2019 84 %      FEV1FEV6-Pre   Date Value Ref Range Status   10/02/2019 88 %      No results found for: 20055            Physician Attestation   I, Rinku Marquez MD, saw this patient with  and agree with the findings and plan of care as documented in the note.      I personally reviewed vital signs, medications, labs and imaging.    Rinku Marquez MD  Date of Service (when I saw the patient): 10/02/19      Again, thank you for allowing me to participate in the care of your patient.      Sincerely,    Racquel Valdez MD

## 2019-10-02 NOTE — PROGRESS NOTES
[facility]    Pulmonary, Allergy, Critical Care and Sleep Medicine    Initial Clinic Visit  10/01/2019    Carrie Munoz MRN# [id number]   Age: 38 year old YOB: 1980        Assessment and Recommendations:    Carrie Munoz is a 38 year old  female with a history of fibromyalgia who presented to the ED on 9/27/2019 with acute onset pleuritic chest pain day before, found to have enlarged mediastinal and hilar lymph nodes and urgently referred to Pulmonary.    Right Pleuritic Pain  Mediastinal and Hilar Adenopathy  CHADWICK Nodule  Acute onset right pleuritic pain accompanied by sweats, headache and abnormal CT. Normal CXR in 2010, no prior CTs for comparison. Given age and smoking history believe lung cancer very unlikely, lymphoma also less likely. Would be more suspicious of infection or Sarcoid. Symptoms do not totally fit with infection (not having cough, rhinorrhea), and did not feel improvement with antibiotic though could be viral. No clear exposures of concern but will rule out Histo. No other results thus far that would hint toward Sarcoid, labs and EKG in ED all normal, not sure if prior sinus tach during pregnancy is pertinent. Predominant symptom of severe chest pain seems atypical for sarcoidosis, not sure if this somehow ties in to her history of fibromyalgia. Discussed options of repeating CT in one month vs pursuing biopsy of lymph nodes. Patient not ready to make decision yet and needed to leave to get to .   - Histo antigen ordered, patient will return to complete, if positive will likely defer further workup  - Will review imaging with Interventional Pulmonology  - Patient wants to consider options of repeat imaging in one month vs biopsy, will discuss further when call with Histo results    Seen and discussed with Dr. Jimmy Valdez MD PhD  Pulmonary and Critical Care Fellow   Pager [phone]    Chief Complaint and History  "of Present Illness:    CC:  Right pleuritic chest pain    HPI:     History obtained from patient and chart review.     Presented to the Emergency Room on 9/27/2019 with complaints of pain with breathing. Onset previous day, accompanied by cold/hot flashes and body aches. In the ED noted to be tachycardic with a temp of 100.9. CT chest obtained with enlarged mediastinal and hilar lymph nodes, 6 mm CHADWICK nodule. Imaging discussed with Pulmonology who recommended initiation of antibiotics to treat for potential infection with urgent clinic visit for further evaluation.    Today patient reports that had started to have chest tightness in days to weeks prior to ED visit. On Sept 25 started to \"feel bad\" with body aches and headache. The next day (9/26) could not get comfortable and when lay down started to have significant pain on right side every time tried to take a breath. Pain was sharp and across most of right upper chest into her side and axilla. Did try to take ibuprofen, wondered if part of symptoms due to her fibromyalgia but it did not help. Next day when she had childcare went into the ED. Given Toradol which helped the pain, threw up from CT contrast and given Zofran. Otherwise denied feeling dyspnea (just hurt to breathe), no palpitations or heart racing.     Pain came back on Sat (9/27) and has continued. Today was better but doing PFTs made the pain worse again. Now having more dyspnea if tried to walk stairs. No significant cough, no hemoptysis. Sore throat over weekend that has improved. Is quite fatigued. Having drenching sweats a periods during the day and at night. Continues to have headache that is centered around crown of head and moves forward toward forehead. States eyes hurt but thinks that is due to headache, denies any vision changes. No new numbness, tingling, skin lesions or rashes. Denies any recent physical activity that could have led to strained muscles.    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 Going to school through [employer]. Between work, school, and family denies having any free time or hobbies. No significant outdoor activities, no recent travel. Air quality at work has been checked and is ok. Reports landlordann did not known when asked about mold or water damage in her home. All prior TB tests have been negative. Never smoker, denies marijuana or vaping.     Prior history of fibromyalgia, and suspected pregnancy related pelvic girdle pain. Seen by Rheumatology 11/2018 with potential inflammation on MRI, negative HLA-B27. Prescribed treatment regimen of duloxetine, gabapentin, and specialized physical therapy. Back in 2/2017 during 3rd trimester evaluated by Cardiology due to sinus tachycardia. Echo with borderline RV enlargement, EF 65-70%. Holter with -150. Suspected due to pregnancy. Denies history of asthma as kid, did have frequent bronchitis and thinks this in part due to living with smokers.     Review of Systems:  Complete 12 point ROS negative unless mentioned in HPI    Histories, Prior to Admission Medications, Allergies:    Past Medical History:  Past Medical History:   Diagnosis Date     Breast disorder     biopsy 2011(?) benign     Breast lump on right side at 2:30  o'clock position 5/4/2011     Cardiac abnormality     tachycardia during pregnancy     Depressive disorder      Mental disorder     depression, anxiety     Unspecified sinusitis (chronic)      Past Surgical History:  Past Surgical History:   Procedure Laterality Date     EXCISE GANGLION WRIST Left 5/24/2018    Procedure: EXCISE GANGLION WRIST;  Excision Left Dorsal Wirst Ganglion;  Surgeon: Randall Maradiaga MD;  Location:  OR      TOOTH EXTRACTION W/FORCEP  2003    all 4 together     Past Social History:  Social History     Socioeconomic History     Marital status: Single     Spouse name: Not on file     Number of children: 0     Years of education: 13 1/2     Highest  education level: Not on file   Occupational History     Occupation: teller     Employer: [employer]   Social Needs     Financial resource strain: Not on file     Food insecurity:     Worry: Not on file     Inability: Not on file     Transportation needs:     Medical: Not on file     Non-medical: Not on file   Tobacco Use     Smoking status: Never Smoker     Smokeless tobacco: Never Used   Substance and Sexual Activity     Alcohol use: No     Drug use: No     Sexual activity: Not Currently     Partners: Male     Birth control/protection: None   Lifestyle     Physical activity:     Days per week: Not on file     Minutes per session: Not on file     Stress: Not on file   Relationships     Social connections:     Talks on phone: Not on file     Gets together: Not on file     Attends Rastafari service: Not on file     Active member of club or organization: Not on file     Attends meetings of clubs or organizations: Not on file     Relationship status: Not on file     Intimate partner violence:     Fear of current or ex partner: Not on file     Emotionally abused: Not on file     Physically abused: Not on file     Forced sexual activity: Not on file   Other Topics Concern     Parent/sibling w/ CABG, MI or angioplasty before 65F 55M? No   Social History Narrative    Risk Behaviors & Healthy habits    Balanced Diet  no     Prevent Osteoporosis  yes - lots of dairy    Regular Exercise  no     Dental Care  yes    Seat Belt use  yes    Self Breast Exam  no     Sexually Active  no , no STD concerns, but would like to be checked     Contraception  yes    Abuse  no     Guns  no     Sun Screen  no             Working at [employer], 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  Lives with her aunt, and gets along well.  In limited contact with her mother, in contact with father a lot.  Born in [place], and moved [place] 5, [place] 11.  Father in the .                     Family History:  Family History  "  Problem Relation Age of Onset     Hypertension Paternal Grandmother      Osteoporosis Paternal Grandmother      Hypertension Paternal Aunt      Neurologic Disorder Father         sheehan's palsy     Hypertension Father         not on medication, smoker     Neurologic Disorder Mother         bell's palsy     Medications:  Current Outpatient Medications   Medication     doxycycline hyclate (VIBRAMYCIN) 100 MG capsule     DULoxetine (CYMBALTA) 30 MG EC capsule     fluticasone (FLONASE) 50 MCG/ACT spray     gabapentin (NEURONTIN) 300 MG capsule     ibuprofen (ADVIL/MOTRIN) 600 MG tablet     levonorgestrel (MIRENA, 52 MG,) 20 MCG/24HR IUD     No current facility-administered medications for this visit.      Allergies:  Allergies   Allergen Reactions     No Known Drug Allergies      Physical Exam:       /77   Pulse 76   Resp 17   Ht 1.727 m (5' 8\")   Wt 107 kg (236 lb)   LMP 09/23/2019   SpO2 100%   BMI 35.88 kg/m    General: Sitting up, NAD  HEENT: anicteric, moist mucosa  Neck: no palpable lymphadenopathy  Chest: CTAB, no wheezing or crackles, no axillary adenopathy, tender to palpation of right upper chest and side  Cardiac: RRR no murmurs  Abdomen: Soft, flat  Extremities: No LE Edema  Neuro: A&Ox3, no focal deficits   Skin: no rash noted  Psych: Appropriate    Laboratory, imaging, and microbiologic data:    All laboratory and imaging data reviewed, pertinent results discussed above.     CT 9/27/2019  CT CHEST PULMONARY EMBOLISM WITH CONTRAST   9/27/2019 11:27 AM      HISTORY: Elevated D-dimer.  Patient with pain on breathing difficulty  breathing and fever.     TECHNIQUE:  72mL of isovue 370. Radiation dose for this scan was  reduced using automated exposure control, adjustment of the mA and/or  kV according to patient size, or iterative reconstruction technique.     COMPARISON: None.     FINDINGS:  No evidence of pulmonary embolism or acute thoracic aortic  abnormality. No significant coronary artery " calcifications. No  pneumothorax. No pleural or pericardial effusion. The heart is  enlarged.     There is a 6 mm left upper lobe pulmonary nodule (series 5, image 66).  No other pulmonary nodule or mass. No evidence of pneumonia.     Enlarged right hilar lymph node measures 2.2 x 1.4 cm (series 4, image  56). There is an enlarged subcarinal lymph node that measures 2.6 x  1.7 cm (series 4, image 63). Prominent left hilar lymph nodes are  present. No axillary adenopathy.     Visualized portions of the upper abdomen unremarkable. No lytic or  blastic bone lesions.                                                                      IMPRESSION:  1. No evidence of pulmonary embolism.  2. Mild cardiomegaly.  3. Enlarged mediastinal and hilar lymph nodes. These are nonspecific  but could be reactive. A lymphoproliferative disorder, atypical  infection, or sarcoidosis are included in the differential.  4. Incidental 6 mm left upper lobe pulmonary nodule.  Recommendations for an incidental lung nodule = or > 6mm to 8mm:    Low risk patients: Initial follow-up CT at 6-12 months, then  consider CT at 18-24 months if no change.    High risk patients: Initial follow-up CT at 6-12 months, then CT at  18-24 months if no change.     *Low Risk: Minimal or absent history of smoking or other known risk  factors.  *Nonsolid (ground-glass) or partly solid nodules may require longer  follow-up to exclude indolent adenocarcinoma.  *Recommendations based on Guidelines for the Management of Incidental  Pulmonary Nodules Detected at CT: From the Fleischner Society 2017,  Radiology 2017.    Most Recent [facility] Pulmonary Function Testing    FVC-Pred   Date Value Ref Range Status   10/02/2019 4.02 L      FVC-Pre   Date Value Ref Range Status   10/02/2019 3.17 L      FVC-%Pred-Pre   Date Value Ref Range Status   10/02/2019 78 %      FEV1-Pre   Date Value Ref Range Status   10/02/2019 2.81 L      FEV1-%Pred-Pre   Date Value Ref Range Status    10/02/2019 83 %      FEV1FVC-Pred   Date Value Ref Range Status   10/02/2019 84 %      FEV1FVC-Pre   Date Value Ref Range Status   10/02/2019 88 %      No results found for: 20029  FEFMax-Pred   Date Value Ref Range Status   10/02/2019 7.72 L/sec      FEFMax-Pre   Date Value Ref Range Status   10/02/2019 9.33 L/sec      FEFMax-%Pred-Pre   Date Value Ref Range Status   10/02/2019 120 %      ExpTime-Pre   Date Value Ref Range Status   10/02/2019 5.81 sec      FIFMax-Pre   Date Value Ref Range Status   10/02/2019 5.82 L/sec      FEV1FEV6-Pred   Date Value Ref Range Status   10/02/2019 84 %      FEV1FEV6-Pre   Date Value Ref Range Status   10/02/2019 88 %      No results found for: 20055

## 2019-10-03 DIAGNOSIS — R59.0 MEDIASTINAL LYMPHADENOPATHY: ICD-10-CM

## 2019-10-03 LAB
DLCOCOR-%PRED-PRE: 97 %
DLCOCOR-PRE: 25.11 ML/MIN/MMHG
DLCOUNC-%PRED-PRE: 99 %
DLCOUNC-PRE: 25.49 ML/MIN/MMHG
DLCOUNC-PRED: 25.67 ML/MIN/MMHG
ERV-%PRED-PRE: 86 %
ERV-PRE: 0.75 L
ERV-PRED: 0.87 L
EXPTIME-PRE: 5.81 SEC
FEF2575-%PRED-PRE: 107 %
FEF2575-PRE: 3.76 L/SEC
FEF2575-PRED: 3.49 L/SEC
FEFMAX-%PRED-PRE: 120 %
FEFMAX-PRE: 9.33 L/SEC
FEFMAX-PRED: 7.72 L/SEC
FEV1-%PRED-PRE: 83 %
FEV1-PRE: 2.81 L
FEV1FEV6-PRE: 88 %
FEV1FEV6-PRED: 84 %
FEV1FVC-PRE: 88 %
FEV1FVC-PRED: 84 %
FEV1SVC-PRE: 87 %
FEV1SVC-PRED: 77 %
FIFMAX-PRE: 5.82 L/SEC
FRCPLETH-%PRED-PRE: 55 %
FRCPLETH-PRE: 1.63 L
FRCPLETH-PRED: 2.93 L
FVC-%PRED-PRE: 78 %
FVC-PRE: 3.17 L
FVC-PRED: 4.02 L
IC-%PRED-PRE: 70 %
IC-PRE: 2.45 L
IC-PRED: 3.47 L
RVPLETH-%PRED-PRE: 50 %
RVPLETH-PRE: 0.87 L
RVPLETH-PRED: 1.73 L
TLCPLETH-%PRED-PRE: 71 %
TLCPLETH-PRE: 4.08 L
TLCPLETH-PRED: 5.69 L
VA-%PRED-PRE: 67 %
VA-PRE: 3.99 L
VC-%PRED-PRE: 73 %
VC-PRE: 3.21 L
VC-PRED: 4.35 L

## 2019-10-03 NOTE — PROGRESS NOTES
Discharge Note    Progress reporting period is from last progress note on   to Dec 19, 2018.    Carrie failed to follow up and current status is unknown.  Please see information below for last relevant information on current status.  Patient seen for 3 visits.    SUBJECTIVE  Subjective changes noted by patient:  Pt reports pain has been ok. She ran out of meds and has gone without for 2 weeks. Pain is more at night. Pain is still in both hips. [facility]. E-stim was helpful to allow pt to feel relaxation of PF  .  Current pain level is  .     Previous pain level was  10/10.   Changes in function:  Yes (See Goal flowsheet attached for changes in current functional level)  Adverse reaction to treatment or activity: None    OBJECTIVE  Changes noted in objective findings: E-stim for relaxation of PF, discussed ways for relaxation of PF      ASSESSMENT/PLAN  Diagnosis: SI joint, Pelvic Pain   Updated problem list and treatment plan:   Pain - HEP  Decreased ROM/flexibility - HEP  Decreased function - HEP  Decreased strength - HEP  Impaired muscle performance - HEP  Impaired posture - HEP  STG/LTGs have been met or progress has been made towards goals:  Yes, please see goal flowsheet for most current information  Assessment of Progress: current status is unknown.    Last current status: Pt is progressing as expected   Self Management Plans:  HEP  I have re-evaluated this patient and find that the nature, scope, duration and intensity of the therapy is appropriate for the medical condition of the patient.  Carrie continues to require the following intervention to meet STG and LTG's:  HEP.    Recommendations:  Discharge with current home program.  Patient to follow up with MD as needed.    Please refer to the daily flowsheet for treatment today, total treatment time and time spent performing 1:1 timed codes.

## 2019-10-03 NOTE — PROGRESS NOTES
Physician Attestation   I, Rinku Marquez MD, saw this patient with  and agree with the findings and plan of care as documented in the note.      I personally reviewed vital signs, medications, labs and imaging.    Rinku Marquez MD  Date of Service (when I saw the patient): 10/02/19

## 2019-10-08 LAB — LAB SCANNED RESULT: NORMAL

## 2019-10-09 ENCOUNTER — TELEPHONE (OUTPATIENT)
Dept: PULMONOLOGY | Facility: CLINIC | Age: 39
End: 2019-10-09

## 2019-10-09 DIAGNOSIS — R59.0 MEDIASTINAL LYMPHADENOPATHY: Primary | ICD-10-CM

## 2019-10-09 NOTE — TELEPHONE ENCOUNTER
Called patient to give negative results of histo test. Discussed that have reviewed case with Rheumatology and Interventional Pulmonology. Given clinical course and symptoms remain less concerned about a malignancy. Workup and symptoms thus far do not seem to indicate active sarcoid and imaging somewhat atypical with unilateral lymphadenopathy. Patient's symptoms have remained relatively stable to slightly better in terms of chest pain, fatigue, and sweats.    Agreed that will repeat CT in two months, will place order for early December. If lymph nodes remain enlarged at that time will discuss with Interventional Pulmonology and likely proceed with EBUS. Advised patient to be seen in clinic if symptoms are not improving and has acute severe symptoms such as dyspnea or chest pain should return to the ED.    Racquel Valdez MD PhD  Pulmonary Critical Care Fellow  [phone]

## 2019-10-22 PROBLEM — M54.50 BILATERAL LOW BACK PAIN WITHOUT SCIATICA: Status: RESOLVED | Noted: 2018-11-28 | Resolved: 2018-12-19

## 2019-11-05 ENCOUNTER — HEALTH MAINTENANCE LETTER (OUTPATIENT)
Age: 39
End: 2019-11-05

## 2019-12-02 ENCOUNTER — ANCILLARY PROCEDURE (OUTPATIENT)
Dept: CT IMAGING | Facility: CLINIC | Age: 39
End: 2019-12-02
Payer: COMMERCIAL

## 2019-12-02 DIAGNOSIS — R59.0 MEDIASTINAL LYMPHADENOPATHY: ICD-10-CM

## 2019-12-02 RX ORDER — IOPAMIDOL 755 MG/ML
116 INJECTION, SOLUTION INTRAVASCULAR ONCE
Status: COMPLETED | OUTPATIENT
Start: 2019-12-02 | End: 2019-12-02

## 2019-12-02 RX ADMIN — IOPAMIDOL 116 ML: 755 INJECTION, SOLUTION INTRAVASCULAR at 16:07

## 2020-01-02 ENCOUNTER — MYC MEDICAL ADVICE (OUTPATIENT)
Dept: FAMILY MEDICINE | Facility: CLINIC | Age: 40
End: 2020-01-02

## 2020-01-02 DIAGNOSIS — G89.4 CHRONIC PAIN SYNDROME: Primary | ICD-10-CM

## 2020-01-16 ENCOUNTER — OFFICE VISIT (OUTPATIENT)
Dept: FAMILY MEDICINE | Facility: CLINIC | Age: 40
End: 2020-01-16
Payer: COMMERCIAL

## 2020-01-16 VITALS
BODY MASS INDEX: 36.78 KG/M2 | DIASTOLIC BLOOD PRESSURE: 76 MMHG | TEMPERATURE: 98.2 F | WEIGHT: 242.7 LBS | SYSTOLIC BLOOD PRESSURE: 118 MMHG | RESPIRATION RATE: 14 BRPM | HEIGHT: 68 IN | OXYGEN SATURATION: 99 % | HEART RATE: 98 BPM

## 2020-01-16 DIAGNOSIS — M79.7 FIBROMYALGIA: Primary | ICD-10-CM

## 2020-01-16 DIAGNOSIS — M53.3 SACROILIAC JOINT PAIN: ICD-10-CM

## 2020-01-16 PROCEDURE — 99213 OFFICE O/P EST LOW 20 MIN: CPT | Performed by: FAMILY MEDICINE

## 2020-01-16 ASSESSMENT — PAIN SCALES - GENERAL: PAINLEVEL: EXTREME PAIN (8)

## 2020-01-16 ASSESSMENT — MIFFLIN-ST. JEOR: SCORE: 1839.25

## 2020-01-16 NOTE — PROGRESS NOTES
"Subjective     Carrie Munoz is a 39 year old female who presents to clinic today for the following health issues:    HPI   Patient coming in today for a Referral to pain clinic  Encounter Diagnoses   Name Primary?     Fibromyalgia Yes     Sacroiliac joint pain       ddi not repond to medications  Would like a more comprehensive program    Current Outpatient Medications   Medication Sig Dispense Refill     ibuprofen (ADVIL/MOTRIN) 600 MG tablet Take 1 tablet (600 mg) by mouth every 6 hours as needed for moderate pain 120 tablet 1     doxycycline hyclate (VIBRAMYCIN) 100 MG capsule Take 1 capsule (100 mg) by mouth 2 times daily 14 capsule 0     DULoxetine (CYMBALTA) 30 MG EC capsule Take 1 capsule (30 mg) by mouth 2 times daily 90 capsule 3     fluticasone (FLONASE) 50 MCG/ACT spray Spray 1-2 sprays into both nostrils daily (Patient not taking: Reported on 6/29/2018) 3 Bottle 1     gabapentin (NEURONTIN) 300 MG capsule Take 2 tablets (73672 mg) by mouth in AM/morning, 1 tablet (300 mg) at noon and 2 tablets (900 mg) at bedtime for sleep 150 capsule 1     levonorgestrel (MIRENA, 52 MG,) 20 MCG/24HR IUD 1 each (20 mcg) by Intrauterine route once for 1 dose 1 each 0         Reviewed and updated as needed this visit by Provider         Review of Systems   ROS COMP: Constitutional, HEENT, cardiovascular, pulmonary, gi and gu systems are negative, except as otherwise noted.      Objective    /76   Pulse 98   Temp 98.2  F (36.8  C) (Temporal)   Resp 14   Ht 1.735 m (5' 8.31\")   Wt 110.1 kg (242 lb 11.2 oz)   LMP 01/01/2020 (Approximate)   SpO2 99%   Breastfeeding No   BMI 36.57 kg/m    Body mass index is 36.57 kg/m .  Physical Exam   GENERAL: alert, over weight and fatigued  NEURO: Normal strength and tone, mentation intact and speech normal  PSYCH: mentation appears normal, affect normal/bright    Diagnostic Test Results:  Labs reviewed in Epic        Assessment & Plan     1. Fibromyalgia  Agrees with " referral  - PAIN MANAGEMENT REFERRAL    2. Sacroiliac joint pain  As above   keep walking/ work on life stress/ sleep  - PAIN MANAGEMENT REFERRAL            See Patient Instructions    No follow-ups on file.    Narciso Sanford MD  [facility] [place]

## 2020-01-27 ENCOUNTER — MYC MEDICAL ADVICE (OUTPATIENT)
Dept: FAMILY MEDICINE | Facility: CLINIC | Age: 40
End: 2020-01-27

## 2020-03-17 ENCOUNTER — TRANSFERRED RECORDS (OUTPATIENT)
Dept: HEALTH INFORMATION MANAGEMENT | Facility: CLINIC | Age: 40
End: 2020-03-17

## 2020-09-29 ENCOUNTER — MYC MEDICAL ADVICE (OUTPATIENT)
Dept: FAMILY MEDICINE | Facility: CLINIC | Age: 40
End: 2020-09-29

## 2020-09-29 NOTE — LETTER
[facility]  [address] [place] [address]  [address]  [facility] [address]  [phone]          September 30, 2020    Carrie Munoz                                                                                                                     [address]    [facility] [address]            Re: Carrie Munoz    To whom it may concern:    Carrie has been under my medical care. It is my medical opinion that she be able to have a memory foam mattress due to chronic medical conditions related to back pain.        Sincerely,         Narciso Sanford MD

## 2020-09-30 NOTE — TELEPHONE ENCOUNTER
Dr. Sanford,    Please see patients my chart message. Letter cued.    Thanks  Shantal Olson RN   [facility]

## 2020-10-15 ENCOUNTER — OFFICE VISIT (OUTPATIENT)
Dept: OBGYN | Facility: CLINIC | Age: 40
End: 2020-10-15
Payer: COMMERCIAL

## 2020-10-15 VITALS
TEMPERATURE: 98.1 F | HEART RATE: 82 BPM | BODY MASS INDEX: 34.21 KG/M2 | SYSTOLIC BLOOD PRESSURE: 119 MMHG | WEIGHT: 227 LBS | DIASTOLIC BLOOD PRESSURE: 79 MMHG

## 2020-10-15 DIAGNOSIS — Z30.015 ENCOUNTER FOR INITIAL PRESCRIPTION OF VAGINAL RING HORMONAL CONTRACEPTIVE: ICD-10-CM

## 2020-10-15 DIAGNOSIS — Z30.432 ENCOUNTER FOR IUD REMOVAL: Primary | ICD-10-CM

## 2020-10-15 PROCEDURE — 58301 REMOVE INTRAUTERINE DEVICE: CPT | Performed by: OBSTETRICS & GYNECOLOGY

## 2020-10-15 PROCEDURE — 99213 OFFICE O/P EST LOW 20 MIN: CPT | Mod: 25 | Performed by: OBSTETRICS & GYNECOLOGY

## 2020-10-15 RX ORDER — ETONOGESTREL AND ETHINYL ESTRADIOL VAGINAL RING .015; .12 MG/D; MG/D
1 RING VAGINAL
Qty: 3 EACH | Refills: 4 | Status: SHIPPED | OUTPATIENT
Start: 2020-10-15 | End: 2023-03-17

## 2020-10-15 NOTE — PROGRESS NOTES
GYN CLINIC VISIT  10/14/2020     CC: IUD removal    HPI:   Carrie Munoz is a 40 year old  who presents to clinic today for an IUD removal.  She had a Mirena inserted on 18 for contraception and menstrual regulation.   She desires removal of IUD because she's noticed some discomfort with it since losing weight.  Didn't have this problem previously, but did well on NuvaRing in the past and desires to go back to that.  Offered ultrasound to evaluate placement of IUD and consider replacement if malpositioned, but she declined.    Last pap: 2017    Reviewed GYN hx, OB hx, past medical hx, surgical hx and family hx.   Reviewed medications and allergies. Changes made in EPIC.     OBJECTIVE:   Vitals:    10/15/20 1423   BP: 119/79   Pulse: 82   Temp: 98.1  F (36.7  C)   TempSrc: Oral   Weight: 103 kg (227 lb)     Body mass index is 34.21 kg/m .     Gen: alert, oriented, no distress, cooperative and pleasant  Abd: soft, nontender, nondistended, normoactive bowel sounds, no masses, no scars  : normal external genitalia without lesions or abnormalities. Normal Bartholin's, normal [place]'s, normal urethra. Normal vaginal mucosa, no abnormal discharge or lesions. Cervix appears smooth, no lesions or erythema.     PROCEDURE: IUD REMOVAL  Patient has verbalized understanding of risks and benefits. All questions answered. She has signed the consent form.      A regular Sky speculum was placed in the vagina with good visualization of the cervix.  The IUD strings visualized at cervical os. IUD strings grasped with sterile ring forceps. Firm traction was required to remove the IUD intact, as it appeared to be stuck or embedded in the cervix. There were no complications. The patient tolerated the procedure well.       ASSESSMENT:   Carrie Munoz is a 40 year old  who had a Mirena IUD removed today without complication.    PLAN:  1. Encounter for IUD removal  Uncomplicated IUD removal, although IUD  may have been embedded in the cervix.  Patient tolerated well.  - REMOVE INTRAUTERINE DEVICE    2. Encounter for initial prescription of vaginal ring hormonal contraceptive  Prescription for NuvaRing, which patient has tolerated well in the past.  - etonogestrel-ethinyl estradiol (NUVARING) 0.12-0.015 MG/24HR vaginal ring; Place 1 each vaginally every 28 days  Dispense: 3 each; Refill: 4        Return to clinic for annual exam, sooner PRN.    Shira Alejandro MD

## 2020-10-27 DIAGNOSIS — M54.41 ACUTE MIDLINE LOW BACK PAIN WITH RIGHT-SIDED SCIATICA: ICD-10-CM

## 2020-10-28 NOTE — TELEPHONE ENCOUNTER
"Requested Prescriptions   Pending Prescriptions Disp Refills     ibuprofen (ADVIL/MOTRIN) 600 MG tablet [Pharmacy Med Name: IBUPROFEN 600MG TABLETS] 120 tablet 1     Sig: TAKE ONE TABLET BY MOUTH EVERY 6 HOURS AS NEEDED FOR MODERATE PAIN       NSAID Medications Failed - 10/27/2020  2:38 PM        Failed - Normal ALT on file in past 12 months     Recent Labs   Lab Test 09/27/19  0839   ALT 37             Failed - Normal AST on file in past 12 months     Recent Labs   Lab Test 09/27/19  0839   AST 31             Failed - Normal CBC on file in past 12 months     Recent Labs   Lab Test 09/27/19  0839   WBC 4.1   RBC 4.56   HGB 13.9   HCT 40.5                    Failed - Normal serum creatinine on file in past 12 months     Recent Labs   Lab Test 09/27/19  0839   CR 0.61       Ok to refill medication if creatinine is low          Passed - Blood pressure under 140/90 in past 12 months     BP Readings from Last 3 Encounters:   10/15/20 119/79   01/16/20 118/76   10/02/19 109/77                 Passed - Recent (12 mo) or future (30 days) visit within the authorizing provider's specialty     Patient has had an office visit with the authorizing provider or a provider within the authorizing providers department within the previous 12 mos or has a future within next 30 days. See \"Patient Info\" tab in inbasket, or \"Choose Columns\" in Meds & Orders section of the refill encounter.              Passed - Patient is age 6-64 years        Passed - Medication is active on med list        Passed - No active pregnancy on record        Passed - No positive pregnancy test in past 12 months         Routing refill request to provider for review/approval because:  Labs not current:  ALT, AST, CBC, Cre  Shantal Olson RN   [facility]       "

## 2020-10-29 RX ORDER — IBUPROFEN 600 MG/1
TABLET, FILM COATED ORAL
Qty: 120 TABLET | Refills: 1 | Status: SHIPPED | OUTPATIENT
Start: 2020-10-29 | End: 2021-11-29

## 2020-11-12 ENCOUNTER — MYC MEDICAL ADVICE (OUTPATIENT)
Dept: FAMILY MEDICINE | Facility: CLINIC | Age: 40
End: 2020-11-12

## 2020-11-12 NOTE — TELEPHONE ENCOUNTER
My chart message sent to patient    Shantal Olson RN   [facility]

## 2020-11-22 ENCOUNTER — HEALTH MAINTENANCE LETTER (OUTPATIENT)
Age: 40
End: 2020-11-22

## 2020-11-23 ENCOUNTER — VIRTUAL VISIT (OUTPATIENT)
Dept: FAMILY MEDICINE | Facility: CLINIC | Age: 40
End: 2020-11-23
Payer: COMMERCIAL

## 2020-11-23 ENCOUNTER — MYC MEDICAL ADVICE (OUTPATIENT)
Dept: FAMILY MEDICINE | Facility: CLINIC | Age: 40
End: 2020-11-23

## 2020-11-23 DIAGNOSIS — J01.90 ACUTE NON-RECURRENT SINUSITIS, UNSPECIFIED LOCATION: Primary | ICD-10-CM

## 2020-11-23 PROCEDURE — 99213 OFFICE O/P EST LOW 20 MIN: CPT | Mod: 95 | Performed by: NURSE PRACTITIONER

## 2020-11-23 RX ORDER — FLUTICASONE PROPIONATE 50 MCG
1 SPRAY, SUSPENSION (ML) NASAL DAILY
Qty: 11.1 ML | Refills: 1 | Status: SHIPPED | OUTPATIENT
Start: 2020-11-23 | End: 2021-06-22

## 2020-11-23 NOTE — LETTER
November 23, 2020      Carrie Munoz  [address]    [facility] [address]        To Whom It May Concern:    Carrie Munoz  was seen on 11/23/2020.  Please excuse her  11/23/2020 through 11/25/2020 due to acute illness. She may return to work as scheduled following   11/25/2020.      Sincerely,        YASMEEN Sanders CNP

## 2020-11-23 NOTE — PROGRESS NOTES
"Carrie Munoz is a 40 year old female who is being evaluated via a billable video visit.      The patient has been notified of following:     \"This video visit will be conducted via a call between you and your physician/provider. We have found that certain health care needs can be provided without the need for an in-person physical exam.  This service lets us provide the care you need with a video conversation.  If a prescription is necessary we can send it directly to your pharmacy.  If lab work is needed we can place an order for that and you can then stop by our lab to have the test done at a later time.    Video visits are billed at different rates depending on your insurance coverage.  Please reach out to your insurance provider with any questions.    If during the course of the call the physician/provider feels a video visit is not appropriate, you will not be charged for this service.\"    Patient has given verbal consent for Video visit? Yes  How would you like to obtain your AVS? MyChart  If you are dropped from the video visit, the video invite should be resent to: Sara anyone else be joining your video visit? No    Subjective     Carrie Munoz is a 40 year old female who presents today via video visit for the following health issues:    HPI     Acute Illness  Acute illness concerns: congestion and sinus pain  Onset/Duration: 2 weeks  Symptoms:  Fever: no  Chills/Sweats: no  Headache (location?): YES  Sinus Pressure: YES  Conjunctivitis:  no  Ear Pain: YES: bilateral  Rhinorrhea: YES  Congestion: YES  Sore Throat: no  Cough: no  Wheeze: no  Decreased Appetite: no  Nausea: no  Vomiting: no  Diarrhea: no  Dysuria/Freq.: no  Dysuria or Hematuria: no  Fatigue/Achiness: YES  Sick/Strep Exposure: ongoing  Therapies tried and outcome: OTC Claritin D, ibuprofen and Flonase      Video Start Time:11:08 am        Review of Systems   Constitutional, HEENT, cardiovascular, pulmonary, gi and gu systems are " "negative, except as otherwise noted.      Objective           Vitals:  No vitals were obtained today due to virtual visit.    Physical Exam     GENERAL: Healthy, alert and no distress  EYES: Eyes grossly normal to inspection.  No discharge or erythema, or obvious scleral/conjunctival abnormalities.  RESP: No audible wheeze, cough, or visible cyanosis.  No visible retractions or increased work of breathing.    SKIN: Visible skin clear. No significant rash, abnormal pigmentation or lesions.  NEURO: Cranial nerves grossly intact.  Mentation and speech appropriate for age.  PSYCH: Mentation appears normal, affect normal/bright, judgement and insight intact, normal speech and appearance well-groomed.      No results found for this or any previous visit (from the past 24 hour(s)).        Assessment & Plan   Problem List Items Addressed This Visit     None      Visit Diagnoses     Acute non-recurrent sinusitis, unspecified location    -  Primary    Relevant Medications    amoxicillin-clavulanate (AUGMENTIN) 875-125 MG tablet    fluticasone (FLONASE) 50 MCG/ACT nasal spray             BMI:   Estimated body mass index is 34.21 kg/m  as calculated from the following:    Height as of 1/16/20: 1.735 m (5' 8.31\").    Weight as of 10/15/20: 103 kg (227 lb).              See Patient Instructions  No follow-ups on file.    YASMEEN Sanders CNP  [facility]      Video-Visit Details    Type of service:  Video Visit    Video End Time:11:18 AM    Originating Location (pt. Location): Home    Distant Location (provider location):  [facility]     Platform used for Video Visit: Teagan        "

## 2021-01-20 ENCOUNTER — NURSE TRIAGE (OUTPATIENT)
Dept: FAMILY MEDICINE | Facility: CLINIC | Age: 41
End: 2021-01-20

## 2021-01-20 NOTE — TELEPHONE ENCOUNTER
Patient reporting blood stool - attempted to triage by phone - Writer called patient left non detailed message requesting return call to clinic and ask to speak with nurse - sent mychart - see mychart

## 2021-03-22 ENCOUNTER — TRANSFERRED RECORDS (OUTPATIENT)
Dept: HEALTH INFORMATION MANAGEMENT | Facility: CLINIC | Age: 41
End: 2021-03-22

## 2021-04-21 ENCOUNTER — MYC MEDICAL ADVICE (OUTPATIENT)
Dept: FAMILY MEDICINE | Facility: CLINIC | Age: 41
End: 2021-04-21

## 2021-04-30 ENCOUNTER — MYC MEDICAL ADVICE (OUTPATIENT)
Dept: FAMILY MEDICINE | Facility: CLINIC | Age: 41
End: 2021-04-30

## 2021-05-03 NOTE — TELEPHONE ENCOUNTER
Dr. Sanford,    See my chart message    Thanks  Shantal Olson, RN   [facility]

## 2021-06-04 ENCOUNTER — OFFICE VISIT (OUTPATIENT)
Dept: URGENT CARE | Facility: URGENT CARE | Age: 41
End: 2021-06-04
Payer: COMMERCIAL

## 2021-06-04 VITALS — BODY MASS INDEX: 27.12 KG/M2 | TEMPERATURE: 97.2 F | HEART RATE: 97 BPM | OXYGEN SATURATION: 97 % | WEIGHT: 180 LBS

## 2021-06-04 DIAGNOSIS — H66.002 NON-RECURRENT ACUTE SUPPURATIVE OTITIS MEDIA OF LEFT EAR WITHOUT SPONTANEOUS RUPTURE OF TYMPANIC MEMBRANE: Primary | ICD-10-CM

## 2021-06-04 PROCEDURE — 99213 OFFICE O/P EST LOW 20 MIN: CPT

## 2021-06-05 NOTE — PATIENT INSTRUCTIONS
Can try Zyrtec (cetirizine) or Xyzal (levocetirizine) for allergies.    Patient Education     Middle Ear Infection (Adult)   You have an infection of the middle ear, the space behind the eardrum. This is also called acute otitis media (AOM). Sometimes it's caused by the common cold. This is because congestion can block the internal passage (eustachian tube) that drains fluid from the middle ear. When the middle ear fills with fluid, bacteria can grow there and cause an infection. Oral antibiotics are used to treat this illness, not ear drops. Symptoms usually start to improve within 1 to 2 days of treatment.    Home care  The following are general care guidelines:    Finish all of the antibiotic medicine given, even though you may feel better after the first few days.    You may use over-the-counter medicine, such as acetaminophen or ibuprofen, to control pain and fever, unless something else was prescribed. Talk with your healthcare provider before using these medicines if you have chronic liver or kidney disease. Also talk with your provider if you have had a stomach ulcer or digestive bleeding. Don't give aspirin to anyone under 18 years of age who has a fever. It may cause severe illness or death.  Follow-up care  Follow up with your healthcare provider in 2 weeks, or as advised, if all symptoms have not gotten better, or if hearing doesn't go back to normal within 1 month.  When to seek medical advice  Call your healthcare provider right away if any of these occur:    Ear pain gets worse or does not improve after 3 days of treatment    Unusual drowsiness or confusion    Neck pain, stiff neck, or headache    Fluid or blood draining from the ear canal    Fever of 100.4 F (38 C) or as advised     [employer] last reviewed this educational content on 9/1/2019 2000-2021 [employer]. All rights reserved. This information is not intended as a substitute for professional medical care. Always  follow your healthcare professional's instructions.

## 2021-06-05 NOTE — PROGRESS NOTES
Assessment & Plan   Non-recurrent acute suppurative otitis media of left ear without spontaneous rupture of tympanic membrane  Exam consistent with AOM of left ear. Given jaw pain and left frontal sinus pain, elected to treat with amox/clav for possible sinusitis involvement (should cover most likely pathogens). Discussed reasons to return to urgent care or clinic including persistent of symptoms despite 24-48 hours of antimicrobial therapy, dysphagia, worsening jaw pain, eye pain or changes in vision. Also discussed symptomatic cares including NSAIDs or Tylenol and continuing allergy medications (can try zyrtec instead of claritin D due to side effects from decongestant).   - amoxicillin-clavulanate (AUGMENTIN) 875-125 MG tablet  Dispense: 20 tablet; Refill: 0         Return if symptoms worsen or fail to improve.    Stephanie Ponce MD  [facility] DOUG Butler is a 40 year old female who presents to clinic today for the following health issues:  Chief Complaint   Patient presents with     Urgent Care     Ear Problem     x 7 days left ear pain     HPI  URI Adult  Onset of symptoms was 7 day(s) ago.  Course of illness is worsening.    Severity moderate  Current and Associated symptoms: ear pain left, sore throat, facial pain/pressure and headache  Treatment measures tried include Tylenol/Ibuprofen and witch hazel into ear.  Predisposing factors include seasonal allergies.  No fevers, chills, dysphagia, cough, wheezing, trouble breathing  No history of ENT procedures or surgeries      Review of Systems  Constitutional, HEENT, cardiovascular, pulmonary systems are negative, except as otherwise noted.      Objective    Pulse 97   Temp 97.2  F (36.2  C) (Tympanic)   Wt 81.6 kg (180 lb)   SpO2 97%   Breastfeeding No   BMI 27.12 kg/m    Physical Exam   GENERAL: healthy, alert and no distress  EYES: Eyes grossly normal to inspection, conjunctivae and sclerae normal  HENT: right ear:  normal: no effusions, no erythema, normal landmarks, left ear: erythematous, mucopurulent effusion and engorged blood vessels on TM, nose and mouth without ulcers or lesions, oropharynx clear and oral mucous membranes moist  NECK: no adenopathy, no asymmetry, masses, or scars and thyroid normal to palpation  RESP: breathing comfortably on room air  SKIN: no suspicious lesions or rashes  NEURO: Normal strength and tone, mentation intact and speech normal

## 2021-06-22 ENCOUNTER — OFFICE VISIT (OUTPATIENT)
Dept: FAMILY MEDICINE | Facility: CLINIC | Age: 41
End: 2021-06-22
Payer: COMMERCIAL

## 2021-06-22 VITALS
BODY MASS INDEX: 34.66 KG/M2 | HEART RATE: 78 BPM | SYSTOLIC BLOOD PRESSURE: 110 MMHG | WEIGHT: 230 LBS | TEMPERATURE: 97.5 F | DIASTOLIC BLOOD PRESSURE: 82 MMHG | OXYGEN SATURATION: 100 %

## 2021-06-22 DIAGNOSIS — H65.03 BILATERAL ACUTE SEROUS OTITIS MEDIA, RECURRENCE NOT SPECIFIED: ICD-10-CM

## 2021-06-22 DIAGNOSIS — J30.2 SEASONAL ALLERGIC RHINITIS, UNSPECIFIED TRIGGER: Primary | ICD-10-CM

## 2021-06-22 PROCEDURE — 99213 OFFICE O/P EST LOW 20 MIN: CPT | Performed by: FAMILY MEDICINE

## 2021-06-22 RX ORDER — CETIRIZINE HYDROCHLORIDE 10 MG/1
10 TABLET ORAL
Qty: 180 TABLET | Refills: 1 | Status: SHIPPED | OUTPATIENT
Start: 2021-06-22 | End: 2023-03-17

## 2021-06-22 RX ORDER — FLUTICASONE PROPIONATE 50 MCG
1 SPRAY, SUSPENSION (ML) NASAL
Qty: 11.1 ML | Refills: 3 | Status: SHIPPED | OUTPATIENT
Start: 2021-06-22 | End: 2023-03-17

## 2021-06-22 NOTE — PROGRESS NOTES
Assessment & Plan     Seasonal allergic rhinitis, unspecified trigger  Had OM, now resolved but still has fluid behind TM  - fluticasone (FLONASE) 50 MCG/ACT nasal spray; Spray 1 spray into both nostrils 2 times daily  - cetirizine (ZYRTEC) 10 MG tablet; Take 1 tablet (10 mg) by mouth 2 times daily    Bilateral acute serous otitis media, recurrence not specified  Follow up only if unimproved.                See Patient Instructions    No follow-ups on file.    Narciso Sanford MD  [facility]    Vanessa Butler is a 40 year old who presents for the following health issues     HPI     Concern - Ear pain   Onset: 1 month and a half  Description: left side into the left side throat. Still having pain, no hearing loss, pressure and makes vision a little blurry   Intensity: mild  Progression of Symptoms:  Depends on the day and is constant,   Accompanying Signs & Symptoms: itchy sensation   Previous history of similar problem: No   Precipitating factors:        Worsened by: not using zyrtec and nose spray   Alleviating factors:        Improved by: nose spray and zyrtec   Therapies tried and outcome: antibiotics but doesn't seem to help.          Review of Systems   Constitutional, HEENT, cardiovascular, pulmonary, gi and gu systems are negative, except as otherwise noted.      Objective    /82   Pulse 78   Temp 97.5  F (36.4  C) (Temporal)   Wt 104.3 kg (230 lb)   SpO2 100%   BMI 34.66 kg/m    Body mass index is 34.66 kg/m .  Physical Exam   GENERAL: healthy, alert and no distress  HENT: normal cephalic/atraumatic, right ear: clear effusion, left ear: clear effusion, nose and mouth without ulcers or lesions, nasal mucosa edematous , rhinorrhea clear, oropharynx clear and oral mucous membranes moist  NECK: no adenopathy, no asymmetry, masses, or scars and thyroid normal to palpation  RESP: lungs clear to auscultation - no rales, rhonchi or wheezes  MS: normal muscle tone  and no TMJ [pain  SKIN: no suspicious lesions or rashes

## 2021-07-10 ENCOUNTER — MYC MEDICAL ADVICE (OUTPATIENT)
Dept: FAMILY MEDICINE | Facility: CLINIC | Age: 41
End: 2021-07-10

## 2021-07-10 DIAGNOSIS — J01.00 SUBACUTE MAXILLARY SINUSITIS: Primary | ICD-10-CM

## 2021-07-12 NOTE — TELEPHONE ENCOUNTER
Please call her with referral     Orders Placed This Encounter     Otolaryngology Referral     Standing Status:   Future     Standing Expiration Date:   7/12/2022     Referral Priority:   Routine     Referral Type:   Consultation     Requested Specialty:   Otolaryngology     Number of Visits Requested:   1

## 2021-08-04 ENCOUNTER — TRANSFERRED RECORDS (OUTPATIENT)
Dept: HEALTH INFORMATION MANAGEMENT | Facility: CLINIC | Age: 41
End: 2021-08-04

## 2021-08-17 ENCOUNTER — MYC MEDICAL ADVICE (OUTPATIENT)
Dept: FAMILY MEDICINE | Facility: CLINIC | Age: 41
End: 2021-08-17

## 2021-08-18 ENCOUNTER — ANCILLARY PROCEDURE (OUTPATIENT)
Dept: GENERAL RADIOLOGY | Facility: CLINIC | Age: 41
End: 2021-08-18
Attending: INTERNAL MEDICINE
Payer: COMMERCIAL

## 2021-08-18 ENCOUNTER — OFFICE VISIT (OUTPATIENT)
Dept: URGENT CARE | Facility: URGENT CARE | Age: 41
End: 2021-08-18
Payer: COMMERCIAL

## 2021-08-18 VITALS
DIASTOLIC BLOOD PRESSURE: 80 MMHG | HEART RATE: 72 BPM | BODY MASS INDEX: 34.66 KG/M2 | WEIGHT: 230 LBS | OXYGEN SATURATION: 97 % | TEMPERATURE: 98 F | SYSTOLIC BLOOD PRESSURE: 124 MMHG

## 2021-08-18 DIAGNOSIS — Z82.69 FAMILY HISTORY OF GOUT: ICD-10-CM

## 2021-08-18 DIAGNOSIS — M79.675 PAIN OF TOE OF LEFT FOOT: Primary | ICD-10-CM

## 2021-08-18 DIAGNOSIS — M79.675 PAIN OF TOE OF LEFT FOOT: ICD-10-CM

## 2021-08-18 LAB
BASOPHILS # BLD AUTO: 0 10E3/UL (ref 0–0.2)
BASOPHILS NFR BLD AUTO: 0 %
EOSINOPHIL # BLD AUTO: 0 10E3/UL (ref 0–0.7)
EOSINOPHIL NFR BLD AUTO: 1 %
ERYTHROCYTE [DISTWIDTH] IN BLOOD BY AUTOMATED COUNT: 11.7 % (ref 10–15)
HCT VFR BLD AUTO: 39.7 % (ref 35–47)
HGB BLD-MCNC: 13.5 G/DL (ref 11.7–15.7)
IMM GRANULOCYTES # BLD: 0 10E3/UL
IMM GRANULOCYTES NFR BLD: 0 %
LYMPHOCYTES # BLD AUTO: 2.2 10E3/UL (ref 0.8–5.3)
LYMPHOCYTES NFR BLD AUTO: 42 %
MCH RBC QN AUTO: 29.9 PG (ref 26.5–33)
MCHC RBC AUTO-ENTMCNC: 34 G/DL (ref 31.5–36.5)
MCV RBC AUTO: 88 FL (ref 78–100)
MONOCYTES # BLD AUTO: 0.4 10E3/UL (ref 0–1.3)
MONOCYTES NFR BLD AUTO: 7 %
NEUTROPHILS # BLD AUTO: 2.5 10E3/UL (ref 1.6–8.3)
NEUTROPHILS NFR BLD AUTO: 50 %
PLATELET # BLD AUTO: 244 10E3/UL (ref 150–450)
RBC # BLD AUTO: 4.52 10E6/UL (ref 3.8–5.2)
WBC # BLD AUTO: 5.1 10E3/UL (ref 4–11)

## 2021-08-18 PROCEDURE — 73660 X-RAY EXAM OF TOE(S): CPT | Mod: LT | Performed by: RADIOLOGY

## 2021-08-18 PROCEDURE — 84550 ASSAY OF BLOOD/URIC ACID: CPT | Performed by: INTERNAL MEDICINE

## 2021-08-18 PROCEDURE — 85025 COMPLETE CBC W/AUTO DIFF WBC: CPT | Performed by: INTERNAL MEDICINE

## 2021-08-18 PROCEDURE — 99214 OFFICE O/P EST MOD 30 MIN: CPT | Performed by: INTERNAL MEDICINE

## 2021-08-18 PROCEDURE — 36415 COLL VENOUS BLD VENIPUNCTURE: CPT | Performed by: INTERNAL MEDICINE

## 2021-08-18 RX ORDER — INDOMETHACIN 50 MG/1
50 CAPSULE ORAL
Qty: 30 CAPSULE | Refills: 0 | Status: SHIPPED | OUTPATIENT
Start: 2021-08-18 | End: 2023-03-17

## 2021-08-18 NOTE — PATIENT INSTRUCTIONS
Suspicious for gout with family history, history & exam    Check uric acid level -this level should be back tomorrow.  With first gout flare this may be normal.  Expect to be on the high normal range though    Screen for infection with WBC white count -blood counts normal  Xray screen for fracture /  Hairline stress fracture -x-ray normal    Post op shoe  Ice  Elevate  Rest.    Follow-up from this visit with your primary over the next 2 weeks      Patient Education     Gout    Gout is an inflammation of a joint caused by an inflammatory response to gout crystals in the joint fluid. This occurs when there is excess uric acid. Uric acid is a normal waste product in the body. It builds up in the body when the kidneys can't filter enough of it from the blood. This may occur with age. It is also associated with kidney disease. Gout occurs more often in people with obesity, diabetes, high blood pressure, or high levels of fats in the blood. It may run in families. Gout tends to come and go. A flare up of gout is called an attack. Drinking alcohol or eating certain foods (such as shellfish or foods with additives such as high-fructose corn syrup) may increase uric acid levels in the blood and cause a gout attack.   During a gout attack, the affected joint may become hot, red, swollen, and painful. If you have had one attack of gout, you are likely to have another. An attack of gout can be treated with medicine. If these attacks become frequent, a daily medicine may be prescribed to help the kidneys remove uric acid from the body.   Home care  During a gout attack:    Contact your healthcare provider for advice and therapy options at the early stages of a gout flare. Treating the flare right away can prevent it from getting worse.    Rest painful joints. If gout affects the joints of your foot or leg, you may want to use crutches for the first few days to keep from bearing weight on the affected joint.    When sitting  or lying down, raise the painful joint to a level higher than your heart.    Apply an ice pack (ice cubes in a plastic bag wrapped in a thin towel) over the injured area for 20 minutes every 1 to 2 hours the first day for pain relief. Continue this 3 to 4 times a day for swelling and pain.    Avoid alcohol and foods listed below (see Preventing attacks) during a gout attack. Drink extra fluid to help flush the uric acid through your kidneys.    If you were prescribed a medicine to treat gout, take it as your healthcare provider has instructed. Don't skip doses.    Take anti-inflammatory medicine as directed by your healthcare provider.    If pain medicines have been prescribed, take them exactly as directed.    Preventing attacks    Limit or stop  alcohol use. Excess alcohol intake can cause a gout attack.    Limit these foods and beverages:  ? Organ meats, such as kidneys and liver  ? Certain seafoods (anchovies, sardines, shrimp, scallops, herring, mackerel)  ? Wild game, meat extracts and meat gravies  ? Foods and beverages sweetened with high-fructose corn syrup, such as sodas    Eat a healthy diet including low-fat and nonfat dairy, whole grains, and vegetables.    If you are overweight, talk to your healthcare provider about a weight reduction plan. Avoid fasting or extreme low calorie diets (less than 900 calories per day). This will increase uric acid levels in the body.    If you have diabetes or high blood pressure, work with your doctor to manage these conditions.    Protect the joint from injury. Wear good fitting socks and shoes. Injury can trigger a gout attack.    Follow-up care  Follow up with your healthcare provider, or as advised.   When to seek medical advice  Call your healthcare provider if you have any of the following:    Fever over 100.4 F (38. C) with worsening joint pain    Increasing redness around the joint    Pain developing in another joint    Repeated vomiting, abdominal pain, or  blood in the vomit or stool (black or red color)  [employer] last reviewed this educational content on 6/1/2019 2000-2021 [employer]. All rights reserved. This information is not intended as a substitute for professional medical care. Always follow your healthcare professional's instructions.           Patient Education     Treating Gout Attacks     Raising the joint above the level of your heart can help reduce gout symptoms.     Gout is a disease that affects the joints. It is caused by excess uric acid in your blood that may lead to crystals forming in your joints. Left untreated, it can lead to painful foot and joint deformities and even kidney problems. But, by treating gout early, you can relieve pain and help prevent future problems. Gout can usually be treated with medicine and proper diet. In severe cases, surgery may be needed.  Gout attacks are painful and often happen more than once. Taking medicines may reduce pain and prevent attacks in the future. There are also some things you can do at home to relieve symptoms.  Medicines for gout  Your healthcare provider may prescribe a daily medicine to reduce levels of uric acid. Reducing your uric acid levels may help prevent gout attacks. Allopurinol is one commonly used medicine taken daily to reduce uric acid levels. Other daily medicines used to reduce uric acid levels include febuxostat, lesinurad, and probencid. Other medicines can help relieve pain and swelling during an acute attack. Medicines such as NSAIDs (nonsteroidal anti-inflammatory medicines), steroids, and colchicine may be prescribed for intermittent use to relieve an acute gout attack. Be sure to take your medicine as directed.  What you can do  Below are some things you can do at home to relieve gout symptoms. Your healthcare provider may have other tips.    Rest the painful joint as much as you can.    Raise the painful joint so it is at a level higher than your  heart.    Use ice for 10 minutes every 1 to 2 hours as possible.  How can I prevent gout?  With a little effort, you may be able to prevent gout attacks in the future. Here are some things you can do:    Don't eat foods high in purines  ? Certain meats (red meat, processed meat, turkey)  ? Organ meats (kidney, liver, sweetbread)  ? Shellfish (lobster, crab, shrimp, scallop, mussel)  ? Certain fish (anchovy, sardine, herring, mackerel)    Take any medicines prescribed by your healthcare provider.    Lose weight if you need to.    Reduce high fructose corn syrup in meals and drinks.    Reduce or cut out alcohol, particularly beer, but also red wine and spirits.    Control blood pressure, diabetes, and cholesterol.    Drink plenty of water to help flush uric acid from your body.  "[employer] last reviewed this educational content on 4/1/2018 2000-2021 [employer]. All rights reserved. This information is not intended as a substitute for professional medical care. Always follow your healthcare professional's instructions.           Patient Education     Gout Diet  Gout is a painful condition caused by an excess of uric acid, a waste product made by the body. Uric acid forms crystals that collect in the joints. The immune response to these crystals brings on symptoms of joint pain and swelling. This is called a gout attack. Often, medications and diet changes are combined to manage gout. Below are some guidelines for changing your diet to help you manage gout and prevent attacks. Your healthcare provider will help you determine the best eating plan for you.  Eating to manage gout  Weight loss for those who are overweight may help reduce gout attacks.  Eat less of these foods  Eating too many foods containing purines may raise the levels of uric acid in your body. This raises your risk for a gout attack. Try to limit these foods and drinks:    Alcohol, such as beer and red wine. You may be told to avoid  alcohol completely.    Soft drinks that contain sugar or high fructose corn syrup    Certain fish, including anchovies, sardines, fish eggs, and herring    Shellfish    Certain meats, such as red meat, hot dogs, luncheon meats, and turkey    Organ meats, such as liver, kidneys, and sweetbreads    Legumes, such as dried beans and peas    Other high fat foods such as gravy, whole milk, and high fat cheeses    Vegetables such as asparagus, cauliflower, spinach, and mushrooms used to be thought to contribute to an increased risk for a gout attack, but recent studies show that high purine vegetables don't increase the risk for a gout attack.  Eat more of these foods  Other foods may be helpful for people with gout. Add some of these foods to your diet:    Cherries contain chemicals that may lower uric acid.    Omega fatty acids. These are found in some fatty fish such as salmon, certain oils (flax, olive, or nut), and nuts themselves. Omega fatty acids may help prevent inflammation due to gout.    Dairy products that are low-fat or fat-free, such as cheese and yogurt    Complex carbohydrate foods, including whole grains, brown rice, oats, and beans    Coffee, in moderation    Water, approximately 64 ounces per day  Follow-up care  Follow up with your healthcare provider as advised.  When to seek medical advice  Call your healthcare provider right away if any of these occur:    Return of gout symptoms, usually at night:    Severe pain, swelling, and heat in a joint, especially the base of the big toe    Affected joint is hard to move    Skin of the affected joint is purple or red    Fever of 100.4 F (38 C) or higher    Pain that doesn't get better even with prescribed medicine   [employer] last reviewed this educational content on 6/1/2018 2000-2021 [employer]. All rights reserved. This information is not intended as a substitute for professional medical care. Always follow your healthcare professional's  instructions.

## 2021-08-18 NOTE — PROGRESS NOTES
ASSESSMENT AND PLAN:      ICD-10-CM    1. Pain of toe of left foot  M79.675 Uric acid     CBC with platelets and differential     XR Toe Left G/E 2 Views     Ankle/Foot Bracing Supplies Order for DME - ONLY FOR DME     indomethacin (INDOCIN) 50 MG capsule     CBC with platelets and differential     Uric acid   2. Family history of gout  Z82.69 Uric acid     CBC with platelets and differential     XR Toe Left G/E 2 Views     CBC with platelets and differential     Uric acid         Patient Instructions         Suspicious for gout with family history, history & exam    Check uric acid level -this level should be back tomorrow.  With first gout flare this may be normal.  Expect to be on the high normal range though    Screen for infection with WBC white count -blood counts normal  Xray screen for fracture /  Hairline stress fracture -x-ray normal    Post op shoe  Ice  Elevate  Rest.    Follow-up from this visit with your primary over the next 2 weeks      Patient Education     Gout    Gout is an inflammation of a joint caused by an inflammatory response to gout crystals in the joint fluid. This occurs when there is excess uric acid. Uric acid is a normal waste product in the body. It builds up in the body when the kidneys can't filter enough of it from the blood. This may occur with age. It is also associated with kidney disease. Gout occurs more often in people with obesity, diabetes, high blood pressure, or high levels of fats in the blood. It may run in families. Gout tends to come and go. A flare up of gout is called an attack. Drinking alcohol or eating certain foods (such as shellfish or foods with additives such as high-fructose corn syrup) may increase uric acid levels in the blood and cause a gout attack.   During a gout attack, the affected joint may become hot, red, swollen, and painful. If you have had one attack of gout, you are likely to have another. An attack of gout can be treated with medicine. If  these attacks become frequent, a daily medicine may be prescribed to help the kidneys remove uric acid from the body.   Home care  During a gout attack:    Contact your healthcare provider for advice and therapy options at the early stages of a gout flare. Treating the flare right away can prevent it from getting worse.    Rest painful joints. If gout affects the joints of your foot or leg, you may want to use crutches for the first few days to keep from bearing weight on the affected joint.    When sitting or lying down, raise the painful joint to a level higher than your heart.    Apply an ice pack (ice cubes in a plastic bag wrapped in a thin towel) over the injured area for 20 minutes every 1 to 2 hours the first day for pain relief. Continue this 3 to 4 times a day for swelling and pain.    Avoid alcohol and foods listed below (see Preventing attacks) during a gout attack. Drink extra fluid to help flush the uric acid through your kidneys.    If you were prescribed a medicine to treat gout, take it as your healthcare provider has instructed. Don't skip doses.    Take anti-inflammatory medicine as directed by your healthcare provider.    If pain medicines have been prescribed, take them exactly as directed.    Preventing attacks    Limit or stop  alcohol use. Excess alcohol intake can cause a gout attack.    Limit these foods and beverages:  ? Organ meats, such as kidneys and liver  ? Certain seafoods (anchovies, sardines, shrimp, scallops, herring, mackerel)  ? Wild game, meat extracts and meat gravies  ? Foods and beverages sweetened with high-fructose corn syrup, such as sodas    Eat a healthy diet including low-fat and nonfat dairy, whole grains, and vegetables.    If you are overweight, talk to your healthcare provider about a weight reduction plan. Avoid fasting or extreme low calorie diets (less than 900 calories per day). This will increase uric acid levels in the body.    If you have diabetes or high  blood pressure, work with your doctor to manage these conditions.    Protect the joint from injury. Wear good fitting socks and shoes. Injury can trigger a gout attack.    Follow-up care  Follow up with your healthcare provider, or as advised.   When to seek medical advice  Call your healthcare provider if you have any of the following:    Fever over 100.4 F (38. C) with worsening joint pain    Increasing redness around the joint    Pain developing in another joint    Repeated vomiting, abdominal pain, or blood in the vomit or stool (black or red color)  [employer] last reviewed this educational content on 6/1/2019 2000-2021 [employer]. All rights reserved. This information is not intended as a substitute for professional medical care. Always follow your healthcare professional's instructions.           Patient Education     Treating Gout Attacks     Raising the joint above the level of your heart can help reduce gout symptoms.     Gout is a disease that affects the joints. It is caused by excess uric acid in your blood that may lead to crystals forming in your joints. Left untreated, it can lead to painful foot and joint deformities and even kidney problems. But, by treating gout early, you can relieve pain and help prevent future problems. Gout can usually be treated with medicine and proper diet. In severe cases, surgery may be needed.  Gout attacks are painful and often happen more than once. Taking medicines may reduce pain and prevent attacks in the future. There are also some things you can do at home to relieve symptoms.  Medicines for gout  Your healthcare provider may prescribe a daily medicine to reduce levels of uric acid. Reducing your uric acid levels may help prevent gout attacks. Allopurinol is one commonly used medicine taken daily to reduce uric acid levels. Other daily medicines used to reduce uric acid levels include febuxostat, lesinurad, and probencid. Other medicines can help  relieve pain and swelling during an acute attack. Medicines such as NSAIDs (nonsteroidal anti-inflammatory medicines), steroids, and colchicine may be prescribed for intermittent use to relieve an acute gout attack. Be sure to take your medicine as directed.  What you can do  Below are some things you can do at home to relieve gout symptoms. Your healthcare provider may have other tips.    Rest the painful joint as much as you can.    Raise the painful joint so it is at a level higher than your heart.    Use ice for 10 minutes every 1 to 2 hours as possible.  How can I prevent gout?  With a little effort, you may be able to prevent gout attacks in the future. Here are some things you can do:    Don't eat foods high in purines  ? Certain meats (red meat, processed meat, turkey)  ? Organ meats (kidney, liver, sweetbread)  ? Shellfish (lobster, crab, shrimp, scallop, mussel)  ? Certain fish (anchovy, sardine, herring, mackerel)    Take any medicines prescribed by your healthcare provider.    Lose weight if you need to.    Reduce high fructose corn syrup in meals and drinks.    Reduce or cut out alcohol, particularly beer, but also red wine and spirits.    Control blood pressure, diabetes, and cholesterol.    Drink plenty of water to help flush uric acid from your body.  [employer] last reviewed this educational content on 4/1/2018 2000-2021 [employer]. All rights reserved. This information is not intended as a substitute for professional medical care. Always follow your healthcare professional's instructions.           Patient Education     Gout Diet  Gout is a painful condition caused by an excess of uric acid, a waste product made by the body. Uric acid forms crystals that collect in the joints. The immune response to these crystals brings on symptoms of joint pain and swelling. This is called a gout attack. Often, medications and diet changes are combined to manage gout. Below are some guidelines for  changing your diet to help you manage gout and prevent attacks. Your healthcare provider will help you determine the best eating plan for you.  Eating to manage gout  Weight loss for those who are overweight may help reduce gout attacks.  Eat less of these foods  Eating too many foods containing purines may raise the levels of uric acid in your body. This raises your risk for a gout attack. Try to limit these foods and drinks:    Alcohol, such as beer and red wine. You may be told to avoid alcohol completely.    Soft drinks that contain sugar or high fructose corn syrup    Certain fish, including anchovies, sardines, fish eggs, and herring    Shellfish    Certain meats, such as red meat, hot dogs, luncheon meats, and turkey    Organ meats, such as liver, kidneys, and sweetbreads    Legumes, such as dried beans and peas    Other high fat foods such as gravy, whole milk, and high fat cheeses    Vegetables such as asparagus, cauliflower, spinach, and mushrooms used to be thought to contribute to an increased risk for a gout attack, but recent studies show that high purine vegetables don't increase the risk for a gout attack.  Eat more of these foods  Other foods may be helpful for people with gout. Add some of these foods to your diet:    Cherries contain chemicals that may lower uric acid.    Omega fatty acids. These are found in some fatty fish such as salmon, certain oils (flax, olive, or nut), and nuts themselves. Omega fatty acids may help prevent inflammation due to gout.    Dairy products that are low-fat or fat-free, such as cheese and yogurt    Complex carbohydrate foods, including whole grains, brown rice, oats, and beans    Coffee, in moderation    Water, approximately 64 ounces per day  Follow-up care  Follow up with your healthcare provider as advised.  When to seek medical advice  Call your healthcare provider right away if any of these occur:    Return of gout symptoms, usually at night:    Severe pain,  swelling, and heat in a joint, especially the base of the big toe    Affected joint is hard to move    Skin of the affected joint is purple or red    Fever of 100.4 F (38 C) or higher    Pain that doesn't get better even with prescribed medicine   Meri last reviewed this educational content on 6/1/2018 2000-2021 [employer]. All rights reserved. This information is not intended as a substitute for professional medical care. Always follow your healthcare professional's instructions.             Return in about 2 weeks (around 9/1/2021).        Madeleine Shelby MD  [facility]    Subjective     Carrie Munoz is a 40 year old who presents for Patient presents with:  Urgent Care  Toe Pain: c/o toe pain for 1 day, no injury    an established patient of [facility].    MS Injury/Pain    Onset of symptoms was 1 day(s) ago.  Location: left foot  Context:   no injury  Woke in middle of night  Current and Associated symptoms: Pain  Denies  Swelling, Bruising, Warmth and Redness  Aggravating Factors: walking and flexion/extension  Therapies to improve symptoms include: ice and ibuprofen  This is the first time this type of problem has occurred for this patient.     Wonders about gout.  FHx gout  Thinks joint issure      Objective    /80   Pulse 72   Temp 98  F (36.7  C) (Oral)   Wt 104.3 kg (230 lb)   LMP 07/25/2021   SpO2 97%   BMI 34.66 kg/m    Physical Exam  Vitals reviewed.   Constitutional:       Appearance: Normal appearance.   Musculoskeletal:      Comments: left foot      2nd MCT joint  Very tender to touch  Subtle redness noted of skin   Neurological:      Mental Status: She is alert.                Xray - Reviewed and interpreted by me.  No fracture   Results for orders placed or performed in visit on 08/18/21 (from the past 24 hour(s))   CBC with platelets and differential    Narrative    The following orders were created for panel order CBC with platelets  and differential.  Procedure                               Abnormality         Status                     ---------                               -----------         ------                     CBC with platelets and d...[[id number]]                      Final result                 Please view results for these tests on the individual orders.   CBC with platelets and differential   Result Value Ref Range    WBC Count 5.1 4.0 - 11.0 10e3/uL    RBC Count 4.52 3.80 - 5.20 10e6/uL    Hemoglobin 13.5 11.7 - 15.7 g/dL    Hematocrit 39.7 35.0 - 47.0 %    MCV 88 78 - 100 fL    MCH 29.9 26.5 - 33.0 pg    MCHC 34.0 31.5 - 36.5 g/dL    RDW 11.7 10.0 - 15.0 %    Platelet Count 244 150 - 450 10e3/uL    % Neutrophils 50 %    % Lymphocytes 42 %    % Monocytes 7 %    % Eosinophils 1 %    % Basophils 0 %    % Immature Granulocytes 0 %    Absolute Neutrophils 2.5 1.6 - 8.3 10e3/uL    Absolute Lymphocytes 2.2 0.8 - 5.3 10e3/uL    Absolute Monocytes 0.4 0.0 - 1.3 10e3/uL    Absolute Eosinophils 0.0 0.0 - 0.7 10e3/uL    Absolute Basophils 0.0 0.0 - 0.2 10e3/uL    Absolute Immature Granulocytes 0.0 <=0.0 10e3/uL

## 2021-08-19 LAB — URATE SERPL-MCNC: 4.4 MG/DL (ref 2.6–6)

## 2021-09-19 ENCOUNTER — HEALTH MAINTENANCE LETTER (OUTPATIENT)
Age: 41
End: 2021-09-19

## 2021-10-14 ENCOUNTER — MYC MEDICAL ADVICE (OUTPATIENT)
Dept: FAMILY MEDICINE | Facility: CLINIC | Age: 41
End: 2021-10-14

## 2021-10-14 NOTE — TELEPHONE ENCOUNTER
Response sent to patient via [employer].     Humberto Manning RN   [facility]

## 2021-10-18 ENCOUNTER — ALLIED HEALTH/NURSE VISIT (OUTPATIENT)
Dept: FAMILY MEDICINE | Facility: CLINIC | Age: 41
End: 2021-10-18
Payer: COMMERCIAL

## 2021-10-18 DIAGNOSIS — Z23 NEED FOR PROPHYLACTIC VACCINATION AND INOCULATION AGAINST INFLUENZA: Primary | ICD-10-CM

## 2021-10-18 PROCEDURE — 90686 IIV4 VACC NO PRSV 0.5 ML IM: CPT

## 2021-10-18 PROCEDURE — 99207 PR NO CHARGE NURSE ONLY: CPT

## 2021-10-18 PROCEDURE — 90471 IMMUNIZATION ADMIN: CPT

## 2021-10-18 NOTE — PROGRESS NOTES
Prior to immunization administration, verified patients identity using patient s name and date of birth. Please see Immunization Activity for additional information.     Screening Questionnaire for Adult Immunization    Are you sick today?   No   Do you have allergies to medications, food, a vaccine component or latex?   No   Have you ever had a serious reaction after receiving a vaccination?   No   Do you have a long-term health problem with heart, lung, kidney, or metabolic disease (e.g., diabetes), asthma, a blood disorder, no spleen, complement component deficiency, a cochlear implant, or a spinal fluid leak?  Are you on long-term aspirin therapy?   No   Do you have cancer, leukemia, HIV/AIDS, or any other immune system problem?   No   Do you have a parent, brother, or sister with an immune system problem?   No   In the past 3 months, have you taken medications that affect  your immune system, such as prednisone, other steroids, or anticancer drugs; drugs for the treatment of rheumatoid arthritis, Crohn s disease, or psoriasis; or have you had radiation treatments?   No   Have you had a seizure, or a brain or other nervous system problem?   No   During the past year, have you received a transfusion of blood or blood    products, or been given immune (gamma) globulin or antiviral drug?   No   For women: Are you pregnant or is there a chance you could become       pregnant during the next month?   No   Have you received any vaccinations in the past 4 weeks?   No     Immunization questionnaire answers were all negative.        Per orders of Dr. Javier, injection of Flu shot given by Jan Do MA. Patient instructed to remain in clinic for 15 minutes afterwards, and to report any adverse reaction to me immediately.       Screening performed by Jan Do MA on 10/18/2021 at 3:04 PM.

## 2021-11-28 DIAGNOSIS — M54.41 ACUTE MIDLINE LOW BACK PAIN WITH RIGHT-SIDED SCIATICA: ICD-10-CM

## 2021-11-29 RX ORDER — IBUPROFEN 600 MG/1
TABLET, FILM COATED ORAL
Qty: 120 TABLET | Refills: 1 | Status: SHIPPED | OUTPATIENT
Start: 2021-11-29 | End: 2022-02-07

## 2022-01-09 ENCOUNTER — HEALTH MAINTENANCE LETTER (OUTPATIENT)
Age: 42
End: 2022-01-09

## 2022-01-10 ENCOUNTER — VIRTUAL VISIT (OUTPATIENT)
Dept: FAMILY MEDICINE | Facility: CLINIC | Age: 42
End: 2022-01-10
Payer: COMMERCIAL

## 2022-01-10 DIAGNOSIS — U07.1 INFECTION DUE TO 2019 NOVEL CORONAVIRUS: Primary | ICD-10-CM

## 2022-01-10 DIAGNOSIS — J01.00 ACUTE NON-RECURRENT MAXILLARY SINUSITIS: ICD-10-CM

## 2022-01-10 PROCEDURE — 99214 OFFICE O/P EST MOD 30 MIN: CPT | Mod: 95 | Performed by: FAMILY MEDICINE

## 2022-01-10 RX ORDER — AMOXICILLIN 500 MG/1
500 CAPSULE ORAL 2 TIMES DAILY
Qty: 20 CAPSULE | Refills: 0 | Status: SHIPPED | OUTPATIENT
Start: 2022-01-10 | End: 2022-01-20

## 2022-01-10 NOTE — PROGRESS NOTES
Carrie is a 41 year old who is being evaluated via a billable telephone visit.      What phone number would you like to be contacted at? cell  How would you like to obtain your AVS? MyChart    Assessment & Plan     Infection due to 2019 novel coronavirus  Still recovering with fatigue, body aches/ ? fever  rets at home this week  Acute non-recurrent maxillary sinusitis  Worsen sinus pressure pain  - amoxicillin (AMOXIL) 500 MG capsule; Take 1 capsule (500 mg) by mouth 2 times daily for 10 days           rets, medication   See Patient Instructions    No follow-ups on file.    Narciso Sanford MD  [facility]    Vanessa Butler is a 41 year old who presents for the following health issues     HPI     Acute Illness  Acute illness concerns: uri/ sinus   Onset/Duration: 1 week  Symptoms:  Fever: YES  Chills/Sweats: YES  Headache (location?): YES  Sinus Pressure: YES  Conjunctivitis:  no  Ear Pain: no  Rhinorrhea: YES  Congestion: YES  Sore Throat: YES  Cough: YES-non-productive  Wheeze: no  Decreased Appetite: YES  Nausea: YES  Vomiting: no  Diarrhea: no     Fatigue/Achiness: YES  Sick/Strep Exposure: YES  Therapies tried and outcome:       Review of Systems   Constitutional, HEENT, cardiovascular, pulmonary, gi and gu systems are negative, except as otherwise noted.      Objective           Vitals:  No vitals were obtained today due to virtual visit.    Physical Exam   alert and mild distress  PSYCH: Alert and oriented times 3; coherent speech, normal   rate and volume, able to articulate logical thoughts, able   to abstract reason, no tangential thoughts, no hallucinations   or delusions  Her affect is normal  RESP: No cough, no audible wheezing, able to talk in full sentences  Remainder of exam unable to be completed due to telephone visits                Phone call duration: 13 minutes

## 2022-02-07 ENCOUNTER — OFFICE VISIT (OUTPATIENT)
Dept: OBGYN | Facility: CLINIC | Age: 42
End: 2022-02-07
Payer: COMMERCIAL

## 2022-02-07 VITALS
HEART RATE: 97 BPM | OXYGEN SATURATION: 97 % | WEIGHT: 231 LBS | BODY MASS INDEX: 34.81 KG/M2 | DIASTOLIC BLOOD PRESSURE: 64 MMHG | SYSTOLIC BLOOD PRESSURE: 110 MMHG

## 2022-02-07 DIAGNOSIS — M54.41 ACUTE MIDLINE LOW BACK PAIN WITH RIGHT-SIDED SCIATICA: ICD-10-CM

## 2022-02-07 DIAGNOSIS — L68.0 HIRSUTISM: ICD-10-CM

## 2022-02-07 DIAGNOSIS — Z12.31 ENCOUNTER FOR SCREENING MAMMOGRAM FOR BREAST CANCER: ICD-10-CM

## 2022-02-07 DIAGNOSIS — N93.9 ABNORMAL UTERINE BLEEDING (AUB): ICD-10-CM

## 2022-02-07 DIAGNOSIS — N64.4 BREAST PAIN: ICD-10-CM

## 2022-02-07 DIAGNOSIS — N89.8 VAGINAL DISCHARGE: Primary | ICD-10-CM

## 2022-02-07 LAB
ESTRADIOL SERPL-MCNC: 54 PG/ML
FSH SERPL-ACNC: 6.6 IU/L

## 2022-02-07 PROCEDURE — 36415 COLL VENOUS BLD VENIPUNCTURE: CPT | Performed by: OBSTETRICS & GYNECOLOGY

## 2022-02-07 PROCEDURE — 83001 ASSAY OF GONADOTROPIN (FSH): CPT | Performed by: OBSTETRICS & GYNECOLOGY

## 2022-02-07 PROCEDURE — 82670 ASSAY OF TOTAL ESTRADIOL: CPT | Performed by: OBSTETRICS & GYNECOLOGY

## 2022-02-07 PROCEDURE — 99214 OFFICE O/P EST MOD 30 MIN: CPT | Performed by: OBSTETRICS & GYNECOLOGY

## 2022-02-07 RX ORDER — IBUPROFEN 600 MG/1
TABLET, FILM COATED ORAL
Qty: 120 TABLET | Refills: 1 | Status: SHIPPED | OUTPATIENT
Start: 2022-02-07 | End: 2024-04-23

## 2022-02-07 NOTE — PROGRESS NOTES
GYN Problem Visit                                                 CC:  Vaginal discharge    HPI:  Carrie Munoz is a 41 year old female who presents to clinic today complaining of vaginal discharge and concern for early menopause    Has noticed increased discharge lately.  No itching, burning, irritation or foul odors.  Doesn't bother her, has just noticed it's increased in the middle part of her menstrual cycle.    Also notes her last cycle was 1.5 months late.  This has not been the norm for her.  Not on contraception and not sexually active in the last few years.  No longer with daughter's father and not dating.      Also shares that she's noticed increased hair growth on her face/chin.  Has been having to wax lately.    Would prefer not to have exam today, as she's menstruating.    Has noticed some breast discomfort from upper part of breast down to nipples    ROS:  Pos per HPI  C: NEGATIVE for fever, chills, change in weight  I: NEGATIVE for worrisome rashes, moles or lesions  E: NEGATIVE for vision changes or irritation  E/M: NEGATIVE for ear, mouth and throat problems  R: NEGATIVE for significant cough or SOB  CV: NEGATIVE for chest pain, palpitations or peripheral edema  GI: NEGATIVE for nausea, abdominal pain, heartburn, or change in bowel habits  : NEGATIVE for frequency, dysuria, hematuria, or irregular bleeding  M: NEGATIVE for significant arthralgias or myalgia  N: NEGATIVE for weakness, dizziness or paresthesias  E: NEGATIVE for temperature intolerance  P: NEGATIVE for changes in mood or affect    EXAM:  Blood pressure 110/64, pulse 97, weight 104.8 kg (231 lb), last menstrual period 02/05/2022, SpO2 97 %, not currently breastfeeding.   BMI= Body mass index is 34.81 kg/m .  General - pleasant female in no acute distress.  Breast - no nodularity, skin changes, asymmetry or nipple discharge bilaterally. No axillary or supraclavicular lymphadenopathy.  Abdomen - soft, nontender, nondistended, no  hepatosplenomegaly.  Pelvic - declines exam  Rectovaginal - deferred.  Musculoskeletal - no gross deformities.  Neurological - normal strength, sensation, and mental status.      Assessment:    Carrie Munoz is a 41 year old  who presents today to discuss vaginal discharge, irregular menses and breast pain.    Plan:  1. Vaginal discharge  Based on cyclic nature of discharge and her description, this is most consistent with physiologic discharge seen around ovulation.  She is happy with this explanation and declines pelvic exam, wet prep.  Denies concerns or desire for STI testing.    2. Abnormal uterine bleeding (AUB)  Discussed that menopause is a clinical diagnosis and based on her menstrual history  - Follicle stimulating hormone; Future  - Estradiol; Future  - Follicle stimulating hormone  - Estradiol    3. Breast pain  4. Encounter for screening mammogram for breast cancer  Discussed breast exam is normal and generally breast pain is not a concerning symptom.  Recommended wearing very supportive bra more often, as based on her descrption, I suspect that will help.  Has previously been recommended to have annual mammograms and has had a bit of a lapse in this, so order placed.  - *MA Screening Digital Bilateral; Future    5.  Hirsutism  Reveiwed her history to eval for PCOS.  Has had pelvic US without polycystic ovaries and, although had longer cycle recently, they're typically fairly regular.  For those reasons, she does not meet diagnostic criteria of PCOS.  Discussed options for medication to help prevent more hair growth, such as spironolactone, or various treatments for hair that's currently there.  Ultimately, she's not interested in pursuing any of these options.      Shira Alejandro MD      30 minutes spent on the date of the encounter doing chart review, review of test results, interpretation of tests, patient visit and documentation

## 2022-02-07 NOTE — TELEPHONE ENCOUNTER
"Requested Prescriptions   Pending Prescriptions Disp Refills     ibuprofen (ADVIL/MOTRIN) 600 MG tablet [Pharmacy Med Name: IBUPROFEN 600MG TABLETS] 120 tablet 1     Sig: TAKE 1 TABLET BY MOUTH EVERY 6 HOURS AS NEEDED FOR MODERATE PAIN       NSAID Medications Failed - 2/7/2022  3:37 AM        Failed - Normal ALT on file in past 12 months     Recent Labs   Lab Test 09/27/19  0839   ALT 37             Failed - Normal AST on file in past 12 months     Recent Labs   Lab Test 09/27/19  0839   AST 31             Failed - Normal serum creatinine on file in past 12 months     Recent Labs   Lab Test 09/27/19  0839   CR 0.61       Ok to refill medication if creatinine is low          Passed - Blood pressure under 140/90 in past 12 months     BP Readings from Last 3 Encounters:   02/07/22 110/64   08/18/21 124/80   06/22/21 110/82                 Passed - Recent (12 mo) or future (30 days) visit within the authorizing provider's specialty     Patient has had an office visit with the authorizing provider or a provider within the authorizing providers department within the previous 12 mos or has a future within next 30 days. See \"Patient Info\" tab in inbasket, or \"Choose Columns\" in Meds & Orders section of the refill encounter.              Passed - Patient is age 6-64 years        Passed - Normal CBC on file in past 12 months     Recent Labs   Lab Test 08/18/21  1246   WBC 5.1   RBC 4.52   HGB 13.5   HCT 39.7                    Passed - Medication is active on med list        Passed - No active pregnancy on record        Passed - No positive pregnancy test in past 12 months           Routing refill request to provider for review/approval because:  Normal ALT on file in past 12 months    Normal AST on file in past 12 months    Normal serum creatinine on file in past 12 months       Sarahi Miller RN  [facility]           "

## 2022-02-16 ENCOUNTER — HOSPITAL ENCOUNTER (OUTPATIENT)
Dept: MAMMOGRAPHY | Facility: CLINIC | Age: 42
Discharge: HOME | End: 2022-02-16
Attending: OBSTETRICS & GYNECOLOGY | Admitting: OBSTETRICS & GYNECOLOGY
Payer: COMMERCIAL

## 2022-02-16 DIAGNOSIS — N64.4 BREAST PAIN: ICD-10-CM

## 2022-02-16 DIAGNOSIS — Z12.31 ENCOUNTER FOR SCREENING MAMMOGRAM FOR BREAST CANCER: ICD-10-CM

## 2022-02-16 PROCEDURE — 77067 SCR MAMMO BI INCL CAD: CPT

## 2022-04-21 ENCOUNTER — TRANSFERRED RECORDS (OUTPATIENT)
Dept: HEALTH INFORMATION MANAGEMENT | Facility: CLINIC | Age: 42
End: 2022-04-21
Payer: COMMERCIAL

## 2022-11-14 NOTE — MR AVS SNAPSHOT
After Visit Summary   2/8/2017    Carrie Munoz    MRN: [id number]           Patient Information     Date Of Birth          1980        Visit Information        Provider Department      2/8/2017 10:30 AM Francois Barone MD [facility]        Today's Diagnoses     Edema of both legs    -  1     Sinus tachycardia           Care Instructions    Cardiology Provider you saw in clinic today: Dr. Barone    Labs/Tests needed:     1. Echocardiogram   2. Ultrasound of bilateral lower extremities     Follow-up Visit:  Based on results        You will receive all normal lab and testing results via MyChart or mail if not reviewed in clinic today. Please contact our office if you need assistance with setting up MyChart.    If you need a medication refill please contact your pharmacy. Please allow 3 business days for your refill to be completed.     As always, thank you for trusting us with your health care needs!    If you have any questions regarding your visit please contact your care team:   Cardiology  Telephone Number    Fabiana Guadarrama RN  Electrophysiology Nurse Coordinator [phone]     Call for EP procedure scheduling concerns  Molly Russ,   EP  [phone]           Device Clinic (Pacemakers, ICDs, Loop)   RN's : Ivy Kiser Connie, Dawn During business hours: [phone]    After business hours:   [phone]- select option 4 and ask for job code [id number].                  Follow-ups after your visit        Follow-up notes from your care team     Return if symptoms worsen or fail to improve.      Your next 10 appointments already scheduled     Feb 08, 2017  1:30 PM   Ech Complete with [id number]   [facility], [place],  [facility] ([facility], [place])    [address] [facility] [id number] [phone]           1.  Please bring or wear a comfortable two-piece outfit. 2.  You may eat, drink and take your normal medicines. 3.  For any  Daily Progress Note          Assessment      AMS  Mixed respiratory and metabolic alkalosis  Bilateral pleural effusion left greater than right  Bilateral pneumonia and possible compressive atelectasis  CVA  Hypertension  Bipolar disorder  Hypothyroidism  Diabetes mellitus type 2  Invasive adenocarcinoma of the colon s/p resection 10/22  CAD s/p stents 10/27        PLAN:  Oxygenating well on room air  Monitor intake   Bronchodilator  Inhaled corticosteroids  Electrolytes/ glycemic control  Oncology following  Aspirin and Plavix  Glucose control  Mucinex  Blood pressure controlled  Crestor  Thyroid hormone replacement  Diuretics and monitor MARIAN's  DNR/DNI  PT/OT  No DVT and GI prophylaxis       LOS: 22 days     Subjective   Patient is awake and denies shortness of breath      Objective     Vital signs for last 24 hours:  Vitals:    11/14/22 0654 11/14/22 0657 11/14/22 0700 11/14/22 0702   BP:       BP Location:       Patient Position:       Pulse: 81 84 79 83   Resp: 20 20 20 18   Temp:       TempSrc:       SpO2: 97% 100% 95% 100%   Weight:       Height:           Intake/Output last 3 shifts:  I/O last 3 completed shifts:  In: 1537 [P.O.:1537]  Out: -   Intake/Output this shift:  No intake/output data recorded.      Radiology  Imaging Results (Last 24 Hours)     ** No results found for the last 24 hours. **          Labs:  Results from last 7 days   Lab Units 11/14/22  0452   WBC 10*3/mm3 10.20   HEMOGLOBIN g/dL 8.3*   HEMATOCRIT % 27.4*   PLATELETS 10*3/mm3 463*     Results from last 7 days   Lab Units 11/14/22  0452   SODIUM mmol/L 141   POTASSIUM mmol/L 4.0   CHLORIDE mmol/L 98   CO2 mmol/L 29.0   BUN mg/dL 37*   CREATININE mg/dL 1.17*   CALCIUM mg/dL 8.8   GLUCOSE mg/dL 180*         Results from last 7 days   Lab Units 11/10/22  0348   ALBUMIN g/dL 1.7*                               Meds:   SCHEDULE  aspirin, 81 mg, Oral, Daily  budesonide, 0.5 mg, Nebulization, BID - RT  clopidogrel, 75 mg, Oral,  "questions that cannot be answered, please contact the ordering physician            Feb 08, 2017  2:30 PM   US LOWER EXTREMITY VENOUS DUPLEX BILATERAL with [id number]   [facility] ([facility])    [address] [address] [phone]           Please bring a list of your medicines (including vitamins, minerals and over-the-counter drugs). Also, tell your doctor about any allergies you may have. Wear comfortable clothes and leave your valuables at home.  You do not need to do anything special to prepare for your exam.  Please call the Imaging Department at your exam site with any questions.              Future tests that were ordered for you today     Open Future Orders        Priority Expected Expires Ordered    Echocardiogram Routine  2/8/2018 2/8/2017    US Venous lower extremity bilat Routine  2/8/2018 2/8/2017            Who to contact     If you have questions or need follow up information about today's clinic visit or your schedule please contact [facility] directly at [phone].  Normal or non-critical lab and imaging results will be communicated to you by [employer], letter or phone within 4 business days after the clinic has received the results. If you do not hear from us within 7 days, please contact the clinic through Moxie Jeanhart or phone. If you have a critical or abnormal lab result, we will notify you by phone as soon as possible.  Submit refill requests through [employer] or call your pharmacy and they will forward the refill request to us. Please allow 3 business days for your refill to be completed.          Additional Information About Your Visit        [facility] Information     [employer] lets you send messages to your doctor, view your test results, renew your prescriptions, schedule appointments and more. To sign up, go to [email or web address] . Click on \"Log in\" on the left side of the screen, which will take you to the " Daily  divalproex, 500 mg, Oral, BID  empagliflozin, 10 mg, Oral, Daily  ferrous sulfate, 324 mg, Oral, Daily With Breakfast  FLUoxetine, 20 mg, Oral, Daily  folic acid, 1 mg, Oral, Daily  furosemide, 20 mg, Intravenous, Q12H  guaiFENesin, 600 mg, Oral, Q12H  insulin glargine, 20 Units, Subcutaneous, QAM  insulin lispro, 3-14 Units, Subcutaneous, TID With Meals  insulin lispro, 7 Units, Subcutaneous, TID With Meals  ipratropium-albuterol, 3 mL, Nebulization, 4x Daily - RT  levothyroxine, 50 mcg, Oral, Daily  losartan, 25 mg, Oral, Q24H  magnesium oxide, 400 mg, Oral, Daily  metoprolol succinate XL, 25 mg, Oral, Q24H  pantoprazole, 40 mg, Oral, QAM  risperiDONE, 0.5 mg, Oral, Daily  rosuvastatin, 20 mg, Oral, Daily  Scopolamine, 1 patch, Transdermal, Q72H  spironolactone, 25 mg, Oral, Daily      Infusions     PRNs  •  acetaminophen  •  acetaminophen  •  acetaminophen  •  benzonatate  •  Calcium Gluconate-NaCl **AND** calcium gluconate **AND** Calcium, Ionized  •  dextrose  •  dextrose  •  docusate sodium  •  glucagon (human recombinant)  •  HYDROcodone-acetaminophen  •  LORazepam  •  magnesium sulfate **OR** magnesium sulfate **OR** magnesium sulfate  •  Morphine  •  ondansetron  •  phenol  •  polyethylene glycol  •  potassium & sodium phosphates **OR** potassium & sodium phosphates  •  potassium chloride  •  potassium chloride  •  [COMPLETED] Insert peripheral IV **AND** sodium chloride    Physical Exam:  General Appearance:  Alert   HEENT:  Normocephalic, without obvious abnormality, Conjunctiva/corneas clear,.   Nares normal, no drainage     Neck:  Supple, symmetrical, trachea midline.   Lungs /Chest wall:   Bilateral basal rhonchi, respirations unlabored, symmetrical wall movement.     Heart:  Regular rate and rhythm, S1 S2 normal  Abdomen: Soft, non-tender, no masses, no organomegaly.    Extremities: No edema, no clubbing or cyanosis constitutional: Negative for     ROS  Constitutional: Negative for chills,  "Welcome page. Then click on \"Sign up Now\" on the right side of the page.     You will be asked to enter the access code listed below, as well as some personal information. Please follow the directions to create your username and password.     Your access code is: [email or web address]  Expires: 2017  6:02 PM     Your access code will  in 90 days. If you need help or a new code, please call your [facility] or [phone].        Care EveryWhere ID     This is your Care EveryWhere ID. This could be used by other organizations to access your [facility] medical records  [id number]        Your Vitals Were     Pulse Height BMI (Body Mass Index) Pulse Oximetry          124 1.761 m (5' 9.33\") 36.11 kg/m2 97%         Blood Pressure from Last 3 Encounters:   17 104/69   17 117/79   16 117/68    Weight from Last 3 Encounters:   17 111.993 kg (246 lb 14.4 oz)   17 109.317 kg (241 lb)   16 107.502 kg (237 lb)              We Performed the Following     EKG 12-lead, tracing only (Future)        Primary Care Provider Office Phone # Fax #    [facility] [phone]        [facility] [address]        Thank you!     Thank you for choosing [facility]  for your care. Our goal is always to provide you with excellent care. Hearing back from our patients is one way we can continue to improve our services. Please take a few minutes to complete the written survey that you may receive in the mail after your visit with us. Thank you!             Your Updated Medication List - Protect others around you: Learn how to safely use, store and throw away your medicines at www.disposemymeds.org.          This list is accurate as of: 17 12:01 PM.  Always use your most recent med list.                   Brand Name Dispense Instructions for use    prenatal multivitamin  plus iron 27-0.8 MG Tabs per tablet      Take 1 tablet by mouth daily       TYLENOL PO      " fever and malaise/fatigue.   HENT: Negative.    Eyes: Negative.    Cardiovascular: Negative.    Respiratory: Positive for cough and shortness of breath.    Skin: Negative.    Musculoskeletal: Negative.    Gastrointestinal: Negative.    Genitourinary: Negative.    Neurological: Negative.    Psychiatric/Behavioral: Negative.      I reviewed the recent clinical results    Much of this encounter note is an electronic transcription/translation of spoken language to printed text using Dragon Software which might include inadvertent errors in transcription.     Take 1,000 mg by mouth as needed       VITAMIN D (CHOLECALCIFEROL) PO      Take 3,000 Units by mouth daily

## 2022-11-20 ENCOUNTER — HEALTH MAINTENANCE LETTER (OUTPATIENT)
Age: 42
End: 2022-11-20

## 2023-01-13 ENCOUNTER — IMMUNIZATION (OUTPATIENT)
Dept: FAMILY MEDICINE | Facility: CLINIC | Age: 43
End: 2023-01-13
Payer: COMMERCIAL

## 2023-01-13 DIAGNOSIS — Z23 NEED FOR COVID-19 VACCINE: Primary | ICD-10-CM

## 2023-01-13 PROCEDURE — 99207 PR NO CHARGE NURSE ONLY: CPT

## 2023-01-13 PROCEDURE — 0134A COVID-19 VACCINE BIVALENT BOOSTER 18+ (MODERNA): CPT

## 2023-01-13 PROCEDURE — 91313 COVID-19 VACCINE BIVALENT BOOSTER 18+ (MODERNA): CPT

## 2023-01-13 NOTE — PROGRESS NOTES
Prior to immunization administration, verified patients identity using patient s name and date of birth. Please see Immunization Activity for additional information.     Screening Questionnaire for Adult Immunization    Are you sick today?   No   Do you have allergies to medications, food, a vaccine component or latex?   No   Have you ever had a serious reaction after receiving a vaccination?   No   Do you have a long-term health problem with heart, lung, kidney, or metabolic disease (e.g., diabetes), asthma, a blood disorder, no spleen, complement component deficiency, a cochlear implant, or a spinal fluid leak?  Are you on long-term aspirin therapy?   No   Do you have cancer, leukemia, HIV/AIDS, or any other immune system problem?   No   Do you have a parent, brother, or sister with an immune system problem?   No   In the past 3 months, have you taken medications that affect  your immune system, such as prednisone, other steroids, or anticancer drugs; drugs for the treatment of rheumatoid arthritis, Crohn s disease, or psoriasis; or have you had radiation treatments?   No   Have you had a seizure, or a brain or other nervous system problem?   No   During the past year, have you received a transfusion of blood or blood    products, or been given immune (gamma) globulin or antiviral drug?   No   For women: Are you pregnant or is there a chance you could become       pregnant during the next month?   No   Have you received any vaccinations in the past 4 weeks?   No     Immunization questionnaire answers were all negative.        Per orders of Dr. Alvarado , injection of bivalent booster moderna given by Louie Stevens MA. Patient instructed to remain in clinic for 15 minutes afterwards, and to report any adverse reaction to me immediately.       Screening performed by Louie Stevens MA on 1/13/2023 at 2:30 PM.

## 2023-02-10 ENCOUNTER — E-VISIT (OUTPATIENT)
Dept: URGENT CARE | Facility: CLINIC | Age: 43
End: 2023-02-10
Payer: COMMERCIAL

## 2023-02-10 DIAGNOSIS — B96.89 ACUTE BACTERIAL SINUSITIS: Primary | ICD-10-CM

## 2023-02-10 DIAGNOSIS — J01.90 ACUTE BACTERIAL SINUSITIS: Primary | ICD-10-CM

## 2023-02-10 PROCEDURE — 99421 OL DIG E/M SVC 5-10 MIN: CPT | Performed by: PHYSICIAN ASSISTANT

## 2023-02-10 NOTE — PATIENT INSTRUCTIONS
Dear Carrie Munoz    After reviewing your responses, I've been able to diagnose you with Acute bacterial sinusitis.      Based on your responses and diagnosis, I have prescribed   Orders Placed This Encounter     amoxicillin-clavulanate (AUGMENTIN) 875-125 MG tablet    to treat your symptoms. I have sent this to your pharmacy.?     It is also important to stay well hydrated, get lots of rest and take over-the-counter decongestants,?tylenol?or ibuprofen if you?are able to?take those medications per your primary care provider to help relieve discomfort.?     It is important that you take?all of?your prescribed medication even if your symptoms are improving after a few doses.? Taking?all of?your medicine helps prevent the symptoms from returning.?     If your symptoms worsen, you develop severe headache, vomiting, high fever (>102), or are not improving in 7 days, please contact your primary care provider for an appointment or visit any of our convenient Walk-in Care or Urgent Care Centers to be seen which can be found on our website?here.?     Thanks again for choosing?us?as your health care partner,?   ?  Donna Raymond PA-C?

## 2023-03-17 ENCOUNTER — OFFICE VISIT (OUTPATIENT)
Dept: MIDWIFE SERVICES | Facility: CLINIC | Age: 43
End: 2023-03-17
Payer: COMMERCIAL

## 2023-03-17 VITALS
OXYGEN SATURATION: 97 % | DIASTOLIC BLOOD PRESSURE: 84 MMHG | SYSTOLIC BLOOD PRESSURE: 123 MMHG | HEIGHT: 69 IN | WEIGHT: 243 LBS | BODY MASS INDEX: 35.99 KG/M2 | HEART RATE: 112 BPM

## 2023-03-17 DIAGNOSIS — Z13.21 ENCOUNTER FOR VITAMIN DEFICIENCY SCREENING: ICD-10-CM

## 2023-03-17 DIAGNOSIS — E07.9 THYROID MASS: ICD-10-CM

## 2023-03-17 DIAGNOSIS — Z13.29 SCREENING FOR THYROID DISORDER: ICD-10-CM

## 2023-03-17 DIAGNOSIS — Z13.0 SCREENING FOR DEFICIENCY ANEMIA: ICD-10-CM

## 2023-03-17 DIAGNOSIS — Z12.31 ENCOUNTER FOR SCREENING MAMMOGRAM FOR BREAST CANCER: ICD-10-CM

## 2023-03-17 DIAGNOSIS — Z13.228 SCREENING FOR METABOLIC DISORDER: ICD-10-CM

## 2023-03-17 DIAGNOSIS — R41.89 BRAIN FOG: ICD-10-CM

## 2023-03-17 DIAGNOSIS — Z13.1 SCREENING FOR DIABETES MELLITUS: ICD-10-CM

## 2023-03-17 DIAGNOSIS — Z12.4 SCREENING FOR MALIGNANT NEOPLASM OF CERVIX: ICD-10-CM

## 2023-03-17 DIAGNOSIS — J30.2 SEASONAL ALLERGIC RHINITIS, UNSPECIFIED TRIGGER: ICD-10-CM

## 2023-03-17 DIAGNOSIS — Z01.411 ENCOUNTER FOR GYNECOLOGICAL EXAMINATION WITH ABNORMAL FINDING: Primary | ICD-10-CM

## 2023-03-17 DIAGNOSIS — Z13.220 SCREENING FOR CHOLESTEROL LEVEL: ICD-10-CM

## 2023-03-17 LAB
ALBUMIN SERPL BCG-MCNC: 4.5 G/DL (ref 3.5–5.2)
ALP SERPL-CCNC: 59 U/L (ref 35–104)
ALT SERPL W P-5'-P-CCNC: 26 U/L (ref 10–35)
ANION GAP SERPL CALCULATED.3IONS-SCNC: 11 MMOL/L (ref 7–15)
AST SERPL W P-5'-P-CCNC: 26 U/L (ref 10–35)
BILIRUB SERPL-MCNC: 0.3 MG/DL
BUN SERPL-MCNC: 11 MG/DL (ref 6–20)
CALCIUM SERPL-MCNC: 9.6 MG/DL (ref 8.6–10)
CHLORIDE SERPL-SCNC: 106 MMOL/L (ref 98–107)
CHOLEST SERPL-MCNC: 162 MG/DL
CREAT SERPL-MCNC: 0.7 MG/DL (ref 0.51–0.95)
DEPRECATED HCO3 PLAS-SCNC: 26 MMOL/L (ref 22–29)
ERYTHROCYTE [DISTWIDTH] IN BLOOD BY AUTOMATED COUNT: 12.3 % (ref 10–15)
GFR SERPL CREATININE-BSD FRML MDRD: >90 ML/MIN/1.73M2
GLUCOSE SERPL-MCNC: 99 MG/DL (ref 70–99)
HBA1C MFR BLD: 5.4 % (ref 0–5.6)
HCT VFR BLD AUTO: 40.6 % (ref 35–47)
HDLC SERPL-MCNC: 38 MG/DL
HGB BLD-MCNC: 13.6 G/DL (ref 11.7–15.7)
LDLC SERPL CALC-MCNC: 87 MG/DL
MCH RBC QN AUTO: 30.2 PG (ref 26.5–33)
MCHC RBC AUTO-ENTMCNC: 33.5 G/DL (ref 31.5–36.5)
MCV RBC AUTO: 90 FL (ref 78–100)
NONHDLC SERPL-MCNC: 124 MG/DL
PLATELET # BLD AUTO: 234 10E3/UL (ref 150–450)
POTASSIUM SERPL-SCNC: 4.4 MMOL/L (ref 3.4–5.3)
PROT SERPL-MCNC: 7.4 G/DL (ref 6.4–8.3)
RBC # BLD AUTO: 4.5 10E6/UL (ref 3.8–5.2)
SODIUM SERPL-SCNC: 143 MMOL/L (ref 136–145)
TRIGL SERPL-MCNC: 183 MG/DL
TSH SERPL DL<=0.005 MIU/L-ACNC: 1.45 UIU/ML (ref 0.3–4.2)
WBC # BLD AUTO: 4.1 10E3/UL (ref 4–11)

## 2023-03-17 PROCEDURE — 36415 COLL VENOUS BLD VENIPUNCTURE: CPT | Performed by: ADVANCED PRACTICE MIDWIFE

## 2023-03-17 PROCEDURE — G0145 SCR C/V CYTO,THINLAYER,RESCR: HCPCS | Performed by: ADVANCED PRACTICE MIDWIFE

## 2023-03-17 PROCEDURE — 85027 COMPLETE CBC AUTOMATED: CPT | Performed by: ADVANCED PRACTICE MIDWIFE

## 2023-03-17 PROCEDURE — 80053 COMPREHEN METABOLIC PANEL: CPT | Performed by: ADVANCED PRACTICE MIDWIFE

## 2023-03-17 PROCEDURE — 99213 OFFICE O/P EST LOW 20 MIN: CPT | Mod: 25 | Performed by: ADVANCED PRACTICE MIDWIFE

## 2023-03-17 PROCEDURE — 82306 VITAMIN D 25 HYDROXY: CPT | Performed by: ADVANCED PRACTICE MIDWIFE

## 2023-03-17 PROCEDURE — 84443 ASSAY THYROID STIM HORMONE: CPT | Performed by: ADVANCED PRACTICE MIDWIFE

## 2023-03-17 PROCEDURE — 87624 HPV HI-RISK TYP POOLED RSLT: CPT | Performed by: ADVANCED PRACTICE MIDWIFE

## 2023-03-17 PROCEDURE — 83036 HEMOGLOBIN GLYCOSYLATED A1C: CPT | Performed by: ADVANCED PRACTICE MIDWIFE

## 2023-03-17 PROCEDURE — 99396 PREV VISIT EST AGE 40-64: CPT | Performed by: ADVANCED PRACTICE MIDWIFE

## 2023-03-17 PROCEDURE — 80061 LIPID PANEL: CPT | Performed by: ADVANCED PRACTICE MIDWIFE

## 2023-03-17 RX ORDER — CETIRIZINE HYDROCHLORIDE 10 MG/1
10 TABLET ORAL
Qty: 180 TABLET | Refills: 1 | Status: SHIPPED | OUTPATIENT
Start: 2023-03-17 | End: 2024-04-23

## 2023-03-17 RX ORDER — FLUTICASONE PROPIONATE 50 MCG
1 SPRAY, SUSPENSION (ML) NASAL
Qty: 11.1 ML | Refills: 3 | Status: SHIPPED | OUTPATIENT
Start: 2023-03-17 | End: 2024-04-23

## 2023-03-17 NOTE — PROGRESS NOTES
Carrie is a 42 year old  female who presents for annual exam. She has a few things she would like to go over today.   First is she is wondering if she is starting to have some perimenopausal symptoms. She noticed about a year and a half ago she started missing periods some months and then the next month would be really heavy. She would get a period about every month but it would be getting a period at the beginning of one month and then the end of another month. Prior to this she had normal periods. Her periods have also become worse. THey are more painful, come with nausea and fatigue. She finds it hard to get things done during her period week due to feeling uncomfortable. She has tried an IUD in the past and had a bad experience with it. It also did not take her periods away. That is not an option for her for treatment. She had her hormones checked recently and they were normal. Her family starts to go through perimenopause around this time. They were the ones who advised hormone checking and considering hormone therapy. We discussed hormone therapy for her would be birth control. She is not interested in birth control. We also discussed selective serotonin reuptake inhibitor/SNRI treatment. She does have a history of depression and anxiety. She used Celexa in the past and did not like it. Discussed Effexor as another option. She currently goes to therapy and will discuss the Effexor with her therapist. She also feels like she has brain fog, memory issues, fatigue, and lack of motivation to do things. She has talked with her therapist about this and since it did not align with her symptoms of depression when she had it before she recommended looking into other things. We also discussed long covid symptoms. Her brain fog and fatigue fall into that and symptoms started around the time she had covid. Referral for long covid clinic given so she can explore that. Also discussed western medicine as another  option. Will do lab work today to make sure that is all normal to rule out anything there. We can consider an ultrasound as well. She is due for a pap smear, collected today. Declined STI testing. She is not sexually active and has not been for 6 years. Due for mammogram, ordered today. 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 She is also a . She has a 6 year old daughter who is in [employer] school.     Menses are irregular and normal lasting 4 days.  Menses flow: normal and heavy.  Patient's last menstrual period was 02/27/2023. Using none for contraception.  She is not currently considering pregnancy.  Besides routine health maintenance, she has no other health concerns today .  GYNECOLOGIC HISTORY:  Menarche: 16  Age at first intercourse: 22 Number of lifetime partners: more than 6  Carrie is not sexually active with 0male partner(s) and is not currently in monogamous relationship with 0.    History sexually transmitted infections:No STD history  STI testing offered?  Declined  RADHA exposure: No  History of abnormal Pap smear: YES - updated in Problem List and Health Maintenance accordingly  Family history of breast CA: Yes (Please explain): p aunt  Family history of uterine/ovarian CA: No    Family history of colon CA: No    HEALTH MAINTENANCE:  Cholesterol: (  Cholesterol   Date Value Ref Range Status   06/29/2018 135 <200 mg/dL Final   10/22/2003 133 <200 mg/dL Final     Comment:     Cholesterol Reference Range:   <200  The NCEP recommends further         evaluation of:         1.  Patients with cholesterol             greater than 200 mg/dL             if additional risk factors             are present.         2.  All patients with a             cholesterol greater than             240 mg/dL.    History of abnormal lipids: No  Mammo: 2/16/22. History of abnormal Mammo: yes  Regular Self Breast Exams: Yes  Calcium/Vitamin D intake: source:  dairy, dietary supplement(s) Adequate? Yes  TSH:  (  TSH   Date Value Ref Range Status   2017 0.78 0.40 - 4.00 mU/L Final    )  Pap; (  Lab Results   Component Value Date    PAP NIL 2017    PAP NIL 2010    )    HISTORY:  OB History    Para Term  AB Living   1 1 1 0 0 1   SAB IAB Ectopic Multiple Live Births   0 0 0 0 1      # Outcome Date GA Lbr Alexei/2nd Weight Sex Delivery Anes PTL Lv   1 Term 17 41w2d 02:05 / 04:09 4.564 kg (10 lb 1 oz) F Vag-Spont EPI, Nitrous N EFRAÍN      Complications: GBS      Name: TERRY RODRIGUES      Apgar1: 8  Apgar5: 9     Past Medical History:   Diagnosis Date     Breast disorder     biopsy (?) benign     Breast lump on right side at 2:30  o'clock position 2011     Cardiac abnormality     tachycardia during pregnancy     Depressive disorder      Mental disorder     depression, anxiety     Unspecified sinusitis (chronic)      Past Surgical History:   Procedure Laterality Date     EXCISE GANGLION WRIST Left 2018    Procedure: EXCISE GANGLION WRIST;  Excision Left Dorsal Wirst Ganglion;  Surgeon: Randall Maradiaga MD;  Location: [facility] OR     HC TOOTH EXTRACTION W/FORCEP      all 4 together     Family History   Problem Relation Age of Onset     Hypertension Paternal Grandmother      Osteoporosis Paternal Grandmother      Hypertension Paternal Aunt      Neurologic Disorder Father         sheehan's palsy     Hypertension Father         not on medication, smoker     Neurologic Disorder Mother         bell's palsy     Social History     Socioeconomic History     Marital status: Single     Spouse name: None     Number of children: 0     Years of education: 13 1/2     Highest education level: None   Occupational History     Occupation: teller     Employer: [employer]   Tobacco Use     Smoking status: Never     Smokeless tobacco: Never   Substance and Sexual Activity     Alcohol use: No     Drug use: No     Sexual activity: Not Currently     Partners: Male     Birth control/protection: None    Other Topics Concern     Parent/sibling w/ CABG, MI or angioplasty before 65F 55M? No   Social History Narrative    Risk Behaviors & Healthy habits    Balanced Diet  no     Prevent Osteoporosis  yes - lots of dairy    Regular Exercise  no     Dental Care  yes    Seat Belt use  yes    Self Breast Exam  no     Sexually Active  no , no STD concerns, but would like to be checked     Contraception  yes    Abuse  no     Guns  no     Sun Screen  no             Working at [employer], 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  Lives with her aunt, and gets along well.  In limited contact with her mother, in contact with father a lot.  Born in [place], and [place] 5, [place] 11.  Father in the .                       Current Outpatient Medications:      cetirizine (ZYRTEC) 10 MG tablet, Take 1 tablet (10 mg) by mouth 2 times daily, Disp: 180 tablet, Rfl: 1     fluticasone (FLONASE) 50 MCG/ACT nasal spray, Spray 1 spray into both nostrils 2 times daily, Disp: 11.1 mL, Rfl: 3     ibuprofen (ADVIL/MOTRIN) 600 MG tablet, TAKE 1 TABLET BY MOUTH EVERY 6 HOURS AS NEEDED FOR MODERATE PAIN, Disp: 120 tablet, Rfl: 1     etonogestrel-ethinyl estradiol (NUVARING) 0.12-0.015 MG/24HR vaginal ring, Place 1 each vaginally every 28 days (Patient not taking: Reported on 3/17/2023), Disp: 3 each, Rfl: 4     indomethacin (INDOCIN) 50 MG capsule, Take 1 capsule (50 mg) by mouth 3 times daily (with meals) for 10 days, Disp: 30 capsule, Rfl: 0     Allergies   Allergen Reactions     No Known Drug Allergies        Past medical, surgical, social and family history were reviewed and updated in EPIC.    ROS:   C:     NEGATIVE for fever, chills, change in weight  I:       NEGATIVE for worrisome rashes, moles or lesions  E:     NEGATIVE for vision changes or irritation  E/M: NEGATIVE for ear, mouth and throat problems  R:     NEGATIVE for significant cough or SOB  CV:   NEGATIVE for chest pain, palpitations or  "peripheral edema  GI:     NEGATIVE for nausea, abdominal pain, heartburn, or change in bowel habits  :   NEGATIVE for frequency, dysuria, hematuria, vaginal discharge, or irregular bleeding  M:     NEGATIVE for significant arthralgias or myalgia  N:      NEGATIVE for weakness, dizziness or paresthesias  E:      NEGATIVE for temperature intolerance, skin/hair changes  P:      NEGATIVE for changes in mood or affect.    EXAM:  /84   Pulse 112   Ht 1.753 m (5' 9\")   Wt 110.2 kg (243 lb)   LMP 02/27/2023   SpO2 97%   BMI 35.88 kg/m     BMI: Body mass index is 35.88 kg/m .  Constitutional: healthy, alert and no distress  Head: Normocephalic. No masses, lesions, tenderness or abnormalities  Neck: Neck supple. Trachea midline. No adenopathy. Thyroid symmetric, mass on the right side felt  Cardiovascular: RRR.   Respiratory: Negative.   Breast: symmetrical and non tender, nipples inverted, no masses, no discharge, SBE taught  Gastrointestinal: Abdomen soft, tender on the right side, both RUQ and RLQ, non-distended. No masses, organomegaly.  :  Vulva:  No external lesions, normal female hair distribution, no inguinal adenopathy.    Urethra:  Midline, non-tender, well supported, no discharge  Vagina:  Moist, pink, no abnormal discharge, no lesions  Uterus:  Normal size, anteverted, non-tender, freely mobile  Ovaries:  No masses appreciated, non-tender, mobile  Rectal Exam: deferred  Musculoskeletal: extremities normal  Skin: no suspicious lesions or rashes  Psychiatric: Affect appropriate, cooperative,mentation appears normal.     COUNSELING:   Reviewed preventive health counseling, as reflected in patient instructions   reports that she has never smoked. She has never used smokeless tobacco.    Body mass index is 35.88 kg/m .    FRAX Risk Assessment    ASSESSMENT:  42 year old female with satisfactory annual exam  (Z01.411) Encounter for gynecological examination with abnormal finding  (primary encounter " diagnosis)  Comment: Patient has a thyroid nodule. Ultrasound ordered. She felt this nodule about a month or two ago and then forgot about it. Will follow up with endocrine pending results. She also has right sided tenderness in her abdomen, both RUQ and RLQ. She had a fall just under a month ago otherwise unsure where the pain is coming from. She has not noticed this before today. Recommend follow up with her PCP to continue assessment.   Plan: follow up for annual exams or PRN. Return if periods become unbearable to look into other option. Return if would like to start Effexor.     (R41.89) Brain fog  Plan: Adult Post Covid Clinic Referral    (J30.2) Seasonal allergic rhinitis, unspecified trigger  Plan: cetirizine (ZYRTEC) 10 MG tablet, fluticasone         (FLONASE) 50 MCG/ACT nasal spray    (Z13.29) Screening for thyroid disorder  Plan: TSH with free T4 reflex    (Z13.21) Encounter for vitamin deficiency screening  Plan: Vitamin D Deficiency    (Z13.1) Screening for diabetes mellitus  Plan: Hemoglobin A1c    (Z13.0) Screening for deficiency anemia  Plan: CBC with platelets    (Z13.228) Screening for metabolic disorder  Plan: Comprehensive metabolic panel    (Z13.220) Screening for cholesterol level  Plan: Lipid panel reflex to direct LDL Non-fasting    (Z12.4) Screening for malignant neoplasm of cervix  Plan: Pap screen reflex to HPV if ASCUS - recommend         age 25 - 29    (Z12.31) Encounter for screening mammogram for breast cancer  Plan: *MA Screening Digital Bilateral    (E07.9) Thyroid mass  Plan: US Thyroid    YASMEEN Todd CNM

## 2023-03-18 ENCOUNTER — MYC MEDICAL ADVICE (OUTPATIENT)
Dept: FAMILY MEDICINE | Facility: CLINIC | Age: 43
End: 2023-03-18
Payer: COMMERCIAL

## 2023-03-20 ENCOUNTER — VIRTUAL VISIT (OUTPATIENT)
Dept: FAMILY MEDICINE | Facility: CLINIC | Age: 43
End: 2023-03-20
Payer: COMMERCIAL

## 2023-03-20 DIAGNOSIS — R41.89 BRAIN FOG: ICD-10-CM

## 2023-03-20 DIAGNOSIS — E55.9 VITAMIN D DEFICIENCY DISEASE: ICD-10-CM

## 2023-03-20 DIAGNOSIS — F43.23 ADJUSTMENT DISORDER WITH MIXED ANXIETY AND DEPRESSED MOOD: ICD-10-CM

## 2023-03-20 DIAGNOSIS — R41.840 DIFFICULTY CONCENTRATING: Primary | ICD-10-CM

## 2023-03-20 DIAGNOSIS — R53.83 FATIGUE, UNSPECIFIED TYPE: ICD-10-CM

## 2023-03-20 LAB — DEPRECATED CALCIDIOL+CALCIFEROL SERPL-MC: 11 UG/L (ref 20–75)

## 2023-03-20 PROCEDURE — 99214 OFFICE O/P EST MOD 30 MIN: CPT | Mod: VID | Performed by: FAMILY MEDICINE

## 2023-03-20 NOTE — LETTER
[facility]  [address]  [address]  [facility] [address]  [phone]          March 20, 2023    RE:  Carrie Munoz                                                                                                                                                       [address] [facility] [address]            To whom it may concern:    Carrie Munoz is under my professional care for Difficulty concentrating,   Fatigue, Adjustment disorder with mixed anxiety and depressed mood, Brain fog and   Vitamin D deficiency disease.   We are recommending that she withdrawal from school in order to take care of herself and ask for your assistance in helping her.        Sincerely,        Narciso Sanford MD

## 2023-03-20 NOTE — PROGRESS NOTES
"Carrie is a 42 year old who is being evaluated via a billable video visit.      How would you like to obtain your AVS? MyChart  If the video visit is dropped, the invitation should be resent by: Text to cell phone: [phone]  Will anyone else be joining your video visit? No        Assessment & Plan     Difficulty concentrating  Worse since illness this past year   hard to do her school work/ letter letter to assist with her withdrawal from classes  Letter sent    Fatigue, unspecified type  Could be depression/ vit D Def/ postcovid etc  First start with Vit D 5631-0061 international unit(s) D3 daily  Follow up in 2 weeks.     Adjustment disorder with mixed anxiety and depressed mood  Worse   No suicdia l thoughts    see therapist, walk daily   consider Effexor  Follow up in 2 weeks.     Brain fog  As above   wail go to Covid clinic, rest      Review of external notes as documented elsewhere in note  Review of the result(s) of each unique test - labs      22 minutes spent on the date of the encounter doing review of outside records, review of test results and interpretation of tests        BMI:   Estimated body mass index is 35.88 kg/m  as calculated from the following:    Height as of 3/17/23: 1.753 m (5' 9\").    Weight as of 3/17/23: 110.2 kg (243 lb).       Regular exercise  See Patient Instructions    No follow-ups on file.    Narciso Sanford MD  [facility]    Subjective   Carrie is a 42 year old presenting for the following health issues:  Forms (Letter for school) and Medication Request      History of Present Illness       Reason for visit:  Med Check/ RequestShe consumes 1 sweetened beverage(s) daily.      Pt was talking to her midwife about starting Effexor for depression/ menopause symptoms and would like to look into it further     Depression and Anxiety Follow-Up    How are you doing with your depression since your last visit? Worsened     How are you doing with your " "anxiety since your last visit?  Worsened     Are you having other symptoms that might be associated with depression or anxiety? Yes:  fatigue, brain fog    Have you had a significant life event? Health Concerns     Do you have any concerns with your use of alcohol or other drugs? No    Social History     Tobacco Use     Smoking status: Never     Smokeless tobacco: Never   Vaping Use     Vaping Use: Never used   Substance Use Topics     Alcohol use: Yes     Comment: couples times per month     Drug use: Yes     Types: Marijuana     Comment: daily for fibromyalgia     PHQ 4/27/2017   PHQ-9 Total Score 7   Q9: Thoughts of better off dead/self-harm past 2 weeks Not at all     No flowsheet data found.  Last PHQ-9 4/27/2017   1.  Little interest or pleasure in doing things 1   2.  Feeling down, depressed, or hopeless 2   3.  Trouble falling or staying asleep, or sleeping too much 0   4.  Feeling tired or having little energy 1   5.  Poor appetite or overeating 2   6.  Feeling bad about yourself 1   7.  Trouble concentrating 0   8.  Moving slowly or restless 0   Q9: Thoughts of better off dead/self-harm past 2 weeks 0   PHQ-9 Total Score 7   Difficulty at work, home, or with people Not difficult at all     No flowsheet data found.    Suicide Assessment Five-step Evaluation and Treatment (SAFE-T)      9}    Review of Systems   Constitutional, HEENT, cardiovascular, pulmonary, gi and gu systems are negative, except as otherwise noted.      Objective    Vitals - Patient Reported  Weight (Patient Reported): 110.2 kg (243 lb)  Height (Patient Reported): 175.3 cm (5' 9\")  BMI (Based on Pt Reported Ht/Wt): 35.88  Pain Score: No Pain (0)      Vitals:  No vitals were obtained today due to virtual visit.    Physical Exam   GENERAL: alert, no distress and fatigued  EYES: Eyes grossly normal to inspection.  No discharge or erythema, or obvious scleral/conjunctival abnormalities.  RESP: No audible wheeze, cough, or visible cyanosis.  " No visible retractions or increased work of breathing.    SKIN: Visible skin clear. No significant rash, abnormal pigmentation or lesions.  NEURO: Cranial nerves grossly intact.  Mentation and speech appropriate for age.  PSYCH: tearful, anxious and judgement and insight intact    Office Visit on 03/17/2023   Component Date Value Ref Range Status     TSH 03/17/2023 1.45  0.30 - 4.20 uIU/mL Final     Vitamin D, Total (25-Hydroxy) 03/17/2023 11 (L)  20 - 75 ug/L Final     Hemoglobin A1C 03/17/2023 5.4  0.0 - 5.6 % Final    Normal <5.7%   Prediabetes 5.7-6.4%    Diabetes 6.5% or higher     Note: Adopted from ADA consensus guidelines.     Cholesterol 03/17/2023 162  <200 mg/dL Final     Triglycerides 03/17/2023 183 (H)  <150 mg/dL Final     Direct Measure HDL 03/17/2023 38 (L)  >=50 mg/dL Final     LDL Cholesterol Calculated 03/17/2023 87  <=100 mg/dL Final     Non HDL Cholesterol 03/17/2023 124  <130 mg/dL Final     Sodium 03/17/2023 143  136 - 145 mmol/L Final     Potassium 03/17/2023 4.4  3.4 - 5.3 mmol/L Final     Chloride 03/17/2023 106  98 - 107 mmol/L Final     Carbon Dioxide (CO2) 03/17/2023 26  22 - 29 mmol/L Final     Anion Gap 03/17/2023 11  7 - 15 mmol/L Final     Urea Nitrogen 03/17/2023 11.0  6.0 - 20.0 mg/dL Final     Creatinine 03/17/2023 0.70  0.51 - 0.95 mg/dL Final     Calcium 03/17/2023 9.6  8.6 - 10.0 mg/dL Final     Glucose 03/17/2023 99  70 - 99 mg/dL Final     Alkaline Phosphatase 03/17/2023 59  35 - 104 U/L Final     AST 03/17/2023 26  10 - 35 U/L Final     ALT 03/17/2023 26  10 - 35 U/L Final     Protein Total 03/17/2023 7.4  6.4 - 8.3 g/dL Final     Albumin 03/17/2023 4.5  3.5 - 5.2 g/dL Final     Bilirubin Total 03/17/2023 0.3  <=1.2 mg/dL Final     GFR Estimate 03/17/2023 >90  >60 mL/min/1.73m2 Final    eGFR calculated using 2021 CKD-EPI equation.     WBC Count 03/17/2023 4.1  4.0 - 11.0 10e3/uL Final     RBC Count 03/17/2023 4.50  3.80 - 5.20 10e6/uL Final     Hemoglobin 03/17/2023 13.6   11.7 - 15.7 g/dL Final     Hematocrit 03/17/2023 40.6  35.0 - 47.0 % Final     MCV 03/17/2023 90  78 - 100 fL Final     MCH 03/17/2023 30.2  26.5 - 33.0 pg Final     MCHC 03/17/2023 33.5  31.5 - 36.5 g/dL Final     RDW 03/17/2023 12.3  10.0 - 15.0 % Final     Platelet Count 03/17/2023 234  150 - 450 10e3/uL Final               Video-Visit Details    Type of service:  Video Visit     Originating Location (pt. Location): Home  Distant Location (provider location):  On-site  Platform used for Video Visit: Teagan

## 2023-03-20 NOTE — TELEPHONE ENCOUNTER
Please set up a 40 minute virtual / ? Telephone visit for this afternoon, need a longer sot as she has many questions

## 2023-03-22 LAB
BKR LAB AP GYN ADEQUACY: NORMAL
BKR LAB AP GYN INTERPRETATION: NORMAL
BKR LAB AP HPV REFLEX: NORMAL
BKR LAB AP PREVIOUS ABNORMAL: NORMAL
PATH REPORT.COMMENTS IMP SPEC: NORMAL
PATH REPORT.COMMENTS IMP SPEC: NORMAL
PATH REPORT.RELEVANT HX SPEC: NORMAL

## 2023-03-27 ENCOUNTER — MYC MEDICAL ADVICE (OUTPATIENT)
Dept: MIDWIFE SERVICES | Facility: CLINIC | Age: 43
End: 2023-03-27
Payer: COMMERCIAL

## 2023-03-27 LAB
HUMAN PAPILLOMA VIRUS 16 DNA: NEGATIVE
HUMAN PAPILLOMA VIRUS 18 DNA: NEGATIVE
HUMAN PAPILLOMA VIRUS FINAL DIAGNOSIS: ABNORMAL
HUMAN PAPILLOMA VIRUS OTHER HR: POSITIVE

## 2023-03-30 ENCOUNTER — PATIENT OUTREACH (OUTPATIENT)
Dept: MIDWIFE SERVICES | Facility: CLINIC | Age: 43
End: 2023-03-30
Payer: COMMERCIAL

## 2023-04-03 ENCOUNTER — TELEPHONE (OUTPATIENT)
Dept: MIDWIFE SERVICES | Facility: CLINIC | Age: 43
End: 2023-04-03

## 2023-04-03 ENCOUNTER — ANCILLARY PROCEDURE (OUTPATIENT)
Dept: ULTRASOUND IMAGING | Facility: CLINIC | Age: 43
End: 2023-04-03
Attending: ADVANCED PRACTICE MIDWIFE
Payer: COMMERCIAL

## 2023-04-03 ENCOUNTER — ANCILLARY PROCEDURE (OUTPATIENT)
Dept: MAMMOGRAPHY | Facility: CLINIC | Age: 43
End: 2023-04-03
Attending: ADVANCED PRACTICE MIDWIFE
Payer: COMMERCIAL

## 2023-04-03 ENCOUNTER — TELEPHONE (OUTPATIENT)
Dept: ENDOCRINOLOGY | Facility: CLINIC | Age: 43
End: 2023-04-03

## 2023-04-03 DIAGNOSIS — Z12.31 ENCOUNTER FOR SCREENING MAMMOGRAM FOR BREAST CANCER: ICD-10-CM

## 2023-04-03 DIAGNOSIS — E04.1 THYROID NODULE: Primary | ICD-10-CM

## 2023-04-03 DIAGNOSIS — E07.9 THYROID MASS: ICD-10-CM

## 2023-04-03 LAB — RADIOLOGIST FLAGS: NORMAL

## 2023-04-03 PROCEDURE — 77067 SCR MAMMO BI INCL CAD: CPT | Mod: GC | Performed by: STUDENT IN AN ORGANIZED HEALTH CARE EDUCATION/TRAINING PROGRAM

## 2023-04-03 PROCEDURE — 77063 BREAST TOMOSYNTHESIS BI: CPT | Mod: GC | Performed by: STUDENT IN AN ORGANIZED HEALTH CARE EDUCATION/TRAINING PROGRAM

## 2023-04-03 PROCEDURE — 76536 US EXAM OF HEAD AND NECK: CPT | Mod: GC | Performed by: STUDENT IN AN ORGANIZED HEALTH CARE EDUCATION/TRAINING PROGRAM

## 2023-04-03 NOTE — TELEPHONE ENCOUNTER
Health Call Center    Phone Message    May a detailed message be left on voicemail: yes     Reason for Call: Other: Patient is being referred to be seen for Thyroid nodule. Ref by Sudha Castellanos. Patient is currently scheduled on 8/21/23 at 9:30am with Dr Wiggins. Sending encounter to clinic for review. Referring provider is requesting patient be seen in 1-2 weeks. Please call patient to schedule if sooner opening is needed     Action Taken: Message routed to:  [facility] ([facility] Endocrinology    Travel Screening: Not Applicable

## 2023-04-03 NOTE — TELEPHONE ENCOUNTER
Received call from Sherie at [facility]  Needed to notify clinic of incidental findings on thyroid ultrasound      Did not give RN description just that the ultrasound is 'ready for review' and recommendations are listed at the bottom for follow up  Routing to provider to advise.    Thyroid US 4/3  1. TR3 nodule right mid/inferior lobe.  FNA recommended given size.  2. Left thyroid lobe/isthmus 1.0 cm TR4 nodule. Recommend follow-up  ultrasound in 1, 2, 3, and 5 years.  3. Additional thyroid nodules as detailed above, none of which  necessitate follow-up per the guidelines below.     [Recommend Follow Up: Thyroid nodule]     This report will be copied to the [facility] to ensure a  provider acknowledges the finding.      ACR TI-RADS recommendations  TR2 (2 points) & TR1 (0 points) -No FNA or follow-up  TR3 (3 points) - FNA if ? 2.5cm, follow-up if 1.5 -2.4 cm in 1, 3 and  5 years  TR4 (4-6 points) - FNA if ? 1.5cm, follow-up if 1 -1.4 cm in 1, 2, 3  and 5 years  TR5 (?7 points) - FNA if ? 1cm, follow-up if 0.5 -0.9 cm every year  for 5 years

## 2023-04-04 NOTE — TELEPHONE ENCOUNTER
To schedulers:  please  move forward on the schedule to open non-call week RICH space with Dr Medina, using 2 back to back 30 minute RICH spaces such as are currently available on  5/4, 5/8, 5/11, 5/15, 5/16, 5/18, 5/22, 5/23, 5/25, 6/5  Thanks  Brooklyn Aguilar MD  Endocrine Triage

## 2023-04-04 NOTE — TELEPHONE ENCOUNTER
I called the patient yesterday about the results. I placed a urgent order with endocrinology to get the FNA scheduled. They scheduled her in August. Can you call them and see if they can get in sooner or if there is another place she can get the FNA done.   Thanks, Sudha Castellanos, YASMEEN FLOYD

## 2023-04-05 NOTE — TELEPHONE ENCOUNTER
LVM to schedule sooner appt w/ Dr. Medina  using 2 back to back 30 minute RICH spaces such as are currently available on 5/15, 5/16, 5/18, 5/22, 5/23, 5/25, 6/5 4/5 [facility]

## 2023-04-12 NOTE — TELEPHONE ENCOUNTER
RECORDS RECEIVED FROM: internal    DATE RECEIVED: 5.25.23    NOTES (FOR ALL VISITS) STATUS DETAILS   OFFICE NOTES from referring provider internal  Sudha Castellanos APRN CNM     MEDICATION LIST internal     IMAGING        ULTRASOUND (HEAD/NECK) internal  Thyroid- 4.3.23    LABS     DIABETES: HBGA1C, CREATININE, FASTING LIPIDS, MICROALBUMIN URINE, POTASSIUM, TSH, T4    THYROID: TSH, T4, CBC, THYRODLONULIN, TOTAL T3, FREE T4, CALCITONIN, CEA internal  Cbc- 3.17.23  cmp- 3.17.23  Lipid- 3.17.23  HBGA1C- 3.17.23  Vitamin D- 3.17.23  TSH/T4- 3.17.23

## 2023-04-27 ENCOUNTER — TRANSFERRED RECORDS (OUTPATIENT)
Dept: HEALTH INFORMATION MANAGEMENT | Facility: CLINIC | Age: 43
End: 2023-04-27
Payer: COMMERCIAL

## 2023-05-09 SDOH — SOCIAL STABILITY: SOCIAL NETWORK: I HAVE TROUBLE DOING ALL OF MY REGULAR LEISURE ACTIVITIES WITH OTHERS: SOMETIMES

## 2023-05-09 SDOH — SOCIAL STABILITY: SOCIAL NETWORK: I HAVE TROUBLE DOING ALL OF MY USUAL WORK (INCLUDE WORK AT HOME): SOMETIMES

## 2023-05-09 SDOH — SOCIAL STABILITY: SOCIAL NETWORK: I HAVE TROUBLE DOING ALL OF THE ACTIVITIES WITH FRIENDS THAT I WANT TO DO: SOMETIMES

## 2023-05-09 SDOH — SOCIAL STABILITY: SOCIAL NETWORK

## 2023-05-09 SDOH — SOCIAL STABILITY: SOCIAL NETWORK: I HAVE TROUBLE DOING ALL OF THE FAMILY ACTIVITIES THAT I WANT TO DO: SOMETIMES

## 2023-05-09 SDOH — SOCIAL STABILITY: SOCIAL NETWORK: PROMIS ABILITY TO PARTICIPATE IN SOCIAL ROLES & ACTIVITIES T-SCORE: 45

## 2023-05-09 ASSESSMENT — ANXIETY QUESTIONNAIRES
GAD7 TOTAL SCORE: 4
3. WORRYING TOO MUCH ABOUT DIFFERENT THINGS: SEVERAL DAYS
IF YOU CHECKED OFF ANY PROBLEMS ON THIS QUESTIONNAIRE, HOW DIFFICULT HAVE THESE PROBLEMS MADE IT FOR YOU TO DO YOUR WORK, TAKE CARE OF THINGS AT HOME, OR GET ALONG WITH OTHER PEOPLE: SOMEWHAT DIFFICULT
1. FEELING NERVOUS, ANXIOUS, OR ON EDGE: SEVERAL DAYS
7. FEELING AFRAID AS IF SOMETHING AWFUL MIGHT HAPPEN: NOT AT ALL
5. BEING SO RESTLESS THAT IT IS HARD TO SIT STILL: NOT AT ALL
GAD7 TOTAL SCORE: 4
8. IF YOU CHECKED OFF ANY PROBLEMS, HOW DIFFICULT HAVE THESE MADE IT FOR YOU TO DO YOUR WORK, TAKE CARE OF THINGS AT HOME, OR GET ALONG WITH OTHER PEOPLE?: SOMEWHAT DIFFICULT
2. NOT BEING ABLE TO STOP OR CONTROL WORRYING: NOT AT ALL
GAD7 TOTAL SCORE: 4
6. BECOMING EASILY ANNOYED OR IRRITABLE: SEVERAL DAYS
7. FEELING AFRAID AS IF SOMETHING AWFUL MIGHT HAPPEN: NOT AT ALL
4. TROUBLE RELAXING: SEVERAL DAYS

## 2023-05-09 ASSESSMENT — ENCOUNTER SYMPTOMS
HOARSE VOICE: 0
PANIC: 0
CONSTIPATION: 1
PARALYSIS: 0
TREMORS: 0
BLOOD IN STOOL: 0
POOR WOUND HEALING: 0
DOUBLE VISION: 0
SINUS CONGESTION: 1
BOWEL INCONTINENCE: 0
PALPITATIONS: 1
TINGLING: 1
SLEEP DISTURBANCES DUE TO BREATHING: 1
EYE WATERING: 1
SMELL DISTURBANCE: 0
LEG PAIN: 1
SPEECH CHANGE: 1
SORE THROAT: 1
MUSCLE CRAMPS: 1
LIGHT-HEADEDNESS: 1
DIARRHEA: 0
HYPERTENSION: 0
ARTHRALGIAS: 1
NECK PAIN: 1
ABDOMINAL PAIN: 1
HEADACHES: 1
INSOMNIA: 0
VOMITING: 1
BRUISES/BLEEDS EASILY: 0
DIZZINESS: 0
EYE REDNESS: 0
POLYDIPSIA: 1
JOINT SWELLING: 1
SEIZURES: 0
RECTAL PAIN: 1
BACK PAIN: 1
JAUNDICE: 0
BLOATING: 1
MYALGIAS: 1
SKIN CHANGES: 0
MUSCLE WEAKNESS: 1
HEARTBURN: 1
CHILLS: 1
SYNCOPE: 0
NERVOUS/ANXIOUS: 1
WEAKNESS: 1
LOSS OF CONSCIOUSNESS: 0
DEPRESSION: 1
STIFFNESS: 1
NUMBNESS: 1
NIGHT SWEATS: 1
ALTERED TEMPERATURE REGULATION: 1
TROUBLE SWALLOWING: 0
NECK MASS: 1
EYE IRRITATION: 1
HYPOTENSION: 0
ORTHOPNEA: 1
DISTURBANCES IN COORDINATION: 1
DECREASED CONCENTRATION: 1
TASTE DISTURBANCE: 0
SWOLLEN GLANDS: 1
EXERCISE INTOLERANCE: 1
EYE PAIN: 1
NAUSEA: 1
NAIL CHANGES: 0
MEMORY LOSS: 1
SINUS PAIN: 1

## 2023-05-09 ASSESSMENT — PATIENT HEALTH QUESTIONNAIRE - PHQ9
SUM OF ALL RESPONSES TO PHQ QUESTIONS 1-9: 5
SUM OF ALL RESPONSES TO PHQ QUESTIONS 1-9: 5
10. IF YOU CHECKED OFF ANY PROBLEMS, HOW DIFFICULT HAVE THESE PROBLEMS MADE IT FOR YOU TO DO YOUR WORK, TAKE CARE OF THINGS AT HOME, OR GET ALONG WITH OTHER PEOPLE: SOMEWHAT DIFFICULT

## 2023-05-10 ENCOUNTER — TELEPHONE (OUTPATIENT)
Dept: NEUROPSYCHOLOGY | Facility: CLINIC | Age: 43
End: 2023-05-10

## 2023-05-10 ENCOUNTER — VIRTUAL VISIT (OUTPATIENT)
Dept: PHYSICAL MEDICINE AND REHAB | Facility: CLINIC | Age: 43
End: 2023-05-10
Attending: ADVANCED PRACTICE MIDWIFE
Payer: COMMERCIAL

## 2023-05-10 DIAGNOSIS — R41.89 BRAIN FOG: ICD-10-CM

## 2023-05-10 DIAGNOSIS — G93.31 POST VIRAL SYNDROME: Primary | ICD-10-CM

## 2023-05-10 DIAGNOSIS — F90.0 ATTENTION DEFICIT HYPERACTIVITY DISORDER (ADHD), PREDOMINANTLY INATTENTIVE TYPE: ICD-10-CM

## 2023-05-10 PROCEDURE — 99204 OFFICE O/P NEW MOD 45 MIN: CPT | Mod: VID | Performed by: PHYSICAL MEDICINE & REHABILITATION

## 2023-05-10 NOTE — PROGRESS NOTES
Carrie is a 42 year old who is being evaluated via a billable video visit.      How would you like to obtain your AVS? [facility]    ASSESSMENT AND PLAN   Carrie Munoz is a 42 year old female with h/o Fibromyalgia who presents with Long COVID syndrome, with the initial infection being in January 2022.   She presents with migraines, new onset, and brain fog. 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 She notes that she has difficulty with remembering and recalling. She is forgetful in the middle of the conversation. Will refer for neuropsychological testing as she has history of ADHD in the family. I would like to rule out ADHD, likely pre-existing.   - Fatigue   - increasing Vitamin D to a higher dose.   - check ESR, CRP   - Rule out other deficiencies   - refer to PT   - she is interested in the EMA study and a link was shared with her in her Mychart.     RTC in 3 months   Yolie Lozano MD, [facility]   Department of Rehabilitation         HPI   History of COVID-19 infection:   Date of first symptoms: Jan 2022, likely had COVID before that as well.   Diagnosis: PCR  Hospitalization: No  Treatment: none   Completed her vaccination, just received her 3rd booster.   PMH   Fibromyalgia     Current Symptoms:  Brain fog/fatigue: vitamin d was 11 and she is on supplements.  She finds the fog frustrating.   Flares up of fibromyalgia; swelling and pain of joints, she is medical cannabis program. She has myalgias at all times. She takes ibuprofen during the day for the myalgias. She has sinus flares up all the year round. She is on zyrtec. She takes a dissolvable cannabis.   Headaches; on ibuprofen. She misses her work. If she does not catch it early, they  become severe, and has photophobia and phonophobia. Associated with nausea.   She sleeps well with the cannabis.     Mental health:   She notes that she was depressed earlier in the year in March. She denies any now. She has a therapist, she meets twice a month.         5/10/2023     8:52 AM   PHQ Assesment Total Score(s)   PHQ-2 Score 2         5/9/2023     3:49 PM   YANIV-7 Results   YANIV 7 TOTAL SCORE 4 (minimal anxiety)   YANIV-7 Total Score 4         5/9/2023     3:50 PM   PTSD Screen Score   Have you ever experienced this kind of event? Yes   PTSD Screen (Score of 3 or more suggests positive screen) 5         5/9/2023     3:59 PM   PROMIS-29   PROMIS Physical Function T-Score 40 (mild dysfunction)   PROMIS Anxiety T-Score 63 (moderate)   PROMIS Depression T-Score 52 (within normal limits)   PROMIS Fatigue T-Score 61 (moderate)   PROMIS Sleep Disturbance T-Score 52 (within normal limits)   PROMIS Ability to Participate in Social Roles & Activities T-Score 45 (within normal limits)   PROMIS Pain Interference T-Score 67 (moderate)   PROMIS Pain Intensity 6         Past Medical History:   Diagnosis Date     Breast disorder     biopsy 2011(?) benign     Breast lump on right side at 2:30  o'clock position 05/04/2011     Cardiac abnormality     tachycardia during pregnancy     Depressive disorder      Fibromyalgia      Mental disorder     depression, anxiety     Sinus infection      Unspecified sinusitis (chronic)        Past Surgical History:   Procedure Laterality Date     BREAST EXCISIONAL BIOPSY Left      EXCISE GANGLION WRIST Left 05/24/2018    Procedure: EXCISE GANGLION WRIST;  Excision Left Dorsal Wirst Ganglion;  Surgeon: Randall Maradiaga MD;  Location:  OR      TOOTH EXTRACTION W/FORCEP  2003    all 4 together       Family History   Problem Relation Age of Onset     Hypertension Paternal Grandmother      Osteoporosis Paternal Grandmother      Hypertension Paternal Aunt      Neurologic Disorder Father          bell's palsy     Hypertension Father         not on medication, smoker     Neurologic Disorder Mother         bell's palsy       Social History     Tobacco Use     Smoking status: Never     Smokeless tobacco: Never   Vaping Use     Vaping status: Never Used   Substance Use Topics     Alcohol use: Yes     Comment: couples times per month     Drug use: Yes     Types: Marijuana     Comment: daily for fibromyalgia         Current Outpatient Medications:      cetirizine (ZYRTEC) 10 MG tablet, Take 1 tablet (10 mg) by mouth 2 times daily, Disp: 180 tablet, Rfl: 1     fluticasone (FLONASE) 50 MCG/ACT nasal spray, Spray 1 spray into both nostrils 2 times daily, Disp: 11.1 mL, Rfl: 3     ibuprofen (ADVIL/MOTRIN) 600 MG tablet, TAKE 1 TABLET BY MOUTH EVERY 6 HOURS AS NEEDED FOR MODERATE PAIN, Disp: 120 tablet, Rfl: 1      Review of Systems         Objective    Vitals - Patient Reported  Pain Score: Severe Pain (6)  Pain Loc: Other - see comment (All over)        Physical Exam   GENERAL: Healthy, alert and no distress  EYES: Eyes grossly normal to inspection.  No discharge or erythema, or obvious scleral/conjunctival abnormalities.  RESP: No audible wheeze, cough, or visible cyanosis.  No visible retractions or increased work of breathing.    SKIN: Visible skin clear. No significant rash, abnormal pigmentation or lesions.  NEURO: Cranial nerves grossly intact.  Mentation and speech appropriate for age.  PSYCH: Mentation appears normal, affect normal/bright, judgement and insight intact, normal speech and appearance well-groomed.    Labs and imaging  Reviewed the US thyroid, plan for a biopsy of the largest nodule.   Remainder of the labs are wnl.   Vitamin D is 11 and low.             Video-Visit Details    Video Start Time: 9:24 AM  Video End Time:10:00 AM  Type of service:  Video Visit     Originating Location (pt. Location): Home    Distant Location (provider location):  Off-site  Platform used for Video Visit:  AmWell

## 2023-05-10 NOTE — LETTER
5/10/2023       RE: Carrie Munoz  [address] [facility] [address]       Dear Colleague,    Thank you for referring your patient, Carrie Munoz, to the [facility] [place] at [facility]. Please see a copy of my visit note below.    Carrie is a 42 year old who is being evaluated via a billable video visit.      How would you like to obtain your AVS? MyChart    ASSESSMENT AND PLAN   Carrie Munoz is a 42 year old female with h/o Fibromyalgia who presents with Long COVID syndrome, with the initial infection being in January 2022.   She presents with migraines, new onset, and brain fog. 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 She notes that she has difficulty with remembering and recalling. She is forgetful in the middle of the conversation. Will refer for neuropsychological testing as she has history of ADHD in the family. I would like to rule out ADHD, likely pre-existing.   - Fatigue   - increasing Vitamin D to a higher dose.   - check ESR, CRP   - Rule out other deficiencies   - refer to PT   - she is interested in the EMA study and a link was shared with her in her Mychart.     RTC in 3 months   Yolie Lozano MD, [facility]   Department of Rehabilitation         HPI   History of COVID-19 infection:   Date of first symptoms: Jan 2022, likely had COVID before that as well.   Diagnosis: PCR  Hospitalization: No  Treatment: none   Completed her vaccination, just received her 3rd booster.   PMH   Fibromyalgia     Current Symptoms:  Brain fog/fatigue: vitamin d was 11 and she is on supplements.  She finds the fog frustrating.   Flares  up of fibromyalgia; swelling and pain of joints, she is medical cannabis program. She has myalgias at all times. She takes ibuprofen during the day for the myalgias. She has sinus flares up all the year round. She is on zyrtec. She takes a dissolvable cannabis.   Headaches; on ibuprofen. She misses her work. If she does not catch it early, they become severe, and has photophobia and phonophobia. Associated with nausea.   She sleeps well with the cannabis.     Mental health:   She notes that she was depressed earlier in the year in March. She denies any now. She has a therapist, she meets twice a month.         5/10/2023     8:52 AM   PHQ Assesment Total Score(s)   PHQ-2 Score 2         5/9/2023     3:49 PM   YANIV-7 Results   YANIV 7 TOTAL SCORE 4 (minimal anxiety)   YANIV-7 Total Score 4         5/9/2023     3:50 PM   PTSD Screen Score   Have you ever experienced this kind of event? Yes   PTSD Screen (Score of 3 or more suggests positive screen) 5         5/9/2023     3:59 PM   PROMIS-29   PROMIS Physical Function T-Score 40 (mild dysfunction)   PROMIS Anxiety T-Score 63 (moderate)   PROMIS Depression T-Score 52 (within normal limits)   PROMIS Fatigue T-Score 61 (moderate)   PROMIS Sleep Disturbance T-Score 52 (within normal limits)   PROMIS Ability to Participate in Social Roles & Activities T-Score 45 (within normal limits)   PROMIS Pain Interference T-Score 67 (moderate)   PROMIS Pain Intensity 6         Past Medical History:   Diagnosis Date    Breast disorder     biopsy 2011(?) benign    Breast lump on right side at 2:30  o'clock position 05/04/2011    Cardiac abnormality     tachycardia during pregnancy    Depressive disorder     Fibromyalgia     Mental disorder     depression, anxiety    Sinus infection     Unspecified sinusitis (chronic)        Past Surgical History:   Procedure Laterality Date    BREAST EXCISIONAL BIOPSY Left     EXCISE GANGLION WRIST Left 05/24/2018    Procedure: EXCISE GANGLION WRIST;   Excision Left Dorsal Wirst Ganglion;  Surgeon: Randall Maradiaga MD;  [facility]    [facility] TOOTH EXTRACTION W/FORCEP  2003    all 4 together       Family History   Problem Relation Age of Onset    Hypertension Paternal Grandmother     Osteoporosis Paternal Grandmother     Hypertension Paternal Aunt     Neurologic Disorder Father         sheehan's palsy    Hypertension Father         not on medication, smoker    Neurologic Disorder Mother         bell's palsy       Social History     Tobacco Use    Smoking status: Never    Smokeless tobacco: Never   Vaping Use    Vaping status: Never Used   Substance Use Topics    Alcohol use: Yes     Comment: couples times per month    Drug use: Yes     Types: Marijuana     Comment: daily for fibromyalgia         Current Outpatient Medications:     cetirizine (ZYRTEC) 10 MG tablet, Take 1 tablet (10 mg) by mouth 2 times daily, Disp: 180 tablet, Rfl: 1    fluticasone (FLONASE) 50 MCG/ACT nasal spray, Spray 1 spray into both nostrils 2 times daily, Disp: 11.1 mL, Rfl: 3    ibuprofen (ADVIL/MOTRIN) 600 MG tablet, TAKE 1 TABLET BY MOUTH EVERY 6 HOURS AS NEEDED FOR MODERATE PAIN, Disp: 120 tablet, Rfl: 1      Review of Systems        Objective    Vitals - Patient Reported  Pain Score: Severe Pain (6)  Pain Loc: Other - see comment (All over)        Physical Exam   GENERAL: Healthy, alert and no distress  EYES: Eyes grossly normal to inspection.  No discharge or erythema, or obvious scleral/conjunctival abnormalities.  RESP: No audible wheeze, cough, or visible cyanosis.  No visible retractions or increased work of breathing.    SKIN: Visible skin clear. No significant rash, abnormal pigmentation or lesions.  NEURO: Cranial nerves grossly intact.  Mentation and speech appropriate for age.  PSYCH: Mentation appears normal, affect normal/bright, judgement and insight intact, normal speech and appearance well-groomed.    Labs and imaging  Reviewed the US thyroid, plan for a biopsy of the  largest nodule.   Remainder of the labs are wnl.   Vitamin D is 11 and low.                Again, thank you for allowing me to participate in the care of your patient.      Sincerely,    Yolie Lozano MD

## 2023-05-10 NOTE — CONFIDENTIAL NOTE
Referral declined    At this time our clinic does not see patients for diagnostic questions related to learning disabilities, ADHD, and/or autism spectrum disorders. In short, we do not currently perform assessments solely for the retrospective diagnosis of developmental conditions in adults.     Possible referral options for your patient could include:    - Dr. Shannen [email or web address]  [phone]    - [facility] - with locations throughout the [facility] area: [phone].    - [facility] - with locations throughout the [facility] area: [phone].    - [facility] - with locations throughout the [facility] area: [phone].    Sincerely,   Marisa Tom   Psychometrist

## 2023-05-22 ASSESSMENT — ENCOUNTER SYMPTOMS
HYPERTENSION: 0
ARTHRALGIAS: 1
JOINT SWELLING: 1
NERVOUS/ANXIOUS: 1
PANIC: 0
WEIGHT GAIN: 0
ORTHOPNEA: 0
FATIGUE: 1
MUSCLE CRAMPS: 1
VOMITING: 0
RECTAL PAIN: 0
DECREASED APPETITE: 0
DECREASED LIBIDO: 0
TINGLING: 0
INCREASED ENERGY: 1
LIGHT-HEADEDNESS: 1
WEIGHT LOSS: 0
HEADACHES: 1
SPEECH CHANGE: 0
HALLUCINATIONS: 0
HOARSE VOICE: 0
BLOOD IN STOOL: 0
SEIZURES: 0
JAUNDICE: 0
MUSCLE WEAKNESS: 1
PARALYSIS: 0
ABDOMINAL PAIN: 0
TROUBLE SWALLOWING: 0
DIARRHEA: 0
MEMORY LOSS: 1
NECK MASS: 1
PALPITATIONS: 1
TASTE DISTURBANCE: 0
LOSS OF CONSCIOUSNESS: 0
FEVER: 0
SLEEP DISTURBANCES DUE TO BREATHING: 1
DEPRESSION: 1
STIFFNESS: 1
SYNCOPE: 0
MYALGIAS: 1
BLOATING: 0
EYE REDNESS: 0
POLYPHAGIA: 0
NAUSEA: 0
SORE THROAT: 0
TREMORS: 0
EYE WATERING: 1
DISTURBANCES IN COORDINATION: 0
SMELL DISTURBANCE: 0
HYPOTENSION: 0
CONSTIPATION: 0
BOWEL INCONTINENCE: 0
EYE IRRITATION: 1
EXERCISE INTOLERANCE: 0
DOUBLE VISION: 0
NUMBNESS: 0
POLYDIPSIA: 0
NIGHT SWEATS: 0
WEAKNESS: 1
NECK PAIN: 1
SINUS PAIN: 1
CHILLS: 0
BACK PAIN: 1
LEG PAIN: 0
EYE PAIN: 1
DIZZINESS: 0
ALTERED TEMPERATURE REGULATION: 1
INSOMNIA: 0
HOT FLASHES: 1
SINUS CONGESTION: 1
DECREASED CONCENTRATION: 1
HEARTBURN: 1

## 2023-05-25 ENCOUNTER — VIRTUAL VISIT (OUTPATIENT)
Dept: ENDOCRINOLOGY | Facility: CLINIC | Age: 43
End: 2023-05-25
Attending: ADVANCED PRACTICE MIDWIFE
Payer: COMMERCIAL

## 2023-05-25 ENCOUNTER — PRE VISIT (OUTPATIENT)
Dept: ENDOCRINOLOGY | Facility: CLINIC | Age: 43
End: 2023-05-25

## 2023-05-25 ENCOUNTER — TELEPHONE (OUTPATIENT)
Dept: ENDOCRINOLOGY | Facility: CLINIC | Age: 43
End: 2023-05-25

## 2023-05-25 DIAGNOSIS — E04.1 THYROID NODULE: ICD-10-CM

## 2023-05-25 PROCEDURE — 99204 OFFICE O/P NEW MOD 45 MIN: CPT | Mod: VID | Performed by: STUDENT IN AN ORGANIZED HEALTH CARE EDUCATION/TRAINING PROGRAM

## 2023-05-25 NOTE — LETTER
5/25/2023       RE: Carrie Munoz  [address] [facility] [id number]     Dear Colleague,    Thank you for referring your patient, Carrie Munoz, to the [facility] ENDOCRINOLOGY CLINIC [place] at [facility]. Please see a copy of my visit note below.    Endocrinology Clinic Visit 5/25/2023      Video-Visit Details    Type of service:  Video Visit    Joined the call at 5/25/2023, 11:09:45 am.  Left the call at 5/25/2023, 11:30:08 am.    Originating Location (pt. Location): Home        Distant Location (provider location):  Off-site    Mode of Communication:  Video Conference via [facility]    Physician has received verbal consent for a Video Visit from the patient? Yes    I spent a total of 45 minutes on the date of encounter reviewing medical records, evaluating the patient, coordinating care and documenting in the EHR, as detailed above.      NAME:  Carrie Munoz  PCP:  Narciso Sanford  MRN:  [id number]  Reason for Consult:  Thyroid nodule  Requesting Provider:  Sudha Castellanos    Chief Complaint     Chief Complaint   Patient presents with    Consult       History of Present Illness     Carrie Munoz is a 42 year old female who is seen in video visit for thyroid nodule.    Couple month ago she had a sore throat and was feeling her throat felt a thyroid nodule then had thyroid US done which showed multiple small thyroid nodule, right dominant nodule of 2.7 cm hyperechoic. leftthyroid close to isthmus a hypoechoic thyroid nodule of 1 cm.    She denied any anterior neck discomfort, no difficulty swallowing, no difficulty swallowing.     No previous hx of radiation.  No family hx of thyroid cancer. Cousins has issues with thyroid. Aunt had thyroid nodules s/p partial thyroidectomy which reportedly was benign.    TSH 3/17/23 1.45.    Social: she works with single parent students finishing undergrade degree. No smoking. No  illicit recreational drugs.         Problem List     Patient Active Problem List   Diagnosis    Sinusitis, chronic    Breast lump on right side at 2:30  o'clock position    Sinus tachycardia    Irregular heart beat    Secondary amenorrhea    Fibromyalgia    Sacroiliac joint pain    Somatic dysfunction of pelvis region    Cervical high risk HPV (human papillomavirus) test positive        Medications     Current Outpatient Medications   Medication    cetirizine (ZYRTEC) 10 MG tablet    fluticasone (FLONASE) 50 MCG/ACT nasal spray    ibuprofen (ADVIL/MOTRIN) 600 MG tablet     No current facility-administered medications for this visit.        Allergies     Allergies   Allergen Reactions    No Known Drug Allergy        Medical / Surgical History     Past Medical History:   Diagnosis Date    Breast disorder     biopsy 2011(?) benign    Breast lump on right side at 2:30  o'clock position 05/04/2011    Cardiac abnormality     tachycardia during pregnancy    Depressive disorder     Fibromyalgia     Mental disorder     depression, anxiety    Sinus infection     Unspecified sinusitis (chronic)      Past Surgical History:   Procedure Laterality Date    BREAST EXCISIONAL BIOPSY Left     EXCISE GANGLION WRIST Left 05/24/2018    Procedure: EXCISE GANGLION WRIST;  Excision Left Dorsal Wirst Ganglion;  Surgeon: Randall Maradiaga MD;  Location:  OR     TOOTH EXTRACTION W/FORCEP  2003    all 4 together       Social History     Social History     Socioeconomic History    Marital status: Single     Spouse name: Not on file    Number of children: 0    Years of education: 13 1/2    Highest education level: Not on file   Occupational History    Occupation: teller     Employer: [employer]   Tobacco Use    Smoking status: Never    Smokeless tobacco: Never   Vaping Use    Vaping status: Never Used   Substance and Sexual Activity    Alcohol use: Yes     Comment: couples times per month    Drug use: Yes     Types: Marijuana     Comment:  daily for fibromyalgia    Sexual activity: Not Currently     Partners: Male     Birth control/protection: None   Other Topics Concern    Parent/sibling w/ CABG, MI or angioplasty before 65F 55M? No   Social History Narrative    Risk Behaviors & Healthy habits    Balanced Diet  no     Prevent Osteoporosis  yes - lots of dairy    Regular Exercise  no     Dental Care  yes    Seat Belt use  yes    Self Breast Exam  no     Sexually Active  no , no STD concerns, but would like to be checked     Contraception  yes    Abuse  no     Guns  no     Sun Screen  no             Working at [employer], 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  Lives with her aunt, and gets along well.  In limited contact with her mother, in contact with father a lot.  Born in [place], and [place] 5[place] 11.  Father in the .                     Social Determinants of Health     Financial Resource Strain: Not on file   Food Insecurity: Not on file   Transportation Needs: Not on file   Physical Activity: Not on file   Stress: Not on file   Social Connections: Not on file   Intimate Partner Violence: Not on file   Housing Stability: Not on file       Family History     Family History   Problem Relation Age of Onset    Hypertension Paternal Grandmother     Osteoporosis Paternal Grandmother     Hypertension Paternal Aunt     Neurologic Disorder Father         sheehan's palsy    Hypertension Father         not on medication, smoker    Neurologic Disorder Mother         bell's palsy       ROS     12 ROS completed, pertinent positive and negative in HPI    Physical Exam   There were no vitals taken for this visit.   GENERAL: Healthy, alert and no distress  EYES: Eyes grossly normal to inspection.  No discharge or erythema, or obvious scleral/conjunctival abnormalities.  RESP: No audible wheeze, cough, or visible cyanosis.  No visible retractions or increased work of breathing.    SKIN: Visible skin clear. No significant  rash, abnormal pigmentation or lesions.  NEURO: Cranial nerves grossly intact.  Mentation and speech appropriate for age.  PSYCH: Mentation appears normal, affect normal/bright, judgement and insight intact, normal speech and appearance well-groomed.     Labs/Imaging     Pertinent Labs were reviewed and updated in EPIC and discussed briefly.  Radiology Results were  reviewed and updated in EPIC and discussed briefly.    Summary of recent findings:   Lab Results   Component Value Date    A1C 5.4 03/17/2023       TSH   Date Value Ref Range Status   03/17/2023 1.45 0.30 - 4.20 uIU/mL Final   11/06/2017 0.78 0.40 - 4.00 mU/L Final   08/24/2017 1.39 0.40 - 4.00 mU/L Final   09/30/2003 1.04 0.4 - 5.0 mU/L Final   04/16/2003 1.31 0.4 - 5.0 mU/L Final       Creatinine   Date Value Ref Range Status   03/17/2023 0.70 0.51 - 0.95 mg/dL Final   09/27/2019 0.61 0.52 - 1.04 mg/dL Final       Recent Labs   Lab Test 03/17/23  1054 06/29/18  0904   CHOL 162 135   HDL 38* 36*   LDL 87 70   TRIG 183* 144       No results found for: [id number], [id number], [id number]   thyroid us 4/2023     Nodule 1:  Lobe: Right  Location: Mid to inferior  Size: 2.2 x 1.9 x 2.7 cm  Composition: Solid or almost completely solid (2 points)  Echogenicity: Hyperechoic or isoechoic (1 point)  Shape: Wider than tall (0 points)  Margin: Smooth (0 points)  Echogenic Foci: None or large comet tail artifact (0 points)  Stability: Not well characterized on prior exam  TIRADS: TR3 (3 points)      Nodule 2:  Lobe: Left  Location: Superior  Size: 0.5 x 0.3 x 0.6 cm  Composition: Spongiform (0 points)  Echogenicity: Spongiform (0 points)  Shape: Spongiform (0 points)  Margin: Spongiform (0 points)  Echogenic Foci: Spongiform (0 points)  Stability: Spongiform (0 points)  TIRADS: TR1 (0 points) Benign     Nodule 3:  Lobe: Left  Location: Mid  Size: 0.7 x 0.5 x 0.7 cm  Composition: Solid or almost completely solid (2 points)  Echogenicity: Hyperechoic or isoechoic (1  point)  Shape: Wider than tall (0 points)  Margin: Ill-defined (0 points)  Echogenic Foci: None or large comet tail artifact (0 points)  Stability: Not well seen on prior exam  TIRADS: TR3 (3 points)      Nodule 4:  Lobe: Left  Location: Mid  Size: 0.5 x 0.5 x 0.4 cm  Composition: Solid or almost completely solid (2 points)  Echogenicity: Hypoechoic (2 points)  Shape: Wider than tall (0 points)  Margin: Ill-defined (0 points)  Echogenic Foci: None or large comet tail artifact (0 points)  Stability: Not well seen on prior exam  TIRADS: TR4 (4-6 points)      Nodule 5:  Lobe: Left/isthmus  Location: Inferior  Size: 1.0 x 0.8 x 0.9 cm  Composition: Solid or almost completely solid (2 points)  Echogenicity: Very hypoechoic (3 points)  Shape: Wider than tall (0 points)  Margin: Smooth (0 points)  Echogenic Foci: None or large comet tail artifact (0 points)  Stability: No well-characterized on prior exam  TIRADS: TR4 (4-6 points)                                                                       Impression:  1. TR3 nodule right mid/inferior lobe.  FNA recommended given size.  2. Left thyroid lobe/isthmus 1.0 cm TR4 nodule. Recommend follow-up  ultrasound in 1, 2, 3, and 5 years.  3. Additional thyroid nodules as detailed above, none of which  necessitate follow-up per the guidelines below.    I personally reviewed the patient's outside records from [facility] EMR and Care Everywhere. Summary of pertinent findings in HPI.    Impression / Plan     1. Thyroid nodules  We reviewed the images of her thyroid US. We discussed thyroid nodules nature, most are benign, we reviewed FNA indications. we also reviewed the procedure of  US guided FNA of thyroid nodules. Right dominant nodule of 2.7 cm meet criteria for FNA. Left/isthmus nodule needs close follow up with 1 year US.  We reviewed possible results from FNA and next steps. She would like to proceed with FNA.        Answers for HPI/ROS submitted by the patient on  5/22/2023  General Symptoms: Yes  Skin Symptoms: No  HENT Symptoms: Yes  EYE SYMPTOMS: Yes  HEART SYMPTOMS: Yes  LUNG SYMPTOMS: No  INTESTINAL SYMPTOMS: Yes  URINARY SYMPTOMS: No  GYNECOLOGIC SYMPTOMS: Yes  BREAST SYMPTOMS: No  SKELETAL SYMPTOMS: Yes  BLOOD SYMPTOMS: No  NERVOUS SYSTEM SYMPTOMS: Yes  MENTAL HEALTH SYMPTOMS: Yes  Ear pain: Yes  Ear discharge: No  Hearing loss: No  Tinnitus: No  Nosebleeds: No  Congestion: Yes  Sinus pain: Yes  Trouble swallowing: No   Voice hoarseness: No  Mouth sores: No  Sore throat: No  Tooth pain: No  Gum tenderness: No  Bleeding gums: No  Change in taste: No  Change in sense of smell: No  Dry mouth: No  Hearing aid used: No  Neck lump: Yes  Fever: No  Loss of appetite: No  Weight loss: No  Weight gain: No  Fatigue: Yes  Night sweats: No  Chills: No  Increased stress: No  Excessive hunger: No  Excessive thirst: No  Feeling hot or cold when others believe the temperature is normal: Yes  Loss of height: No  Post-operative complications: No  Surgical site pain: No  Hallucinations: No  Change in or Loss of Energy: Yes  Hyperactivity: No  Confusion: Yes  Eye pain: Yes  Vision loss: No  Dry eyes: No  Watery eyes: Yes  Eye bulging: No  Double vision: No  Flashing of lights: No  Spots: Yes  Floaters: Yes  Redness: No  Crossed eyes: No  Tunnel Vision: No  Yellowing of eyes: No  Eye irritation: Yes  Chest pain or pressure: No  Fast or irregular heartbeat: Yes  Pain in legs with walking: No  Trouble breathing while lying down: No  Fingers or toes appear blue: No  High blood pressure: No  Low blood pressure: No  Fainting: No  Murmurs: No  Pacemaker: No  Varicose veins: No  Edema or swelling: Yes  Wake up at night with shortness of breath: Yes  Light-headedness: Yes  Exercise intolerance: No  Heart burn or indigestion: Yes  Nausea: No  Vomiting: No  Abdominal pain: No  Bloating: No  Constipation: No  Diarrhea: No  Blood in stool: No  Black stools: No  Rectal or Anal pain: No  Fecal  incontinence: No  Yellowing of skin or eyes: No  Vomit with blood: No  Change in stools: No  Bleeding or spotting between periods: No  Heavy or painful periods: Yes  Irregular periods: Yes  Vaginal discharge: Yes  Hot flashes: Yes  Vaginal dryness: No  Genital ulcers: No  Reduced libido: No  Painful intercourse: No  Difficulty with sexual arousal: No  Post-menopausal bleeding: No  Back pain: Yes  Muscle aches: Yes  Neck pain: Yes  Swollen joints: Yes  Joint pain: Yes  Bone pain: No  Muscle cramps: Yes  Muscle weakness: Yes  Joint stiffness: Yes  Bone fracture: No  Trouble with coordination: No  Dizziness or trouble with balance: No  Fainting or black-out spells: No  Memory loss: Yes  Headache: Yes  Seizures: No  Speech problems: No  Tingling: No  Tremor: No  Weakness: Yes  Difficulty walking: Yes  Paralysis: No  Numbness: No  Nervous or Anxious: Yes  Depression: Yes  Trouble sleeping: No  Trouble thinking or concentrating: Yes  Mood changes: Yes  Panic attacks: No          Test and/or medications prescribed today:  No orders of the defined types were placed in this encounter.        Follow up: 1 year      Isabel Medina MD  Endocrinology, Diabetes and Metabolism  [facility]

## 2023-05-25 NOTE — PROGRESS NOTES
Endocrinology Clinic Visit 5/25/2023      Video-Visit Details    Type of service:  Video Visit    Joined the call at 5/25/2023, 11:09:45 am.  Left the call at 5/25/2023, 11:30:08 am.    Originating Location (pt. Location): Home        Distant Location (provider location):  Off-site    Mode of Communication:  Video Conference via Youcruit    Physician has received verbal consent for a Video Visit from the patient? Yes    I spent a total of 45 minutes on the date of encounter reviewing medical records, evaluating the patient, coordinating care and documenting in the EHR, as detailed above.      NAME:  Carrie Munoz  PCP:  Narciso Sanford  MRN:  [id number]  Reason for Consult:  Thyroid nodule  Requesting Provider:  Sudha Castellanos    Chief Complaint     Chief Complaint   Patient presents with     Consult       History of Present Illness     Carrie Munoz is a 42 year old female who is seen in video visit for thyroid nodule.    Couple month ago she had a sore throat and was feeling her throat felt a thyroid nodule then had thyroid US done which showed multiple small thyroid nodule, right dominant nodule of 2.7 cm hyperechoic. leftthyroid close to isthmus a hypoechoic thyroid nodule of 1 cm.    She denied any anterior neck discomfort, no difficulty swallowing, no difficulty swallowing.     No previous hx of radiation.  No family hx of thyroid cancer. Cousins has issues with thyroid. Aunt had thyroid nodules s/p partial thyroidectomy which reportedly was benign.    TSH 3/17/23 1.45.    Social: she works with single parent students finishing undergrade degree. No smoking. No illicit recreational drugs.         Problem List     Patient Active Problem List   Diagnosis     Sinusitis, chronic     Breast lump on right side at 2:30  o'clock position     Sinus tachycardia     Irregular heart beat     Secondary amenorrhea     Fibromyalgia     Sacroiliac joint pain     Somatic dysfunction of pelvis region      Cervical high risk HPV (human papillomavirus) test positive        Medications     Current Outpatient Medications   Medication     cetirizine (ZYRTEC) 10 MG tablet     fluticasone (FLONASE) 50 MCG/ACT nasal spray     ibuprofen (ADVIL/MOTRIN) 600 MG tablet     No current facility-administered medications for this visit.        Allergies     Allergies   Allergen Reactions     No Known Drug Allergy        Medical / Surgical History     Past Medical History:   Diagnosis Date     Breast disorder     biopsy 2011(?) benign     Breast lump on right side at 2:30  o'clock position 05/04/2011     Cardiac abnormality     tachycardia during pregnancy     Depressive disorder      Fibromyalgia      Mental disorder     depression, anxiety     Sinus infection      Unspecified sinusitis (chronic)      Past Surgical History:   Procedure Laterality Date     BREAST EXCISIONAL BIOPSY Left      EXCISE GANGLION WRIST Left 05/24/2018    Procedure: EXCISE GANGLION WRIST;  Excision Left Dorsal Wirst Ganglion;  Surgeon: Randall Maradiaga MD;  Location:  OR      TOOTH EXTRACTION W/FORCEP  2003    all 4 together       Social History     Social History     Socioeconomic History     Marital status: Single     Spouse name: Not on file     Number of children: 0     Years of education: 13 1/2     Highest education level: Not on file   Occupational History     Occupation: teller     Employer: [employer]   Tobacco Use     Smoking status: Never     Smokeless tobacco: Never   Vaping Use     Vaping status: Never Used   Substance and Sexual Activity     Alcohol use: Yes     Comment: couples times per month     Drug use: Yes     Types: Marijuana     Comment: daily for fibromyalgia     Sexual activity: Not Currently     Partners: Male     Birth control/protection: None   Other Topics Concern     Parent/sibling w/ CABG, MI or angioplasty before 65F 55M? No   Social History Narrative    Risk Behaviors & Healthy habits    Balanced Diet  no      Prevent Osteoporosis  yes - lots of dairy    Regular Exercise  no     Dental Care  yes    Seat Belt use  yes    Self Breast Exam  no     Sexually Active  no , no STD concerns, but would like to be checked     Contraception  yes    Abuse  no     Guns  no     Sun Screen  no             Working at [employer], 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  Lives with her aunt, and gets along well.  In limited contact with her mother, in contact with father a lot.  Born in [place], and moved [place] 5, [place] age 11.  Father in the .                     Social Determinants of Health     Financial Resource Strain: Not on file   Food Insecurity: Not on file   Transportation Needs: Not on file   Physical Activity: Not on file   Stress: Not on file   Social Connections: Not on file   Intimate Partner Violence: Not on file   Housing Stability: Not on file       Family History     Family History   Problem Relation Age of Onset     Hypertension Paternal Grandmother      Osteoporosis Paternal Grandmother      Hypertension Paternal Aunt      Neurologic Disorder Father         sheehan's palsy     Hypertension Father         not on medication, smoker     Neurologic Disorder Mother         bell's palsy       ROS     12 ROS completed, pertinent positive and negative in HPI    Physical Exam   There were no vitals taken for this visit.   GENERAL: Healthy, alert and no distress  EYES: Eyes grossly normal to inspection.  No discharge or erythema, or obvious scleral/conjunctival abnormalities.  RESP: No audible wheeze, cough, or visible cyanosis.  No visible retractions or increased work of breathing.    SKIN: Visible skin clear. No significant rash, abnormal pigmentation or lesions.  NEURO: Cranial nerves grossly intact.  Mentation and speech appropriate for age.  PSYCH: Mentation appears normal, affect normal/bright, judgement and insight intact, normal speech and appearance well-groomed.     Labs/Imaging      Pertinent Labs were reviewed and updated in EPIC and discussed briefly.  Radiology Results were  reviewed and updated in EPIC and discussed briefly.    Summary of recent findings:   Lab Results   Component Value Date    A1C 5.4 03/17/2023       TSH   Date Value Ref Range Status   03/17/2023 1.45 0.30 - 4.20 uIU/mL Final   11/06/2017 0.78 0.40 - 4.00 mU/L Final   08/24/2017 1.39 0.40 - 4.00 mU/L Final   09/30/2003 1.04 0.4 - 5.0 mU/L Final   04/16/2003 1.31 0.4 - 5.0 mU/L Final       Creatinine   Date Value Ref Range Status   03/17/2023 0.70 0.51 - 0.95 mg/dL Final   09/27/2019 0.61 0.52 - 1.04 mg/dL Final       Recent Labs   Lab Test 03/17/23  1054 06/29/18  0904   CHOL 162 135   HDL 38* 36*   LDL 87 70   TRIG 183* 144       No results found for: [id number], [id number], [id number]   thyroid us 4/2023     Nodule 1:  Lobe: Right  Location: Mid to inferior  Size: 2.2 x 1.9 x 2.7 cm  Composition: Solid or almost completely solid (2 points)  Echogenicity: Hyperechoic or isoechoic (1 point)  Shape: Wider than tall (0 points)  Margin: Smooth (0 points)  Echogenic Foci: None or large comet tail artifact (0 points)  Stability: Not well characterized on prior exam  TIRADS: TR3 (3 points)      Nodule 2:  Lobe: Left  Location: Superior  Size: 0.5 x 0.3 x 0.6 cm  Composition: Spongiform (0 points)  Echogenicity: Spongiform (0 points)  Shape: Spongiform (0 points)  Margin: Spongiform (0 points)  Echogenic Foci: Spongiform (0 points)  Stability: Spongiform (0 points)  TIRADS: TR1 (0 points) Benign     Nodule 3:  Lobe: Left  Location: Mid  Size: 0.7 x 0.5 x 0.7 cm  Composition: Solid or almost completely solid (2 points)  Echogenicity: Hyperechoic or isoechoic (1 point)  Shape: Wider than tall (0 points)  Margin: Ill-defined (0 points)  Echogenic Foci: None or large comet tail artifact (0 points)  Stability: Not well seen on prior exam  TIRADS: TR3 (3 points)      Nodule 4:  Lobe: Left  Location: Mid  Size: 0.5 x 0.5 x 0.4  cm  Composition: Solid or almost completely solid (2 points)  Echogenicity: Hypoechoic (2 points)  Shape: Wider than tall (0 points)  Margin: Ill-defined (0 points)  Echogenic Foci: None or large comet tail artifact (0 points)  Stability: Not well seen on prior exam  TIRADS: TR4 (4-6 points)      Nodule 5:  Lobe: Left/isthmus  Location: Inferior  Size: 1.0 x 0.8 x 0.9 cm  Composition: Solid or almost completely solid (2 points)  Echogenicity: Very hypoechoic (3 points)  Shape: Wider than tall (0 points)  Margin: Smooth (0 points)  Echogenic Foci: None or large comet tail artifact (0 points)  Stability: No well-characterized on prior exam  TIRADS: TR4 (4-6 points)                                                                       Impression:  1. TR3 nodule right mid/inferior lobe.  FNA recommended given size.  2. Left thyroid lobe/isthmus 1.0 cm TR4 nodule. Recommend follow-up  ultrasound in 1, 2, 3, and 5 years.  3. Additional thyroid nodules as detailed above, none of which  necessitate follow-up per the guidelines below.    I personally reviewed the patient's outside records from [facility]MR and Care Everywhere. Summary of pertinent findings in HPI.    Impression / Plan     1. Thyroid nodules  We reviewed the images of her thyroid US. We discussed thyroid nodules nature, most are benign, we reviewed FNA indications. we also reviewed the procedure of  US guided FNA of thyroid nodules. Right dominant nodule of 2.7 cm meet criteria for FNA. Left/isthmus nodule needs close follow up with 1 year US.  We reviewed possible results from FNA and next steps. She would like to proceed with FNA.        Answers for HPI/ROS submitted by the patient on 5/22/2023  General Symptoms: Yes  Skin Symptoms: No  HENT Symptoms: Yes  EYE SYMPTOMS: Yes  HEART SYMPTOMS: Yes  LUNG SYMPTOMS: No  INTESTINAL SYMPTOMS: Yes  URINARY SYMPTOMS: No  GYNECOLOGIC SYMPTOMS: Yes  BREAST SYMPTOMS: No  SKELETAL SYMPTOMS: Yes  BLOOD SYMPTOMS: No  NERVOUS  SYSTEM SYMPTOMS: Yes  MENTAL HEALTH SYMPTOMS: Yes  Ear pain: Yes  Ear discharge: No  Hearing loss: No  Tinnitus: No  Nosebleeds: No  Congestion: Yes  Sinus pain: Yes  Trouble swallowing: No   Voice hoarseness: No  Mouth sores: No  Sore throat: No  Tooth pain: No  Gum tenderness: No  Bleeding gums: No  Change in taste: No  Change in sense of smell: No  Dry mouth: No  Hearing aid used: No  Neck lump: Yes  Fever: No  Loss of appetite: No  Weight loss: No  Weight gain: No  Fatigue: Yes  Night sweats: No  Chills: No  Increased stress: No  Excessive hunger: No  Excessive thirst: No  Feeling hot or cold when others believe the temperature is normal: Yes  Loss of height: No  Post-operative complications: No  Surgical site pain: No  Hallucinations: No  Change in or Loss of Energy: Yes  Hyperactivity: No  Confusion: Yes  Eye pain: Yes  Vision loss: No  Dry eyes: No  Watery eyes: Yes  Eye bulging: No  Double vision: No  Flashing of lights: No  Spots: Yes  Floaters: Yes  Redness: No  Crossed eyes: No  Tunnel Vision: No  Yellowing of eyes: No  Eye irritation: Yes  Chest pain or pressure: No  Fast or irregular heartbeat: Yes  Pain in legs with walking: No  Trouble breathing while lying down: No  Fingers or toes appear blue: No  High blood pressure: No  Low blood pressure: No  Fainting: No  Murmurs: No  Pacemaker: No  Varicose veins: No  Edema or swelling: Yes  Wake up at night with shortness of breath: Yes  Light-headedness: Yes  Exercise intolerance: No  Heart burn or indigestion: Yes  Nausea: No  Vomiting: No  Abdominal pain: No  Bloating: No  Constipation: No  Diarrhea: No  Blood in stool: No  Black stools: No  Rectal or Anal pain: No  Fecal incontinence: No  Yellowing of skin or eyes: No  Vomit with blood: No  Change in stools: No  Bleeding or spotting between periods: No  Heavy or painful periods: Yes  Irregular periods: Yes  Vaginal discharge: Yes  Hot flashes: Yes  Vaginal dryness: No  Genital ulcers: No  Reduced libido:  No  Painful intercourse: No  Difficulty with sexual arousal: No  Post-menopausal bleeding: No  Back pain: Yes  Muscle aches: Yes  Neck pain: Yes  Swollen joints: Yes  Joint pain: Yes  Bone pain: No  Muscle cramps: Yes  Muscle weakness: Yes  Joint stiffness: Yes  Bone fracture: No  Trouble with coordination: No  Dizziness or trouble with balance: No  Fainting or black-out spells: No  Memory loss: Yes  Headache: Yes  Seizures: No  Speech problems: No  Tingling: No  Tremor: No  Weakness: Yes  Difficulty walking: Yes  Paralysis: No  Numbness: No  Nervous or Anxious: Yes  Depression: Yes  Trouble sleeping: No  Trouble thinking or concentrating: Yes  Mood changes: Yes  Panic attacks: No          Test and/or medications prescribed today:  No orders of the defined types were placed in this encounter.        Follow up: 1 year      Isabel Medina MD  Endocrinology, Diabetes and Metabolism  [facility]

## 2023-05-25 NOTE — NURSING NOTE
Is the patient currently in the state of [place]? YES    Visit mode:VIDEO    If the visit is dropped, the patient can be reconnected by: VIDEO VISIT: Text to cell phone: [phone]    Will anyone else be joining the visit? NO      How would you like to obtain your AVS? MyChart    Are changes needed to the allergy or medication list? Yes, patient notes using medical cannabis for fibromyalgia.     Reason for visit: Consult

## 2023-05-25 NOTE — TELEPHONE ENCOUNTER
Follow up: 1 year        Isabel Medina MD  Endocrinology, Diabetes and Metabolism  [facility]    ------------------------------------------------------------      Left Voicemail (1st Attempt) for the patient to call back and schedule the following:    Appointment type: Return Endocrine  Provider: Dr. Medina  Return date: 1 year follow up  Specialty phone number: [phone]  Additional appointment(s) needed: Patient needs complex scheduling for other orders placed at visit today (Fine Needle Aspiration).  Cristel ESPINAL, Virtual Visit Facilitator 1:34 PM May 25, 2023

## 2023-06-03 ENCOUNTER — HOSPITAL ENCOUNTER (EMERGENCY)
Facility: CLINIC | Age: 43
Discharge: HOME | End: 2023-06-03
Attending: EMERGENCY MEDICINE | Admitting: EMERGENCY MEDICINE
Payer: COMMERCIAL

## 2023-06-03 ENCOUNTER — OFFICE VISIT (OUTPATIENT)
Dept: URGENT CARE | Facility: URGENT CARE | Age: 43
End: 2023-06-03
Payer: COMMERCIAL

## 2023-06-03 VITALS
TEMPERATURE: 97.8 F | SYSTOLIC BLOOD PRESSURE: 112 MMHG | DIASTOLIC BLOOD PRESSURE: 78 MMHG | HEART RATE: 78 BPM | BODY MASS INDEX: 35.88 KG/M2 | WEIGHT: 243 LBS | OXYGEN SATURATION: 97 %

## 2023-06-03 VITALS
HEART RATE: 74 BPM | TEMPERATURE: 98.7 F | OXYGEN SATURATION: 100 % | DIASTOLIC BLOOD PRESSURE: 88 MMHG | SYSTOLIC BLOOD PRESSURE: 125 MMHG | RESPIRATION RATE: 16 BRPM

## 2023-06-03 DIAGNOSIS — H20.9 UVEITIS: ICD-10-CM

## 2023-06-03 DIAGNOSIS — H40.9 ACUTE GLAUCOMA OF RIGHT EYE: Primary | ICD-10-CM

## 2023-06-03 PROCEDURE — 99214 OFFICE O/P EST MOD 30 MIN: CPT | Performed by: STUDENT IN AN ORGANIZED HEALTH CARE EDUCATION/TRAINING PROGRAM

## 2023-06-03 PROCEDURE — 99284 EMERGENCY DEPT VISIT MOD MDM: CPT | Performed by: EMERGENCY MEDICINE

## 2023-06-03 RX ORDER — CYCLOPENTOLATE HYDROCHLORIDE 5 MG/ML
1 SOLUTION/ DROPS OPHTHALMIC 2 TIMES DAILY
Status: DISCONTINUED | OUTPATIENT
Start: 2023-06-03 | End: 2023-06-03

## 2023-06-03 RX ORDER — CYCLOPENTOLATE HYDROCHLORIDE 5 MG/ML
1 SOLUTION/ DROPS OPHTHALMIC 2 TIMES DAILY
Qty: 1 ML | Refills: 0 | Status: SHIPPED | OUTPATIENT
Start: 2023-06-03 | End: 2023-06-12

## 2023-06-03 RX ORDER — PREDNISOLONE ACETATE 10 MG/ML
1 SUSPENSION/ DROPS OPHTHALMIC 4 TIMES DAILY
Qty: 15 ML | Refills: 0 | Status: SHIPPED | OUTPATIENT
Start: 2023-06-03 | End: 2023-10-02

## 2023-06-03 RX ORDER — PREDNISOLONE ACETATE 10 MG/ML
1 SUSPENSION/ DROPS OPHTHALMIC 4 TIMES DAILY
Status: DISCONTINUED | OUTPATIENT
Start: 2023-06-03 | End: 2023-06-03

## 2023-06-03 ASSESSMENT — VISUAL ACUITY
OD: 20/20;WITH CORRECTIVE LENSES
OS: 20/20;WITH CORRECTIVE LENSES

## 2023-06-03 ASSESSMENT — ACTIVITIES OF DAILY LIVING (ADL)
ADLS_ACUITY_SCORE: 35
ADLS_ACUITY_SCORE: 35

## 2023-06-03 NOTE — CONSULTS
OPHTHALMOLOGY CONSULT NOTE  06/03/23    Patient: Carrie Munoz      ASSESSMENT/PLAN:     Carrie Munoz is a 42 year old female who presented with     # Acute anterior uveitis of the right eye  - Two weeks of worsening right eye pain and photophobia. Her symptoms have improved minimally over the past 24 hours without treatment.  - VA 20/25 right eye   - IOP 17 right eye   - No APD   - Exam shows injection and trace - 1+ cell in the AC. No TIDs noted. Injection improved with administration of phenylephrine,.   - Fundus exam unremarkable.   - Exam consistent with acute anterior uveitis episode. No evidence of posterior involvement. No hypopyon. No synechiae.   - Uncertain etiology as patient does not have obvious risk factors. Given that this is the patient's first episode, will not pursue lab work at this time. If patient's symptoms do not improve or worsen, will plan to increase steroids and pursue lab work.     Plan:  - Start Prednisolone QID right eye  - Start Cyclopentolate BID right eye  - Return to clinic on Monday at 10 am.       It is our pleasure to participate in this patient's care and treatment. Please contact us with any further questions or concerns.    Discussed and seen with Dr. Simpson who agreed with this assessment and plan.     Laine Shankar MD     HISTORY OF PRESENTING ILLNESS:     Carrie Munoz is a 42 year old female who presented on 6/3/23 with right eye pain that began two weeks ago. She also feels like a similar pain is starting in the left eye. It is associated with redness, tearing. No double vision. No flashes or floaters. Her symptoms feel better today than yesterday.     Patient has been told she may develop glaucoma in the future. She also has a history of high myopia.     Patient states that she has 5 nodules in her thyroid. She does not take any thyroid medications She is undergoing biopsy at the [facility] in one week.     No history of infections. No cold sores. Patient  states that she was bitten by a tick years ago. No autoimmune conditions.       10+ review of systems were otherwise negative except for that which has been stated above.      OCULAR/MEDICAL/SURGICAL HISTORIES:     Past Ocular History:   High myopia  ?Glaucoma risk    Eye Drops:   None    Pertinent Systemic Medications:   None    Past Medical History:  Past Medical History:   Diagnosis Date     Breast disorder     biopsy 2011(?) benign     Breast lump on right side at 2:30  o'clock position 05/04/2011     Cardiac abnormality     tachycardia during pregnancy     Depressive disorder      Fibromyalgia      Mental disorder     depression, anxiety     Sinus infection      Unspecified sinusitis (chronic)        Past Surgical History:   Past Surgical History:   Procedure Laterality Date     BREAST EXCISIONAL BIOPSY Left      EXCISE GANGLION WRIST Left 05/24/2018    Procedure: EXCISE GANGLION WRIST;  Excision Left Dorsal Wirst Ganglion;  Surgeon: Randall Maradiaga MD;  Location: [facility] OR      TOOTH EXTRACTION W/FORCEP  2003    all 4 together       Family History:  Family History   Problem Relation Age of Onset     Hypertension Paternal Grandmother      Osteoporosis Paternal Grandmother      Hypertension Paternal Aunt      Neurologic Disorder Father         sheehan's palsy     Hypertension Father         not on medication, smoker     Neurologic Disorder Mother         bell's palsy   Glaucoma - mom      Social History:  Social History     Socioeconomic History     Marital status: Single     Spouse name: Not on file     Number of children: 0     Years of education: 13 1/2     Highest education level: Not on file   Occupational History     Occupation: teller     Employer: [employer]   Tobacco Use     Smoking status: Never     Passive exposure: Never     Smokeless tobacco: Never   Vaping Use     Vaping status: Never Used   Substance and Sexual Activity     Alcohol use: Yes     Comment: couples times per month     Drug use: Yes      Types: Marijuana     Comment: daily for fibromyalgia     Sexual activity: Not Currently     Partners: Male     Birth control/protection: None   Other Topics Concern     Parent/sibling w/ CABG, MI or angioplasty before 65F 55M? 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  Lives with her aunt, and gets along well.  In limited contact with her mother, in contact with father a lot.  Born in [place], and [place] 5, [place] 11.  Father in the .                     Social Determinants of Health     Financial Resource Strain: Not on file   Food Insecurity: Not on file   Transportation Needs: Not on file   Physical Activity: Not on file   Stress: Not on file   Social Connections: Not on file   Intimate Partner Violence: Not on file   Housing Stability: Not on file         EXAMINATION:     Base Eye Exam     Visual Acuity (Snellen - Linear)       Right Left    Near cc 20/25 20/20          Tonometry (Tonopen, 2:40 PM)       Right Left    Pressure 17 19          Pupils       Pupils    Right PERRL    Left PERRL          Visual Fields       Left Right     Full Full          Extraocular Movement       Right Left     Full, Ortho Full, Ortho          Dilation     Both eyes: 1.0% Mydriacyl, 2.5% Kyaw Synephrine @ 2:40 PM            Additional Tests     Color       Right Left    Ishihara 11/11 11/11            Slit Lamp and Fundus Exam     External Exam       Right Left    External Normal Normal          Slit Lamp Exam       Right Left    Lids/Lashes Normal Normal    Conjunctiva/Sclera 2+ Injection Trace  Injection    Cornea Clear trace pee    Anterior Chamber tr-1+ cell Deep and quiet, persistent pupillary membrane    Iris Round and reactive -> dilated Round and reactive -> dilated    Lens Clear Clear    Anterior Vitreous Normal Normal          Fundus Exam       Right Left    Disc Normal Normal    C/D Ratio 0.4 0.4    Macula Normal Normal    Vessels Normal Normal    Periphery Normal Normal                Labs/Studies/Imaging Performed         Laine Shankar M.D.  PGY-2 Resident Physician   Department of Ophthalmology  06/03/23 2:26 PM   Pager: [phone]

## 2023-06-03 NOTE — ED PROVIDER NOTES
ED Provider Note  [facility]      History     Chief Complaint   Patient presents with     Eye Problem     Right eye, rule out acute glaucoma     The history is provided by the patient and medical records.     Carrie Munoz is a 42 year old female sent from urgent care to rule out acute glaucoma due to asymmetrical pupils on exam.  Patient reports that she has had eye pain for a few weeks. She thought this was due to allergies and has been taking Zyrtec and Flonase. She has some discomfort in the left eye, but right eye is very painful.  Pain has been worsening and she now has some blurry vision and photosensitivity in the right eye.  She notes that she did see an eye doctor a few years ago and was warned about glaucoma due to test done.  She also has significant family history of glaucoma.    Past Medical History  Past Medical History:   Diagnosis Date     Breast disorder     biopsy 2011(?) benign     Breast lump on right side at 2:30  o'clock position 05/04/2011     Cardiac abnormality     tachycardia during pregnancy     Depressive disorder      Fibromyalgia      Mental disorder     depression, anxiety     Sinus infection      Unspecified sinusitis (chronic)      Past Surgical History:   Procedure Laterality Date     BREAST EXCISIONAL BIOPSY Left      EXCISE GANGLION WRIST Left 05/24/2018    Procedure: EXCISE GANGLION WRIST;  Excision Left Dorsal Wirst Ganglion;  Surgeon: Randall Maradiaga MD;  Location:  OR      TOOTH EXTRACTION W/FORCEP  2003    all 4 together     cetirizine (ZYRTEC) 10 MG tablet  cyclopentolate (CYCLODRYL) 0.5 % ophthalmic solution  fluticasone (FLONASE) 50 MCG/ACT nasal spray  ibuprofen (ADVIL/MOTRIN) 600 MG tablet  prednisoLONE acetate (PRED FORTE) 1 % ophthalmic suspension      Allergies   Allergen Reactions     No Known Drug Allergy      Family History  Family History   Problem Relation Age of Onset     Hypertension Paternal Grandmother       Osteoporosis Paternal Grandmother      Hypertension Paternal Aunt      Neurologic Disorder Father         sheehan's palsy     Hypertension Father         not on medication, smoker     Neurologic Disorder Mother         bell's palsy     Social History   Social History     Tobacco Use     Smoking status: Never     Passive exposure: Never     Smokeless tobacco: Never   Vaping Use     Vaping status: Never Used   Substance Use Topics     Alcohol use: Yes     Comment: couples times per month     Drug use: Yes     Types: Marijuana     Comment: daily for fibromyalgia         A medically appropriate review of systems was performed with pertinent positives and negatives noted in the HPI, and all other systems negative.    Physical Exam   BP: 100/74  Pulse: 79  Temp: 98.7  F (37.1  C)  Resp: 16  SpO2: 98 %  Physical Exam  Vitals and nursing note reviewed.   Constitutional:       Appearance: She is normal weight.      Comments: Sitting in the dark.  Photophobia   HENT:      Head: Normocephalic and atraumatic.   Eyes:      Extraocular Movements: Extraocular movements intact.      Conjunctiva/sclera: Conjunctivae normal.      Pupils: Pupils are equal, round, and reactive to light.   Cardiovascular:      Rate and Rhythm: Normal rate.   Pulmonary:      Effort: Pulmonary effort is normal.   Skin:     Coloration: Skin is not jaundiced or pale.   Neurological:      General: No focal deficit present.      Mental Status: She is alert and oriented to person, place, and time.   Psychiatric:         Mood and Affect: Mood normal.           ED Course, Procedures, & Data      Procedures                     No results found for any visits on 06/03/23.  Medications - No data to display  Labs Ordered and Resulted from Time of ED Arrival to Time of ED Departure - No data to display  No orders to display          Critical care was not performed.     Medical Decision Making  The patient's presentation was of moderate complexity (an acute complicated  injury).    The patient's evaluation involved:  review of external note(s) from 1 sources (urgent care note from prior to arrival)  ordering and/or review of 1 test(s) in this encounter (visual acuity 20/20 ou)  strong consideration of a test (slit lamp exam deferred to eye resident) that was ultimately deferred  discussion of management or test interpretation with another health professional (eye resident consult service)    The patient's management necessitated moderate risk (prescription drug management including medications given in the ED).      Assessment & Plan    The patient was sent in by outside facility due to concerns regarding glaucoma. She has significant photophobia with light.  I have deferred the eye exam to eye md who was consulted. Eye resident feels the patient has uveitis.  Patient was discharged with their treatment recommendations and 2 day follow up.     I have reviewed the nursing notes. I have reviewed the findings, diagnosis, plan and need for follow up with the patient.    Discharge Medication List as of 6/3/2023  4:49 PM      START taking these medications    Details   cyclopentolate (CYCLODRYL) 0.5 % ophthalmic solution Place 1 drop into the right eye 2 times daily, Disp-1 mL, R-0, Local Print      prednisoLONE acetate (PRED FORTE) 1 % ophthalmic suspension Place 1 drop into the right eye 4 times daily, Disp-15 mL, R-0, Local Print             Final diagnoses:   Uveitis   IVeronica, am serving as a trained medical scribe to document services personally performed by Malia Lee MD, based on the provider's statements to me.     Malia DUQUE MD, was physically present and have reviewed and verified the accuracy of this note documented by Veronica Zimmerman.      Malia Lee MD  [facility] EMERGENCY DEPARTMENT  6/3/2023     Malia Lee MD  06/05/23 [id number]

## 2023-06-03 NOTE — PATIENT INSTRUCTIONS
We saw in UC. I am worried about acute glaucoma. I recommend you going to the ED right away to be evaluated by an eye specialist.

## 2023-06-03 NOTE — ED TRIAGE NOTES
Patient reports her right eye has been hurting for the past two weeks. Patient thought it was allergies. Patient reports that yesterday it never stopped hurting. Patient was seen at Urgent Care today who referred her to come to the ED to be evaluated for acute glaucoma. Patient reports when the pain gets really bad her vision gets blurry.

## 2023-06-03 NOTE — ED TRIAGE NOTES
Triage Assessment     Row Name 06/03/23 1128       Triage Assessment (Adult)    Airway WDL WDL       Respiratory WDL    Respiratory WDL WDL       Skin Circulation/Temperature WDL    Skin Circulation/Temperature WDL WDL       Cardiac WDL    Cardiac WDL WDL       Peripheral/Neurovascular WDL    Peripheral Neurovascular WDL WDL       Cognitive/Neuro/Behavioral WDL    Cognitive/Neuro/Behavioral WDL WDL

## 2023-06-03 NOTE — PROGRESS NOTES
Assessment & Plan     Acute glaucoma of right eye  Patient with asymmetrical pupil size on exam with history of possible glaucoma previously, not on any medications. Symptoms of 1 week is concerning for possible acute angle-closure glaucoma. Recommended emergent evaluation by ophthalmology to ensure that patient does not have acute angle-closure glaucoma. No history of debris in the eye, infectious etiologies that could be contributory to patient's eye symptoms. Discussed with patient to immediately go to emergency department for further evaluation by ophthalmologist and she voices understanding and agreement. I gave patient information about acute glaucoma and the serious risks to vision if left untreated.    Javon De La O MD  [facility] [place]    Vanessa Butler is a 42 year old, presenting for the following health issues:  Urgent Care and Eye Pain (C/O eye pain for 1 week)         View : No data to display.              HPI     Patient reports that she has had 1 week of eye pain right greater than left, she states that her pain has been getting worse over time. She perhaps her eye pain was associated with mild eyelid swelling. Worse with light. Has had some mild blurring of her intermittently but otherwise no other changes to her vision. Denies any fevers. Endorses that her eyes are constantly watering, however no thick purulent drainage. Denies any debris going into the eye.    Endorses having congestion that improved with zyrtec, however her eye pain has not improved. She does note that she previously had been told by an eye doctor that she has elevated pressures in her eyes, however has not been taking any medications for this.    Review of Systems   Constitutional, HEENT, cardiovascular, pulmonary, gi and gu systems are negative, except as otherwise noted.      Objective    /78   Pulse 78   Temp 97.8  F (36.6  C) (Tympanic)   Wt 110.2 kg (243 lb)   LMP  05/27/2023   SpO2 97%   BMI 35.88 kg/m    Body mass index is 35.88 kg/m .  Physical Exam   GENERAL: healthy, alert and no distress  EYES: Pupils react to light bilaterally. Subtle asymmetry of pupil size with right being greater than left. Right eye is mildly injected and tearing.  NECK: no adenopathy, no asymmetry, masses, or scars and thyroid normal to palpation  RESP: lungs clear to auscultation - no rales, rhonchi or wheezes  CV: regular rate and rhythm, normal S1 S2, no S3 or S4, no murmur, click or rub, no peripheral edema and peripheral pulses strong  MS: no gross musculoskeletal defects noted, no edema

## 2023-06-03 NOTE — DISCHARGE INSTRUCTIONS
Please follow up with [facility] (phone: [phone]) on 6/5/23 at 10 am.  They are located at the [facility] on [facility] on the 9th floor.      Take cyclodryl 1 drop in right eye 2 times day.     Take pred forte 1 drop in right eye 4 times daily.     Please seek medical attention if worsening/concerns.

## 2023-06-05 ENCOUNTER — OFFICE VISIT (OUTPATIENT)
Dept: OPHTHALMOLOGY | Facility: CLINIC | Age: 43
End: 2023-06-05
Attending: STUDENT IN AN ORGANIZED HEALTH CARE EDUCATION/TRAINING PROGRAM
Payer: COMMERCIAL

## 2023-06-05 DIAGNOSIS — H20.00 ACUTE ANTERIOR UVEITIS OF RIGHT EYE: Primary | ICD-10-CM

## 2023-06-05 PROCEDURE — 99203 OFFICE O/P NEW LOW 30 MIN: CPT | Mod: GC | Performed by: STUDENT IN AN ORGANIZED HEALTH CARE EDUCATION/TRAINING PROGRAM

## 2023-06-05 PROCEDURE — 99211 OFF/OP EST MAY X REQ PHY/QHP: CPT | Performed by: STUDENT IN AN ORGANIZED HEALTH CARE EDUCATION/TRAINING PROGRAM

## 2023-06-05 ASSESSMENT — VISUAL ACUITY
OD_CC+: -2+2
OS_CC: 20/20
METHOD: SNELLEN - LINEAR
OD_CC: 20/40
OD_PH_CC: 20/20
CORRECTION_TYPE: GLASSES

## 2023-06-05 ASSESSMENT — REFRACTION_WEARINGRX
OD_SPHERE: -7.25
OS_AXIS: 180
OD_AXIS: 124
OS_SPHERE: -8.50
OS_CYLINDER: +0.75
OD_CYLINDER: +0.25
SPECS_TYPE: SVL

## 2023-06-05 ASSESSMENT — TONOMETRY
OS_IOP_MMHG: 17
IOP_METHOD: ICARE
OD_IOP_MMHG: 16

## 2023-06-05 ASSESSMENT — EXTERNAL EXAM - LEFT EYE: OS_EXAM: NORMAL

## 2023-06-05 ASSESSMENT — CONF VISUAL FIELD
OS_SUPERIOR_NASAL_RESTRICTION: 0
OS_INFERIOR_TEMPORAL_RESTRICTION: 0
OD_INFERIOR_NASAL_RESTRICTION: 0
OD_NORMAL: 1
OD_INFERIOR_TEMPORAL_RESTRICTION: 0
METHOD: COUNTING FINGERS
OS_INFERIOR_NASAL_RESTRICTION: 0
OS_SUPERIOR_TEMPORAL_RESTRICTION: 0
OD_SUPERIOR_TEMPORAL_RESTRICTION: 0
OS_NORMAL: 1
OD_SUPERIOR_NASAL_RESTRICTION: 0

## 2023-06-05 ASSESSMENT — EXTERNAL EXAM - RIGHT EYE: OD_EXAM: NORMAL

## 2023-06-05 ASSESSMENT — CUP TO DISC RATIO: OD_RATIO: 0.35

## 2023-06-05 NOTE — PROGRESS NOTES
Ophthalmology Acute Clinic     HPI     Follow Up    In right eye.  Associated symptoms include Negative for eye pain.  Treatments tried include eye drops.  Pain was noted as 0/10. Additional comments: 2 day follow up Acute Anterior Uveitis right eye            Comments    Eye pain over the last few weeks, 3 days ago swollen right eye with blurry vision.   Went to , referred to ED due to unequal pupil size.   No eye pain today and swelling has gone down.  Still blurry VA in right eye.     Ocular meds:   Prednisolone q4H right eye   Cyclopentolate BID right eye (not taken today yet)    Karsten Ramos 10:05 AM June 5, 2023               Last edited by Karsten Ramos on 6/5/2023 10:05 AM.          HPI:   Carrie Munoz is a 42 year old female who presented on 6/3/23 with right eye pain that began two weeks ago. She also feels like a similar pain is starting in the left eye. It is associated with redness, tearing. No double vision. No flashes or floaters. Her symptoms feel better today than yesterday.      Patient has been told she may develop glaucoma in the future. She also has a history of high myopia.      Patient states that she has 5 nodules in her thyroid. She does not take any thyroid medications She is undergoing biopsy at the [facility] in one week.      No history of infections. No cold sores. Patient states that she was bitten by a tick years ago. No autoimmune conditions.     Interval history:  - She feels like her blurry vision hasn't changed much. However the drops have helped with the pain. She is using pred and cyclopentolate with good compliance.     Past Ocular history:   - Glasses: Yes  - Contact lens wear: No  - Ocular medical history: High myopia  - Ocular surgical history: None  - Current eye drops: Prednisolone QID and Cyclopentolate BID    PMH:   Past Medical History:   Diagnosis Date     Breast disorder     biopsy 2011(?) benign     Breast lump on right side at 2:30  o'clock position 05/04/2011     Cardiac  abnormality     tachycardia during pregnancy     Depressive disorder      Fibromyalgia      Mental disorder     depression, anxiety     Sinus infection      Unspecified sinusitis (chronic)          FH: Glaucoma and cataracts in mother and maternal grandfather.       Review of systems for the eyes was negative other than the pertinent positives/negatives listed in the HPI.        Assessment & Plan      Carrie Munoz is a 42 year old female with the following diagnoses:     # Acute anterior uveitis of the right eye  - Two weeks of worsening right eye pain and photophobia. Her symptoms have improved minimally over the past 24 hours without treatment  - VA 20/20 right eye   - IOP 16 right eye   - Exam shows trace cell in the AC. No TIDs noted.   - Fundus exam unremarkable.   - Exam shows improvement in AC inflammation. No synechiae formation. No KP. No posterior involvement.    - Given good response, plan to continue steroids. With only trace cell and resolved pain, will stop cycloplegic at this time. Advised patient to restart cycloplegic if her pain returns.      Plan:  - Cont Prednisolone QID right eye, will likely taper at follow up  - Stop Cyclopentolate  - Use PFAT as needed     Follow up:  Return in about 1 week (around 6/12/2023) for Follow Up, VT, or sooner changes.      Patient seen with Dr. Brett Shankar M.D.  PGY-2 Resident Physician  Department of Ophthalmology    Attending Physician Attestation:  Complete documentation of historical and exam elements from today's encounter can be found in the full encounter summary report (not reduplicated in this progress note).  I personally obtained the chief complaint(s) and history of present illness.  I confirmed and edited as necessary the review of systems, past medical/surgical history, family history, social history, and examination findings as documented by others; and I examined the patient myself.  I personally reviewed the relevant tests,  images, and reports as documented above.  I formulated and edited as necessary the assessment and plan and discussed the findings and management plan with the patient and family. . - Maribel West MD

## 2023-06-05 NOTE — NURSING NOTE
Chief Complaints and History of Present Illnesses   Patient presents with     Follow Up     2 day follow up Acute Anterior Uveitis right eye      Chief Complaint(s) and History of Present Illness(es)     Follow Up            Laterality: right eye    Associated symptoms: Negative for eye pain    Treatments tried: eye drops    Pain scale: 0/10    Comments: 2 day follow up Acute Anterior Uveitis right eye           Comments    Eye pain over the last few weeks, 3 days ago swollen right eye with blurry vision.   Went to , referred to ED due to unequal pupil size.   No eye pain today and swelling has gone down.  Still blurry VA in right eye.     Ocular meds:   Prednisolone q4H right eye   Cyclopentolate BID right eye (not taken today yet)    Karsten Ramos 10:05 AM June 5, 2023

## 2023-06-11 ASSESSMENT — SLIT LAMP EXAM - LIDS
COMMENTS: WNL
COMMENTS: WNL

## 2023-06-12 ENCOUNTER — ANCILLARY PROCEDURE (OUTPATIENT)
Dept: ULTRASOUND IMAGING | Facility: CLINIC | Age: 43
End: 2023-06-12
Attending: STUDENT IN AN ORGANIZED HEALTH CARE EDUCATION/TRAINING PROGRAM
Payer: COMMERCIAL

## 2023-06-12 ENCOUNTER — OFFICE VISIT (OUTPATIENT)
Dept: OPHTHALMOLOGY | Facility: CLINIC | Age: 43
End: 2023-06-12
Attending: STUDENT IN AN ORGANIZED HEALTH CARE EDUCATION/TRAINING PROGRAM
Payer: COMMERCIAL

## 2023-06-12 DIAGNOSIS — H02.054 TRICHIASIS OF LEFT UPPER EYELID: ICD-10-CM

## 2023-06-12 DIAGNOSIS — E04.1 THYROID NODULE: ICD-10-CM

## 2023-06-12 DIAGNOSIS — H02.051 TRICHIASIS OF RIGHT UPPER EYELID: ICD-10-CM

## 2023-06-12 DIAGNOSIS — H20.00 ACUTE ANTERIOR UVEITIS OF RIGHT EYE: Primary | ICD-10-CM

## 2023-06-12 PROCEDURE — 88173 CYTOPATH EVAL FNA REPORT: CPT | Mod: TC | Performed by: STUDENT IN AN ORGANIZED HEALTH CARE EDUCATION/TRAINING PROGRAM

## 2023-06-12 PROCEDURE — 88173 CYTOPATH EVAL FNA REPORT: CPT | Mod: 26 | Performed by: PATHOLOGY

## 2023-06-12 PROCEDURE — 88172 CYTP DX EVAL FNA 1ST EA SITE: CPT | Mod: 26 | Performed by: PATHOLOGY

## 2023-06-12 PROCEDURE — 67820 REVISE EYELASHES: CPT | Performed by: STUDENT IN AN ORGANIZED HEALTH CARE EDUCATION/TRAINING PROGRAM

## 2023-06-12 PROCEDURE — 99213 OFFICE O/P EST LOW 20 MIN: CPT | Mod: 25 | Performed by: STUDENT IN AN ORGANIZED HEALTH CARE EDUCATION/TRAINING PROGRAM

## 2023-06-12 PROCEDURE — 10005 FNA BX W/US GDN 1ST LES: CPT | Performed by: RADIOLOGY

## 2023-06-12 PROCEDURE — 67820 REVISE EYELASHES: CPT | Mod: 50 | Performed by: STUDENT IN AN ORGANIZED HEALTH CARE EDUCATION/TRAINING PROGRAM

## 2023-06-12 PROCEDURE — 99211 OFF/OP EST MAY X REQ PHY/QHP: CPT | Performed by: STUDENT IN AN ORGANIZED HEALTH CARE EDUCATION/TRAINING PROGRAM

## 2023-06-12 RX ORDER — LIDOCAINE HYDROCHLORIDE 10 MG/ML
5 INJECTION, SOLUTION INFILTRATION; PERINEURAL ONCE
Status: COMPLETED | OUTPATIENT
Start: 2023-06-12 | End: 2023-06-12

## 2023-06-12 RX ADMIN — LIDOCAINE HYDROCHLORIDE 3 ML: 10 INJECTION, SOLUTION INFILTRATION; PERINEURAL at 10:41

## 2023-06-12 ASSESSMENT — VISUAL ACUITY
OS_PH_CC: 20/20
CORRECTION_TYPE: GLASSES
OS_CC: 20/25
METHOD: SNELLEN - LINEAR
OD_PH_CC: 20/20-
OD_CC: 20/30-/+

## 2023-06-12 ASSESSMENT — CONF VISUAL FIELD
OS_SUPERIOR_TEMPORAL_RESTRICTION: 0
OS_INFERIOR_TEMPORAL_RESTRICTION: 0
OS_INFERIOR_NASAL_RESTRICTION: 0
OS_NORMAL: 1
OD_INFERIOR_NASAL_RESTRICTION: 0
OD_INFERIOR_TEMPORAL_RESTRICTION: 0
OS_SUPERIOR_NASAL_RESTRICTION: 0
OD_SUPERIOR_TEMPORAL_RESTRICTION: 0
OD_SUPERIOR_NASAL_RESTRICTION: 0
OD_NORMAL: 1

## 2023-06-12 ASSESSMENT — REFRACTION_WEARINGRX
OS_CYLINDER: +0.75
OD_CYLINDER: +0.25
SPECS_TYPE: SVL
OS_AXIS: 180
OD_AXIS: 124
OD_SPHERE: -7.25
OS_SPHERE: -8.50

## 2023-06-12 ASSESSMENT — EXTERNAL EXAM - RIGHT EYE: OD_EXAM: NORMAL

## 2023-06-12 ASSESSMENT — TONOMETRY
OS_IOP_MMHG: 16
OD_IOP_MMHG: 18
IOP_METHOD: ICARE

## 2023-06-12 ASSESSMENT — EXTERNAL EXAM - LEFT EYE: OS_EXAM: NORMAL

## 2023-06-12 NOTE — PROGRESS NOTES
Ophthalmology Acute Clinic     HPI     Uveitis Follow-Up    In right eye.  This started weeks ago.  Presenting in central vision.  Charactertized as  blurred vision.  Severity is mild.  Occurring constantly.  It is worse throughout the day.  Since onset it is gradually improving.  Associated symptoms include photophobia.  Treatments tried include eye drops.  Pain was noted as 5/10 (Due to migraine, pain had gone away but she developed a migraine yesterday).           Comments    Pt here for follow up uveitis right eye    She states her right eye was improving a lot, but she got a migraine yesterday and currently has pain.  Vision seemed to be better but still somewhat blurred right eye.  Photophobia had improved, today photophobic related to migraine.      Oc meds:  Prednisolone QID right eye    DUANE Fernandez 8:41 AM 06/12/2023              Last edited by Jess Pickett on 6/12/2023  8:41 AM.          Past Ocular history:   - Glasses: Yes  - Contact lens wear: No  - Ocular medical history: High myopia  - Ocular surgical history: None  - Current eye drops: Prednisolone QID OD    PMHx:   Past Medical History:   Diagnosis Date     Breast disorder     biopsy 2011(?) benign     Breast lump on right side at 2:30  o'clock position 05/04/2011     Cardiac abnormality     tachycardia during pregnancy     Depressive disorder      Fibromyalgia      Mental disorder     depression, anxiety     Sinus infection      Unspecified sinusitis (chronic)          FOHx: Glaucoma and cataracts in mother and maternal grandfather.       Review of systems for the eyes was negative other than the pertinent positives/negatives listed in the HPI.        Assessment & Plan      Carrie Munoz is a 42 year old female with the following diagnoses:     Acute anterior uveitis of the right eye  - here for follow up, overall improved  - excellent distance VA with pinhole, IOP wnl, no RAPD OU  - no signs of cell on exam today    Right  upper eyelid trichiasis  Left upper eyelid trichiasis  - abutting globe and patient with intermittent fbs  - r/b/a of forceps epilation reviewed with patient, patient expressed understanding and would like to proceed, see procedure note    Plan:  - Start tapering Prednisolone acetate 1%, TID/BID/daily/Stop, taper every 5 days  - Use PFATs QID and PRN OU  - counseled return/RD precautions    Follow up:  Return in about 4 weeks (around 7/10/2023) for Follow Up, VT, Refraction, or sooner changes.    Patient seen with Dr. Brett Shankar M.D.  PGY-2 Resident Physician  Department of Ophthalmology    Attending Physician Attestation:  Complete documentation of historical and exam elements from today's encounter can be found in the full encounter summary report (not reduplicated in this progress note).  I personally obtained the chief complaint(s) and history of present illness.  I confirmed and edited as necessary the review of systems, past medical/surgical history, family history, social history, and examination findings as documented by others; and I examined the patient myself.  I personally reviewed the relevant tests, images, and reports as documented above.  I formulated and edited as necessary the assessment and plan and discussed the findings and management plan with the patient and family. . - Maribel West MD

## 2023-06-13 LAB
PATH REPORT.COMMENTS IMP SPEC: NORMAL
PATH REPORT.COMMENTS IMP SPEC: NORMAL
PATH REPORT.FINAL DX SPEC: NORMAL
PATH REPORT.GROSS SPEC: NORMAL
PATH REPORT.MICROSCOPIC SPEC OTHER STN: NORMAL
PATH REPORT.RELEVANT HX SPEC: NORMAL

## 2023-07-05 ENCOUNTER — LAB (OUTPATIENT)
Dept: LAB | Facility: CLINIC | Age: 43
End: 2023-07-05
Payer: COMMERCIAL

## 2023-07-05 DIAGNOSIS — R41.89 BRAIN FOG: ICD-10-CM

## 2023-07-05 LAB
ERYTHROCYTE [SEDIMENTATION RATE] IN BLOOD BY WESTERGREN METHOD: 9 MM/HR (ref 0–20)
VIT B12 SERPL-MCNC: 539 PG/ML (ref 232–1245)

## 2023-07-05 PROCEDURE — 82607 VITAMIN B-12: CPT

## 2023-07-05 PROCEDURE — 36415 COLL VENOUS BLD VENIPUNCTURE: CPT

## 2023-07-05 PROCEDURE — 84425 ASSAY OF VITAMIN B-1: CPT | Mod: 90

## 2023-07-05 PROCEDURE — 85652 RBC SED RATE AUTOMATED: CPT

## 2023-07-08 LAB — VIT B1 PYROPHOSHATE BLD-SCNC: 105 NMOL/L

## 2023-07-10 ENCOUNTER — OFFICE VISIT (OUTPATIENT)
Dept: OPHTHALMOLOGY | Facility: CLINIC | Age: 43
End: 2023-07-10
Attending: STUDENT IN AN ORGANIZED HEALTH CARE EDUCATION/TRAINING PROGRAM
Payer: COMMERCIAL

## 2023-07-10 DIAGNOSIS — H20.00 ACUTE ANTERIOR UVEITIS OF RIGHT EYE: Primary | ICD-10-CM

## 2023-07-10 DIAGNOSIS — H02.054 TRICHIASIS OF LEFT UPPER EYELID: ICD-10-CM

## 2023-07-10 DIAGNOSIS — H52.13 MYOPIA OF BOTH EYES WITH ASTIGMATISM: ICD-10-CM

## 2023-07-10 DIAGNOSIS — H02.051 TRICHIASIS OF RIGHT UPPER EYELID: ICD-10-CM

## 2023-07-10 DIAGNOSIS — H52.203 MYOPIA OF BOTH EYES WITH ASTIGMATISM: ICD-10-CM

## 2023-07-10 PROCEDURE — 99214 OFFICE O/P EST MOD 30 MIN: CPT | Performed by: STUDENT IN AN ORGANIZED HEALTH CARE EDUCATION/TRAINING PROGRAM

## 2023-07-10 PROCEDURE — 99213 OFFICE O/P EST LOW 20 MIN: CPT | Performed by: STUDENT IN AN ORGANIZED HEALTH CARE EDUCATION/TRAINING PROGRAM

## 2023-07-10 ASSESSMENT — REFRACTION_WEARINGRX
OD_CYLINDER: +0.25
OD_SPHERE: -7.25
OS_AXIS: 180
OS_CYLINDER: +0.75
OS_SPHERE: -8.50
OD_AXIS: 124
SPECS_TYPE: SVL

## 2023-07-10 ASSESSMENT — TONOMETRY
OS_IOP_MMHG: 17
OD_IOP_MMHG: 19
IOP_METHOD: TONOPEN

## 2023-07-10 ASSESSMENT — CONF VISUAL FIELD
OS_NORMAL: 1
OD_INFERIOR_NASAL_RESTRICTION: 0
OS_INFERIOR_TEMPORAL_RESTRICTION: 0
OD_NORMAL: 1
OD_SUPERIOR_NASAL_RESTRICTION: 0
OD_INFERIOR_TEMPORAL_RESTRICTION: 0
METHOD: COUNTING FINGERS
OS_SUPERIOR_NASAL_RESTRICTION: 0
OS_SUPERIOR_TEMPORAL_RESTRICTION: 0
OS_INFERIOR_NASAL_RESTRICTION: 0
OD_SUPERIOR_TEMPORAL_RESTRICTION: 0

## 2023-07-10 ASSESSMENT — EXTERNAL EXAM - RIGHT EYE: OD_EXAM: NORMAL

## 2023-07-10 ASSESSMENT — VISUAL ACUITY
OS_CC: 20/20
OD_CC+: +2
OS_CC+: -2
METHOD: SNELLEN - LINEAR
CORRECTION_TYPE: GLASSES
OD_CC: 20/25

## 2023-07-10 ASSESSMENT — SLIT LAMP EXAM - LIDS
COMMENTS: NORMAL
COMMENTS: NORMAL

## 2023-07-10 ASSESSMENT — EXTERNAL EXAM - LEFT EYE: OS_EXAM: NORMAL

## 2023-07-10 NOTE — PROGRESS NOTES
HPI     Follow Up    In both eyes.  Associated symptoms include Negative for double vision, flashes and floaters.  Treatments tried include no treatments.  Pain was noted as 0/10.           Comments    Patient reports no changes in vision since last seen. BE   Denies new floaters/flashes/pain. BE  Is not using any eye drops at this time; finished prescribed bottle Tuesday of last week per pt.     MRussell Celestin OA, July 10, 2023          Last edited by Nancy Metz on 7/10/2023  9:29 AM.          Past Ocular history:   - Glasses: Yes  - Contact lens wear: No  - Ocular medical history: High myopia  - Ocular surgical history: None  - Current eye drops: none    PMHx:     Past Medical History:   Diagnosis Date     Breast disorder     biopsy 2011(?) benign     Breast lump on right side at 2:30  o'clock position 05/04/2011     Cardiac abnormality     tachycardia during pregnancy     Depressive disorder      Fibromyalgia      Mental disorder     depression, anxiety     Sinus infection      Unspecified sinusitis (chronic)        FOHx: Glaucoma and cataracts in mother and maternal grandfather.       Review of systems for the eyes was negative other than the pertinent positives/negatives listed in the HPI.        Assessment & Plan     Carrie Munoz is a 42 year old female with the following diagnoses:     Acute anterior uveitis of the right eye  - here for follow up, overall improved  - excellent distance VA with correction, IOP wnl, no RAPD OU  - no signs of cell on exam today, off all drops    Right upper eyelid trichiasis  Left upper eyelid trichiasis  - no recurrence  - observe    Myopia with astigmatism of both eyes  - happy with current Rx  -  observe    Counseled return/RD precautions    Patient disposition:  Return in about 1 year (around 7/10/2024) for Annual Visit, or sooner changes.    Attending Physician Attestation:  Complete documentation of historical and exam elements from today's encounter can be found  in the full encounter summary report (not reduplicated in this progress note).  I personally obtained the chief complaint(s) and history of present illness.  I confirmed and edited as necessary the review of systems, past medical/surgical history, family history, social history, and examination findings as documented by others; and I examined the patient myself.  I personally reviewed the relevant tests, images, and reports as documented above.  I formulated and edited as necessary the assessment and plan and discussed the findings and management plan with the patient and family. . - Maribel West MD

## 2023-07-10 NOTE — PATIENT INSTRUCTIONS
Use preservative free artificial tears one drop four times a day and as needed for comfort to both eyes; some brands include: refresh, systane, thera tears, blink, etc    DO NOT use eye drops that say 'take the red out' including Visine or Clear Eyes

## 2023-07-10 NOTE — NURSING NOTE
Chief Complaints and History of Present Illnesses   Patient presents with     Follow Up     Chief Complaint(s) and History of Present Illness(es)     Follow Up            Laterality: both eyes    Associated symptoms: Negative for double vision, flashes and floaters    Treatments tried: no treatments    Pain scale: 0/10          Comments    Patient reports no changes in vision since last seen. BE   Denies new floaters/flashes/pain. BE  Is not using any eye drops at this time; finished prescribed bottle Tuesday of last week per ptRussell Celestin OA, July 10, 2023

## 2023-07-11 ASSESSMENT — ENCOUNTER SYMPTOMS
POLYPHAGIA: 0
SORE THROAT: 0
NECK MASS: 0
ALTERED TEMPERATURE REGULATION: 0
DIZZINESS: 0
HALLUCINATIONS: 0
DEPRESSION: 0
LOSS OF CONSCIOUSNESS: 0
INSOMNIA: 0
EYE PAIN: 1
PANIC: 0
TROUBLE SWALLOWING: 0
CHILLS: 0
WEAKNESS: 0
EYE WATERING: 1
DECREASED APPETITE: 0
NERVOUS/ANXIOUS: 1
WEIGHT LOSS: 0
FEVER: 0
SINUS CONGESTION: 0
SPEECH CHANGE: 0
HOT FLASHES: 0
TASTE DISTURBANCE: 0
SINUS PAIN: 1
INCREASED ENERGY: 1
TINGLING: 0
MEMORY LOSS: 1
HOARSE VOICE: 0
EYE IRRITATION: 1
SEIZURES: 0
WEIGHT GAIN: 0
NIGHT SWEATS: 1
DISTURBANCES IN COORDINATION: 0
DECREASED CONCENTRATION: 1
SMELL DISTURBANCE: 0
HEADACHES: 1
NUMBNESS: 0
POLYDIPSIA: 0
DECREASED LIBIDO: 0
FATIGUE: 1
TREMORS: 0
EYE REDNESS: 1
PARALYSIS: 0

## 2023-07-11 ASSESSMENT — ANXIETY QUESTIONNAIRES
5. BEING SO RESTLESS THAT IT IS HARD TO SIT STILL: SEVERAL DAYS
4. TROUBLE RELAXING: SEVERAL DAYS
IF YOU CHECKED OFF ANY PROBLEMS ON THIS QUESTIONNAIRE, HOW DIFFICULT HAVE THESE PROBLEMS MADE IT FOR YOU TO DO YOUR WORK, TAKE CARE OF THINGS AT HOME, OR GET ALONG WITH OTHER PEOPLE: SOMEWHAT DIFFICULT
GAD7 TOTAL SCORE: 6
2. NOT BEING ABLE TO STOP OR CONTROL WORRYING: SEVERAL DAYS
7. FEELING AFRAID AS IF SOMETHING AWFUL MIGHT HAPPEN: NOT AT ALL
GAD7 TOTAL SCORE: 6
6. BECOMING EASILY ANNOYED OR IRRITABLE: SEVERAL DAYS
3. WORRYING TOO MUCH ABOUT DIFFERENT THINGS: SEVERAL DAYS
1. FEELING NERVOUS, ANXIOUS, OR ON EDGE: SEVERAL DAYS

## 2023-07-11 ASSESSMENT — PATIENT HEALTH QUESTIONNAIRE - PHQ9
SUM OF ALL RESPONSES TO PHQ QUESTIONS 1-9: 2
10. IF YOU CHECKED OFF ANY PROBLEMS, HOW DIFFICULT HAVE THESE PROBLEMS MADE IT FOR YOU TO DO YOUR WORK, TAKE CARE OF THINGS AT HOME, OR GET ALONG WITH OTHER PEOPLE: SOMEWHAT DIFFICULT
SUM OF ALL RESPONSES TO PHQ QUESTIONS 1-9: 2

## 2023-07-12 ENCOUNTER — VIRTUAL VISIT (OUTPATIENT)
Dept: PHYSICAL MEDICINE AND REHAB | Facility: CLINIC | Age: 43
End: 2023-07-12
Payer: COMMERCIAL

## 2023-07-12 ENCOUNTER — TELEPHONE (OUTPATIENT)
Dept: PHYSICAL MEDICINE AND REHAB | Facility: CLINIC | Age: 43
End: 2023-07-12

## 2023-07-12 ENCOUNTER — APPOINTMENT (OUTPATIENT)
Dept: LAB | Facility: CLINIC | Age: 43
End: 2023-07-12
Payer: COMMERCIAL

## 2023-07-12 ENCOUNTER — MYC MEDICAL ADVICE (OUTPATIENT)
Dept: PHYSICAL MEDICINE AND REHAB | Facility: CLINIC | Age: 43
End: 2023-07-12

## 2023-07-12 DIAGNOSIS — G43.719 INTRACTABLE CHRONIC MIGRAINE WITHOUT AURA AND WITHOUT STATUS MIGRAINOSUS: ICD-10-CM

## 2023-07-12 DIAGNOSIS — F90.0 ATTENTION DEFICIT HYPERACTIVITY DISORDER (ADHD), PREDOMINANTLY INATTENTIVE TYPE: ICD-10-CM

## 2023-07-12 DIAGNOSIS — G93.31 POST VIRAL SYNDROME: Primary | ICD-10-CM

## 2023-07-12 DIAGNOSIS — G93.31 POST VIRAL SYNDROME: ICD-10-CM

## 2023-07-12 DIAGNOSIS — R41.89 BRAIN FOG: Primary | ICD-10-CM

## 2023-07-12 DIAGNOSIS — R41.89 BRAIN FOG: ICD-10-CM

## 2023-07-12 PROCEDURE — 99214 OFFICE O/P EST MOD 30 MIN: CPT | Mod: 95 | Performed by: PHYSICAL MEDICINE & REHABILITATION

## 2023-07-12 RX ORDER — GUAVA FLAVOR
1 LIQUID (ML) MISCELLANEOUS AT BEDTIME
Qty: 60 ML | Refills: 3 | Status: SHIPPED | OUTPATIENT
Start: 2023-07-12 | End: 2023-10-17

## 2023-07-12 ASSESSMENT — ENCOUNTER SYMPTOMS
CHILLS: 0
HEADACHES: 1
NERVOUS/ANXIOUS: 1
HALLUCINATIONS: 0
SEIZURES: 0
POLYPHAGIA: 0
EYE REDNESS: 1
SORE THROAT: 0
DIZZINESS: 0
EYE PAIN: 1
WEAKNESS: 0
DECREASED CONCENTRATION: 1
SINUS PAIN: 1
TREMORS: 0
NUMBNESS: 0
FATIGUE: 1
FEVER: 0
POLYDIPSIA: 0
TROUBLE SWALLOWING: 0

## 2023-07-12 NOTE — LETTER
7/12/2023       RE: Carrie Munoz  [address] [facility] [address]       Dear Colleague,    Thank you for referring your patient, Carrie Munoz, to the [facility] PHYSICAL MEDICINE AND REHABILITATION CLINIC [place] [facility]. Please see a copy of my visit note below.    Carrie is a 42 year old who is being evaluated via a billable video visit.        Assessment and plan   Carrie Munoz is a 42 year old female with h/o Fibromyalgia who presents with Long COVID syndrome, with the initial infection being in January 2022.   She presents with migraines, new onset, and brain fog. Her fibromyalgia has been exacerbated due to the long COVID     Brain fog  D/w patient various options, she is not interested in stimulants   Start with Guanfacine 0.5 mg after 3 weeks, can increase to 1 mg. Supplement with NAC (found in any pharmacy OTC) at 600 mg BID   Discussed the side effects   Patient  agreeable to the plan of care     She did not start PT and was not seen by Neurology. I placed the orders again with a priority to be seen in 2 weeks. She was instructed to follow up on mychart if no one connects with her again.     Both vitamin B and ESR were reviewed and are within normal limits     Neuropsychological testing is pending     Follow up 3 months             HPI   Carrie Munoz is a 42 year old female with h/o Fibromyalgia who presents with Long COVID syndrome, with the initial infection being in January 2022.   She presents with migraines, new onset, and brain fog. Her fibromyalgia has been exacerbated due to the long COVID          Current Symptoms:    Carrie continues to struggle with   On vitamin D (was very low at 11) and calcium, and feels that it helping.   She just started on a new job, working on improve equity practices, and is a hybrid.     Brain fog:   She notes that her brain fog is unchanged, she has developed strategies to  compensate for it. The job is FT.   She is also studying non profit organization, she is happy that her GPA is 3.7, and she is concerned that her GPA may be effected.     Ongoing fatigue   Denies any depression or fatigue   Sleeping well           7/11/2023     5:55 PM   PHQ Assesment Total Score(s)   PHQ-9 Score 2         7/11/2023     5:57 PM   YANIV-7 Results   YANIV 7 TOTAL SCORE 6 (mild anxiety)   YANIV-7 Total Score 6         7/11/2023     5:58 PM   PTSD Screen Score   Have you ever experienced this kind of event? Yes   PTSD Screen (Score of 3 or more suggests positive screen) 2         7/11/2023     6:05 PM   PROMIS-29   PROMIS Physical Function T-Score 48 (within normal limits)   PROMIS Anxiety T-Score 60 (mild)   PROMIS Depression T-Score 41 (within normal limits)   PROMIS Fatigue T-Score 51 (within normal limits)   PROMIS Sleep Disturbance T-Score 52 (within normal limits)   PROMIS Ability to Participate in Social Roles & Activities T-Score 45 (within normal limits)   PROMIS Pain Interference T-Score 56 (mild)   PROMIS Pain Intensity 5         Past Medical History:   Diagnosis Date    Breast disorder     biopsy 2011(?) benign    Breast lump on right side at 2:30  o'clock position 05/04/2011    Cardiac abnormality     tachycardia during pregnancy    Depressive disorder     Fibromyalgia     Mental disorder     depression, anxiety    Sinus infection     Unspecified sinusitis (chronic)        Past Surgical History:   Procedure Laterality Date    BREAST EXCISIONAL BIOPSY Left     EXCISE GANGLION WRIST Left 05/24/2018    Procedure: EXCISE GANGLION WRIST;  Excision Left Dorsal Wirst Ganglion;  Surgeon: Randall Maradiaga MD;  Location:  OR     TOOTH EXTRACTION W/FORCEP  2003    all 4 together       Family History   Problem Relation Age of Onset    Hypertension Paternal Grandmother     Osteoporosis Paternal Grandmother     Hypertension Paternal Aunt     Neurologic Disorder Father         bell's palsy     Hypertension Father         not on medication, smoker    Neurologic Disorder Mother         bell's palsy       Social History     Tobacco Use    Smoking status: Never     Passive exposure: Never    Smokeless tobacco: Never   Vaping Use    Vaping Use: Never used   Substance Use Topics    Alcohol use: Yes     Comment: couples times per month    Drug use: Yes     Types: Marijuana     Comment: daily for fibromyalgia         Current Outpatient Medications:     cetirizine (ZYRTEC) 10 MG tablet, Take 1 tablet (10 mg) by mouth 2 times daily, Disp: 180 tablet, Rfl: 1    fluticasone (FLONASE) 50 MCG/ACT nasal spray, Spray 1 spray into both nostrils 2 times daily, Disp: 11.1 mL, Rfl: 3    ibuprofen (ADVIL/MOTRIN) 600 MG tablet, TAKE 1 TABLET BY MOUTH EVERY 6 HOURS AS NEEDED FOR MODERATE PAIN, Disp: 120 tablet, Rfl: 1    prednisoLONE acetate (PRED FORTE) 1 % ophthalmic suspension, Place 1 drop into the right eye 4 times daily, Disp: 15 mL, Rfl: 0      Review of Systems   Constitutional: Positive for fatigue. Negative for chills and fever.   HENT: Positive for sinus pain. Negative for ear discharge, ear pain, hearing loss, mouth sores, nosebleeds, sore throat, tinnitus and trouble swallowing.    Eyes: Positive for pain and redness.   Endocrine: Negative for polydipsia and polyphagia.   Genitourinary: Positive for vaginal discharge. Negative for dyspareunia and genital sores.   Neurological: Positive for headaches. Negative for dizziness, tremors, seizures, weakness and numbness.   Psychiatric/Behavioral: Positive for decreased concentration. Negative for hallucinations. The patient is nervous/anxious.            Objective    Vitals - Patient Reported  Pain Score: No Pain (0)        Physical Exam   GENERAL: Healthy, alert and no distress  EYES: Eyes grossly normal to inspection.  No discharge or erythema, or obvious scleral/conjunctival abnormalities.  RESP: No audible wheeze, cough, or visible cyanosis.  No visible retractions  or increased work of breathing.    SKIN: Visible skin clear. No significant rash, abnormal pigmentation or lesions.  NEURO: Cranial nerves grossly intact.  Mentation and speech appropriate for age.  PSYCH: Mentation appears normal, affect normal/bright, judgement and insight intact, normal speech and appearance well-groomed.    Lab on 07/05/2023   Component Date Value Ref Range Status    Vitamin B12 07/05/2023 539  232 - 1,245 pg/mL Final    Vitamin B1 Whole Blood Level 07/05/2023 105  70 - 180 nmol/L Final    INTERPRETIVE INFORMATION: Vitamin B1, Whole Blood    This assay measures the concentration of thiamine   diphosphate (TDP), the primary active form of vitamin B1.   Approximately 90 percent of vitamin B1 present in whole   blood is TDP. Thiamine and thiamine monophosphate, which   comprise the remaining 10 percent, are not measured.    This test was developed and its performance characteristics   determined by [facility]. It has not been cleared or   approved by the US Food and Drug Administration. This test   was performed in a CLIA certified laboratory and is   intended for clinical purposes.  Performed By: [facility]  [address] [place] [id number]  : Randall Trujillo MD, PhD    Erythrocyte Sedimentation Rate 07/05/2023 9  0 - 20 mm/hr Final       Physician Attestation   I, Yolie Lozano MD, saw this patient and agree with the findings and plan of care as documented in the note.      Items personally reviewed/procedural attestation: labs.    Yolie Lozano MD       Video-Visit Details    Type of service:  Video Visit     Originating Location (pt. Location): Home  Distant Location (provider location):  Off-site  Platform used for Video Visit: Well    Answers for HPI/ROS submitted by the patient on 7/11/2023  If you checked off any problems, how difficult have these problems made it for you to do your work, take care of things at home, or  get along with other people?: Somewhat difficult  PHQ9 TOTAL SCORE: 2  YANIV 7 TOTAL SCORE: 6  General Symptoms: Yes  Skin Symptoms: No  HENT Symptoms: Yes  EYE SYMPTOMS: Yes  HEART SYMPTOMS: No  LUNG SYMPTOMS: No  INTESTINAL SYMPTOMS: No  URINARY SYMPTOMS: No  GYNECOLOGIC SYMPTOMS: Yes  BREAST SYMPTOMS: No  SKELETAL SYMPTOMS: No  BLOOD SYMPTOMS: No  NERVOUS SYSTEM SYMPTOMS: Yes  MENTAL HEALTH SYMPTOMS: Yes  Congestion: No   Voice hoarseness: No  Tooth pain: No  Gum tenderness: No  Bleeding gums: No  Change in taste: No  Change in sense of smell: No  Dry mouth: Yes  Hearing aid used: No  Neck lump: No  Loss of appetite: No  Weight loss: No  Weight gain: No  Night sweats: Yes  Increased stress: Yes  Feeling hot or cold when others believe the temperature is normal: No  Loss of height: No  Post-operative complications: No  Surgical site pain: No  Change in or Loss of Energy: Yes  Hyperactivity: No  Confusion: No  Vision loss: No  Dry eyes: Yes  Watery eyes: Yes  Flashing of lights: No  Spots: No  Floaters: Yes  Crossed eyes: No  Tunnel Vision: No  Yellowing of eyes: No  Eye irritation: Yes  Bleeding or spotting between periods: No  Heavy or painful periods: Yes  Irregular periods: No  Hot flashes: No  Vaginal dryness: No  Reduced libido: No  Difficulty with sexual arousal: No  Post-menopausal bleeding: No  Trouble with coordination: No  Fainting or black-out spells: No  Memory loss: Yes  Speech problems: No  Tingling: No  Difficulty walking: No  Paralysis: No  Depression: No  Trouble sleeping: No  Mood changes: Yes  Panic attacks: No          Again, thank you for allowing me to participate in the care of your patient.      Sincerely,    Yolie Lozano MD

## 2023-07-12 NOTE — PATIENT INSTRUCTIONS
Start with Guanfacine 0.5 mg after 3 weeks, can increase to 1 mg. Supplement with NAC (found in any pharmacy OTC) at 600 mg BID

## 2023-07-12 NOTE — TELEPHONE ENCOUNTER
Health Call Center    Phone Message    May a detailed message be left on voicemail: yes     Reason for Call: Medication Question or concern regarding medication   Prescription Clarification  Name of Medication: Flavoring Agent (GUAVA FLAVOR) LIQD  Prescribing Provider: Yolie Lozano MD   Pharmacy: [id number] [address] [place] [address]   What on the order needs clarification? Radha states they do not know what the provider is trying to prescribe. She states there is no way for them to order a Guava Liquid. Please advise.      Action Taken: Message routed to:  [facility] ([facility] PM&R    Travel Screening: Not Applicable

## 2023-07-12 NOTE — PROGRESS NOTES
Carrie is a 42 year old who is being evaluated via a billable video visit.        Assessment and plan   Carrie Munoz is a 42 year old female with h/o Fibromyalgia who presents with Long COVID syndrome, with the initial infection being in January 2022.   She presents with migraines, new onset, and brain fog. Her fibromyalgia has been exacerbated due to the long COVID     Brain fog  D/w patient various options, she is not interested in stimulants   Start with Guanfacine 0.5 mg after 3 weeks, can increase to 1 mg. Supplement with NAC (found in any pharmacy OTC) at 600 mg BID   Discussed the side effects   Patient  agreeable to the plan of care     She did not start PT and was not seen by Neurology. I placed the orders again with a priority to be seen in 2 weeks. She was instructed to follow up on mychart if no one connects with her again.     Both vitamin B and ESR were reviewed and are within normal limits     Neuropsychological testing is pending     Follow up 3 months     Yolie Lozano MD, [facility]   Department of Rehabilitation         HPI   Carrie Munoz is a 42 year old female with h/o Fibromyalgia who presents with Long COVID syndrome, with the initial infection being in January 2022.   She presents with migraines, new onset, and brain fog. Her fibromyalgia has been exacerbated due to the long COVID          Current Symptoms:    Carrie continues to struggle with   On vitamin D (was very low at 11) and calcium, and feels that it helping.   She just started on a new job, working on improve equity practices, and is a hybrid.     Brain fog:   She notes that her brain fog is unchanged, she has developed strategies to compensate for it. The job is FT.   She is also studying non profit organization, she is happy that her GPA is 3.7, and she is concerned that her GPA may be effected.     Ongoing fatigue   Denies any depression or fatigue   Sleeping well           7/11/2023     5:55 PM   PHQ Assesment Total  Score(s)   PHQ-9 Score 2         7/11/2023     5:57 PM   YANIV-7 Results   YANIV 7 TOTAL SCORE 6 (mild anxiety)   YANIV-7 Total Score 6         7/11/2023     5:58 PM   PTSD Screen Score   Have you ever experienced this kind of event? Yes   PTSD Screen (Score of 3 or more suggests positive screen) 2         7/11/2023     6:05 PM   PROMIS-29   PROMIS Physical Function T-Score 48 (within normal limits)   PROMIS Anxiety T-Score 60 (mild)   PROMIS Depression T-Score 41 (within normal limits)   PROMIS Fatigue T-Score 51 (within normal limits)   PROMIS Sleep Disturbance T-Score 52 (within normal limits)   PROMIS Ability to Participate in Social Roles & Activities T-Score 45 (within normal limits)   PROMIS Pain Interference T-Score 56 (mild)   PROMIS Pain Intensity 5         Past Medical History:   Diagnosis Date     Breast disorder     biopsy 2011(?) benign     Breast lump on right side at 2:30  o'clock position 05/04/2011     Cardiac abnormality     tachycardia during pregnancy     Depressive disorder      Fibromyalgia      Mental disorder     depression, anxiety     Sinus infection      Unspecified sinusitis (chronic)        Past Surgical History:   Procedure Laterality Date     BREAST EXCISIONAL BIOPSY Left      EXCISE GANGLION WRIST Left 05/24/2018    Procedure: EXCISE GANGLION WRIST;  Excision Left Dorsal Wirst Ganglion;  Surgeon: Randall Maradiaga MD;  Location:  OR      TOOTH EXTRACTION W/FORCEP  2003    all 4 together       Family History   Problem Relation Age of Onset     Hypertension Paternal Grandmother      Osteoporosis Paternal Grandmother      Hypertension Paternal Aunt      Neurologic Disorder Father         sheehan's palsy     Hypertension Father         not on medication, smoker     Neurologic Disorder Mother         bell's palsy       Social History     Tobacco Use     Smoking status: Never     Passive exposure: Never     Smokeless tobacco: Never   Vaping Use     Vaping Use: Never used   Substance Use  Topics     Alcohol use: Yes     Comment: couples times per month     Drug use: Yes     Types: Marijuana     Comment: daily for fibromyalgia         Current Outpatient Medications:      cetirizine (ZYRTEC) 10 MG tablet, Take 1 tablet (10 mg) by mouth 2 times daily, Disp: 180 tablet, Rfl: 1     fluticasone (FLONASE) 50 MCG/ACT nasal spray, Spray 1 spray into both nostrils 2 times daily, Disp: 11.1 mL, Rfl: 3     ibuprofen (ADVIL/MOTRIN) 600 MG tablet, TAKE 1 TABLET BY MOUTH EVERY 6 HOURS AS NEEDED FOR MODERATE PAIN, Disp: 120 tablet, Rfl: 1     prednisoLONE acetate (PRED FORTE) 1 % ophthalmic suspension, Place 1 drop into the right eye 4 times daily, Disp: 15 mL, Rfl: 0      Review of Systems   Constitutional: Positive for fatigue. Negative for chills and fever.   HENT: Positive for sinus pain. Negative for ear discharge, ear pain, hearing loss, mouth sores, nosebleeds, sore throat, tinnitus and trouble swallowing.    Eyes: Positive for pain and redness.   Endocrine: Negative for polydipsia and polyphagia.   Genitourinary: Positive for vaginal discharge. Negative for dyspareunia and genital sores.   Neurological: Positive for headaches. Negative for dizziness, tremors, seizures, weakness and numbness.   Psychiatric/Behavioral: Positive for decreased concentration. Negative for hallucinations. The patient is nervous/anxious.             Objective    Vitals - Patient Reported  Pain Score: No Pain (0)        Physical Exam   GENERAL: Healthy, alert and no distress  EYES: Eyes grossly normal to inspection.  No discharge or erythema, or obvious scleral/conjunctival abnormalities.  RESP: No audible wheeze, cough, or visible cyanosis.  No visible retractions or increased work of breathing.    SKIN: Visible skin clear. No significant rash, abnormal pigmentation or lesions.  NEURO: Cranial nerves grossly intact.  Mentation and speech appropriate for age.  PSYCH: Mentation appears normal, affect normal/bright, judgement and  insight intact, normal speech and appearance well-groomed.    Lab on 07/05/2023   Component Date Value Ref Range Status     Vitamin B12 07/05/2023 539  232 - 1,245 pg/mL Final     Vitamin B1 Whole Blood Level 07/05/2023 105  70 - 180 nmol/L Final    INTERPRETIVE INFORMATION: Vitamin B1, Whole Blood    This assay measures the concentration of thiamine   diphosphate (TDP), the primary active form of vitamin B1.   Approximately 90 percent of vitamin B1 present in whole   blood is TDP. Thiamine and thiamine monophosphate, which   comprise the remaining 10 percent, are not measured.    This test was developed and its performance characteristics   determined by [facility]. It has not been cleared or   approved by the US Food and Drug Administration. This test   was performed in a CLIA certified laboratory and is   intended for clinical purposes.  Performed By: [facility]  [address] [place] [id number]  : Randall Trujillo MD, PhD     Erythrocyte Sedimentation Rate 07/05/2023 9  0 - 20 mm/hr Final       Physician Attestation   I, Yolie Lozano MD, saw this patient and agree with the findings and plan of care as documented in the note.      Items personally reviewed/procedural attestation: labs.    Yolie Lozano MD        Video-Visit Details    Type of service:  Video Visit     Originating Location (pt. Location): Home  Distant Location (provider location):  Off-site  Platform used for Video Visit: [employer]    Answers for HPI/ROS submitted by the patient on 7/11/2023  If you checked off any problems, how difficult have these problems made it for you to do your work, take care of things at home, or get along with other people?: Somewhat difficult  PHQ9 TOTAL SCORE: 2  YANIV 7 TOTAL SCORE: 6  General Symptoms: Yes  Skin Symptoms: No  HENT Symptoms: Yes  EYE SYMPTOMS: Yes  HEART SYMPTOMS: No  LUNG SYMPTOMS: No  INTESTINAL SYMPTOMS: No  URINARY SYMPTOMS:  No  GYNECOLOGIC SYMPTOMS: Yes  BREAST SYMPTOMS: No  SKELETAL SYMPTOMS: No  BLOOD SYMPTOMS: No  NERVOUS SYSTEM SYMPTOMS: Yes  MENTAL HEALTH SYMPTOMS: Yes  Congestion: No   Voice hoarseness: No  Tooth pain: No  Gum tenderness: No  Bleeding gums: No  Change in taste: No  Change in sense of smell: No  Dry mouth: Yes  Hearing aid used: No  Neck lump: No  Loss of appetite: No  Weight loss: No  Weight gain: No  Night sweats: Yes  Increased stress: Yes  Feeling hot or cold when others believe the temperature is normal: No  Loss of height: No  Post-operative complications: No  Surgical site pain: No  Change in or Loss of Energy: Yes  Hyperactivity: No  Confusion: No  Vision loss: No  Dry eyes: Yes  Watery eyes: Yes  Flashing of lights: No  Spots: No  Floaters: Yes  Crossed eyes: No  Tunnel Vision: No  Yellowing of eyes: No  Eye irritation: Yes  Bleeding or spotting between periods: No  Heavy or painful periods: Yes  Irregular periods: No  Hot flashes: No  Vaginal dryness: No  Reduced libido: No  Difficulty with sexual arousal: No  Post-menopausal bleeding: No  Trouble with coordination: No  Fainting or black-out spells: No  Memory loss: Yes  Speech problems: No  Tingling: No  Difficulty walking: No  Paralysis: No  Depression: No  Trouble sleeping: No  Mood changes: Yes  Panic attacks: No

## 2023-07-12 NOTE — NURSING NOTE
Is the patient currently in the state of [place]? YES    Visit mode:VIDEO    If the visit is dropped, the patient can be reconnected by: VIDEO VISIT: Send to e-mail at: [email or web address]    Will anyone else be joining the visit? NO      How would you like to obtain your AVS? MyChart    Are changes needed to the allergy or medication list? NO    Reason for visit: Follow Up    Nia Valentin on 7/12/2023 at 7:47 AM

## 2023-07-13 ENCOUNTER — LAB (OUTPATIENT)
Dept: LAB | Facility: CLINIC | Age: 43
End: 2023-07-13
Payer: COMMERCIAL

## 2023-07-13 ENCOUNTER — MYC MEDICAL ADVICE (OUTPATIENT)
Dept: PHYSICAL MEDICINE AND REHAB | Facility: CLINIC | Age: 43
End: 2023-07-13

## 2023-07-13 DIAGNOSIS — R41.89 BRAIN FOG: ICD-10-CM

## 2023-07-13 PROCEDURE — 36415 COLL VENOUS BLD VENIPUNCTURE: CPT

## 2023-07-13 PROCEDURE — 84207 ASSAY OF VITAMIN B-6: CPT | Mod: 90

## 2023-07-13 PROCEDURE — 99000 SPECIMEN HANDLING OFFICE-LAB: CPT

## 2023-07-14 NOTE — TELEPHONE ENCOUNTER
Request for clarification of prescription sent vs intended prescription has been sent to Dr. Lozano to address.  Closing this encounter.    Margi Matute RN on 7/14/2023 at 9:38 AM

## 2023-07-16 LAB — PYRIDOXAL PHOS SERPL-SCNC: 67.4 NMOL/L

## 2023-07-17 ENCOUNTER — THERAPY VISIT (OUTPATIENT)
Dept: PHYSICAL THERAPY | Facility: CLINIC | Age: 43
End: 2023-07-17
Attending: PHYSICAL MEDICINE & REHABILITATION
Payer: COMMERCIAL

## 2023-07-17 DIAGNOSIS — R41.89 BRAIN FOG: Primary | ICD-10-CM

## 2023-07-17 PROCEDURE — 97161 PT EVAL LOW COMPLEX 20 MIN: CPT | Mod: GP | Performed by: PHYSICAL THERAPIST

## 2023-07-17 PROCEDURE — 97110 THERAPEUTIC EXERCISES: CPT | Mod: GP | Performed by: PHYSICAL THERAPIST

## 2023-07-17 PROCEDURE — 97535 SELF CARE MNGMENT TRAINING: CPT | Mod: GP | Performed by: PHYSICAL THERAPIST

## 2023-07-17 NOTE — PATIENT INSTRUCTIONS
Migraine Diego adrian - look for something different in 48 hours before - sleep, hydration, excessive activity  If you get a migarine try ice pack over your eyes, and do the suboccipital release  Look into cranio cradle

## 2023-07-17 NOTE — PROGRESS NOTES
PHYSICAL THERAPY EVALUATION  Type of Visit: Evaluation    See electronic medical record for Abuse and Falls Screening details.    Subjective      Presenting condition or subjective complaint: Recommend by my shaka hirsch dr.    Since long covid has more flare ups of fibromyalgia - used to be more with activity but now can come with whatever.  Interested in some stretching - pain starts in neck - base of skull down to shoulders.    Currently takes CBD, massage - has a whole routine.  Can feel pain first getting out of bed.      Date of onset:      Relevant medical history: Fibromyalgia. Did PT back in 2018.    Dates & types of surgery:      Prior diagnostic imaging/testing results:       Prior therapy history for the same diagnosis, illness or injury: No      Prior Level of Function  Transfers: Independent  Ambulation: Independent  ADL: Independent  IADL:     Living Environment  Social support: With family members   Type of home: [facility]   Stairs to enter the home: Yes 6 Is there a railing: Yes   Ramp: No   Stairs inside the home: Yes 25 Is there a railing: Yes   Help at home: None  Equipment owned:       Employment: Yes [employer]  Hobbies/Interests: Hang out, walks dog daily 20 min to 2 hours    Patient goals for therapy: I would like to know more stretches    Pain assessment: Always in some level of pain - shoulders and down     Objective   Cognitive Status Examination  Orientation: Oriented to person, place and time   Level of Consciousness: Alert  Follows Commands and Answers Questions: 100% of the time  Personal Safety and Judgement: Intact  Memory: Intact    OBSERVATION:   INTEGUMENTARY: Intact  POSTURE: WNL  PALPATION: tenderness noted in mid and upper trap  RANGE OF MOTION: UE, Cspine WNLs, supine hip flexion to 45 degrees bilatrally  STRENGTH: LE Strength WNL  UE Strength WNL    BED MOBILITY: WNL    TRANSFERS: WNL    WHEELCHAIR MOBILITY: n/a    GAIT:   Level of [place]: WNL  Assistive  Device(s): None  Gait Deviations: WNL  Gait Distance: 50 feet  Stairs: n/a    BALANCE: WNL        Assessment & Plan   CLINICAL IMPRESSIONS  Medical Diagnosis: (P) Brain Fog    Treatment Diagnosis: (P) Decreased ROM, Pain   Impression/Assessment: Patient is a 42 year old female with brain fog, and complaints of fibromylagia flare ups.  The following significant findings have been identified: Pain and Decreased ROM/flexibility. These impairments interfere with their ability to perform recreational activities and household chores as compared to previous level of function.     Clinical Decision Making (Complexity):  Clinical Presentation: Stable/Uncomplicated  Clinical Presentation Rationale: based on medical and personal factors listed in PT evaluation  Clinical Decision Making (Complexity): Low complexity    PLAN OF CARE  Treatment Interventions:  Interventions: Manual Therapy, Neuromuscular Re-education, Therapeutic Activity, Therapeutic Exercise, Self-Care/Home Management    Long Term Goals     PT Goal 1  Goal Identifier: (P) HEP  Goal Description: (P) Patient will be independent with HEP to allow for continued improvements in health and wellness upon DC  Rationale: (P) to maximize safety and independence within the community  Target Date: (P) 09/11/23  PT Goal 2  Goal Identifier: (P) Function  Goal Description: (P) 3 use of her home program patient will report ability to decrease or stopping flareup of her fibromyalgia  Target Date: (P) 09/11/23      Frequency of Treatment: (P) 4 sessions  Duration of Treatment: (P) 8 weeks    Recommended Referrals to Other Professionals: Occupational Therapy - for brain fog  Education Assessment:   Learner/Method: (P) Patient;Listening;Demonstration  Education Comments: (P) HEP and POC    Risks and benefits of evaluation/treatment have been explained.   Patient/Family/caregiver agrees with Plan of Care.     Evaluation Time:     PT Eval, Low Complexity Minutes (92948): (P) 15        Signing Clinician: Lucy Ferrera, PT

## 2023-07-18 RX ORDER — GUANFACINE 1 MG/1
1 TABLET ORAL AT BEDTIME
Qty: 31 TABLET | Refills: 1 | Status: ON HOLD | OUTPATIENT
Start: 2023-07-18 | End: 2024-06-28

## 2023-07-18 NOTE — TELEPHONE ENCOUNTER
Health Call Center     Phone Message     May a detailed message be left on voicemail: yes      Reason for Call: Medication Question or concern regarding medication   Prescription Clarification  Name of Medication: Flavoring Agent (GUAVA FLAVOR) LIQD  Prescribing Provider: Yolie Lozano MD              Pharmacy: [id number] [address] [place] [address]              What on the order needs clarification? Rolando states they do not know what the provider is trying to prescribe. She states there is no way for them to order a Guava Liquid. Please advise.        Action Taken: Message routed to:  [facility]     Travel Screening: Not Applicable

## 2023-08-15 ENCOUNTER — MYC MEDICAL ADVICE (OUTPATIENT)
Dept: PHYSICAL MEDICINE AND REHAB | Facility: CLINIC | Age: 43
End: 2023-08-15

## 2023-08-16 NOTE — TELEPHONE ENCOUNTER
Writer contacted patient regarding headache pain.  Started the guanfacine 0.5 mg (half tablet) once daily at bedtime since 7/18.  Felt it was helpful with headaches at first, after 3 weeks she went up to 1mg, per directions. Has been on this dose for over a week.  Since the increase, she has has painful headaches every day, starting at the front and around the whole crown of her head.  Headache pain prior to taking the guanfacine was migraine pain, only behind one eye.  She states that yesterday she had a series of 3 anxiety attacks due to the headaches, so she only took 0.5mg (half tablet) last evening.  Still experiencing the headaches.  She states that she has also been taking the NAC twice daily which she feels helps with the intensity of headaches (prior to increase of guanfacine).  She is wondering if she should try something different instead of the guanfacine?    Writer explained that Dr. Lozano will be in clinic tomorrow so I can discuss this with her and we can call her back with her recommendations.  Discussed that she will only take 0.5mg again tonight as she read that the medication cannot be stopped abruptly and needs to be tapered, until she hears back from us.  She verbalized understanding.    Margi Matute RN on 8/16/2023 at 12:28 PM

## 2023-08-17 NOTE — TELEPHONE ENCOUNTER
Writer attempted call to patient again, left message that it does not look like we will be able to connect in time today for her to be added to today's schedule, but that Dr. Lozano has an opening on 8/30 at 11:30am, and that if she is interested in this appointment she can call us back and let us know.  Reiterated that Dr. Lozano would like to meet with her again prior to ordering any further medication.  Provided clinic contact information.    Margi Matute RN on 8/17/2023 at 1:31 PM

## 2023-08-17 NOTE — TELEPHONE ENCOUNTER
[facility] Call Center    Phone Message    May a detailed message be left on voicemail: yes     Reason for Call: Other: Patient is returning phone call to nurse, she would like to take the 8/30 11:30am appointment time that was offered in previous note. Writer is unable to schedule this appointment. Please schedule the patient for this time and call back or mychart message to verify with the patient that she has been scheduled.     Action Taken: Message routed to:  [facility]    Travel Screening: Not Applicable

## 2023-08-17 NOTE — TELEPHONE ENCOUNTER
Writer called patient but left voicemail message that Dr. Lozano would like to visit with patient again before prescribing another medication.  Offered a virtual appointment this afternoon around 1:30pm.  Asked patient to call or send qunb message that this will work for her.  Also stated that I will try calling again in about 10 minutes.    Margi Matute RN on 8/17/2023 at 1:15 PM

## 2023-08-22 ASSESSMENT — PATIENT HEALTH QUESTIONNAIRE - PHQ9
SUM OF ALL RESPONSES TO PHQ QUESTIONS 1-9: 2
10. IF YOU CHECKED OFF ANY PROBLEMS, HOW DIFFICULT HAVE THESE PROBLEMS MADE IT FOR YOU TO DO YOUR WORK, TAKE CARE OF THINGS AT HOME, OR GET ALONG WITH OTHER PEOPLE: NOT DIFFICULT AT ALL
SUM OF ALL RESPONSES TO PHQ QUESTIONS 1-9: 2

## 2023-08-22 ASSESSMENT — ENCOUNTER SYMPTOMS
SNORES LOUDLY: 1
INCREASED ENERGY: 0
WHEEZING: 0
FLANK PAIN: 0
EYE WATERING: 1
EYE IRRITATION: 0
BLOOD IN STOOL: 0
NAUSEA: 1
VOMITING: 0
SHORTNESS OF BREATH: 0
EYE PAIN: 1
HEARTBURN: 1
COUGH: 0
ABDOMINAL PAIN: 0
NIGHT SWEATS: 0
DIARRHEA: 0
CHILLS: 0
BOWEL INCONTINENCE: 0
COUGH DISTURBING SLEEP: 0
HOT FLASHES: 1
FATIGUE: 0
POSTURAL DYSPNEA: 0
JAUNDICE: 0
BLOATING: 0
DECREASED LIBIDO: 0
DYSPNEA ON EXERTION: 0
POLYDIPSIA: 1
HEMOPTYSIS: 0
DYSURIA: 0
FEVER: 0
WEIGHT LOSS: 0
CONSTIPATION: 1
EYE REDNESS: 1
POLYPHAGIA: 1
DOUBLE VISION: 0
WEIGHT GAIN: 1
HEMATURIA: 0
HALLUCINATIONS: 0
SPUTUM PRODUCTION: 0
ALTERED TEMPERATURE REGULATION: 0
RECTAL PAIN: 0
DECREASED APPETITE: 0
DIFFICULTY URINATING: 0

## 2023-08-22 ASSESSMENT — ANXIETY QUESTIONNAIRES
1. FEELING NERVOUS, ANXIOUS, OR ON EDGE: NOT AT ALL
5. BEING SO RESTLESS THAT IT IS HARD TO SIT STILL: NOT AT ALL
7. FEELING AFRAID AS IF SOMETHING AWFUL MIGHT HAPPEN: NOT AT ALL
2. NOT BEING ABLE TO STOP OR CONTROL WORRYING: NOT AT ALL
6. BECOMING EASILY ANNOYED OR IRRITABLE: SEVERAL DAYS
4. TROUBLE RELAXING: NOT AT ALL
GAD7 TOTAL SCORE: 1
GAD7 TOTAL SCORE: 1
IF YOU CHECKED OFF ANY PROBLEMS ON THIS QUESTIONNAIRE, HOW DIFFICULT HAVE THESE PROBLEMS MADE IT FOR YOU TO DO YOUR WORK, TAKE CARE OF THINGS AT HOME, OR GET ALONG WITH OTHER PEOPLE: NOT DIFFICULT AT ALL
3. WORRYING TOO MUCH ABOUT DIFFERENT THINGS: NOT AT ALL

## 2023-08-23 ENCOUNTER — VIRTUAL VISIT (OUTPATIENT)
Dept: PHYSICAL MEDICINE AND REHAB | Facility: CLINIC | Age: 43
End: 2023-08-23

## 2023-08-23 DIAGNOSIS — R41.89 BRAIN FOG: Primary | ICD-10-CM

## 2023-08-23 DIAGNOSIS — F90.0 ATTENTION DEFICIT HYPERACTIVITY DISORDER (ADHD), PREDOMINANTLY INATTENTIVE TYPE: ICD-10-CM

## 2023-08-23 PROCEDURE — 99213 OFFICE O/P EST LOW 20 MIN: CPT | Mod: VID | Performed by: PHYSICAL MEDICINE & REHABILITATION

## 2023-08-23 RX ORDER — METHYLPHENIDATE HYDROCHLORIDE 5 MG/1
5 TABLET ORAL 2 TIMES DAILY
Qty: 30 TABLET | Refills: 0 | Status: SHIPPED | OUTPATIENT
Start: 2023-08-23

## 2023-08-23 ASSESSMENT — ENCOUNTER SYMPTOMS
SHORTNESS OF BREATH: 0
DIFFICULTY URINATING: 0
HEMATURIA: 0
FEVER: 0
EYE REDNESS: 1
HEARTBURN: 1
FATIGUE: 0
EYE PAIN: 1
CHILLS: 0
ABDOMINAL PAIN: 0
HALLUCINATIONS: 0
WHEEZING: 0
DYSURIA: 0
COUGH: 0
DIARRHEA: 0
VOMITING: 0
CONSTIPATION: 1
RECTAL PAIN: 0
NAUSEA: 1
POLYDIPSIA: 1
FLANK PAIN: 0
POLYPHAGIA: 1

## 2023-08-23 NOTE — LETTER
8/23/2023       RE: Carrie Munoz  [address] [facility] [address]     Dear Colleague,    Thank you for referring your patient, Carrie Munoz, to the [facility] [place] at [facility]. Please see a copy of my visit note below.    Carrie is a 42 year old who is being evaluated via a billable video visit.      How would you like to obtain your AVS? MyChart     Assessment and plan   Carrie Munoz is a 42 year old female who presents with long COVID syndrome in the setting of history of migraines and headaches.  Her symptoms of long COVID syndrome include brain fog and fatigue.    She was started on guanfacine with remarkable response to her brain fog.  She notes significant improvement in her performance overall.  She denied any bradycardia or hypotension symptoms.  However increasing the dose from 0.05 mg to 1 mg she had exacerbation of her migraines and headaches.  Counseled the patient on the impact of stimulants on migraines and headaches.  She is interested in other stimulants apart from the guanfacine.  She may benefit from a low-dose of Ritalin 5 mg twice daily.  She can start at 8 mg and the second dose can be between noon and 2 PM.  I counseled her not to take the medication beyond 4 PM to prevent insomnia.  She is agreeable to the plan of care.  I gave her a prescription today to that effect.    She would like to continue the 0.05 mg of guanfacine for a few days more.  I had recommended that she discontinue the guanfacine at this low dose, hold any medication for a few days before trying Ritalin.  She is agreeable to the plan of care.    She continues to be on an acetylcysteine which I recommend she continue to do so.  Counseled the patient on not stopping the guanfacine abruptly.    Thank you for allowing us to participate in the care of this patient.    Yolie Lozano MD, A    Department of Rehabilitation       HPI     Current Symptoms:  Carrie notes that she had headaches when dose was increased to 1 mg from 0.5 mg. She is now on 0.5 mg, here the headaches are more faint.   She notes that the guanfacine was very helpful, and she was able to study and has been productive.     She has a history migraines, and the migraines were up in frequency and intensity with 1 mg.   However the headaches are faint, and ibuprofen has been helpful when taken at the right time.     She is also on NAC bid at 600 mg.     Other options we discussed were           8/23/2023    11:07 AM   PHQ Assesment Total Score(s)   PHQ-2 Score 1         8/22/2023    10:30 AM   YANIV-7 Results   YANIV 7 TOTAL SCORE 1 (minimal anxiety)   YANIV-7 Total Score 1         8/22/2023    10:31 AM   PTSD Screen Score   Have you ever experienced this kind of event? Yes   PTSD Screen (Score of 3 or more suggests positive screen) 5         8/22/2023    11:03 AM   PROMIS-29   PROMIS Physical Function T-Score 42 (mild dysfunction)   PROMIS Anxiety T-Score 56 (mild)   PROMIS Depression T-Score 41 (within normal limits)   PROMIS Fatigue T-Score 46 (within normal limits)   PROMIS Sleep Disturbance T-Score 51 (within normal limits)   PROMIS Ability to Participate in Social Roles & Activities T-Score 45 (within normal limits)   PROMIS Pain Interference T-Score 64 (moderate)   PROMIS Pain Intensity 5         Past Medical History:   Diagnosis Date    Breast disorder     biopsy 2011(?) benign    Breast lump on right side at 2:30  o'clock position 05/04/2011    Cardiac abnormality     tachycardia during pregnancy    Depressive disorder     Fibromyalgia     Mental disorder     depression, anxiety    Sinus infection     Unspecified sinusitis (chronic)        Past Surgical History:   Procedure Laterality Date    BREAST EXCISIONAL BIOPSY Left     EXCISE GANGLION WRIST Left 05/24/2018    Procedure: EXCISE GANGLION WRIST;  Excision Left Dorsal Wirst  Ganglion;  Surgeon: Randall Maradiaga MD;  Location:  OR     TOOTH EXTRACTION W/FORCEP  2003    all 4 together       Family History   Problem Relation Age of Onset    Hypertension Paternal Grandmother     Osteoporosis Paternal Grandmother     Hypertension Paternal Aunt     Neurologic Disorder Father         sheehan's palsy    Hypertension Father         not on medication, smoker    Neurologic Disorder Mother         bell's palsy       Social History     Tobacco Use    Smoking status: Never     Passive exposure: Never    Smokeless tobacco: Never   Vaping Use    Vaping Use: Never used   Substance Use Topics    Alcohol use: Yes     Comment: couples times per month    Drug use: Yes     Types: Marijuana     Comment: daily for fibromyalgia         Current Outpatient Medications:     cetirizine (ZYRTEC) 10 MG tablet, Take 1 tablet (10 mg) by mouth 2 times daily, Disp: 180 tablet, Rfl: 1    fluticasone (FLONASE) 50 MCG/ACT nasal spray, Spray 1 spray into both nostrils 2 times daily, Disp: 11.1 mL, Rfl: 3    guanFACINE (TENEX) 1 MG tablet, Take 1 tablet (1 mg) by mouth At Bedtime, Disp: 31 tablet, Rfl: 1    ibuprofen (ADVIL/MOTRIN) 600 MG tablet, TAKE 1 TABLET BY MOUTH EVERY 6 HOURS AS NEEDED FOR MODERATE PAIN, Disp: 120 tablet, Rfl: 1    prednisoLONE acetate (PRED FORTE) 1 % ophthalmic suspension, Place 1 drop into the right eye 4 times daily, Disp: 15 mL, Rfl: 0    Flavoring Agent (GUAVA FLAVOR) LIQD, 1 mg At Bedtime 0.5 mg at bedtime can increase it to 1 mg in 2-3 weeks, Disp: 60 mL, Rfl: 3      Review of Systems   Constitutional: Negative for chills, fatigue and fever.   Eyes: Positive for pain and redness.   Respiratory: Negative for cough, shortness of breath and wheezing.    Gastrointestinal: Positive for constipation, heartburn and nausea. Negative for abdominal pain, diarrhea, rectal pain and vomiting.   Endocrine: Positive for polydipsia and polyphagia.   Genitourinary: Negative for difficulty urinating,  "dyspareunia, dysuria, flank pain, genital sores, hematuria, urgency and vaginal discharge.   Psychiatric/Behavioral: Negative for hallucinations.            Objective    Vitals - Patient Reported  Weight (Patient Reported): 108.9 kg (240 lb)  Height (Patient Reported): 175.3 cm (5' 9\")  BMI (Based on Pt Reported Ht/Wt): 35.44  Pain Score: No Pain (0)          Video-Visit Details    Type of service:  Video Visit     Originating Location (pt. Location): Home    Distant Location (provider location):  Off-site  Platform used for Video Visit: [employer]  Answers submitted by the patient for this visit:  Patient Health Questionnaire (Submitted on 8/22/2023)  If you checked off any problems, how difficult have these problems made it for you to do your work, take care of things at home, or get along with other people?: Not difficult at all  PHQ9 TOTAL SCORE: 2  YANIV-7 (Submitted on 8/22/2023)  YANIV 7 TOTAL SCORE: 1  Symptoms you have experienced in the last 30 days (Submitted on 8/22/2023)  General Symptoms: Yes  Skin Symptoms: No  HENT Symptoms: No  EYE SYMPTOMS: Yes  HEART SYMPTOMS: No  LUNG SYMPTOMS: Yes  INTESTINAL SYMPTOMS: Yes  URINARY SYMPTOMS: Yes  GYNECOLOGIC SYMPTOMS: Yes  BREAST SYMPTOMS: No  SKELETAL SYMPTOMS: No  BLOOD SYMPTOMS: No  NERVOUS SYSTEM SYMPTOMS: No  MENTAL HEALTH SYMPTOMS: No  Please answer the questions below to tell us what conditions you are experiencing: (Submitted on 8/22/2023)  Loss of appetite: No  Weight loss: No  Weight gain: Yes  Night sweats: No  Increased stress: No  Feeling hot or cold when others believe the temperature is normal: No  Loss of height: No  Post-operative complications: No  Surgical site pain: No  Change in or Loss of Energy: No  Hyperactivity: No  Confusion: No  Please answer the questions below to tell us what conditions you are experiencing: (Submitted on 8/22/2023)  Vision loss: No  Dry eyes: Yes  Watery eyes: Yes  Eye bulging: No  Double vision: No  Flashing of lights: " Yes  Spots: Yes  Floaters: Yes  Crossed eyes: No  Tunnel Vision: No  Yellowing of eyes: No  Eye irritation: No  Please answer the questions below to tell us what condition you are experiencing: (Submitted on 8/22/2023)  Sputum or phlegm: No  Coughing up blood: No  Snoring: Yes  Difficulty breathing on exertion: No  Nighttime Cough: No  Difficulty breathing when lying flat: No  Please answer the questions below to tell us what conditions you are experiencing: (Submitted on 8/22/2023)  Bloating: No  Blood in stool: No  Black stools: No  Fecal incontinence: No  Yellowing of skin or eyes: No  Vomit with blood: No  Change in stools: No  Please answer the questions below to tell us what condition you are experiencing: (Submitted on 8/22/2023)  Trouble holding urine or incontinence: No  Increased frequency of urination: No  Decreased frequency of urination: No  Frequent nighttime urination: Yes  Please answer the questions below to tell us what condition you are experiencing: (Submitted on 8/22/2023)  Bleeding or spotting between periods: No  Heavy or painful periods: No  Irregular periods: Yes  Hot flashes: Yes  Vaginal dryness: No  Reduced libido: No  Difficulty with sexual arousal: No  Post-menopausal bleeding: No        Again, thank you for allowing me to participate in the care of your patient.      Sincerely,    Yolie Lozano MD

## 2023-08-23 NOTE — NURSING NOTE
Is the patient currently in the state of [place]? YES    Visit mode:VIDEO    If the visit is dropped, the patient can be reconnected by: TELEPHONE VISIT: Phone number:   Telephone Information:   Mobile [phone]       Will anyone else be joining the visit? NO  (If patient encounters technical issues they should call [phone]    How would you like to obtain your AVS? MyChart    Are changes needed to the allergy or medication list? Pt stated no med changes    Reason for visit: Video Visit (Follow-up )    Claudia DAVID

## 2023-08-23 NOTE — PROGRESS NOTES
Carrie is a 42 year old who is being evaluated via a billable video visit.      How would you like to obtain your AVS? [facility]     Assessment and plan   Carrie Munoz is a 42 year old female who presents with long COVID syndrome in the setting of history of migraines and headaches.  Her symptoms of long COVID syndrome include brain fog and fatigue.    She was started on guanfacine with remarkable response to her brain fog.  She notes significant improvement in her performance overall.  She denied any bradycardia or hypotension symptoms.  However increasing the dose from 0.05 mg to 1 mg she had exacerbation of her migraines and headaches.  Counseled the patient on the impact of stimulants on migraines and headaches.  She is interested in other stimulants apart from the guanfacine.  She may benefit from a low-dose of Ritalin 5 mg twice daily.  She can start at 8 mg and the second dose can be between noon and 2 PM.  I counseled her not to take the medication beyond 4 PM to prevent insomnia.  She is agreeable to the plan of care.  I gave her a prescription today to that effect.    She would like to continue the 0.05 mg of guanfacine for a few days more.  I had recommended that she discontinue the guanfacine at this low dose, hold any medication for a few days before trying Ritalin.  She is agreeable to the plan of care.    She continues to be on an acetylcysteine which I recommend she continue to do so.  Counseled the patient on not stopping the guanfacine abruptly.    Thank you for allowing us to participate in the care of this patient.    Yolie Lozano MD, A   Department of Rehabilitation       HPI     Current Symptoms:  Carrie notes that she had headaches when dose was increased to 1 mg from 0.5 mg. She is now on 0.5 mg, here the headaches are more faint.   She notes that the guanfacine was very helpful, and she was able to study and has been productive.     She has a history migraines, and the  migraines were up in frequency and intensity with 1 mg.   However the headaches are faint, and ibuprofen has been helpful when taken at the right time.     She is also on NAC bid at 600 mg.     Other options we discussed were           8/23/2023    11:07 AM   PHQ Assesment Total Score(s)   PHQ-2 Score 1         8/22/2023    10:30 AM   YANIV-7 Results   YANIV 7 TOTAL SCORE 1 (minimal anxiety)   YANIV-7 Total Score 1         8/22/2023    10:31 AM   PTSD Screen Score   Have you ever experienced this kind of event? Yes   PTSD Screen (Score of 3 or more suggests positive screen) 5         8/22/2023    11:03 AM   PROMIS-29   PROMIS Physical Function T-Score 42 (mild dysfunction)   PROMIS Anxiety T-Score 56 (mild)   PROMIS Depression T-Score 41 (within normal limits)   PROMIS Fatigue T-Score 46 (within normal limits)   PROMIS Sleep Disturbance T-Score 51 (within normal limits)   PROMIS Ability to Participate in Social Roles & Activities T-Score 45 (within normal limits)   PROMIS Pain Interference T-Score 64 (moderate)   PROMIS Pain Intensity 5         Past Medical History:   Diagnosis Date     Breast disorder     biopsy 2011(?) benign     Breast lump on right side at 2:30  o'clock position 05/04/2011     Cardiac abnormality     tachycardia during pregnancy     Depressive disorder      Fibromyalgia      Mental disorder     depression, anxiety     Sinus infection      Unspecified sinusitis (chronic)        Past Surgical History:   Procedure Laterality Date     BREAST EXCISIONAL BIOPSY Left      EXCISE GANGLION WRIST Left 05/24/2018    Procedure: EXCISE GANGLION WRIST;  Excision Left Dorsal Wirst Ganglion;  Surgeon: Randall Maradiaga MD;  Location:  OR      TOOTH EXTRACTION W/FORCEP  2003    all 4 together       Family History   Problem Relation Age of Onset     Hypertension Paternal Grandmother      Osteoporosis Paternal Grandmother      Hypertension Paternal Aunt      Neurologic Disorder Father         bell's palsy      "Hypertension Father         not on medication, smoker     Neurologic Disorder Mother         bell's palsy       Social History     Tobacco Use     Smoking status: Never     Passive exposure: Never     Smokeless tobacco: Never   Vaping Use     Vaping Use: Never used   Substance Use Topics     Alcohol use: Yes     Comment: couples times per month     Drug use: Yes     Types: Marijuana     Comment: daily for fibromyalgia         Current Outpatient Medications:      cetirizine (ZYRTEC) 10 MG tablet, Take 1 tablet (10 mg) by mouth 2 times daily, Disp: 180 tablet, Rfl: 1     fluticasone (FLONASE) 50 MCG/ACT nasal spray, Spray 1 spray into both nostrils 2 times daily, Disp: 11.1 mL, Rfl: 3     guanFACINE (TENEX) 1 MG tablet, Take 1 tablet (1 mg) by mouth At Bedtime, Disp: 31 tablet, Rfl: 1     ibuprofen (ADVIL/MOTRIN) 600 MG tablet, TAKE 1 TABLET BY MOUTH EVERY 6 HOURS AS NEEDED FOR MODERATE PAIN, Disp: 120 tablet, Rfl: 1     prednisoLONE acetate (PRED FORTE) 1 % ophthalmic suspension, Place 1 drop into the right eye 4 times daily, Disp: 15 mL, Rfl: 0     Flavoring Agent (GUAVA FLAVOR) LIQD, 1 mg At Bedtime 0.5 mg at bedtime can increase it to 1 mg in 2-3 weeks, Disp: 60 mL, Rfl: 3      Review of Systems   Constitutional: Negative for chills, fatigue and fever.   Eyes: Positive for pain and redness.   Respiratory: Negative for cough, shortness of breath and wheezing.    Gastrointestinal: Positive for constipation, heartburn and nausea. Negative for abdominal pain, diarrhea, rectal pain and vomiting.   Endocrine: Positive for polydipsia and polyphagia.   Genitourinary: Negative for difficulty urinating, dyspareunia, dysuria, flank pain, genital sores, hematuria, urgency and vaginal discharge.   Psychiatric/Behavioral: Negative for hallucinations.             Objective    Vitals - Patient Reported  Weight (Patient Reported): 108.9 kg (240 lb)  Height (Patient Reported): 175.3 cm (5' 9\")  BMI (Based on Pt Reported Ht/Wt): " 35.44  Pain Score: No Pain (0)          Video-Visit Details    Type of service:  Video Visit     Originating Location (pt. Location): Home    Distant Location (provider location):  Off-site  Platform used for Video Visit: [employer]  Answers submitted by the patient for this visit:  Patient Health Questionnaire (Submitted on 8/22/2023)  If you checked off any problems, how difficult have these problems made it for you to do your work, take care of things at home, or get along with other people?: Not difficult at all  PHQ9 TOTAL SCORE: 2  YANIV-7 (Submitted on 8/22/2023)  YANVI 7 TOTAL SCORE: 1  Symptoms you have experienced in the last 30 days (Submitted on 8/22/2023)  General Symptoms: Yes  Skin Symptoms: No  HENT Symptoms: No  EYE SYMPTOMS: Yes  HEART SYMPTOMS: No  LUNG SYMPTOMS: Yes  INTESTINAL SYMPTOMS: Yes  URINARY SYMPTOMS: Yes  GYNECOLOGIC SYMPTOMS: Yes  BREAST SYMPTOMS: No  SKELETAL SYMPTOMS: No  BLOOD SYMPTOMS: No  NERVOUS SYSTEM SYMPTOMS: No  MENTAL HEALTH SYMPTOMS: No  Please answer the questions below to tell us what conditions you are experiencing: (Submitted on 8/22/2023)  Loss of appetite: No  Weight loss: No  Weight gain: Yes  Night sweats: No  Increased stress: No  Feeling hot or cold when others believe the temperature is normal: No  Loss of height: No  Post-operative complications: No  Surgical site pain: No  Change in or Loss of Energy: No  Hyperactivity: No  Confusion: No  Please answer the questions below to tell us what conditions you are experiencing: (Submitted on 8/22/2023)  Vision loss: No  Dry eyes: Yes  Watery eyes: Yes  Eye bulging: No  Double vision: No  Flashing of lights: Yes  Spots: Yes  Floaters: Yes  Crossed eyes: No  Tunnel Vision: No  Yellowing of eyes: No  Eye irritation: No  Please answer the questions below to tell us what condition you are experiencing: (Submitted on 8/22/2023)  Sputum or phlegm: No  Coughing up blood: No  Snoring: Yes  Difficulty breathing on exertion:  No  Nighttime Cough: No  Difficulty breathing when lying flat: No  Please answer the questions below to tell us what conditions you are experiencing: (Submitted on 8/22/2023)  Bloating: No  Blood in stool: No  Black stools: No  Fecal incontinence: No  Yellowing of skin or eyes: No  Vomit with blood: No  Change in stools: No  Please answer the questions below to tell us what condition you are experiencing: (Submitted on 8/22/2023)  Trouble holding urine or incontinence: No  Increased frequency of urination: No  Decreased frequency of urination: No  Frequent nighttime urination: Yes  Please answer the questions below to tell us what condition you are experiencing: (Submitted on 8/22/2023)  Bleeding or spotting between periods: No  Heavy or painful periods: No  Irregular periods: Yes  Hot flashes: Yes  Vaginal dryness: No  Reduced libido: No  Difficulty with sexual arousal: No  Post-menopausal bleeding: No

## 2023-09-07 ENCOUNTER — OFFICE VISIT (OUTPATIENT)
Dept: OPHTHALMOLOGY | Facility: CLINIC | Age: 43
End: 2023-09-07
Attending: OPHTHALMOLOGY
Payer: COMMERCIAL

## 2023-09-07 DIAGNOSIS — H20.00 ACUTE ANTERIOR UVEITIS OF RIGHT EYE: Primary | ICD-10-CM

## 2023-09-07 PROCEDURE — 99212 OFFICE O/P EST SF 10 MIN: CPT

## 2023-09-07 PROCEDURE — 99214 OFFICE O/P EST MOD 30 MIN: CPT | Mod: GC | Performed by: OPHTHALMOLOGY

## 2023-09-07 RX ORDER — PREDNISOLONE ACETATE 10 MG/ML
1-2 SUSPENSION/ DROPS OPHTHALMIC 4 TIMES DAILY
Qty: 5 ML | Refills: 0 | Status: SHIPPED | OUTPATIENT
Start: 2023-09-07 | End: 2023-10-02

## 2023-09-07 RX ORDER — CYCLOPENTOLATE HYDROCHLORIDE 10 MG/ML
1 SOLUTION/ DROPS OPHTHALMIC ONCE
Qty: 5 ML | Refills: 0 | Status: SHIPPED | OUTPATIENT
Start: 2023-09-07 | End: 2023-09-07

## 2023-09-07 ASSESSMENT — CONF VISUAL FIELD
OS_NORMAL: 1
OS_SUPERIOR_NASAL_RESTRICTION: 0
OS_INFERIOR_TEMPORAL_RESTRICTION: 0
OD_INFERIOR_NASAL_RESTRICTION: 0
OD_INFERIOR_TEMPORAL_RESTRICTION: 0
OD_NORMAL: 1
OD_SUPERIOR_TEMPORAL_RESTRICTION: 0
OS_INFERIOR_NASAL_RESTRICTION: 0
OD_SUPERIOR_NASAL_RESTRICTION: 0
OS_SUPERIOR_TEMPORAL_RESTRICTION: 0

## 2023-09-07 ASSESSMENT — REFRACTION_WEARINGRX
OD_AXIS: 124
OD_CYLINDER: +0.25
OD_SPHERE: -7.25
OS_CYLINDER: +0.75
OS_SPHERE: -8.50
SPECS_TYPE: SVL
OS_AXIS: 180

## 2023-09-07 ASSESSMENT — VISUAL ACUITY
CORRECTION_TYPE: GLASSES
METHOD: SNELLEN - LINEAR
OD_CC: 20/30
OS_CC: 20/20
OD_PH_CC: 20/25

## 2023-09-07 ASSESSMENT — TONOMETRY
OD_IOP_MMHG: 13
IOP_METHOD: TONOPEN
OS_IOP_MMHG: 10

## 2023-09-07 ASSESSMENT — SLIT LAMP EXAM - LIDS
COMMENTS: NORMAL
COMMENTS: NORMAL

## 2023-09-07 ASSESSMENT — EXTERNAL EXAM - LEFT EYE: OS_EXAM: NORMAL

## 2023-09-07 ASSESSMENT — EXTERNAL EXAM - RIGHT EYE: OD_EXAM: NORMAL

## 2023-09-07 NOTE — PATIENT INSTRUCTIONS
Take pred forte (pink top) 4 times per day in the right eye  Take cyclopentolate (red top) 2 times per day in the right eye    Please obtain labs and imaging before your follow up appointment    Follow up in 1 week and establish care with Uveitis doctor

## 2023-09-07 NOTE — NURSING NOTE
"Chief Complaints and History of Present Illnesses   Patient presents with    Uveitis Follow-Up     Chief Complaint(s) and History of Present Illness(es)       Uveitis Follow-Up              Laterality: right eye    Onset: 1 week ago              Comments    Pt. States that she started with redness, pain and blurred vision RE about 1 week ago. Has been seeing occasional floaters RE. Has a \"pulsating\" sensation and pain at times.   Cheryl Greenwood COT 9:45 AM September 7, 2023                      "

## 2023-09-07 NOTE — PROGRESS NOTES
Op[facility] Acute [facility]     Chief Complaint(s) and History of Present Illness(es)    No visit information to display           HPI:   Carrie Munoz is a 42 year old female with a history of anterior uveitis who presents for eye pain and redness. She was last seen by Dr. West for right eye anterior uveitis on 7/10/23. For her current episode, symptoms started around 2 week ago. She began experiencing right eye pain and tried using AT's and visine during this time without relief. Also using ibuprofen, which has kept pain at bay but it is still uncomfortable. Currently experiencing right eye pain, pain with EOMs, light sensitivity, FBS, flashes of light, floaters, and blurry vision. Denies discharge, loss of vision, or curtain sensation.     Of note, patient was seen for anterior uveitis symptoms in the same eye initially on 6/6/23. She denies any personal history of autoimmune disease. She does have joint issues in her ankles, knees, fingers, and lower back. Denies SOB.     Lab Results   Component Value Date    A1C 5.4 03/17/2023       Past Ocular history:   - Glasses: Yes  - Contact lens wear: No  - Ocular medical history: High myopia  - Ocular surgical history: None  - Current eye drops: AT's    PMH:   Past Medical History:   Diagnosis Date    Breast disorder     biopsy 2011(?) benign    Breast lump on right side at 2:30  o'clock position 05/04/2011    Cardiac abnormality     tachycardia during pregnancy    Depressive disorder     Fibromyalgia     Mental disorder     depression, anxiety    Sinus infection     Unspecified sinusitis (chronic)      FH: Glaucoma in mother and maternal grandfather  Maternal grandmother - RA  Paternal cousins - lupus    Review of systems for the eyes was negative other than the pertinent positives/negatives listed in the HPI.      Imaging:   None    Assessment & Plan      Carrie Munoz is a 42 year old female with the following diagnoses:   1. Acute anterior uveitis of  right eye       Patient presents with second case of anterior uveitis in the right eye in the past 3 months, first seen in June. Now having 2 weeks of right eye redness, pain, pain with EOMs, and light sensitivity. Exam revealing fine KP inferiorly and 2-3+ mixed cell in the AC. Given second episode of uveitis, will obtain labs and imaging to rule out any underlying systemic pathology.    Plan:  Pred forte qid, right eye  Cyclopentolate bid, right eye  Labs: RPR, TB, ACE, Lysozyme, ROBERTO, HLA-B27, CXR    Follow up in 1 week  Follow up next available [place]    Patient disposition:   Return in about 1 week (around 9/14/2023) for Acute follow up for acute, [place] next available.    Patient seen with Dr. Galdamez    Thank you for entrusting us with your care  Kojo Bobby MD  Resident Physician, PGY-2  Department of Ophthalmology  09/06/23 8:11 PM    Attending Physician Attestation:  Complete documentation of historical and exam elements from today's encounter can be found in the full encounter summary report (not reduplicated in this progress note).  I personally obtained the chief complaint(s) and history of present illness.  I confirmed and edited as necessary the review of systems, past medical/surgical history, family history, social history, and examination findings as documented by others; and I examined the patient myself.  I personally reviewed the relevant tests, images, and reports as documented above.  I formulated and edited as necessary the assessment and plan and discussed the findings and management plan with the patient and family. . - Oliverio Galdamez MD

## 2023-09-12 ASSESSMENT — ANXIETY QUESTIONNAIRES
7. FEELING AFRAID AS IF SOMETHING AWFUL MIGHT HAPPEN: NOT AT ALL
6. BECOMING EASILY ANNOYED OR IRRITABLE: SEVERAL DAYS
2. NOT BEING ABLE TO STOP OR CONTROL WORRYING: NOT AT ALL
3. WORRYING TOO MUCH ABOUT DIFFERENT THINGS: NOT AT ALL
5. BEING SO RESTLESS THAT IT IS HARD TO SIT STILL: NOT AT ALL
IF YOU CHECKED OFF ANY PROBLEMS ON THIS QUESTIONNAIRE, HOW DIFFICULT HAVE THESE PROBLEMS MADE IT FOR YOU TO DO YOUR WORK, TAKE CARE OF THINGS AT HOME, OR GET ALONG WITH OTHER PEOPLE: SOMEWHAT DIFFICULT
1. FEELING NERVOUS, ANXIOUS, OR ON EDGE: SEVERAL DAYS
GAD7 TOTAL SCORE: 2
GAD7 TOTAL SCORE: 2
4. TROUBLE RELAXING: NOT AT ALL

## 2023-09-12 ASSESSMENT — ENCOUNTER SYMPTOMS
EYE REDNESS: 1
SMELL DISTURBANCE: 1
FATIGUE: 1
WEIGHT GAIN: 0
STIFFNESS: 1
EYE PAIN: 1
MYALGIAS: 1
WEIGHT LOSS: 0
VOMITING: 0
LOSS OF CONSCIOUSNESS: 0
MUSCLE WEAKNESS: 1
JAUNDICE: 0
SPEECH CHANGE: 0
JOINT SWELLING: 1
EYE WATERING: 1
ABDOMINAL PAIN: 1
SEIZURES: 0
HALLUCINATIONS: 0
POLYPHAGIA: 0
INCREASED ENERGY: 0
CHILLS: 1
DISTURBANCES IN COORDINATION: 1
EYE IRRITATION: 1
POLYDIPSIA: 1
TROUBLE SWALLOWING: 1
SINUS PAIN: 1
DIZZINESS: 0
NIGHT SWEATS: 0
FEVER: 0
NAUSEA: 0
WEAKNESS: 1
SINUS CONGESTION: 1
TINGLING: 0
BLOOD IN STOOL: 0
NECK MASS: 0
MUSCLE CRAMPS: 1
ARTHRALGIAS: 1
BACK PAIN: 1
ALTERED TEMPERATURE REGULATION: 1
HEARTBURN: 0
CONSTIPATION: 0
HOARSE VOICE: 1
DECREASED APPETITE: 1
DIARRHEA: 0
TASTE DISTURBANCE: 0
NECK PAIN: 1
DOUBLE VISION: 0
HEADACHES: 1
TREMORS: 0
SORE THROAT: 1
BLOATING: 0
NUMBNESS: 0
BOWEL INCONTINENCE: 0
PARALYSIS: 0
MEMORY LOSS: 0
RECTAL PAIN: 0

## 2023-09-12 ASSESSMENT — PATIENT HEALTH QUESTIONNAIRE - PHQ9
SUM OF ALL RESPONSES TO PHQ QUESTIONS 1-9: 4
SUM OF ALL RESPONSES TO PHQ QUESTIONS 1-9: 4
10. IF YOU CHECKED OFF ANY PROBLEMS, HOW DIFFICULT HAVE THESE PROBLEMS MADE IT FOR YOU TO DO YOUR WORK, TAKE CARE OF THINGS AT HOME, OR GET ALONG WITH OTHER PEOPLE: SOMEWHAT DIFFICULT

## 2023-09-13 ENCOUNTER — VIRTUAL VISIT (OUTPATIENT)
Dept: PHYSICAL MEDICINE AND REHAB | Facility: CLINIC | Age: 43
End: 2023-09-13
Payer: COMMERCIAL

## 2023-09-13 ENCOUNTER — E-VISIT (OUTPATIENT)
Dept: FAMILY MEDICINE | Facility: CLINIC | Age: 43
End: 2023-09-13

## 2023-09-13 ENCOUNTER — MYC MEDICAL ADVICE (OUTPATIENT)
Dept: FAMILY MEDICINE | Facility: CLINIC | Age: 43
End: 2023-09-13

## 2023-09-13 VITALS — HEIGHT: 69 IN | BODY MASS INDEX: 35.99 KG/M2 | WEIGHT: 243 LBS

## 2023-09-13 DIAGNOSIS — J01.00 ACUTE MAXILLARY SINUSITIS, RECURRENCE NOT SPECIFIED: Primary | ICD-10-CM

## 2023-09-13 DIAGNOSIS — R41.89 BRAIN FOG: ICD-10-CM

## 2023-09-13 DIAGNOSIS — F90.0 ATTENTION DEFICIT HYPERACTIVITY DISORDER (ADHD), PREDOMINANTLY INATTENTIVE TYPE: Primary | ICD-10-CM

## 2023-09-13 PROCEDURE — 99421 OL DIG E/M SVC 5-10 MIN: CPT | Performed by: FAMILY MEDICINE

## 2023-09-13 PROCEDURE — 99212 OFFICE O/P EST SF 10 MIN: CPT | Mod: VID | Performed by: PHYSICAL MEDICINE & REHABILITATION

## 2023-09-13 RX ORDER — METHYLPHENIDATE HYDROCHLORIDE 5 MG/1
5 TABLET ORAL 2 TIMES DAILY
Qty: 60 TABLET | Refills: 0 | Status: SHIPPED | OUTPATIENT
Start: 2023-09-13 | End: 2023-10-16

## 2023-09-13 RX ORDER — AMOXICILLIN 500 MG/1
500 CAPSULE ORAL 2 TIMES DAILY
Qty: 20 CAPSULE | Refills: 0 | Status: SHIPPED | OUTPATIENT
Start: 2023-09-13 | End: 2023-09-23

## 2023-09-13 ASSESSMENT — ENCOUNTER SYMPTOMS
POLYPHAGIA: 0
HEADACHES: 1
FEVER: 0
VOMITING: 0
ARTHRALGIAS: 1
NECK PAIN: 1
DIARRHEA: 0
FATIGUE: 1
SEIZURES: 0
CONSTIPATION: 0
SINUS PAIN: 1
CHILLS: 1
POLYDIPSIA: 1
BACK PAIN: 1
EYE REDNESS: 1
NUMBNESS: 0
SORE THROAT: 1
JOINT SWELLING: 1
HEARTBURN: 0
TROUBLE SWALLOWING: 1
EYE PAIN: 1
TREMORS: 0
WEAKNESS: 1
NAUSEA: 0
HALLUCINATIONS: 0
DIZZINESS: 0
RECTAL PAIN: 0
MYALGIAS: 1
ABDOMINAL PAIN: 1

## 2023-09-13 ASSESSMENT — PAIN SCALES - GENERAL: PAINLEVEL: EXTREME PAIN (8)

## 2023-09-13 NOTE — TELEPHONE ENCOUNTER
Encounter Diagnosis   Name Primary?    Acute maxillary sinusitis, recurrence not specified Yes    Provider E-Visit time total (minutes): 7      Orders Placed This Encounter    amoxicillin (AMOXIL) 500 MG capsule     Sig: Take 1 capsule (500 mg) by mouth 2 times daily for 10 days     Dispense:  20 capsule     Refill:  0   '

## 2023-09-13 NOTE — PATIENT INSTRUCTIONS
Acute Sinusitis: Care Instructions  Overview     Acute sinusitis is an inflammation of the mucous membranes inside the nose and sinuses. Sinuses are the hollow spaces in your skull around the eyes and nose. Acute sinusitis often follows a cold. Acute sinusitis causes thick, discolored mucus that drains from the nose or down the back of the throat. It also can cause pain and pressure in your head and face along with a stuffy or blocked nose.  In most cases, sinusitis gets better on its own in 1 to 2 weeks. But some mild symptoms may last for several weeks. Sometimes antibiotics are needed if there is a bacterial infection.  Follow-up care is a key part of your treatment and safety. Be sure to make and go to all appointments, and call your doctor if you are having problems. It's also a good idea to know your test results and keep a list of the medicines you take.  How can you care for yourself at home?  Use saline (saltwater) nasal washes. This can help keep your nasal passages open and wash out mucus and allergens.  You can buy saline nose washes at a grocery store or drugstore. Follow the instructions on the package.  You can make your own at home. Add 1 teaspoon of non-iodized salt and 1 teaspoon of baking soda to 2 cups of distilled or boiled and cooled water. Fill a squeeze bottle or a nasal cleansing pot (such as a neti pot) with the nasal wash. Then put the tip into your nostril, and lean over the sink. With your mouth open, gently squirt the liquid. Repeat on the other side.  Try a decongestant nasal spray like oxymetazoline (Afrin). Do not use it for more than 3 days in a row. Using it for more than 3 days can make your congestion worse.  If needed, take an over-the-counter pain medicine, such as acetaminophen (Tylenol), ibuprofen (Advil, Motrin), or naproxen (Aleve). Read and follow all instructions on the label.  If the doctor prescribed antibiotics, take them as directed. Do not stop taking them just  "because you feel better. You need to take the full course of antibiotics.  Be careful when taking over-the-counter cold or flu medicines and Tylenol at the same time. Many of these medicines have acetaminophen, which is Tylenol. Read the labels to make sure that you are not taking more than the recommended dose. Too much acetaminophen (Tylenol) can be harmful.  Try a steroid nasal spray. It may help with your symptoms.  Breathe warm, moist air. You can use a steamy shower, a hot bath, or a sink filled with hot water. Avoid cold, dry air. Using a humidifier in your home may help. Follow the directions for cleaning the machine.  When should you call for help?   Call your doctor now or seek immediate medical care if:    You have new or worse swelling, redness, or pain in your face or around one or both of your eyes.     You have double vision or a change in your vision.     You have a high fever.     You have a severe headache and a stiff neck.     You have mental changes, such as feeling confused or much less alert.   Watch closely for changes in your health, and be sure to contact your doctor if:    You are not getting better as expected.   Where can you learn more?  Go to https://www.[employer].net/patiented  Enter I933 in the search box to learn more about \"Acute Sinusitis: Care Instructions.\"  Current as of: March 1, 2023               Content Version: 13.7    4218-7833 [employer].   Care instructions adapted under license by your healthcare professional. If you have questions about a medical condition or this instruction, always ask your healthcare professional. [employer] disclaims any warranty or liability for your use of this information.      Dear Carrie Munoz    After reviewing your responses, I've been able to diagnose you with Acute maxillary sinusitis, recurrence not specified.      Based on your responses and diagnosis, I have prescribed No orders of the defined types " were placed in this encounter.   to treat your symptoms. I have sent this to your pharmacy.?     It is also important to stay well hydrated, get lots of rest and take over-the-counter decongestants,?tylenol?or ibuprofen if you?are able to?take those medications per your primary care provider to help relieve discomfort.?     It is important that you take?all of?your prescribed medication even if your symptoms are improving after a few doses.? Taking?all of?your medicine helps prevent the symptoms from returning.?     If your symptoms worsen, you develop severe headache, vomiting, high fever (>102), or are not improving in 7 days, please contact your primary care provider for an appointment or visit any of our convenient Walk-in Care or Urgent Care Centers to be seen which can be found on our website?here.?     Thanks again for choosing?us?as your health care partner,?   ?  Narciso Sanford MD?

## 2023-09-13 NOTE — PROGRESS NOTES
Carrie is a 42 year old who is being evaluated via a billable video visit.      How would you like to obtain your AVS? MyChart  If the video visit is dropped, the invitation should be resent by: Text to cell phone: [phone]  Will anyone else be joining your video visit? No      She has started on ritalin at 5 mg in the morning at 7 am, and second dose is at noon. She started that she is able to concentrate better, she notes that she is not getting headaches.   She has stopped the guanfacine.   She notes uveitis which is being worked up to rule out an autoimmune disorder.     She is amidst Fibromyalgia flare-up and unable to go to a PT.        Plan of care   Would recommend the daily exercise and rule of TENS   Would recommend anti-inflammation and continue to focus on losing weight as a sign of inflammation.   Put in a prescription for the ritalin for another month.   Refer to OT for cognition assessment given the good response to Ritalin.   May benefit from neuropsychological testing, the order was done and is scheduled.     Follow up in 3 month.     Yolie Lozano MD, [facility]    Department of Rehabilitation           9/12/2023     8:14 PM   PHQ Assesment Total Score(s)   PHQ-9 Score 4         9/12/2023     8:15 PM   YANIV-7 Results   YANIV 7 TOTAL SCORE 2 (minimal anxiety)   YANIV-7 Total Score 2         9/12/2023     8:16 PM   PTSD Screen Score   Have you ever experienced this kind of event? Yes   PTSD Screen (Score of 3 or more suggests positive screen) 2         9/12/2023     8:22 PM   PROMIS-29   PROMIS Physical Function T-Score 37 (moderate dysfunction)   PROMIS Anxiety T-Score 40 (within normal limits)   PROMIS Depression T-Score 41 (within normal limits)   PROMIS Fatigue T-Score 51 (within normal limits)   PROMIS Sleep Disturbance T-Score 52 (within normal limits)   PROMIS Ability to Participate in Social Roles & Activities T-Score 52 (within normal limits)   PROMIS Pain Interference T-Score 61 (moderate)    PROMIS Pain Intensity 8           Past Medical History:   Diagnosis Date     Breast disorder     biopsy 2011(?) benign     Breast lump on right side at 2:30  o'clock position 05/04/2011     Cardiac abnormality     tachycardia during pregnancy     Depressive disorder      Fibromyalgia      Mental disorder     depression, anxiety     Sinus infection      Unspecified sinusitis (chronic)        Past Surgical History:   Procedure Laterality Date     BREAST EXCISIONAL BIOPSY Left      EXCISE GANGLION WRIST Left 05/24/2018    Procedure: EXCISE GANGLION WRIST;  Excision Left Dorsal Wirst Ganglion;  Surgeon: Randall Maradiaga MD;  Location: [facility]      TOOTH EXTRACTION W/FORCEP  2003    all 4 together       Family History   Problem Relation Age of Onset     Hypertension Paternal Grandmother      Osteoporosis Paternal Grandmother      Hypertension Paternal Aunt      Neurologic Disorder Father         sheehan's palsy     Hypertension Father         not on medication, smoker     Neurologic Disorder Mother         bell's palsy       Social History     Tobacco Use     Smoking status: Never     Passive exposure: Never     Smokeless tobacco: Never   Vaping Use     Vaping Use: Never used   Substance Use Topics     Alcohol use: Yes     Comment: couples times per month     Drug use: Yes     Types: Marijuana     Comment: daily for fibromyalgia         Current Outpatient Medications:      methylphenidate (RITALIN) 5 MG tablet, Take 1 tablet (5 mg) by mouth 2 times daily, Disp: 60 tablet, Rfl: 0     cetirizine (ZYRTEC) 10 MG tablet, Take 1 tablet (10 mg) by mouth 2 times daily, Disp: 180 tablet, Rfl: 1     Flavoring Agent (GUAVA FLAVOR) LIQD, 1 mg At Bedtime 0.5 mg at bedtime can increase it to 1 mg in 2-3 weeks, Disp: 60 mL, Rfl: 3     fluticasone (FLONASE) 50 MCG/ACT nasal spray, Spray 1 spray into both nostrils 2 times daily, Disp: 11.1 mL, Rfl: 3     guanFACINE (TENEX) 1 MG tablet, Take 1 tablet (1 mg) by mouth At Bedtime, Disp:  31 tablet, Rfl: 1     ibuprofen (ADVIL/MOTRIN) 600 MG tablet, TAKE 1 TABLET BY MOUTH EVERY 6 HOURS AS NEEDED FOR MODERATE PAIN, Disp: 120 tablet, Rfl: 1     methylphenidate (RITALIN) 5 MG tablet, Take 1 tablet (5 mg) by mouth 2 times daily, Disp: 30 tablet, Rfl: 0     prednisoLONE acetate (PRED FORTE) 1 % ophthalmic suspension, Place 1-2 drops into the right eye 4 times daily, Disp: 5 mL, Rfl: 0     prednisoLONE acetate (PRED FORTE) 1 % ophthalmic suspension, Place 1 drop into the right eye 4 times daily, Disp: 15 mL, Rfl: 0      Review of Systems   Constitutional: Positive for chills and fatigue. Negative for fever.   HENT: Positive for sinus pain, sore throat and trouble swallowing. Negative for ear discharge, ear pain, hearing loss, mouth sores, nosebleeds and tinnitus.    Eyes: Positive for pain and redness.   Gastrointestinal: Positive for abdominal pain. Negative for constipation, diarrhea, heartburn, nausea, rectal pain and vomiting.   Endocrine: Positive for polydipsia. Negative for polyphagia.   Musculoskeletal: Positive for arthralgias, back pain, joint swelling, myalgias and neck pain.   Neurological: Positive for weakness and headaches. Negative for dizziness, tremors, seizures and numbness.   Psychiatric/Behavioral: Negative for hallucinations.            Objective       Vitals:  No vitals were obtained today due to virtual visit.            Video-Visit Details    Type of service:  Video Visit     Originating Location (pt. Location): Home    Distant Location (provider location):  Off-site  Platform used for Video Visit: Pollsb  Answers submitted by the patient for this visit:  Patient Health Questionnaire (Submitted on 9/12/2023)  If you checked off any problems, how difficult have these problems made it for you to do your work, take care of things at home, or get along with other people?: Somewhat difficult  PHQ9 TOTAL SCORE: 4  YANIV-7 (Submitted on 9/12/2023)  YANIV 7 TOTAL SCORE: 2  Symptoms you have  experienced in the last 30 days (Submitted on 9/12/2023)  General Symptoms: Yes  Skin Symptoms: No  HENT Symptoms: Yes  EYE SYMPTOMS: Yes  HEART SYMPTOMS: No  LUNG SYMPTOMS: No  INTESTINAL SYMPTOMS: Yes  URINARY SYMPTOMS: No  GYNECOLOGIC SYMPTOMS: No  BREAST SYMPTOMS: No  SKELETAL SYMPTOMS: Yes  BLOOD SYMPTOMS: No  NERVOUS SYSTEM SYMPTOMS: Yes  MENTAL HEALTH SYMPTOMS: No  Please answer the questions below to tell us what conditions you are experiencing: (Submitted on 9/12/2023)  Congestion: Yes   Voice hoarseness: Yes  Tooth pain: No  Gum tenderness: No  Bleeding gums: No  Change in taste: No  Change in sense of smell: Yes  Dry mouth: Yes  Hearing aid used: No  Neck lump: No  Please answer the questions below to tell us what conditions you are experiencing: (Submitted on 9/12/2023)  Loss of appetite: Yes  Weight loss: No  Weight gain: No  Night sweats: No  Increased stress: No  Feeling hot or cold when others believe the temperature is normal: Yes  Loss of height: No  Post-operative complications: No  Surgical site pain: No  Change in or Loss of Energy: No  Hyperactivity: No  Confusion: No  Please answer the questions below to tell us what conditions you are experiencing: (Submitted on 9/12/2023)  Vision loss: No  Dry eyes: Yes  Watery eyes: Yes  Eye bulging: No  Double vision: No  Flashing of lights: Yes  Spots: Yes  Floaters: Yes  Crossed eyes: No  Tunnel Vision: No  Yellowing of eyes: No  Eye irritation: Yes  Please answer the questions below to tell us what conditions you are experiencing: (Submitted on 9/12/2023)  Bloating: No  Blood in stool: No  Black stools: No  Fecal incontinence: No  Yellowing of skin or eyes: No  Vomit with blood: No  Change in stools: No  Please answer the questions below to tell us what condition you are experiencing: (Submitted on 9/12/2023)  Bone pain: No  Muscle cramps: Yes  Muscle weakness: Yes  Joint stiffness: Yes  Bone fracture: No  Please answer the questions below to tell us  what condition you are experiencing: (Submitted on 9/12/2023)  Trouble with coordination: Yes  Fainting or black-out spells: No  Memory loss: No  Speech problems: No  Tingling: No  Difficulty walking: Yes  Paralysis: No

## 2023-09-13 NOTE — LETTER
9/13/2023       RE: Carrie Munoz  [address] [facility] [address]       Dear Colleague,    Thank you for referring your patient, Carrie Munoz, to the [facility] PHYSICAL MEDICINE AND REHABILITATION CLINIC [place] at [facility]. Please see a copy of my visit note below.    Carrie is a 42 year old who is being evaluated via a billable video visit.      How would you like to obtain your AVS? MyChart  If the video visit is dropped, the invitation should be resent by: Text to cell phone: [phone]  Will anyone else be joining your video visit? No      She has started on ritalin at 5 mg in the morning at 7 am, and second dose is at noon. She started that she is able to concentrate better, she notes that she is not getting headaches.   She has stopped the guanfacine.   She notes uveitis which is being worked up to rule out an autoimmune disorder.     She is amidst Fibromyalgia flare-up and unable to go to a PT.        Plan of care   Would recommend the daily exercise and rule of TENS   Would recommend anti-inflammation and continue to focus on losing weight as a sign of inflammation.   Put in a prescription for the ritalin for another month.   Refer to OT for cognition assessment given the good response to Ritalin.   May benefit from neuropsychological testing, the order was done and is scheduled.     Follow up in 3 month.     Yolie Lozano MD, [facility]    Department of Rehabilitation           9/12/2023     8:14 PM   PHQ Assesment Total Score(s)   PHQ-9 Score 4         9/12/2023     8:15 PM   YANIV-7 Results   YANIV 7 TOTAL SCORE 2 (minimal anxiety)   YANIV-7 Total Score 2         9/12/2023     8:16 PM   PTSD Screen Score   Have you ever experienced this kind of event? Yes   PTSD Screen (Score of 3 or more suggests positive screen) 2         9/12/2023     8:22 PM   PROMIS-29   PROMIS Physical Function T-Score 37 (moderate dysfunction)   PROMIS Anxiety  T-Score 40 (within normal limits)   PROMIS Depression T-Score 41 (within normal limits)   PROMIS Fatigue T-Score 51 (within normal limits)   PROMIS Sleep Disturbance T-Score 52 (within normal limits)   PROMIS Ability to Participate in Social Roles & Activities T-Score 52 (within normal limits)   PROMIS Pain Interference T-Score 61 (moderate)   PROMIS Pain Intensity 8           Past Medical History:   Diagnosis Date    Breast disorder     biopsy 2011(?) benign    Breast lump on right side at 2:30  o'clock position 05/04/2011    Cardiac abnormality     tachycardia during pregnancy    Depressive disorder     Fibromyalgia     Mental disorder     depression, anxiety    Sinus infection     Unspecified sinusitis (chronic)        Past Surgical History:   Procedure Laterality Date    BREAST EXCISIONAL BIOPSY Left     EXCISE GANGLION WRIST Left 05/24/2018    Procedure: EXCISE GANGLION WRIST;  Excision Left Dorsal Wirst Ganglion;  Surgeon: Randall Maradiaga MD;  Location: [facility] OR    HC TOOTH EXTRACTION W/FORCEP  2003    all 4 together       Family History   Problem Relation Age of Onset    Hypertension Paternal Grandmother     Osteoporosis Paternal Grandmother     Hypertension Paternal Aunt     Neurologic Disorder Father         sheehan's palsy    Hypertension Father         not on medication, smoker    Neurologic Disorder Mother         bell's palsy       Social History     Tobacco Use    Smoking status: Never     Passive exposure: Never    Smokeless tobacco: Never   Vaping Use    Vaping Use: Never used   Substance Use Topics    Alcohol use: Yes     Comment: couples times per month    Drug use: Yes     Types: Marijuana     Comment: daily for fibromyalgia         Current Outpatient Medications:     methylphenidate (RITALIN) 5 MG tablet, Take 1 tablet (5 mg) by mouth 2 times daily, Disp: 60 tablet, Rfl: 0    cetirizine (ZYRTEC) 10 MG tablet, Take 1 tablet (10 mg) by mouth 2 times daily, Disp: 180 tablet, Rfl: 1    Flavoring  Agent (GUAVA FLAVOR) LIQD, 1 mg At Bedtime 0.5 mg at bedtime can increase it to 1 mg in 2-3 weeks, Disp: 60 mL, Rfl: 3    fluticasone (FLONASE) 50 MCG/ACT nasal spray, Spray 1 spray into both nostrils 2 times daily, Disp: 11.1 mL, Rfl: 3    guanFACINE (TENEX) 1 MG tablet, Take 1 tablet (1 mg) by mouth At Bedtime, Disp: 31 tablet, Rfl: 1    ibuprofen (ADVIL/MOTRIN) 600 MG tablet, TAKE 1 TABLET BY MOUTH EVERY 6 HOURS AS NEEDED FOR MODERATE PAIN, Disp: 120 tablet, Rfl: 1    methylphenidate (RITALIN) 5 MG tablet, Take 1 tablet (5 mg) by mouth 2 times daily, Disp: 30 tablet, Rfl: 0    prednisoLONE acetate (PRED FORTE) 1 % ophthalmic suspension, Place 1-2 drops into the right eye 4 times daily, Disp: 5 mL, Rfl: 0    prednisoLONE acetate (PRED FORTE) 1 % ophthalmic suspension, Place 1 drop into the right eye 4 times daily, Disp: 15 mL, Rfl: 0      Review of Systems   Constitutional: Positive for chills and fatigue. Negative for fever.   HENT: Positive for sinus pain, sore throat and trouble swallowing. Negative for ear discharge, ear pain, hearing loss, mouth sores, nosebleeds and tinnitus.    Eyes: Positive for pain and redness.   Gastrointestinal: Positive for abdominal pain. Negative for constipation, diarrhea, heartburn, nausea, rectal pain and vomiting.   Endocrine: Positive for polydipsia. Negative for polyphagia.   Musculoskeletal: Positive for arthralgias, back pain, joint swelling, myalgias and neck pain.   Neurological: Positive for weakness and headaches. Negative for dizziness, tremors, seizures and numbness.   Psychiatric/Behavioral: Negative for hallucinations.           Objective       Vitals:  No vitals were obtained today due to virtual visit.           Video-Visit Details    Type of service:  Video Visit     Originating Location (pt. Location): Home    Distant Location (provider location):  Off-site  Platform used for Video Visit: [employer]  Answers submitted by the patient for this visit:  [facility] Decision Pace  Questionnaire (Submitted on 9/12/2023)  If you checked off any problems, how difficult have these problems made it for you to do your work, take care of things at home, or get along with other people?: Somewhat difficult  PHQ9 TOTAL SCORE: 4  YANIV-7 (Submitted on 9/12/2023)  YANIV 7 TOTAL SCORE: 2  Symptoms you have experienced in the last 30 days (Submitted on 9/12/2023)  General Symptoms: Yes  Skin Symptoms: No  HENT Symptoms: Yes  EYE SYMPTOMS: Yes  HEART SYMPTOMS: No  LUNG SYMPTOMS: No  INTESTINAL SYMPTOMS: Yes  URINARY SYMPTOMS: No  GYNECOLOGIC SYMPTOMS: No  BREAST SYMPTOMS: No  SKELETAL SYMPTOMS: Yes  BLOOD SYMPTOMS: No  NERVOUS SYSTEM SYMPTOMS: Yes  MENTAL HEALTH SYMPTOMS: No  Please answer the questions below to tell us what conditions you are experiencing: (Submitted on 9/12/2023)  Congestion: Yes   Voice hoarseness: Yes  Tooth pain: No  Gum tenderness: No  Bleeding gums: No  Change in taste: No  Change in sense of smell: Yes  Dry mouth: Yes  Hearing aid used: No  Neck lump: No  Please answer the questions below to tell us what conditions you are experiencing: (Submitted on 9/12/2023)  Loss of appetite: Yes  Weight loss: No  Weight gain: No  Night sweats: No  Increased stress: No  Feeling hot or cold when others believe the temperature is normal: Yes  Loss of height: No  Post-operative complications: No  Surgical site pain: No  Change in or Loss of Energy: No  Hyperactivity: No  Confusion: No  Please answer the questions below to tell us what conditions you are experiencing: (Submitted on 9/12/2023)  Vision loss: No  Dry eyes: Yes  Watery eyes: Yes  Eye bulging: No  Double vision: No  Flashing of lights: Yes  Spots: Yes  Floaters: Yes  Crossed eyes: No  Tunnel Vision: No  Yellowing of eyes: No  Eye irritation: Yes  Please answer the questions below to tell us what conditions you are experiencing: (Submitted on 9/12/2023)  Bloating: No  Blood in stool: No  Black stools: No  Fecal incontinence: No  Yellowing of  skin or eyes: No  Vomit with blood: No  Change in stools: No  Please answer the questions below to tell us what condition you are experiencing: (Submitted on 9/12/2023)  Bone pain: No  Muscle cramps: Yes  Muscle weakness: Yes  Joint stiffness: Yes  Bone fracture: No  Please answer the questions below to tell us what condition you are experiencing: (Submitted on 9/12/2023)  Trouble with coordination: Yes  Fainting or black-out spells: No  Memory loss: No  Speech problems: No  Tingling: No  Difficulty walking: Yes  Paralysis: No        Again, thank you for allowing me to participate in the care of your patient.      Sincerely,    Yolie Lozano MD

## 2023-09-15 ENCOUNTER — MYC MEDICAL ADVICE (OUTPATIENT)
Dept: OPHTHALMOLOGY | Facility: CLINIC | Age: 43
End: 2023-09-15
Payer: COMMERCIAL

## 2023-09-15 NOTE — TELEPHONE ENCOUNTER
Patient was scheduled incorrectly she was suppose to follow up with  per last not from .     Thanks

## 2023-09-15 NOTE — TELEPHONE ENCOUNTER
Spoke to pt after review with Dr. Rader's team    Pt to f/U in acute clinic this week and first available with Dr. Boyce (no scheduling with Dr. Rader indicated)    Pt currently scheduled 10- with Dr. Boyce    Spoke to pt at 1220 and updated pt on appointment recommendations    Pt prefers scheduling next weeks Thursday in AM.    Pt states eyes stable    Reviewed important to attend f/U exam and will evaluate inflammation and likely make adjustments to treatment.    Pt verbally demonstrated understanding and aware to call for any worsening symptoms/vision changes    Scheduled next Thursday at 0900 at B location and pt aware of date/time/location    Kodi Olivas RN 12:25 PM 09/15/23

## 2023-09-21 ENCOUNTER — OFFICE VISIT (OUTPATIENT)
Dept: OPHTHALMOLOGY | Facility: CLINIC | Age: 43
End: 2023-09-21
Attending: OPHTHALMOLOGY
Payer: COMMERCIAL

## 2023-09-21 DIAGNOSIS — H20.00 ACUTE ANTERIOR UVEITIS OF RIGHT EYE: Primary | ICD-10-CM

## 2023-09-21 DIAGNOSIS — H52.203 MYOPIA OF BOTH EYES WITH ASTIGMATISM: ICD-10-CM

## 2023-09-21 DIAGNOSIS — H52.13 MYOPIA OF BOTH EYES WITH ASTIGMATISM: ICD-10-CM

## 2023-09-21 PROCEDURE — 99214 OFFICE O/P EST MOD 30 MIN: CPT | Mod: GC | Performed by: OPHTHALMOLOGY

## 2023-09-21 PROCEDURE — G0463 HOSPITAL OUTPT CLINIC VISIT: HCPCS

## 2023-09-21 ASSESSMENT — REFRACTION_WEARINGRX
OD_CYLINDER: +0.25
OS_SPHERE: -8.50
OS_AXIS: 180
OD_AXIS: 124
SPECS_TYPE: SVL
OD_SPHERE: -7.25
OS_CYLINDER: +0.75

## 2023-09-21 ASSESSMENT — CONF VISUAL FIELD
OS_SUPERIOR_NASAL_RESTRICTION: 0
OD_INFERIOR_TEMPORAL_RESTRICTION: 0
OD_SUPERIOR_NASAL_RESTRICTION: 0
OD_SUPERIOR_TEMPORAL_RESTRICTION: 0
METHOD: COUNTING FINGERS
OD_NORMAL: 1
OD_INFERIOR_NASAL_RESTRICTION: 0
OS_NORMAL: 1
OS_INFERIOR_NASAL_RESTRICTION: 0
OS_INFERIOR_TEMPORAL_RESTRICTION: 0
OS_SUPERIOR_TEMPORAL_RESTRICTION: 0

## 2023-09-21 ASSESSMENT — TONOMETRY
OD_IOP_MMHG: 15
IOP_METHOD: ICARE
OS_IOP_MMHG: 15

## 2023-09-21 ASSESSMENT — EXTERNAL EXAM - RIGHT EYE: OD_EXAM: NORMAL

## 2023-09-21 ASSESSMENT — VISUAL ACUITY
OD_CC: 20/40
OD_PH_CC: 20/20
OS_CC: 20/20
CORRECTION_TYPE: GLASSES
OD_PH_CC+: -3
METHOD: SNELLEN - LINEAR

## 2023-09-21 ASSESSMENT — SLIT LAMP EXAM - LIDS
COMMENTS: NORMAL
COMMENTS: NORMAL

## 2023-09-21 ASSESSMENT — EXTERNAL EXAM - LEFT EYE: OS_EXAM: NORMAL

## 2023-09-21 NOTE — NURSING NOTE
Chief Complaints and History of Present Illnesses   Patient presents with    Uveitis Follow-Up     Chief Complaint(s) and History of Present Illness(es)       Uveitis Follow-Up              Laterality: right eye    Associated symptoms: flashes.  Negative for eye pain and floaters    Treatments tried: eye drops              Comments    Here for uveitis follow up right eye. VA is about the same. Eye feels better. Stable flashes without floaters. Compliant with drops.    Doroteo ZEPEDA 8:50 AM September 21, 2023

## 2023-09-21 NOTE — PROGRESS NOTES
Ophthalmology Acute Clinic     Chief Complaint(s) and History of Present Illness(es)    No visit information to display         Presenting HPI:   Carrie Munoz is a 42 year old female with a history of anterior uveitis who presents for eye pain and redness. She was last seen by Dr. West for right eye anterior uveitis on 7/10/23. For her current episode, symptoms started around 2 week ago. She began experiencing right eye pain and tried using AT's and visine during this time without relief. Also using ibuprofen, which has kept pain at bay but it is still uncomfortable. Currently experiencing right eye pain, pain with EOMs, light sensitivity, FBS, flashes of light, floaters, and blurry vision. Denies discharge, loss of vision, or curtain sensation.     Of note, patient was seen for anterior uveitis symptoms in the same eye initially on 6/6/23. She denies any personal history of autoimmune disease. She does have joint issues in her ankles, knees, fingers, and lower back. Denies SOB.     Interval history (09/21/23)  States doing better overall. Using drops without issues though bothered by the blurry vision and photophobia caused by the cyclopentolate. Denies any ongoing pain or redness.     Lab Results   Component Value Date    A1C 5.4 03/17/2023       Past Ocular history:   - Glasses: Yes  - Contact lens wear: No  - Ocular medical history: High myopia  - Ocular surgical history: None  - Current eye drops: AT's, PF QID, cyclopentolate BID    PMH:   Past Medical History:   Diagnosis Date    Breast disorder     biopsy 2011(?) benign    Breast lump on right side at 2:30  o'clock position 05/04/2011    Cardiac abnormality     tachycardia during pregnancy    Depressive disorder     Fibromyalgia     Mental disorder     depression, anxiety    Sinus infection     Unspecified sinusitis (chronic)      FH: Glaucoma in mother and maternal grandfather  Maternal grandmother - RA  Paternal cousins - lupus    Review of systems  for the eyes was negative other than the pertinent positives/negatives listed in the HPI.      Imaging:   None    Assessment & Plan      Carrie Munoz is a 42 year old female with the following diagnoses:   1. Acute anterior uveitis of right eye    2. Myopia of both eyes with astigmatism      - Second episode of anterior uveitis of the right eye in the past three months (first episode in June)   - Symptoms and exam significantly improved  - Given second episode, systemic work up initiated but pending; patient has an appt with lab tomorrow    - RPR, TB, ACE, Lysozyme, ROBERTO, HLA-B27, CXR    Plan  - Start Pred Forte taper (3 times daily for 1 week, 2 times daily for 1 week, once daily for 1 week)  - Discontinue cyclopentolate  - Follow up RPR, TB, ACE, Lysozyme, ROBERTO, HLA-B27, CXR    Patient disposition:   Return in about 4 weeks (around 10/19/2023) for appointment with Dr. Boyce .    Patient seen with Dr. Galdamez    Thank you for entrusting us with your care  Tracy Busch MD  Resident Physician, PGY-2  Department of Ophthalmology    Attending Physician Attestation:  Complete documentation of historical and exam elements from today's encounter can be found in the full encounter summary report (not reduplicated in this progress note).  I personally obtained the chief complaint(s) and history of present illness.  I confirmed and edited as necessary the review of systems, past medical/surgical history, family history, social history, and examination findings as documented by others; and I examined the patient myself.  I personally reviewed the relevant tests, images, and reports as documented above.  I formulated and edited as necessary the assessment and plan and discussed the findings and management plan with the patient and family. . - Oliverio Galdamez MD

## 2023-09-21 NOTE — PATIENT INSTRUCTIONS
STOP the cyclopentolate ()    DECREASE the prednisolone acetate (pink cap) in the following manner:  - DECREASE to THREE TIMES DAILY for one week  - then TWO TIMES DAILY for one week  - then ONCE DAILY for one week  - THEN STOP

## 2023-09-22 ENCOUNTER — ANCILLARY PROCEDURE (OUTPATIENT)
Dept: GENERAL RADIOLOGY | Facility: CLINIC | Age: 43
End: 2023-09-22
Payer: COMMERCIAL

## 2023-09-22 ENCOUNTER — LAB (OUTPATIENT)
Dept: LAB | Facility: CLINIC | Age: 43
End: 2023-09-22
Payer: COMMERCIAL

## 2023-09-22 DIAGNOSIS — H20.00 ACUTE ANTERIOR UVEITIS OF RIGHT EYE: ICD-10-CM

## 2023-09-22 PROCEDURE — 85549 MURAMIDASE: CPT | Mod: 90 | Performed by: PATHOLOGY

## 2023-09-22 PROCEDURE — 86038 ANTINUCLEAR ANTIBODIES: CPT

## 2023-09-22 PROCEDURE — 99000 SPECIMEN HANDLING OFFICE-LAB: CPT | Performed by: PATHOLOGY

## 2023-09-22 PROCEDURE — 82164 ANGIOTENSIN I ENZYME TEST: CPT | Mod: 90 | Performed by: PATHOLOGY

## 2023-09-22 PROCEDURE — 71046 X-RAY EXAM CHEST 2 VIEWS: CPT | Mod: GC | Performed by: STUDENT IN AN ORGANIZED HEALTH CARE EDUCATION/TRAINING PROGRAM

## 2023-09-22 PROCEDURE — 36415 COLL VENOUS BLD VENIPUNCTURE: CPT | Performed by: PATHOLOGY

## 2023-09-22 PROCEDURE — 86780 TREPONEMA PALLIDUM: CPT

## 2023-09-22 PROCEDURE — 86481 TB AG RESPONSE T-CELL SUSP: CPT

## 2023-09-23 LAB — T PALLIDUM AB SER QL: NONREACTIVE

## 2023-09-24 LAB
ACE SERPL-CCNC: 39 U/L
QUANTIFERON MITOGEN: 10 IU/ML
QUANTIFERON NIL TUBE: 0.03 IU/ML
QUANTIFERON TB1 TUBE: 0.04 IU/ML
QUANTIFERON TB2 TUBE: 0.04

## 2023-09-24 NOTE — TELEPHONE ENCOUNTER
RECORDS RECEIVED FROM:    REASON FOR VISIT: Headaches    Date of Appt: 11/20/2023   NOTES (FOR ALL VISITS) STATUS DETAILS   OFFICE NOTE from referring provider Omar Lozano-5/10/2023   OFFICE NOTE from other specialist     DISCHARGE SUMMARY from hospital     DISCHARGE REPORT from the ER     OPERATIVE REPORT     [facility] ([facility] ONLY)     EMG     EEG     MEDICATION LIST     IMAGING  (FOR ALL VISITS)     LUMBAR PUNCTURE     ELLIOT SCAN (MOVEMENT)     ULTRASOUND (CAROTID BILAT) *VASCULAR*     MRI (HEAD, NECK, SPINE) No Images     CT (HEAD, NECK, SPINE) No Images           [Initial Visit ___] : [unfilled] is here today for an initial visit  for [unfilled]

## 2023-09-25 LAB
ANA SER QL IF: NEGATIVE
GAMMA INTERFERON BACKGROUND BLD IA-ACNC: 0.03 IU/ML
M TB IFN-G BLD-IMP: NEGATIVE
M TB IFN-G CD4+ BCKGRND COR BLD-ACNC: 9.97 IU/ML
MITOGEN IGNF BCKGRD COR BLD-ACNC: 0.01 IU/ML
MITOGEN IGNF BCKGRD COR BLD-ACNC: 0.01 IU/ML

## 2023-09-26 LAB — LYSOZYME SERPL-MCNC: 2.33 UG/ML

## 2023-10-16 ENCOUNTER — MYC REFILL (OUTPATIENT)
Dept: PHYSICAL MEDICINE AND REHAB | Facility: CLINIC | Age: 43
End: 2023-10-16
Payer: COMMERCIAL

## 2023-10-16 DIAGNOSIS — F90.0 ATTENTION DEFICIT HYPERACTIVITY DISORDER (ADHD), PREDOMINANTLY INATTENTIVE TYPE: Primary | ICD-10-CM

## 2023-10-17 ENCOUNTER — OFFICE VISIT (OUTPATIENT)
Dept: OPHTHALMOLOGY | Facility: CLINIC | Age: 43
End: 2023-10-17
Attending: OPHTHALMOLOGY
Payer: COMMERCIAL

## 2023-10-17 DIAGNOSIS — H20.00 ACUTE ANTERIOR UVEITIS OF RIGHT EYE: Primary | ICD-10-CM

## 2023-10-17 DIAGNOSIS — H35.413 BILATERAL RETINAL LATTICE DEGENERATION: ICD-10-CM

## 2023-10-17 PROCEDURE — 99214 OFFICE O/P EST MOD 30 MIN: CPT | Performed by: OPHTHALMOLOGY

## 2023-10-17 PROCEDURE — 99213 OFFICE O/P EST LOW 20 MIN: CPT | Performed by: OPHTHALMOLOGY

## 2023-10-17 RX ORDER — METHYLPHENIDATE HYDROCHLORIDE 5 MG/1
5 TABLET ORAL 2 TIMES DAILY
Qty: 60 TABLET | Refills: 0 | Status: SHIPPED | OUTPATIENT
Start: 2023-10-17 | End: 2023-12-02

## 2023-10-17 RX ORDER — PREDNISOLONE ACETATE 10 MG/ML
SUSPENSION/ DROPS OPHTHALMIC
Qty: 5 ML | Refills: 2 | Status: SHIPPED | OUTPATIENT
Start: 2023-10-17 | End: 2024-02-06

## 2023-10-17 ASSESSMENT — VISUAL ACUITY
CORRECTION_TYPE: GLASSES
METHOD: SNELLEN - LINEAR
OD_CC+: -1
OS_CC: 20/20
OD_CC: 20/20
OS_CC+: -1

## 2023-10-17 ASSESSMENT — REFRACTION_WEARINGRX
OS_SPHERE: -8.50
OS_AXIS: 180
OS_CYLINDER: +0.75
SPECS_TYPE: SVL
OD_AXIS: 124
OD_CYLINDER: +0.25
OD_SPHERE: -7.25

## 2023-10-17 ASSESSMENT — EXTERNAL EXAM - RIGHT EYE: OD_EXAM: NORMAL

## 2023-10-17 ASSESSMENT — CONF VISUAL FIELD
OD_SUPERIOR_TEMPORAL_RESTRICTION: 0
OD_NORMAL: 1
OD_INFERIOR_NASAL_RESTRICTION: 0
OS_NORMAL: 1
OS_INFERIOR_NASAL_RESTRICTION: 0
OS_SUPERIOR_TEMPORAL_RESTRICTION: 0
OS_INFERIOR_TEMPORAL_RESTRICTION: 0
OS_SUPERIOR_NASAL_RESTRICTION: 0
OD_SUPERIOR_NASAL_RESTRICTION: 0
METHOD: COUNTING FINGERS
OD_INFERIOR_TEMPORAL_RESTRICTION: 0

## 2023-10-17 ASSESSMENT — EXTERNAL EXAM - LEFT EYE: OS_EXAM: NORMAL

## 2023-10-17 ASSESSMENT — TONOMETRY
OS_IOP_MMHG: 18
IOP_METHOD: TONOPEN
OD_IOP_MMHG: 19

## 2023-10-17 ASSESSMENT — CUP TO DISC RATIO
OS_RATIO: 0.4
OD_RATIO: 0.4

## 2023-10-17 ASSESSMENT — SLIT LAMP EXAM - LIDS
COMMENTS: NORMAL
COMMENTS: NORMAL

## 2023-10-17 NOTE — PROGRESS NOTES
"Chief Complaint/Presenting Concern:Uveitis evaluation    History of Present Illness:   Carrie Munoz is a 43 year old patient who presents for follow up of uveitis flare in the right eye. Her uveitis had initially started in June (6/3/23) per patient with associated eye pain, she went to the urgent care and was sent to the ED thinking she had glaucoma, but was diagnosed with uveitis of the right eye. She took prednisone drops and did the taper, improved for about 3 weeks. Then had another flare in the right eye at the end of August that she described as much worse than the first time, with associated pain that felt like \"sandpaper rubbing on the eye\", was seen on 9/7/23 and then followed up 9/21/23. At that time, topical steroid was recommended to be tapered.    She did another taper of PredForte, which she finished 2 days ago, her cyclopentolate was discontinued at that time. They also ordered systemic labs: - RPR, Quantiferon , ACE, Lysozyme, ROBERTO, HLA-B27, CXR, which came back within normal limits.      Neri eports seeing some floaters in both eyes. These were gone while she was taking drops, but once she has finished the drops (this time and previously the floaters return). There is no eye pain.     Additional Ocular History:   Wears glasses   No eye injuries    Relevant Past Medical/Family/Social History:  Prior breast biopsy which was negative for cancer  No history of diabetes, thyroid disease  Seasonal allergies, worst when seasons change.   She previously reported joint issues in her ankles, knees, fingers, and lower back, but denies history of autoimmune disease and tests/CT scans. This has been evaluated by Physicians who did not feel this was inflammatory disease and was determined to be Fibromyalgia. Gabapentin was not helpful. Physical therapy was tried initially.CBD/Epsom salt baths were tried subsequently. Takes medical cannabis nightly CBD/THC and this really helps.   Joint pains diffusely " with COVID and vaccines (Moderna). Also Long COVID issues  Headaches better with Ritalin    Family history of glaucoma in maternal grandfather.Has a dog. No woodsy exposures although there was one tick found.    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     Relevant Review of Systems: Had a sinus infection in early September. O current joint issues even with recent boosters. No rashes, no trouble breathing.     Laboratory Testing  9/2023: Normal/negative: RPR, TB, ACE, Lysozyme, ROBERTO, Quantiferon  HLA-B27  9/2023: Chest x-ray was negative/normal     9/2018: Nedra/negative: B27, ESR, ROBERTO, RF, CCP, CRP, B27    Current eye related medications: Completed prednisolone daily two days ago for right eye, Bio Tears    Retina/Uveitis Imaging:None today    Assessment:    1. Acute anterior uveitis of right eye  There was one cell seen on exam of the right eye today, essentially inactive    2. Bilateral retinal lattice degeneration  No breaks on dilated exam right eye, none on undilated exam left eye     Plan/Recommendations:  Discussed findings with patient. Given no active flare after she has tapered her PredForte drops on Sunday (2 days ago), she can continue to monitor for any changes.   There are lattice changes, but no obvious breaks/detachment. We can monitor at this time. We discussed symptoms of retinal tears such as floaters/flashing lights and to call/message for such symptoms.  Eye pressure is normal in each eye and optic nerves healthy  Recommend additional diagnostic testing: Lyme testing on day of Neurology visit. If you want to schedule on the same day as Neurology later that afternoon.   We discussed Rheumatology if interested in the future. At this time, the current regimen for joint symptoms is keeping symptoms well controlled and recent labs negative for systemic inflammatory therapy  Continue to monitor without steroid drops at this time. If symptoms recur, you can start the steroid drop (prednisolone) 3x/day  for 3 days. Call/message if things come up     RTC Friday, December 1 tonopen, no dilation, no testing    Physician Attestation     Attending Physician Attestation:  Complete documentation of historical and exam elements from today's encounter can be found in the full encounter summary report (not reduplicated in this progress note). I personally obtained the chief complaint(s) and history of present illness. I confirmed and edited as necessary the review of systems, past medical/surgical history, family history, social history, and examination findings as documented by others; and I examined the patient myself. I personally reviewed the relevant tests, images, and reports as documented above. I formulated and edited as necessary the assessment and plan and discussed the findings and management plan with the patient and any family members present at the time of the visit.  Festus Boyce M.D., Uveitis and Medical Retina, October 17, 2023

## 2023-10-17 NOTE — TELEPHONE ENCOUNTER
Refill request received from patient via [employer] refill request    Medication: methylphenidate (Ritalin) 5 mg tablet  Directions: Take 1 tablet by mouth 2 times daily     Last ordered: 9/13/23   Qty: #60  RF: 0  Last OV: 9/13/23  Next OV: None at this time    Routing to Dr. Lozano to review and sign if appropriate.    Margi Matute, RN Care Coordinator  [facility]   [facility] main phone # [phone] fax # [phone]

## 2023-10-17 NOTE — LETTER
"10/17/2023       RE: Carrie Munoz  [address] [facility] [place] [address]     Dear Colleague,    Thank you for referring your patient, Carrie Munoz, to the [facility] [place] at [facility]. Please see a copy of my visit note below.    Chief Complaint/Presenting Concern:Uveitis evaluation    History of Present Illness:   Carrie Munoz is a 43 year old patient who presents for follow up of uveitis flare in the right eye. Her uveitis had initially started in June (6/3/23) per patient with associated eye pain, she went to the urgent care and was sent to the ED thinking she had glaucoma, but was diagnosed with uveitis of the right eye. She took prednisone drops and did the taper, improved for about 3 weeks. Then had another flare in the right eye at the end of August that she described as much worse than the first time, with associated pain that felt like \"sandpaper rubbing on the eye\", was seen on 9/7/23 and then followed up 9/21/23. At that time, topical steroid was recommended to be tapered.    She did another taper of PredForte, which she finished 2 days ago, her cyclopentolate was discontinued at that time. They also ordered systemic labs: - RPR, Quantiferon , ACE, Lysozyme, ROBERTO, HLA-B27, CXR, which came back within normal limits.      Neri eports seeing some floaters in both eyes. These were gone while she was taking drops, but once she has finished the drops (this time and previously the floaters return). There is no eye pain.     Additional Ocular History:   Wears glasses   No eye injuries    Relevant Past Medical/Family/Social History:  Prior breast biopsy which was negative for cancer  No history of diabetes, thyroid disease  Seasonal allergies, worst when seasons change.     She previously reported joint issues in her ankles, knees, fingers, and lower back, but denies history of autoimmune disease and tests/CT scans. This has " been evaluated by Physicians who did not feel this was inflammatory disease and was determined to be Fibromyalgia. Gabapentin was not helpful. Physical therapy was tried initially.CBD/Epsom salt baths were tried subsequently. Takes medical cannabis nightly CBD/THC and this really helps.   Joint pains diffusely with COVID and vaccines (Moderna). Also Long COVID issues  Headaches better with Ritalin    Family history of glaucoma in maternal grandfather.Has a dog. No woodsy exposures although there was one tick found.    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     Relevant Review of Systems: Had a sinus infection in early September. O current joint issues even with recent boosters. No rashes, no trouble breathing.     Laboratory Testing  9/2023: Normal/negative: RPR, TB, ACE, Lysozyme, ROBERTO, Quantiferon  HLA-B27  9/2023: Chest x-ray was negative/normal     9/2018: Nedra/negative: B27, ESR, ROBERTO, RF, CCP, CRP, B27    Current eye related medications: Completed prednisolone daily two days ago for right eye, Bio Tears    Retina/Uveitis Imaging:None today    Assessment:    1. Acute anterior uveitis of right eye  There was one cell seen on exam of the right eye today, essentially inactive    2. Bilateral retinal lattice degeneration  No breaks on dilated exam right eye, none on undilated exam left eye     Plan/Recommendations:  Discussed findings with patient. Given no active flare after she has tapered her PredForte drops on Sunday (2 days ago), she can continue to monitor for any changes.   There are lattice changes, but no obvious breaks/detachment. We can monitor at this time. We discussed symptoms of retinal tears such as floaters/flashing lights and to call/message for such symptoms.  Eye pressure is normal in each eye and optic nerves healthy  Recommend additional diagnostic testing: Lyme testing on day of Neurology visit. If you want to schedule on the same day as Neurology later that afternoon.   We discussed  Rheumatology if interested in the future. At this time, the current regimen for joint symptoms is keeping symptoms well controlled and recent labs negative for systemic inflammatory therapy  Continue to monitor without steroid drops at this time. If symptoms recur, you can start the steroid drop (prednisolone) 3x/day for 3 days. Call/message if things come up     RTC Friday, December 1 tonopen, no dilation, no testing      Physician Attestation    Attending Physician Attestation:  Complete documentation of historical and exam elements from today's encounter can be found in the full encounter summary report (not reduplicated in this progress note). I personally obtained the chief complaint(s) and history of present illness. I confirmed and edited as necessary the review of systems, past medical/surgical history, family history, social history, and examination findings as documented by others; and I examined the patient myself. I personally reviewed the relevant tests, images, and reports as documented above. I formulated and edited as necessary the assessment and plan and discussed the findings and management plan with the patient and any family members present at the time of the visit.  Festus Boyce M.D., Uveitis and Medical Retina, October 17, 2023     Again, thank you for allowing me to participate in the care of your patient.      Sincerely,    Festus Boyce MD  [facility] Dept of Ophthalmology  Uveitis and Medical Retina

## 2023-10-17 NOTE — NURSING NOTE
Chief Complaints and History of Present Illnesses   Patient presents with    Uveitis Evaluation     Chief Complaint(s) and History of Present Illness(es)       Uveitis Evaluation              Laterality: right eye    Onset: gradual    Onset: months ago    Quality: Feels the va has improved      Severity: moderate    Frequency: intermittently    Associated symptoms: floaters.  Negative for photophobia and flashes    Treatments tried: eye drops    Pain scale: 0/10              Comments    Here for Acute anterior uveitis of right eye  Prednisolone last taken Sunday  Marysol Castillo COT 8:41 AM October 17, 2023

## 2023-10-17 NOTE — PATIENT INSTRUCTIONS
Recommend additional diagnostic testing: Lyme testing on day of Neurology visit. If you want to schedule on the same day as Neurology later that afternoon.   We discussed Rheumatology if interested in the future. At this time, the current regimen for joint symptoms is keeping symptoms well controlled and recent labs negative for systemic inflammatory therapy  Continue to monitor without steroid drops at this time. If symptoms recur, you can start the steroid drop (prednisolone) 3x/day for 3 days. Call/message if things come up

## 2023-10-27 ENCOUNTER — ALLIED HEALTH/NURSE VISIT (OUTPATIENT)
Dept: FAMILY MEDICINE | Facility: CLINIC | Age: 43
End: 2023-10-27
Payer: COMMERCIAL

## 2023-10-27 DIAGNOSIS — Z23 ENCOUNTER FOR IMMUNIZATION: Primary | ICD-10-CM

## 2023-10-27 PROCEDURE — 90686 IIV4 VACC NO PRSV 0.5 ML IM: CPT

## 2023-10-27 PROCEDURE — 99207 PR NO CHARGE NURSE ONLY: CPT

## 2023-10-27 PROCEDURE — 91320 SARSCV2 VAC 30MCG TRS-SUC IM: CPT

## 2023-10-27 PROCEDURE — 90480 ADMN SARSCOV2 VAC 1/ONLY CMP: CPT

## 2023-10-27 PROCEDURE — 90471 IMMUNIZATION ADMIN: CPT

## 2023-11-20 ENCOUNTER — PRE VISIT (OUTPATIENT)
Dept: NEUROLOGY | Facility: CLINIC | Age: 43
End: 2023-11-20

## 2023-11-20 ENCOUNTER — OFFICE VISIT (OUTPATIENT)
Dept: NEUROLOGY | Facility: CLINIC | Age: 43
End: 2023-11-20
Attending: PHYSICAL MEDICINE & REHABILITATION
Payer: COMMERCIAL

## 2023-11-20 VITALS
DIASTOLIC BLOOD PRESSURE: 86 MMHG | WEIGHT: 247 LBS | OXYGEN SATURATION: 95 % | SYSTOLIC BLOOD PRESSURE: 135 MMHG | BODY MASS INDEX: 36.46 KG/M2 | HEART RATE: 73 BPM

## 2023-11-20 DIAGNOSIS — G43.719 INTRACTABLE CHRONIC MIGRAINE WITHOUT AURA AND WITHOUT STATUS MIGRAINOSUS: ICD-10-CM

## 2023-11-20 DIAGNOSIS — G43.009 MIGRAINE WITHOUT AURA AND WITHOUT STATUS MIGRAINOSUS, NOT INTRACTABLE: Primary | ICD-10-CM

## 2023-11-20 PROCEDURE — 99202 OFFICE O/P NEW SF 15 MIN: CPT | Performed by: NURSE PRACTITIONER

## 2023-11-20 RX ORDER — ONDANSETRON 4 MG/1
4 TABLET, ORALLY DISINTEGRATING ORAL EVERY 8 HOURS PRN
Qty: 20 TABLET | Refills: 3 | Status: SHIPPED | OUTPATIENT
Start: 2023-11-20

## 2023-11-20 RX ORDER — SUMATRIPTAN 50 MG/1
50 TABLET, FILM COATED ORAL
Qty: 12 TABLET | Refills: 9 | Status: SHIPPED | OUTPATIENT
Start: 2023-11-20

## 2023-11-20 ASSESSMENT — HEADACHE IMPACT TEST (HIT 6)
HIT6 TOTAL SCORE: 66
WHEN YOU HAVE HEADACHES HOW OFTEN IS THE PAIN SEVERE: SOMETIMES
HOW OFTEN HAVE YOU FELT FED UP OR IRRITATED BECAUSE OF YOUR HEADACHES: VERY OFTEN
HOW OFTEN DO HEADACHES LIMIT YOUR DAILY ACTIVITIES: VERY OFTEN
HOW OFTEN DID HEADACHS LIMIT CONCENTRATION ON WORK OR DAILY ACTIVITY: VERY OFTEN
WHEN YOU HAVE A HEADACHE HOW OFTEN DO YOU WISH YOU COULD LIE DOWN: ALWAYS
HOW OFTEN HAVE YOU FELT TOO TIRED TO WORK BECAUSE OF YOUR HEADACHES: SOMETIMES

## 2023-11-20 ASSESSMENT — PAIN SCALES - GENERAL: PAINLEVEL: SEVERE PAIN (6)

## 2023-11-20 NOTE — LETTER
11/20/2023       RE: Carrie Munoz  [address] [facility] [address]     Dear Colleague,    Thank you for referring your patient, Carrie Munoz, to the [facility]. Please see a copy of my visit note below.    ASSESSMENT AND PLAN:  Post COVID headaches   In the last month 2-4 severe headaches   Preexisting history of migraines  Family history of migraines  Non focal exam today    Plan:  Rescue treatment -a trial of sumatriptan as needed for severe headache onset- start when you feel headache is coming Limit use to no more than 9 days per month. Side effects-dizziness, fatigue, stop if any chest pain    OTC medications-ibuprofen-limit use of ibuprofen to no more than 14 days per month. Ok to take it the same day with sumatriptan.   Ondansetron as needed for nausea   Would wait with imaging for now-headaches improved and would wait for now unless getting worse or any new symptoms  Prevention -may need to consider preventative treatment for frequent headaches and not responding to treatment   May try vit B2 200 mg daily for headache prevention   Follow up in 3-6 months or sooner if needed         Subjective  Carrie is a 43 year old, presenting for the following health issues:  Consult (New headache)  long COVID syndrome, history of headaches.  Her symptoms of long COVID syndrome include brain fog and fatigue.   HPI   History of migraines in the past-severe and could not get out of bed   Headaches worsen in the past year.   Pain is localized to the right side and top. Pain dull and if does not treat it -than headache is extreme with throbbing, nausea, and has to get back to home and go to sleep. Light and noise sensitivity-intensified post COVID.   Visual aura -none but wavy lines always   Headache-water, ibuprofen and sleep  Since starting ritalin headaches improved  In the last month 2-4 severe headaches but  not as much daily headaches. In overall headaches are better.   Takes ibuprofen when headache feels dull pain and about once per week. Ibuprofen works. Eye issues -for the last 4 months was dealing with uveitis -prednisone drops and ibuprofen.   Family History:sister gets migraines       Objective   /86 (BP Location: Right arm, Patient Position: Sitting, Cuff Size: Adult Large)   Pulse 73   Wt 112 kg (247 lb)   SpO2 95%   BMI 36.46 kg/m    Body mass index is 36.46 kg/m .  Physical Exam   GENERAL: healthy, alert and no distress  EYES: Eyes grossly normal to inspection, PERRL and conjunctivae and sclerae normal  NECK: no adenopathy, no asymmetry, masses, or scars and thyroid normal to palpation  MS: no gross musculoskeletal defects noted, no edema  NEURO: Normal strength and tone, sensory exam grossly normal, mentation intact, speech normal, and cranial nerves 2-12 intact  PSYCH: mentation appears normal, affect normal      I discussed all my recommendations with Carrie Munoz who verbalizes understanding and comfortable with the plan.      28 minutes spent on the date of the encounter doing FACE TO FACE visit  chart  review, exam, results review,  meds review, treatment plan, documentation and further activities as noted above          Again, thank you for allowing me to participate in the care of your patient.      Sincerely,    YASMEEN Chavez CNP

## 2023-11-20 NOTE — PROGRESS NOTES
ASSESSMENT AND PLAN:  Post COVID headaches   In the last month 2-4 severe headaches   Preexisting history of migraines  Family history of migraines  Non focal exam today    Plan:  Rescue treatment -a trial of sumatriptan as needed for severe headache onset- start when you feel headache is coming Limit use to no more than 9 days per month. Side effects-dizziness, fatigue, stop if any chest pain    OTC medications-ibuprofen-limit use of ibuprofen to no more than 14 days per month. Ok to take it the same day with sumatriptan.   Ondansetron as needed for nausea   Would wait with imaging for now-headaches improved and would wait for now unless getting worse or any new symptoms  Prevention -may need to consider preventative treatment for frequent headaches and not responding to treatment   May try vit B2 200 mg daily for headache prevention   Follow up in 3-6 months or sooner if needed         Subjective   Carrie is a 43 year old, presenting for the following health issues:  Consult (New headache)  long COVID syndrome, history of headaches.  Her symptoms of long COVID syndrome include brain fog and fatigue.   HPI   History of migraines in the past-severe and could not get out of bed   Headaches worsen in the past year.   Pain is localized to the right side and top. Pain dull and if does not treat it -than headache is extreme with throbbing, nausea, and has to get back to home and go to sleep. Light and noise sensitivity-intensified post COVID.   Visual aura -none but wavy lines always   Headache-water, ibuprofen and sleep  Since starting ritalin headaches improved  In the last month 2-4 severe headaches but not as much daily headaches. In overall headaches are better.   Takes ibuprofen when headache feels dull pain and about once per week. Ibuprofen works. Eye issues -for the last 4 months was dealing with uveitis -prednisone drops and ibuprofen.   Family History:sister gets migraines       Objective    /86 (BP  Location: Right arm, Patient Position: Sitting, Cuff Size: Adult Large)   Pulse 73   Wt 112 kg (247 lb)   SpO2 95%   BMI 36.46 kg/m    Body mass index is 36.46 kg/m .  Physical Exam   GENERAL: healthy, alert and no distress  EYES: Eyes grossly normal to inspection, PERRL and conjunctivae and sclerae normal  NECK: no adenopathy, no asymmetry, masses, or scars and thyroid normal to palpation  MS: no gross musculoskeletal defects noted, no edema  NEURO: Normal strength and tone, sensory exam grossly normal, mentation intact, speech normal, and cranial nerves 2-12 intact  PSYCH: mentation appears normal, affect normal      I discussed all my recommendations with Carrie Munoz who verbalizes understanding and comfortable with the plan.      28 minutes spent on the date of the encounter doing FACE TO FACE visit  chart  review, exam, results review,  meds review, treatment plan, documentation and further activities as noted above    YASMEEN Isabel, CNP [facility]  Headache certified  [facility]

## 2023-11-20 NOTE — PATIENT INSTRUCTIONS
Plan:  Rescue treatment -a trial of sumatriptan as needed for severe headache onset- start when you feel headache is coming Limit use to no more than 9 days per month. Side effects-dizziness, fatigue, stop if any chest pain    OTC medications-ibuprofen-limit use of ibuprofen to no more than 14 days per month. Ok to take it the same day with sumatriptan.   Ondansetron as needed for nausea   Would wait with imaging for now-headaches improved and would wait for now unless getting worse or any new symptoms  Prevention -may need to consider preventative treatment for frequent headaches and not responding to treatment   May try vit B2 200 mg daily for headache prevention   Follow up in 3-6 months or sooner if needed

## 2023-11-22 ENCOUNTER — TELEPHONE (OUTPATIENT)
Dept: NEUROLOGY | Facility: CLINIC | Age: 43
End: 2023-11-22
Payer: COMMERCIAL

## 2023-11-22 NOTE — TELEPHONE ENCOUNTER
Left Voicemail (1st Attempt) and Sent [employer] (1st Attempt) for the patient to call back and schedule the following:    Appointment type: return headache   Provider: Ramon Asher CNP  Return date: 3-6 months   Specialty phone number: [phone]  Additional appointment(s) needed: N/A  Additonal Notes: LVM, and sent Aydin Slaughter on 11/22/2023 at 12:12 PM

## 2023-11-27 ENCOUNTER — TELEPHONE (OUTPATIENT)
Dept: NEUROLOGY | Facility: CLINIC | Age: 43
End: 2023-11-27
Payer: COMMERCIAL

## 2023-12-16 ENCOUNTER — MYC REFILL (OUTPATIENT)
Dept: PHYSICAL MEDICINE AND REHAB | Facility: CLINIC | Age: 43
End: 2023-12-16
Payer: COMMERCIAL

## 2023-12-16 DIAGNOSIS — F90.0 ATTENTION DEFICIT HYPERACTIVITY DISORDER (ADHD), PREDOMINANTLY INATTENTIVE TYPE: ICD-10-CM

## 2023-12-18 NOTE — TELEPHONE ENCOUNTER
"Refill request received from patient via [employer] refill request    Medication: methylphenidate (RITALIN) 5 MG tablet   Directions: Take 1 tablet (5 mg) by mouth 2 times daily      Last ordered: 12/4/23   Qty: #60  RF: 0  Last OV: 9/13/23  Next OV: 1/3/24    Patient comment noted, \"I didnt get notified the meds were ready so they got canceled can you please send back to the pharmacy\"    Routing to Dr. Lozano to review and sign if appropriate.    Margi Matute, RN Care Coordinator  [facility]   [facility] main phone # [phone] fax # [phone]    "

## 2023-12-19 RX ORDER — METHYLPHENIDATE HYDROCHLORIDE 5 MG/1
5 TABLET ORAL 2 TIMES DAILY
Qty: 60 TABLET | Refills: 0 | Status: SHIPPED | OUTPATIENT
Start: 2023-12-19 | End: 2024-02-01

## 2024-01-02 SDOH — SOCIAL STABILITY: SOCIAL NETWORK: I HAVE TROUBLE DOING ALL OF THE FAMILY ACTIVITIES THAT I WANT TO DO: RARELY

## 2024-01-02 SDOH — SOCIAL STABILITY: SOCIAL NETWORK

## 2024-01-02 SDOH — SOCIAL STABILITY: SOCIAL NETWORK: I HAVE TROUBLE DOING ALL OF THE ACTIVITIES WITH FRIENDS THAT I WANT TO DO: RARELY

## 2024-01-02 SDOH — SOCIAL STABILITY: SOCIAL NETWORK: I HAVE TROUBLE DOING ALL OF MY USUAL WORK (INCLUDE WORK AT HOME): RARELY

## 2024-01-02 SDOH — SOCIAL STABILITY: SOCIAL NETWORK: PROMIS ABILITY TO PARTICIPATE IN SOCIAL ROLES & ACTIVITIES T-SCORE: 54

## 2024-01-02 SDOH — SOCIAL STABILITY: SOCIAL NETWORK: I HAVE TROUBLE DOING ALL OF MY REGULAR LEISURE ACTIVITIES WITH OTHERS: NEVER

## 2024-01-02 ASSESSMENT — ANXIETY QUESTIONNAIRES
4. TROUBLE RELAXING: NOT AT ALL
3. WORRYING TOO MUCH ABOUT DIFFERENT THINGS: NOT AT ALL
GAD7 TOTAL SCORE: 1
7. FEELING AFRAID AS IF SOMETHING AWFUL MIGHT HAPPEN: NOT AT ALL
IF YOU CHECKED OFF ANY PROBLEMS ON THIS QUESTIONNAIRE, HOW DIFFICULT HAVE THESE PROBLEMS MADE IT FOR YOU TO DO YOUR WORK, TAKE CARE OF THINGS AT HOME, OR GET ALONG WITH OTHER PEOPLE: NOT DIFFICULT AT ALL
1. FEELING NERVOUS, ANXIOUS, OR ON EDGE: NOT AT ALL
8. IF YOU CHECKED OFF ANY PROBLEMS, HOW DIFFICULT HAVE THESE MADE IT FOR YOU TO DO YOUR WORK, TAKE CARE OF THINGS AT HOME, OR GET ALONG WITH OTHER PEOPLE?: NOT DIFFICULT AT ALL
2. NOT BEING ABLE TO STOP OR CONTROL WORRYING: NOT AT ALL
6. BECOMING EASILY ANNOYED OR IRRITABLE: SEVERAL DAYS
GAD7 TOTAL SCORE: 1
7. FEELING AFRAID AS IF SOMETHING AWFUL MIGHT HAPPEN: NOT AT ALL
GAD7 TOTAL SCORE: 1
5. BEING SO RESTLESS THAT IT IS HARD TO SIT STILL: NOT AT ALL

## 2024-01-02 ASSESSMENT — PATIENT HEALTH QUESTIONNAIRE - PHQ9
10. IF YOU CHECKED OFF ANY PROBLEMS, HOW DIFFICULT HAVE THESE PROBLEMS MADE IT FOR YOU TO DO YOUR WORK, TAKE CARE OF THINGS AT HOME, OR GET ALONG WITH OTHER PEOPLE: NOT DIFFICULT AT ALL
SUM OF ALL RESPONSES TO PHQ QUESTIONS 1-9: 0
SUM OF ALL RESPONSES TO PHQ QUESTIONS 1-9: 0

## 2024-01-03 ENCOUNTER — VIRTUAL VISIT (OUTPATIENT)
Dept: PHYSICAL MEDICINE AND REHAB | Facility: CLINIC | Age: 44
End: 2024-01-03
Payer: COMMERCIAL

## 2024-01-03 VITALS — BODY MASS INDEX: 36.58 KG/M2 | WEIGHT: 247 LBS | HEIGHT: 69 IN

## 2024-01-03 DIAGNOSIS — E55.9 VITAMIN D DEFICIENCY: Primary | ICD-10-CM

## 2024-01-03 PROCEDURE — 99214 OFFICE O/P EST MOD 30 MIN: CPT | Mod: 95 | Performed by: PHYSICAL MEDICINE & REHABILITATION

## 2024-01-03 RX ORDER — ERGOCALCIFEROL 1.25 MG/1
50000 CAPSULE, LIQUID FILLED ORAL WEEKLY
Qty: 30 CAPSULE | Refills: 1 | Status: SHIPPED | OUTPATIENT
Start: 2024-01-03 | End: 2024-06-10

## 2024-01-03 ASSESSMENT — PAIN SCALES - GENERAL: PAINLEVEL: MILD PAIN (2)

## 2024-01-03 NOTE — LETTER
1/3/2024       RE: Carrie Munoz  [address] [facility] [address]     Dear Colleague,    Thank you for referring your patient, Carrie Munoz, to the [facility] [place] [facility]. Please see a copy of my visit note below.    Carrie is a 43 year old who is being evaluated via a billable video visit.      How would you like to obtain your AVS? MyChart  If the video visit is dropped, the invitation should be resent by: Text to cell phone: [phone]  Will anyone else be joining your video visit? No        ASSSSMENT AND PLAN   Carrie Munoz is a 43 year old female who presents with LC in the setting of fibromyalgia. She is very well with the addition of ritalin, likely had ADHD which was undiagnosed.     We discussed strategies around taking ritalin 5 mg BID when she starts evening classes, and how to maintain sleep hours.  Would recommend delaying the timing of the ritalin by 2-3 hours to accommodate the evening classes.   Also may need to sleep in for an hour more till 7 am possibly or take naps if the number of hours she sleeps for is reduced due to the classes.   Recommend monitoring fatigue levels.   Mental health scores are remarkable.   Her fatigue has resolved.   She will be seen by neuropsychologist next week.   Mental health scores are remarkable.   Reviewed labs  Vitamin B 6,12,2 are within normal limits     Vitamin D def: She is vitamin D deficient, her labs show a level of 11. Recommend supplementing with 50,000 units weekly. Prescription given.         Follow up in 3 months     Yolie Lozano MD, [facility]   Department of Rehabilitation           HPI   Current Symptoms:  Notes improving symptoms  Headache much improved with ritalin.   She notes that her vision gets blurred and this is around the menstrual periods. She was seen by ophthalmology and given steroids. Not certain if the  uvietis autoimmune.     Guanfacine was not helpful, instead made the HA worse.     She notes that the fatigue has resolved. She states that she detox by stopping medications and a combination of water plus apple cider, she felt severe fatigue.  She has a history of fibromyalgia diagnosed in 2018, she notes that it not severe and does not impact her daily life.     She is planning to go back to school and is planning for night classes, and she is concerned.         1/2/2024     4:29 PM   PHQ Assesment Total Score(s)   PHQ-9 Score 0         1/2/2024     4:30 PM   YANIV-7 Results   YANIV 7 TOTAL SCORE 1 (minimal anxiety)   YANIV-7 Total Score 1         1/2/2024     4:31 PM   PTSD Screen Score   Have you ever experienced this kind of event? Yes   PTSD Screen (Score of 3 or more suggests positive screen) 3         1/2/2024     4:34 PM   PROMIS-29   PROMIS Physical Function T-Score 42 (mild dysfunction)   PROMIS Anxiety T-Score 40 (within normal limits)   PROMIS Depression T-Score 49 (within normal limits)   PROMIS Fatigue T-Score 40 (within normal limits)   PROMIS Sleep Disturbance T-Score 51 (within normal limits)   PROMIS Ability to Participate in Social Roles & Activities T-Score 54 (within normal limits)   PROMIS Pain Interference T-Score 56 (mild)   PROMIS Pain Intensity 4         Past Medical History:   Diagnosis Date    Breast disorder     biopsy 2011(?) benign    Breast lump on right side at 2:30  o'clock position 05/04/2011    Cardiac abnormality     tachycardia during pregnancy    Depressive disorder     Fibromyalgia     Mental disorder     depression, anxiety    Sinus infection     Unspecified sinusitis (chronic)              Social History     Tobacco Use    Smoking status: Never     Passive exposure: Never    Smokeless tobacco: Never   Vaping Use    Vaping Use: Never used   Substance Use Topics    Alcohol use: Yes     Comment: couples times per month    Drug use: Yes     Types: Marijuana     Comment: daily for  fibromyalgia         Current Outpatient Medications:     cetirizine (ZYRTEC) 10 MG tablet, Take 1 tablet (10 mg) by mouth 2 times daily, Disp: 180 tablet, Rfl: 1    fluticasone (FLONASE) 50 MCG/ACT nasal spray, Spray 1 spray into both nostrils 2 times daily, Disp: 11.1 mL, Rfl: 3    guanFACINE (TENEX) 1 MG tablet, Take 1 tablet (1 mg) by mouth At Bedtime (Patient not taking: Reported on 11/20/2023), Disp: 31 tablet, Rfl: 1    ibuprofen (ADVIL/MOTRIN) 600 MG tablet, TAKE 1 TABLET BY MOUTH EVERY 6 HOURS AS NEEDED FOR MODERATE PAIN, Disp: 120 tablet, Rfl: 1    methylphenidate (RITALIN) 5 MG tablet, Take 1 tablet (5 mg) by mouth 2 times daily, Disp: 60 tablet, Rfl: 0    methylphenidate (RITALIN) 5 MG tablet, Take 1 tablet (5 mg) by mouth 2 times daily, Disp: 30 tablet, Rfl: 0    ondansetron (ZOFRAN ODT) 4 MG ODT tab, Take 1 tablet (4 mg) by mouth every 8 hours as needed for nausea, Disp: 20 tablet, Rfl: 3    prednisoLONE acetate (PRED FORTE) 1 % ophthalmic suspension, For flares: Use 1 drop in right eye 3x/day for 3 days. Call/message if things are not better., Disp: 5 mL, Rfl: 2    SUMAtriptan (IMITREX) 50 MG tablet, Take 1 tablet (50 mg) by mouth at onset of headache for migraine May repeat in 2 hours. Max 4 tablets/24 hours., Disp: 12 tablet, Rfl: 9      Review of Systems   Constitutional, HEENT, cardiovascular, pulmonary, gi and gu systems are negative, except as otherwise noted.  Denies any palpitations   Sleeping okay   Fatigue is resolved while on ritalin   No concerns with bowel and bladder.      Objective    Vitals - Patient Reported  Pain Score: Mild Pain (2)  Pain Loc: Eye (right)        Physical Exam   GENERAL: Healthy, alert and no distress  EYES: Eyes grossly normal to inspection.  No discharge or erythema, or obvious scleral/conjunctival abnormalities.  RESP: No audible wheeze, cough, or visible cyanosis.  No visible retractions or increased work of breathing.    SKIN: Visible skin clear. No significant  rash, abnormal pigmentation or lesions.  NEURO: Cranial nerves grossly intact.  Mentation and speech appropriate for age.  PSYCH: Mentation appears normal, affect normal/bright, judgement and insight intact, normal speech and appearance well-groomed.        Answers submitted by the patient for this visit:  Patient Health Questionnaire (Submitted on 1/2/2024)  If you checked off any problems, how difficult have these problems made it for you to do your work, take care of things at home, or get along with other people?: Not difficult at all  PHQ9 TOTAL SCORE: 0  YANIV-7 (Submitted on 1/2/2024)  YANIV 7 TOTAL SCORE: 1        Again, thank you for allowing me to participate in the care of your patient.      Sincerely,    Yolie Lozano MD

## 2024-01-03 NOTE — PROGRESS NOTES
Carrie is a 43 year old who is being evaluated via a billable video visit.      How would you like to obtain your AVS? MyChart  If the video visit is dropped, the invitation should be resent by: Text to cell phone: [phone]  Will anyone else be joining your video visit? No        ASSSSMENT AND PLAN   Carrie Munoz is a 43 year old female who presents with LC in the setting of fibromyalgia. She is very well with the addition of ritalin, likely had ADHD which was undiagnosed.     We discussed strategies around taking ritalin 5 mg BID when she starts evening classes, and how to maintain sleep hours.  Would recommend delaying the timing of the ritalin by 2-3 hours to accommodate the evening classes.   Also may need to sleep in for an hour more till 7 am possibly or take naps if the number of hours she sleeps for is reduced due to the classes.   Recommend monitoring fatigue levels.   Mental health scores are remarkable.   Her fatigue has resolved.   She will be seen by neuropsychologist next week.   Mental health scores are remarkable.   Reviewed labs  Vitamin B 6,12,2 are within normal limits     Vitamin D def: She is vitamin D deficient, her labs show a level of 11. Recommend supplementing with 50,000 units weekly. Prescription given.         Follow up in 3 months     Yolie Lozano MD, A   Department of Rehabilitation           HPI   Current Symptoms:  Notes improving symptoms  Headache much improved with ritalin.   She notes that her vision gets blurred and this is around the menstrual periods. She was seen by ophthalmology and given steroids. Not certain if the uvietis autoimmune.     Guanfacine was not helpful, instead made the HA worse.     She notes that the fatigue has resolved. She states that she detox by stopping medications and a combination of water plus apple cider, she felt severe fatigue.  She has a history of fibromyalgia diagnosed in 2018, she notes that it not severe and does not  impact her daily life.     She is planning to go back to school and is planning for night classes, and she is concerned.         1/2/2024     4:29 PM   PHQ Assesment Total Score(s)   PHQ-9 Score 0         1/2/2024     4:30 PM   YANIV-7 Results   YANIV 7 TOTAL SCORE 1 (minimal anxiety)   YANIV-7 Total Score 1         1/2/2024     4:31 PM   PTSD Screen Score   Have you ever experienced this kind of event? Yes   PTSD Screen (Score of 3 or more suggests positive screen) 3         1/2/2024     4:34 PM   PROMIS-29   PROMIS Physical Function T-Score 42 (mild dysfunction)   PROMIS Anxiety T-Score 40 (within normal limits)   PROMIS Depression T-Score 49 (within normal limits)   PROMIS Fatigue T-Score 40 (within normal limits)   PROMIS Sleep Disturbance T-Score 51 (within normal limits)   PROMIS Ability to Participate in Social Roles & Activities T-Score 54 (within normal limits)   PROMIS Pain Interference T-Score 56 (mild)   PROMIS Pain Intensity 4         Past Medical History:   Diagnosis Date     Breast disorder     biopsy 2011(?) benign     Breast lump on right side at 2:30  o'clock position 05/04/2011     Cardiac abnormality     tachycardia during pregnancy     Depressive disorder      Fibromyalgia      Mental disorder     depression, anxiety     Sinus infection      Unspecified sinusitis (chronic)              Social History     Tobacco Use     Smoking status: Never     Passive exposure: Never     Smokeless tobacco: Never   Vaping Use     Vaping Use: Never used   Substance Use Topics     Alcohol use: Yes     Comment: couples times per month     Drug use: Yes     Types: Marijuana     Comment: daily for fibromyalgia         Current Outpatient Medications:      cetirizine (ZYRTEC) 10 MG tablet, Take 1 tablet (10 mg) by mouth 2 times daily, Disp: 180 tablet, Rfl: 1     fluticasone (FLONASE) 50 MCG/ACT nasal spray, Spray 1 spray into both nostrils 2 times daily, Disp: 11.1 mL, Rfl: 3     guanFACINE (TENEX) 1 MG tablet, Take 1  tablet (1 mg) by mouth At Bedtime (Patient not taking: Reported on 11/20/2023), Disp: 31 tablet, Rfl: 1     ibuprofen (ADVIL/MOTRIN) 600 MG tablet, TAKE 1 TABLET BY MOUTH EVERY 6 HOURS AS NEEDED FOR MODERATE PAIN, Disp: 120 tablet, Rfl: 1     methylphenidate (RITALIN) 5 MG tablet, Take 1 tablet (5 mg) by mouth 2 times daily, Disp: 60 tablet, Rfl: 0     methylphenidate (RITALIN) 5 MG tablet, Take 1 tablet (5 mg) by mouth 2 times daily, Disp: 30 tablet, Rfl: 0     ondansetron (ZOFRAN ODT) 4 MG ODT tab, Take 1 tablet (4 mg) by mouth every 8 hours as needed for nausea, Disp: 20 tablet, Rfl: 3     prednisoLONE acetate (PRED FORTE) 1 % ophthalmic suspension, For flares: Use 1 drop in right eye 3x/day for 3 days. Call/message if things are not better., Disp: 5 mL, Rfl: 2     SUMAtriptan (IMITREX) 50 MG tablet, Take 1 tablet (50 mg) by mouth at onset of headache for migraine May repeat in 2 hours. Max 4 tablets/24 hours., Disp: 12 tablet, Rfl: 9      Review of Systems   Constitutional, HEENT, cardiovascular, pulmonary, gi and gu systems are negative, except as otherwise noted.  Denies any palpitations   Sleeping okay   Fatigue is resolved while on ritalin   No concerns with bowel and bladder.       Objective    Vitals - Patient Reported  Pain Score: Mild Pain (2)  Pain Loc: Eye (right)        Physical Exam   GENERAL: Healthy, alert and no distress  EYES: Eyes grossly normal to inspection.  No discharge or erythema, or obvious scleral/conjunctival abnormalities.  RESP: No audible wheeze, cough, or visible cyanosis.  No visible retractions or increased work of breathing.    SKIN: Visible skin clear. No significant rash, abnormal pigmentation or lesions.  NEURO: Cranial nerves grossly intact.  Mentation and speech appropriate for age.  PSYCH: Mentation appears normal, affect normal/bright, judgement and insight intact, normal speech and appearance well-groomed.            Video-Visit Details    Type of service:  Video Visit      Originating Location (pt. Location): Home    Distant Location (provider location):  Off-site  Platform used for Video Visit: [employer]    Answers submitted by the patient for this visit:  Patient Health Questionnaire (Submitted on 1/2/2024)  If you checked off any problems, how difficult have these problems made it for you to do your work, take care of things at home, or get along with other people?: Not difficult at all  PHQ9 TOTAL SCORE: 0  YANIV-7 (Submitted on 1/2/2024)  YANIV 7 TOTAL SCORE: 1

## 2024-01-03 NOTE — NURSING NOTE
Is the patient currently in the state of [place]? YES    Visit mode:VIDEO    If the visit is dropped, the patient can be reconnected by: VIDEO VISIT: Text to cell phone:   Telephone Information:   Mobile [phone]       Will anyone else be joining the visit? NO  (If patient encounters technical issues they should call [phone] :[id number])    How would you like to obtain your AVS? MyChart    Are changes needed to the allergy or medication list? No    Reason for visit: Follow Up    Sumaya DAVID    Pt has new insurance. Does not have card with her at this time. Gave pt reg phone number. Pt agrees to call after appt.

## 2024-01-08 ENCOUNTER — VIRTUAL VISIT (OUTPATIENT)
Dept: PSYCHOLOGY | Facility: CLINIC | Age: 44
End: 2024-01-08
Payer: COMMERCIAL

## 2024-01-08 DIAGNOSIS — F32.5 DEPRESSION, MAJOR, IN REMISSION (H): Primary | ICD-10-CM

## 2024-01-08 PROCEDURE — 90791 PSYCH DIAGNOSTIC EVALUATION: CPT | Mod: 95 | Performed by: PSYCHOLOGIST

## 2024-01-08 ASSESSMENT — COLUMBIA-SUICIDE SEVERITY RATING SCALE - C-SSRS
2. HAVE YOU ACTUALLY HAD ANY THOUGHTS OF KILLING YOURSELF?: NO
TOTAL  NUMBER OF ABORTED OR SELF INTERRUPTED ATTEMPTS LIFETIME: NO
1. HAVE YOU WISHED YOU WERE DEAD OR WISHED YOU COULD GO TO SLEEP AND NOT WAKE UP?: NO
TOTAL  NUMBER OF INTERRUPTED ATTEMPTS LIFETIME: NO
ATTEMPT LIFETIME: NO
6. HAVE YOU EVER DONE ANYTHING, STARTED TO DO ANYTHING, OR PREPARED TO DO ANYTHING TO END YOUR LIFE?: NO

## 2024-01-08 NOTE — PROGRESS NOTES
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 that patient call 911 or go to the local ED should there be a change in any of these risk factors..          Report to child / adult protection services was NA.      4. Resources/Service Plan:    services are not indicated.   Modifications to assist communication are not indicated.   Additional disability accommodations are not indicated.      5. Collaboration:   Collaboration / coordination of treatment will be initiated with the following  support professionals: primary care physician.      6.  Referrals:   The following referral(s) will be initiated:  NA .       A Release of Information has been obtained for the following:  TBD if ROSA is warranted for therapist .     Clinical Substantiation/medical necessity for the above recommendations:  Medical necessity criteria is warranted in order to: Measure a psychological disorder and its severity and functional impairment to determine psychiatric diagnosis when a mental illness is suspected, or to achieve a differential diagnosis from a range of medical/psychological disorders that present with similar constellations of symptoms (e.g., determination and measurement of anxiety severity and impact in the presence of ongoing asthma or heart disease),  Perform symptom measurement to objectively measure treatment effectiveness and/or determine the need to refer for pharmacological treatment or other medical evaluation (e.g., based on severity and chronicity of symptoms), and Evaluate primary symptoms of impaired attention and concentration that can occur in many neurological and psychiatric conditions. .    7. HAKAN:    HAKAN:  Discussed the general effects of drugs and alcohol on health and well-being. Provider gave patient printed information about the  effects of chemical use on their health and well being. Recommendations:  NA.     8. Records:   These were reviewed at time of assessment.   Information in this assessment was obtained from the medical record and  provided by patient who is a good historian.    Patient will have open access to their mental health medical record.    9.   Interactive Complexity: No    10. Safety Plan:  Patient denied any current/recent/lifetime history of suicidal ideation and/or behaviors.  No safety plan indicated at this time.     Provider Name/ Credentials:  Shantal Villa, PhD, LP  January 8, 2024

## 2024-01-10 ENCOUNTER — APPOINTMENT (OUTPATIENT)
Dept: LAB | Facility: CLINIC | Age: 44
End: 2024-01-10
Payer: COMMERCIAL

## 2024-01-10 ENCOUNTER — OFFICE VISIT (OUTPATIENT)
Dept: MIDWIFE SERVICES | Facility: CLINIC | Age: 44
End: 2024-01-10
Payer: COMMERCIAL

## 2024-01-10 VITALS
OXYGEN SATURATION: 100 % | HEIGHT: 69 IN | WEIGHT: 242.2 LBS | SYSTOLIC BLOOD PRESSURE: 132 MMHG | BODY MASS INDEX: 35.87 KG/M2 | HEART RATE: 74 BPM | DIASTOLIC BLOOD PRESSURE: 83 MMHG

## 2024-01-10 DIAGNOSIS — Z12.4 SCREENING FOR MALIGNANT NEOPLASM OF CERVIX: Primary | ICD-10-CM

## 2024-01-10 DIAGNOSIS — Z11.3 ROUTINE SCREENING FOR STI (SEXUALLY TRANSMITTED INFECTION): ICD-10-CM

## 2024-01-10 DIAGNOSIS — H20.00 ACUTE ANTERIOR UVEITIS OF RIGHT EYE: ICD-10-CM

## 2024-01-10 LAB
CLUE CELLS: NORMAL
TRICHOMONAS, WET PREP: NORMAL
WBC'S/HIGH POWER FIELD, WET PREP: NORMAL
YEAST, WET PREP: NORMAL

## 2024-01-10 PROCEDURE — 87491 CHLMYD TRACH DNA AMP PROBE: CPT | Performed by: ADVANCED PRACTICE MIDWIFE

## 2024-01-10 PROCEDURE — 99213 OFFICE O/P EST LOW 20 MIN: CPT | Performed by: ADVANCED PRACTICE MIDWIFE

## 2024-01-10 PROCEDURE — 87624 HPV HI-RISK TYP POOLED RSLT: CPT | Performed by: ADVANCED PRACTICE MIDWIFE

## 2024-01-10 PROCEDURE — 87389 HIV-1 AG W/HIV-1&-2 AB AG IA: CPT | Performed by: ADVANCED PRACTICE MIDWIFE

## 2024-01-10 PROCEDURE — 87210 SMEAR WET MOUNT SALINE/INK: CPT | Performed by: ADVANCED PRACTICE MIDWIFE

## 2024-01-10 PROCEDURE — 86803 HEPATITIS C AB TEST: CPT | Performed by: ADVANCED PRACTICE MIDWIFE

## 2024-01-10 PROCEDURE — 87340 HEPATITIS B SURFACE AG IA: CPT | Performed by: ADVANCED PRACTICE MIDWIFE

## 2024-01-10 PROCEDURE — 86618 LYME DISEASE ANTIBODY: CPT | Performed by: ADVANCED PRACTICE MIDWIFE

## 2024-01-10 PROCEDURE — 87591 N.GONORRHOEAE DNA AMP PROB: CPT | Performed by: ADVANCED PRACTICE MIDWIFE

## 2024-01-10 PROCEDURE — G0123 SCREEN CERV/VAG THIN LAYER: HCPCS | Performed by: ADVANCED PRACTICE MIDWIFE

## 2024-01-10 PROCEDURE — 86780 TREPONEMA PALLIDUM: CPT | Performed by: ADVANCED PRACTICE MIDWIFE

## 2024-01-10 PROCEDURE — 36415 COLL VENOUS BLD VENIPUNCTURE: CPT | Performed by: ADVANCED PRACTICE MIDWIFE

## 2024-01-10 ASSESSMENT — ANXIETY QUESTIONNAIRES
3. WORRYING TOO MUCH ABOUT DIFFERENT THINGS: NOT AT ALL
7. FEELING AFRAID AS IF SOMETHING AWFUL MIGHT HAPPEN: NOT AT ALL
2. NOT BEING ABLE TO STOP OR CONTROL WORRYING: NOT AT ALL
1. FEELING NERVOUS, ANXIOUS, OR ON EDGE: NOT AT ALL
IF YOU CHECKED OFF ANY PROBLEMS ON THIS QUESTIONNAIRE, HOW DIFFICULT HAVE THESE PROBLEMS MADE IT FOR YOU TO DO YOUR WORK, TAKE CARE OF THINGS AT HOME, OR GET ALONG WITH OTHER PEOPLE: SOMEWHAT DIFFICULT
GAD7 TOTAL SCORE: 3
GAD7 TOTAL SCORE: 3
5. BEING SO RESTLESS THAT IT IS HARD TO SIT STILL: SEVERAL DAYS
6. BECOMING EASILY ANNOYED OR IRRITABLE: SEVERAL DAYS

## 2024-01-10 ASSESSMENT — PATIENT HEALTH QUESTIONNAIRE - PHQ9
SUM OF ALL RESPONSES TO PHQ QUESTIONS 1-9: 5
5. POOR APPETITE OR OVEREATING: SEVERAL DAYS

## 2024-01-10 NOTE — PROGRESS NOTES
Carrie is a 43 year old  female who presents for annual exam.     Menses are regular q 28-30 days and crampy, heavy, irregular, and painful lasting  3-5  days.  Menses flow: heavy and with clots.  Patient's last menstrual period was 2024 (within days).. Using abstinence for contraception.  She is not currently considering pregnancy. At the end of the visit asked about birth control. Reports migraines and reviewed progesterone only options. Patient desires to think about it and either send a [email or web address] message or come back for contraception.   Besides routine health maintenance,  she would like to discuss hysterectomy. Reports a history of uveitis that she has noticed worsen with her periods. Her mother had a hysterectomy around the same time. Patient will schedule with an OB MD to further discuss this option. Talked about an IUD also, patient states she had one imbedded and is not interested at this time.   GYNECOLOGIC HISTORY:  Menarche: 15  Age at first intercourse: 21 Number of lifetime partners: >6  Carrie is not sexually active with 1 female or male partner(s) and is not currently in monogamous relationship.    History sexually transmitted infections:HPV  STI testing offered?  Accepted  RADHA exposure: No  History of abnormal Pap smear: YES - updated in Problem List and Health Maintenance accordingly  Family history of breast CA: Yes (Please explain): Paternal aunt  Family history of uterine/ovarian CA: Unknown    Family history of colon CA: No    HEALTH MAINTENANCE:  Cholesterol: (  Cholesterol   Date Value Ref Range Status   2023 162 <200 mg/dL Final   2018 135 <200 mg/dL Final   10/22/2003 133 <200 mg/dL Final     Comment:     Cholesterol Reference Range:   <200  The NCEP recommends further         evaluation of:         1.  Patients with cholesterol             greater than 200 mg/dL             if additional risk factors             are present.         2.  All patients with a              cholesterol greater than             240 mg/dL.    History of abnormal lipids: No  Mammo: Yes . History of abnormal Mammo: YES.  Regular Self Breast Exams: No  Calcium/Vitamin D intake: source:  dairy, dietary supplement(s) Adequate? Yes  TSH: (  TSH   Date Value Ref Range Status   2023 1.45 0.30 - 4.20 uIU/mL Final   2017 0.78 0.40 - 4.00 mU/L Final    )  Pap; (  Lab Results   Component Value Date    PAP NIL 2017    PAP NIL 2010    )    HISTORY:  OB History    Para Term  AB Living   1 1 1 0 0 1   SAB IAB Ectopic Multiple Live Births   0 0 0 0 1      # Outcome Date GA Lbr Alexei/2nd Weight Sex Delivery Anes PTL Lv   1 Term 17 41w2d 02:05 / 04:09 4.564 kg (10 lb 1 oz) F Vag-Spont EPI, Nitrous N EFRAÍN      Complications: GBS      Name: TERRY RODRIGUES      Apgar1: 8  Apgar5: 9     Past Medical History:   Diagnosis Date    Breast disorder     biopsy (?) benign    Breast lump on right side at 2:30  o'clock position 2011    Cardiac abnormality     tachycardia during pregnancy    Depressive disorder     Fibromyalgia     Mental disorder     depression, anxiety    Sinus infection     Unspecified sinusitis (chronic)      Past Surgical History:   Procedure Laterality Date    BREAST EXCISIONAL BIOPSY Left     EXCISE GANGLION WRIST Left 2018    Procedure: EXCISE GANGLION WRIST;  Excision Left Dorsal Wirst Ganglion;  Surgeon: Randall Maradiaga MD;  Location:  OR     TOOTH EXTRACTION W/FORCEP      all 4 together     Family History   Problem Relation Age of Onset    Substance Abuse Mother     Depression Mother     Neurologic Disorder Mother         bell's palsy    Substance Abuse Father     Mental Illness Father     Neurologic Disorder Father         sheehan's palsy    Hypertension Father         not on medication, smoker    Hypertension Paternal Grandmother     Osteoporosis Paternal Grandmother     Substance Abuse Brother     Bipolar Disorder Brother      Substance Abuse Sister     Schizophrenia Sister     Hypertension Paternal Aunt      Social History     Socioeconomic History    Marital status: Single     Spouse name: None    Number of children: 0    Years of education: 13 1/2    Highest education level: None   Occupational History    Occupation: teller     Employer: [employer]   Tobacco Use    Smoking status: Never     Passive exposure: Never    Smokeless tobacco: Never   Vaping Use    Vaping Use: Never used   Substance and Sexual Activity    Alcohol use: Yes     Comment: couples times per month    Drug use: Yes     Types: Marijuana     Comment: daily for fibromyalgia    Sexual activity: Not Currently     Partners: Male     Birth control/protection: None   Other Topics Concern    Parent/sibling w/ CABG, MI or angioplasty before 65F 55M? No   Social History Narrative    Risk Behaviors & Healthy habits    Balanced Diet  no     Prevent Osteoporosis  yes - lots of dairy    Regular Exercise  no     Dental Care  yes    Seat Belt use  yes    Self Breast Exam  no     Sexually Active  no , no STD concerns, but would like to be checked     Contraception  yes    Abuse  no     Guns  no     Sun Screen  no             Working at [employer], 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  Lives with her aunt, and gets along well.  In limited contact with her mother, in contact with father a lot.  Born in [place], and moved to [facility]. age 5, [place] at age 11.  Father in the .                       Current Outpatient Medications:     cetirizine (ZYRTEC) 10 MG tablet, Take 1 tablet (10 mg) by mouth 2 times daily, Disp: 180 tablet, Rfl: 1    fluticasone (FLONASE) 50 MCG/ACT nasal spray, Spray 1 spray into both nostrils 2 times daily, Disp: 11.1 mL, Rfl: 3    ibuprofen (ADVIL/MOTRIN) 600 MG tablet, TAKE 1 TABLET BY MOUTH EVERY 6 HOURS AS NEEDED FOR MODERATE PAIN, Disp: 120 tablet, Rfl: 1    methylphenidate (RITALIN) 5 MG tablet, Take 1 tablet (5 mg) by mouth 2 times daily,  "Disp: 30 tablet, Rfl: 0    ondansetron (ZOFRAN ODT) 4 MG ODT tab, Take 1 tablet (4 mg) by mouth every 8 hours as needed for nausea, Disp: 20 tablet, Rfl: 3    prednisoLONE acetate (PRED FORTE) 1 % ophthalmic suspension, For flares: Use 1 drop in right eye 3x/day for 3 days. Call/message if things are not better., Disp: 5 mL, Rfl: 2    SUMAtriptan (IMITREX) 50 MG tablet, Take 1 tablet (50 mg) by mouth at onset of headache for migraine May repeat in 2 hours. Max 4 tablets/24 hours., Disp: 12 tablet, Rfl: 9    vitamin D2 (ERGOCALCIFEROL) 11562 units (1250 mcg) capsule, Take 1 capsule (50,000 Units) by mouth once a week, Disp: 30 capsule, Rfl: 1    guanFACINE (TENEX) 1 MG tablet, Take 1 tablet (1 mg) by mouth At Bedtime (Patient not taking: Reported on 11/20/2023), Disp: 31 tablet, Rfl: 1    methylphenidate (RITALIN) 5 MG tablet, Take 1 tablet (5 mg) by mouth 2 times daily (Patient not taking: Reported on 1/10/2024), Disp: 60 tablet, Rfl: 0     Allergies   Allergen Reactions    No Known Drug Allergy        Past medical, surgical, social and family history were reviewed and updated in EPIC.    ROS:   C:     NEGATIVE for fever, chills, change in weight  I:       NEGATIVE for worrisome rashes, moles or lesions  E:     NEGATIVE for vision changes or irritation  E/M: NEGATIVE for ear, mouth and throat problems  R:     NEGATIVE for significant cough or SOB  CV:   NEGATIVE for chest pain, palpitations or peripheral edema  GI:     NEGATIVE for nausea, abdominal pain, heartburn, or change in bowel habits  :   NEGATIVE for frequency, dysuria, hematuria, vaginal discharge, or irregular bleeding  M:     NEGATIVE for significant arthralgias or myalgia  N:      NEGATIVE for weakness, dizziness or paresthesias  E:      NEGATIVE for temperature intolerance, skin/hair changes  P:      NEGATIVE for changes in mood or affect.    EXAM:  /83   Pulse 74   Ht 1.753 m (5' 9\")   Wt 109.9 kg (242 lb 3.2 oz)   LMP 01/02/2024 (Within " Days)   SpO2 100%   BMI 35.77 kg/m     BMI: Body mass index is 35.77 kg/m .  Constitutional: healthy, alert and no distress  Head: Normocephalic. No masses, lesions, tenderness or abnormalities  Neck: Neck supple. Trachea midline. No adenopathy. Thyroid symmetric, normal size.   Cardiovascular: RRR.   Respiratory: Negative.   Breast: No nodularity, asymmetry or nipple discharge bilaterally.  Gastrointestinal: Abdomen soft, non-tender, non-distended. No masses, organomegaly.  :  Vulva:  No external lesions, normal female hair distribution, no inguinal adenopathy.    Urethra:  Midline, non-tender, well supported, no discharge  Vagina:  Moist, pink, no abnormal discharge, no lesions  Uterus:  Normal size , non-tender, freely mobile  Ovaries:  No masses appreciated, non-tender, mobile  Musculoskeletal: extremities normal  Skin: no suspicious lesions or rashes  Psychiatric: Affect appropriate, cooperative,mentation appears normal.     COUNSELING:   Reviewed preventive health counseling, as reflected in patient instructions       Contraception   reports that she has never smoked. She has never been exposed to tobacco smoke. She has never used smokeless tobacco.    ASSESSMENT:  43 year old female with satisfactory annual exam    1. Screening for malignant neoplasm of cervix  - Pap screen with HPV - recommended age 30 - 65 years; Future  - Pap screen with HPV - recommended age 30 - 65 years    2. Routine screening for STI (sexually transmitted infection)  - Chlamydia trachomatis/Neisseria gonorrhoeae by PCR  - Wet preparation  - Hepatitis B surface antigen  - Hepatitis C antibody  - HIV Antigen Antibody Combo Cascade  - Treponema Abs w Reflex to RPR and Titer    3. Acute anterior uveitis of right eye  - Lyme Disease Total Abs Bld with Reflex to Confirm CLIA  -ordered by a previous provider and plan for that provider to follow up on this lab     P:   Patient will meet with OB MD for further discussion on hysterectomy  options  Will call with any abnormal results  Patient may ask for POP or return for any other progesterone only birth control option  RTC for RHM or PRN    Vidya ARANA, HEBERTM, MPH

## 2024-01-11 LAB
B BURGDOR IGG+IGM SER QL: 0.26
C TRACH DNA SPEC QL PROBE+SIG AMP: NEGATIVE
HBV SURFACE AG SERPL QL IA: NONREACTIVE
HCV AB SERPL QL IA: NONREACTIVE
HIV 1+2 AB+HIV1 P24 AG SERPL QL IA: NONREACTIVE
N GONORRHOEA DNA SPEC QL NAA+PROBE: NEGATIVE
T PALLIDUM AB SER QL: NONREACTIVE

## 2024-01-12 LAB
BKR LAB AP GYN ADEQUACY: NORMAL
BKR LAB AP GYN INTERPRETATION: NORMAL
BKR LAB AP HPV REFLEX: NORMAL
BKR LAB AP LMP: NORMAL
BKR LAB AP PREVIOUS ABNL DX: NORMAL
BKR LAB AP PREVIOUS ABNORMAL: NORMAL
PATH REPORT.COMMENTS IMP SPEC: NORMAL
PATH REPORT.COMMENTS IMP SPEC: NORMAL
PATH REPORT.RELEVANT HX SPEC: NORMAL

## 2024-01-16 ENCOUNTER — DOCUMENTATION ONLY (OUTPATIENT)
Dept: PSYCHOLOGY | Facility: CLINIC | Age: 44
End: 2024-01-16
Payer: COMMERCIAL

## 2024-01-16 LAB
HUMAN PAPILLOMA VIRUS 16 DNA: NEGATIVE
HUMAN PAPILLOMA VIRUS 18 DNA: NEGATIVE
HUMAN PAPILLOMA VIRUS FINAL DIAGNOSIS: NORMAL
HUMAN PAPILLOMA VIRUS OTHER HR: NEGATIVE

## 2024-01-16 NOTE — PROGRESS NOTES
Client Name: Carrie Munoz  MRN:  [id number]  :  1980     Client completed the [place] Multiphasic Personality Inventory-3 (MMPI-3), a self-report personality inventory, as part of her evaluation. Validity scales indicate that the client was able to comprehend and respond relevantly to the test items. Client responded in an open and consistent manner, resulting in a valid profile. She reports diffuse cognitive difficulties including memory problems, and difficulties with attention and concentration. She has memory problems and a low tolerance for frustration. She feels she does not cope well with stress. Client reports engaging in problematic impulsive behavior. She reports engaging in acting out behavior and experiencing a heightened excitation and energy level.

## 2024-01-18 ENCOUNTER — PATIENT OUTREACH (OUTPATIENT)
Dept: MIDWIFE SERVICES | Facility: CLINIC | Age: 44
End: 2024-01-18
Payer: COMMERCIAL

## 2024-01-18 PROBLEM — R87.810 CERVICAL HIGH RISK HPV (HUMAN PAPILLOMAVIRUS) TEST POSITIVE: Status: ACTIVE | Noted: 2023-03-17

## 2024-01-23 ENCOUNTER — VIRTUAL VISIT (OUTPATIENT)
Dept: PSYCHOLOGY | Facility: CLINIC | Age: 44
End: 2024-01-23
Payer: COMMERCIAL

## 2024-01-23 DIAGNOSIS — F32.5 DEPRESSION, MAJOR, IN REMISSION (H): Primary | ICD-10-CM

## 2024-01-23 PROCEDURE — 90834 PSYTX W PT 45 MINUTES: CPT | Mod: 95 | Performed by: PSYCHOLOGIST
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
## 2024-02-01 ENCOUNTER — MYC REFILL (OUTPATIENT)
Dept: PHYSICAL MEDICINE AND REHAB | Facility: CLINIC | Age: 44
End: 2024-02-01
Payer: COMMERCIAL

## 2024-02-01 DIAGNOSIS — F90.0 ATTENTION DEFICIT HYPERACTIVITY DISORDER (ADHD), PREDOMINANTLY INATTENTIVE TYPE: ICD-10-CM

## 2024-02-02 NOTE — TELEPHONE ENCOUNTER
Refill request received from patient via [facility] refill request     Medication: methylphenidate (RITALIN) 5 MG tablet   Directions: Take 1 tablet (5 mg) by mouth 2 times daily      Last ordered: 12/19/23   Qty: #60  RF: 0  Last OV: 1/3/24  Next OV: 4/17/24     Routing to Dr. Lozano to review and sign if appropriate.     Margi Matute RN Care Coordinator  [facility]   [facility] main phone # [phone] fax # [phone]

## 2024-02-05 ENCOUNTER — MYC REFILL (OUTPATIENT)
Dept: PHYSICAL MEDICINE AND REHAB | Facility: CLINIC | Age: 44
End: 2024-02-05
Payer: COMMERCIAL

## 2024-02-05 DIAGNOSIS — F90.0 ATTENTION DEFICIT HYPERACTIVITY DISORDER (ADHD), PREDOMINANTLY INATTENTIVE TYPE: ICD-10-CM

## 2024-02-05 RX ORDER — METHYLPHENIDATE HYDROCHLORIDE 5 MG/1
5 TABLET ORAL 2 TIMES DAILY
Qty: 60 TABLET | Refills: 0 | Status: CANCELLED | OUTPATIENT
Start: 2024-02-05

## 2024-02-06 ENCOUNTER — OFFICE VISIT (OUTPATIENT)
Dept: OPHTHALMOLOGY | Facility: CLINIC | Age: 44
End: 2024-02-06
Attending: OPHTHALMOLOGY
Payer: COMMERCIAL

## 2024-02-06 ENCOUNTER — MYC REFILL (OUTPATIENT)
Dept: PHYSICAL MEDICINE AND REHAB | Facility: CLINIC | Age: 44
End: 2024-02-06

## 2024-02-06 DIAGNOSIS — F90.0 ATTENTION DEFICIT HYPERACTIVITY DISORDER (ADHD), PREDOMINANTLY INATTENTIVE TYPE: ICD-10-CM

## 2024-02-06 DIAGNOSIS — R41.89 BRAIN FOG: ICD-10-CM

## 2024-02-06 DIAGNOSIS — H20.00 ACUTE ANTERIOR UVEITIS OF RIGHT EYE: Primary | ICD-10-CM

## 2024-02-06 PROCEDURE — 99213 OFFICE O/P EST LOW 20 MIN: CPT | Performed by: OPHTHALMOLOGY

## 2024-02-06 RX ORDER — CYCLOPENTOLATE HYDROCHLORIDE 10 MG/ML
SOLUTION/ DROPS OPHTHALMIC
Qty: 1 ML | Refills: 0 | Status: SHIPPED | OUTPATIENT
Start: 2024-02-06

## 2024-02-06 RX ORDER — METHYLPHENIDATE HYDROCHLORIDE 5 MG/1
5 TABLET ORAL 2 TIMES DAILY
Qty: 30 TABLET | Refills: 0 | Status: CANCELLED | OUTPATIENT
Start: 2024-02-06

## 2024-02-06 RX ORDER — PREDNISOLONE ACETATE 10 MG/ML
SUSPENSION/ DROPS OPHTHALMIC
Qty: 5 ML | Refills: 4 | Status: SHIPPED | OUTPATIENT
Start: 2024-02-06

## 2024-02-06 ASSESSMENT — VISUAL ACUITY
OD_CC: 20/40
OS_CC+: +2
CORRECTION_TYPE: GLASSES
METHOD: SNELLEN - LINEAR
OS_CC: 20/25

## 2024-02-06 ASSESSMENT — EXTERNAL EXAM - RIGHT EYE: OD_EXAM: NORMAL

## 2024-02-06 ASSESSMENT — REFRACTION_WEARINGRX
OD_CYLINDER: +0.25
OS_CYLINDER: +0.75
OD_SPHERE: -7.25
OD_AXIS: 124
OS_AXIS: 180
OS_SPHERE: -8.50
SPECS_TYPE: SVL

## 2024-02-06 ASSESSMENT — EXTERNAL EXAM - LEFT EYE: OS_EXAM: NORMAL

## 2024-02-06 ASSESSMENT — SLIT LAMP EXAM - LIDS
COMMENTS: NORMAL
COMMENTS: NORMAL

## 2024-02-06 ASSESSMENT — CONF VISUAL FIELD
OD_INFERIOR_TEMPORAL_RESTRICTION: 0
OS_NORMAL: 1
OS_SUPERIOR_NASAL_RESTRICTION: 0
OS_INFERIOR_NASAL_RESTRICTION: 0
OD_INFERIOR_NASAL_RESTRICTION: 0
OD_NORMAL: 1
OS_INFERIOR_TEMPORAL_RESTRICTION: 0
OD_SUPERIOR_NASAL_RESTRICTION: 0
OS_SUPERIOR_TEMPORAL_RESTRICTION: 0
METHOD: COUNTING FINGERS
OD_SUPERIOR_TEMPORAL_RESTRICTION: 0

## 2024-02-06 ASSESSMENT — CUP TO DISC RATIO
OD_RATIO: 0.4
OS_RATIO: 0.4

## 2024-02-06 ASSESSMENT — TONOMETRY
IOP_METHOD: TONOPEN
OD_IOP_MMHG: 12
OS_IOP_MMHG: 13

## 2024-02-06 NOTE — TELEPHONE ENCOUNTER
Refill request received from patient via [facility] Refill request     Medication: methylphenidate (RITALIN) 5 MG tablet   Directions: Take 1 tablet (5 mg) by mouth 2 times daily      Last ordered: 12/19/23   Qty: #60  RF: 0  Last OV: 1/3/24  Next OV: 4/17/24    Routing to Dr. Lozano to review and sign if appropriate.    Margi Matute RN Care Coordinator  [facility]   [facility] main phone # [phone] fax # [phone]

## 2024-02-06 NOTE — PROGRESS NOTES
Chief Complaint/Presenting Concern:  Uveitis follow up    Interval History of Present Ocular Illness:  Carrie Munoz is a 43 year old patient who returns for follow up of her uveitis of the right eye. Last visit, we discussed using steroid drops right eye when needed and some lab testing.    Carrie reports an early flare at the start of her menstrual period in January 2024 and then again later in the month. Drops have seemed to  have helped tapering from 3x/day for one week, then 2x/day for one week, then 1x/day for one week. Carrie is wondering about next steps. No recent issues left eye.     Interval Updates to Medical/Family/Social History:    Will see OB next week to potentially discuss hysterectomy    Relevant Review of Systems Updates:  Some recent fibromyalgia symptoms but no major flares    Labs: 1/10/24: Normal/negative; Lyme, Treponema, HIV, Hep C, Hep B     Current eye related medications: No steroid drops in 4 days for right eye     Retina/Uveitis Imaging: None today    1. Acute anterior uveitis of right eye  Recent symptoms and inactive today    Plan/Recommendations:    Discussed findings with patient. There is no uveitis activity today and there have been two recent  flares right eye. We discussed continued drops as needed or daily maintenance drops. Given January may have been a rough month, we decided the safest thing may be to use drops as needed. IF there are more than 2-3 bad flares in the next few months , we might discuss other options at the next visit  Eye pressure is normal in each eye   Recommend additional testing: None at this time.   For flares, use the steroid drop (Prednisolone) 3x/day for 3 days in the right eye. Okay to use for a few weeks if needed  WE also sent a dilating drop called cyclopentolate to use in the right eye at night during flares  Message me if things are happening more often and we can do a check sooner    RTC July 2024 tonopen, no dilation, no  testing    Physician Attestation     Attending Physician Attestation:  Complete documentation of historical and exam elements from today's encounter can be found in the full encounter summary report (not reduplicated in this progress note). I personally obtained the chief complaint(s) and history of present illness. I confirmed and edited as necessary the review of systems, past medical/surgical history, family history, social history, and examination findings as documented by others; and I examined the patient myself. I personally reviewed the relevant tests, images, and reports as documented above. I formulated and edited as necessary the assessment and plan and discussed the findings and management plan with the patient and family members present at the time of this visit.  Festus Boyce M.D., Uveitis and Medical Retina, February 6, 2024

## 2024-02-06 NOTE — PATIENT INSTRUCTIONS
For flares, use the steroid drop (Prednisolone) 3x/day for 3 days in the right eye. Okay to use for a few weeks if needed  WE also sent a dilating drop called cyclopentolate to use in the right eye at night during flares  Message me if things are happening more often and we can do a check sooner

## 2024-02-06 NOTE — LETTER
2/6/2024       RE: Carrie Munoz  [address] [facility] [address]     Dear Colleague,    Thank you for referring your patient, Carrie Munoz, to the [facility] [place] at [facility]. Please see a copy of my visit note below.    Chief Complaint/Presenting Concern:  Uveitis follow up    Interval History of Present Ocular Illness:  Carrie Munoz is a 43 year old patient who returns for follow up of her uveitis of the right eye. Last visit, we discussed using steroid drops right eye when needed and some lab testing.    Carrie reports an early flare at the start of her menstrual period in January 2024 and then again later in the month. Drops have seemed to  have helped tapering from 3x/day for one week, then 2x/day for one week, then 1x/day for one week. Carrie is wondering about next steps. No recent issues left eye.     Interval Updates to Medical/Family/Social History:    Will see OB next week to potentially discuss hysterectomy    Relevant Review of Systems Updates:  Some recent fibromyalgia symptoms but no major flares    Labs: 1/10/24: Normal/negative; Lyme, Treponema, HIV, Hep C, Hep B     Current eye related medications: No steroid drops in 4 days for right eye     Retina/Uveitis Imaging: None today    1. Acute anterior uveitis of right eye  Recent symptoms and inactive today          Plan/Recommendations:    Discussed findings with patient. There is no uveitis activity today and there have been two recent  flares right eye. We discussed continued drops as needed or daily maintenance drops. Given January may have been a rough month, we decided the safest thing may be to use drops as needed. IF there are more than 2-3 bad flares in the next few months , we might discuss other options at the next visit  Eye pressure is normal in each eye   Recommend additional testing: None at this time.   For flares, use the steroid drop  (Prednisolone) 3x/day for 3 days in the right eye. Okay to use for a few weeks if needed  We also sent a dilating drop called cyclopentolate to use in the right eye at night during flares  Message me if things are happening more often and we can do a check sooner    RT July 2024 tonopen, no dilation, no testing    Physician Attestation    Attending Physician Attestation:  Complete documentation of historical and exam elements from today's encounter can be found in the full encounter summary report (not reduplicated in this progress note). I personally obtained the chief complaint(s) and history of present illness. I confirmed and edited as necessary the review of systems, past medical/surgical history, family history, social history, and examination findings as documented by others; and I examined the patient myself. I personally reviewed the relevant tests, images, and reports as documented above. I formulated and edited as necessary the assessment and plan and discussed the findings and management plan with the patient and family members present at the time of this visit.  Festus Boyce M.D., Uveitis and Medical Retina, February 6, 2024     Again, thank you for allowing me to participate in the care of your patient.      Sincerely,    Festus Boyce MD  [facility] Dept of Ophthalmology  Uveitis and Medical Retina

## 2024-02-06 NOTE — NURSING NOTE
Chief Complaints and History of Present Illnesses   Patient presents with    Uveitis Follow-Up     3 month follow up      Chief Complaint(s) and History of Present Illness(es)       Uveitis Follow-Up              Comments: 3 month follow up               Comments    Pt states vision in RE appears blurry for the past 1.5 weeks. Stingy, burning and achy eye pain in RE when pt has a flare up.  Still seeing floaters, more noticeable with flare ups.    KENDRA Sandoval February 6, 2024 8:54 AM

## 2024-02-07 RX ORDER — METHYLPHENIDATE HYDROCHLORIDE 5 MG/1
5 TABLET ORAL 2 TIMES DAILY
Qty: 60 TABLET | Refills: 0 | Status: SHIPPED | OUTPATIENT
Start: 2024-02-07 | End: 2024-02-11

## 2024-02-11 ENCOUNTER — MYC REFILL (OUTPATIENT)
Dept: PHYSICAL MEDICINE AND REHAB | Facility: CLINIC | Age: 44
End: 2024-02-11
Payer: COMMERCIAL

## 2024-02-11 ENCOUNTER — MYC REFILL (OUTPATIENT)
Dept: OPHTHALMOLOGY | Facility: CLINIC | Age: 44
End: 2024-02-11
Payer: COMMERCIAL

## 2024-02-11 DIAGNOSIS — H20.00 ACUTE ANTERIOR UVEITIS OF RIGHT EYE: ICD-10-CM

## 2024-02-11 DIAGNOSIS — F90.0 ATTENTION DEFICIT HYPERACTIVITY DISORDER (ADHD), PREDOMINANTLY INATTENTIVE TYPE: ICD-10-CM

## 2024-02-11 RX ORDER — CYCLOPENTOLATE HYDROCHLORIDE 10 MG/ML
SOLUTION/ DROPS OPHTHALMIC
Qty: 1 ML | Refills: 0 | Status: CANCELLED | OUTPATIENT
Start: 2024-02-11

## 2024-02-12 ENCOUNTER — OFFICE VISIT (OUTPATIENT)
Dept: OBGYN | Facility: CLINIC | Age: 44
End: 2024-02-12
Attending: OBSTETRICS & GYNECOLOGY
Payer: COMMERCIAL

## 2024-02-12 VITALS
DIASTOLIC BLOOD PRESSURE: 84 MMHG | HEART RATE: 86 BPM | OXYGEN SATURATION: 100 % | WEIGHT: 243 LBS | SYSTOLIC BLOOD PRESSURE: 126 MMHG | BODY MASS INDEX: 35.88 KG/M2

## 2024-02-12 DIAGNOSIS — N92.6 IRREGULAR MENSTRUAL CYCLE: Primary | ICD-10-CM

## 2024-02-12 DIAGNOSIS — N94.6 DYSMENORRHEA: ICD-10-CM

## 2024-02-12 DIAGNOSIS — H20.9 UVEITIS: ICD-10-CM

## 2024-02-12 PROCEDURE — 99215 OFFICE O/P EST HI 40 MIN: CPT | Performed by: OBSTETRICS & GYNECOLOGY

## 2024-02-12 RX ORDER — METHYLPHENIDATE HYDROCHLORIDE 5 MG/1
5 TABLET ORAL 2 TIMES DAILY
Qty: 60 TABLET | Refills: 0 | Status: SHIPPED | OUTPATIENT
Start: 2024-02-12 | End: 2024-03-27

## 2024-02-12 RX ORDER — NORETHINDRONE ACETATE 5 MG
5 TABLET ORAL DAILY
Qty: 90 TABLET | Refills: 1 | Status: SHIPPED | OUTPATIENT
Start: 2024-02-12 | End: 2024-05-24

## 2024-02-12 NOTE — Clinical Note
I ordered a 1 month shot of Lupron. Can you guys see if PA was started for that?  I thought it was automatic, but not seeing anything in her chart.  Thanks, Javon

## 2024-02-12 NOTE — PROGRESS NOTES
GYN Problem Visit                                                 CC:  hormonal issues, hysterectomy    HPI:  Carrie Munoz is a 43 year old female who presents to clinic today to discuss hormonal issues, hysterectomy    Last year, talked through hormonal issues she was having with CNM.  Suggested talking to MD, but she wasn't ready for it.    This year, things have progressed, when she gets her period, having more fibromyalgia flares.  Now added uveitis to that (swelling in eye).    Last month, two periods in one month, so was miserable all month, both with bleeding issues and eye issues    Only gets migraines with period.  Does get vision changes, due to uveitis, but no aura.  Started in  last year.  Thought she had pink eye.  Went to urgent care to get drops.  They noticed one retina(?) bigger than the other.  Thought maybe it could be glaucoma, so went to ER.  Was not glaucoma.  Went through a number of different steroid drops and things got better, until next period came.  Has standing prescription for steroid drops that she uses every time she gets her period.  Not sure if hysterectomy would help with that, but wants to discuss      EXAM:  Blood pressure 126/84, pulse 86, weight 110.2 kg (243 lb), last menstrual period 2024, SpO2 100%, not currently breastfeeding.   BMI= Body mass index is 35.88 kg/m .  General:  Pleasant, in no acute distress.  CV:  Normal pulse.  No cyanosis.  Respiratory:  Breathing comfortably.  Ext:  No gross abnormalities.  Neurological:  Normal mental status.  Gait WNL.  Sensation and strength grossly intact  Psych - Appropriate mood and affect.        Assessment:    Carrie Munoz is a 43 year old  who presents today to discuss abnormal uterine bleeding, dysmenorrhea, hormonal concerns re uveitis.    Plan:  1. Irregular menstrual cycle  2. Dysmenorrhea  3. Uveitis  Discussed that based on my quick review of the literature, I do not see a clear correlation  between cyclic hormonal changes and uveitis.  Reports her ophthalmologist was not clear on that either.  Reviewed potential implications of removing the ovaries, especially at her young age of 43, which would be especially unfortunate if this did not help her uveitis.  Also reviewed the difference in terms of surgery and associated risks and recovery when looking at hysterectomy versus oophorectomy alone.  Hesitant to move forward with either surgery without having more information.  Recommend pelvic ultrasound to evaluate for any structural causes of her abnormal bleeding.  Discussed a trial of Lupron to see how she feels with suppression of hormones from ovaries both in terms of menopausal symptoms as well as uveitis.  Would recommend at least a 3-month trial.  If she is not having significant issues with uveitis and tolerating hormone symptoms, it would be reasonable to consider oophorectomy.  If not, would not recommend that surgery.  This could be considered with or without removal of the uterus pending ultrasound results and further discussion of bleeding issues.  Order placed for 1 month shot of Lupron as well as add back therapy with norethindrone.  Prior authorization will be submitted.  Will follow-up after injection and US to see how things are going.    - leuprolide (LUPRON DEPOT) kit 3.75 mg  - norethindrone (AYGESTIN) 5 MG tablet; Take 1 tablet (5 mg) by mouth daily  Dispense: 90 tablet; Refill: 1  - US Pelvic Complete with Transvaginal; Future        Shira Alejandro MD      ------  40 minutes spent by me on the date of the encounter doing chart review, review of outside records, patient visit, documentation, and review of literature re hormonal changes and uveitis

## 2024-02-13 NOTE — TELEPHONE ENCOUNTER
Spoke to pt at 0835 and 0840.    Cyclopentolate not in stock currently-- [facility] and I also spoke to  pharmacy and  pharmacy not able to obtain.    Pt currently asymptomatic and cyclopentolate for PRN use/flares.    Pt will wait til [facility] able to obtain.    Pt may call if has symptoms and likely ok to substitute with atropine if cyclopentolate not available.    Pt seemed comfortable with information/plan.    Kodi Olivas RN 9:43 AM 02/13/24

## 2024-02-15 ENCOUNTER — ANCILLARY PROCEDURE (OUTPATIENT)
Dept: ULTRASOUND IMAGING | Facility: CLINIC | Age: 44
End: 2024-02-15
Attending: OBSTETRICS & GYNECOLOGY
Payer: COMMERCIAL

## 2024-02-15 DIAGNOSIS — N92.6 IRREGULAR MENSTRUAL CYCLE: ICD-10-CM

## 2024-02-15 DIAGNOSIS — N94.6 DYSMENORRHEA: ICD-10-CM

## 2024-02-15 PROCEDURE — 76856 US EXAM PELVIC COMPLETE: CPT | Performed by: OBSTETRICS & GYNECOLOGY

## 2024-02-15 PROCEDURE — 76830 TRANSVAGINAL US NON-OB: CPT | Performed by: OBSTETRICS & GYNECOLOGY

## 2024-02-21 ENCOUNTER — E-VISIT (OUTPATIENT)
Dept: FAMILY MEDICINE | Facility: CLINIC | Age: 44
End: 2024-02-21
Payer: COMMERCIAL

## 2024-02-21 DIAGNOSIS — J01.01 ACUTE RECURRENT MAXILLARY SINUSITIS: Primary | ICD-10-CM

## 2024-02-21 PROCEDURE — 99421 OL DIG E/M SVC 5-10 MIN: CPT | Performed by: FAMILY MEDICINE

## 2024-02-21 RX ORDER — AMOXICILLIN 500 MG/1
500 CAPSULE ORAL 3 TIMES DAILY
Qty: 30 CAPSULE | Refills: 0 | Status: SHIPPED | OUTPATIENT
Start: 2024-02-21 | End: 2024-03-02

## 2024-02-21 NOTE — TELEPHONE ENCOUNTER
Encounter Diagnosis   Name Primary?    Acute recurrent maxillary sinusitis Yes      Orders Placed This Encounter    amoxicillin (AMOXIL) 500 MG capsule     Sig: Take 1 capsule (500 mg) by mouth 3 times daily for 10 days     Dispense:  30 capsule     Refill:  0       Rest, fluids  Provider E-Visit time total (minutes): 8

## 2024-02-26 ENCOUNTER — DOCUMENTATION ONLY (OUTPATIENT)
Dept: PSYCHOLOGY | Facility: CLINIC | Age: 44
End: 2024-02-26

## 2024-02-26 NOTE — PROGRESS NOTES
"Client Name: Carrie Munoz  MRN:  [id number]  :  1980     Daniel Adult ADHD Rating Scale-IV: Self and Other Reports (BAARS-IV)   The BAARS-IV assesses for symptoms of ADHD that are experienced in one's daily life. This assessment measure includes self and collateral rating scales designed to provide information regarding current and childhood symptoms of ADHD including inattention, hyperactivity, and impulsivity. Self-report scores are reported as percentiles. Scores at the 76th-83rd percentile are considered marginal, scores at the 84th-92nd percentile are considered borderline, scores at the 93rd-95th percentile are considered mild, scores at the 96th-98th percentile are considered moderate, and those at the 99th percentile are considered severe. Collateral or \"other\" rating scales are reported as number of symptoms observed in comparison to those reported by the client. Norms and percentile scores are not available for collateral reports.    ?   Current Symptoms Scale--Self Report:    Client completed the self-report inventory of current symptoms. The results indicate that the client's Total ADHD Score was 50 which places them in the 98th percentile for overall ADHD symptoms. In addition, the client endorsed the following occur \"often\" or \"very often\": 7/9 (98th percentile) Inattention symptoms, 7/9 (99th?percentile) Hyperactivity/Impulsivity symptoms, and 6/9 (95th percentile) Sluggish Cognitive Tempo symptoms. Overall, the results suggest the client is reporting moderate symptoms of inattention and severe symptoms of hyperactivity/impulsivity at this time.    ?   Current Symptoms Scale--Other Report:   Client did not have a collateral report for this measure.     Childhood Symptoms Scale--Self-Report:   Client completed the self-report inventory of childhood symptoms. The results indicate that the client's Total ADHD Score was 58 which places them in the 99th percentile for overall ADHD symptoms in " "childhood. In addition, the client endorsed having experienced the following \"often\" or \"very often\": 9/9 (99th percentile) Inattention symptoms and 4/9 (94th percentile) Hyperactivity-Impulsivity symptoms. Overall, the results suggest the client experienced severe symptoms of inattention and mild symptoms of hyperactivity/impulsivity in childhood.      Childhood Symptoms Scale--Other Report:   Client did not have a collateral report for this portion of the measure.  ?   Daniel Functional Impairment Scale: Self and Other Reports (BFIS)   The BFIS is used to assess an individuals' psychosocial impairment in major life/daily activities that may be due to a mental health disorder. This assessment measure includes self and collateral rating scales. Self-report scores are reported as percentiles. Scores at the 76th-83rd percentile are considered marginal, scores at the 84th-92nd percentile are considered borderline, scores at the 93rd-95th percentile are considered mild, scores at the 96th-98th percentile are considered moderate, and those at the 99th percentile are considered severe. Collateral or \"other\" rating scales are reported as number of symptoms observed in comparison to those reported by the client. Norms and percentile scores are not available for collateral reports.    ?   Results indicate the client identified impairment (scores at or greater than 93rd percentile) in the following areas: home-family, home-chores, work, social-strangers, social-friends, community activities, education, dating/marriage, money management, driving, sexual relations, daily responsibilities, and health maintenance. The client's Mean Impairment Score was 6.86 (99th percentile) indicating the client is reporting severe impairment in functioning across domains. Client did not have a collateral report for this measure.     Daniel Deficits in Executive Functioning Scale (BDEFS)   The BDEFS is a measure used for evaluating dimensions " "of adult executive functioning in daily life. This assessment measure includes self and collateral rating scales. Self-report scores are reported as percentiles. Scores at the 76th-83rd percentile are considered marginal, scores at the 84th-92nd percentile are considered borderline, scores at the 93rd-95th percentile are considered mild, scores at the 96th-98th percentile are considered moderate, and those at the 99th percentile are considered severe. Collateral or \"other\" rating scales are reported as number of symptoms observed in comparison to those reported by the client. Norms and percentile scores are not available for collateral reports.    ?   Results indicate the client's Total Executive Functioning Score was 265 (99th percentile). The ADHD-Executive Functioning Index score was 32 (99th percentile). These scores suggest the client is reporting severe deficits in executive functioning. These deficits may be due to ADHD or other mental health conditions. They noted the following deficits: self-management to time (severe); self-organization/problem-solving (moderate); self-restraint (severe); self-motivation (moderate); and self-regulation of emotions (moderate). Client did not have a collateral report for this measure.     Generalized Anxiety Disorder Questionnaire (YANIV-7)   This questionnaire is designed to screen for anxiety in adults. Based on the client's score of 6, they are reporting mild symptoms of anxiety at this time. Client identified the following symptoms of anxiety: feeling nervous/anxious/on edge; not being able to stop or control worrying; worrying too much about different things; trouble relaxing; restlessness; becoming easily annoyed or irritable; and feeling afraid as if something awful might happen.  ?   Patient Health Questionnaire- 9 (PHQ-9)    This questionnaire is designed to screen for depression in adults. Based on the client's score of 5, they are not reporting symptoms of " depression at this time. Client identified the following symptoms of depression: little interest or pleasure in doing things; feeling tired or having little energy, feeling bad about self; and poor concentration.

## 2024-02-27 ENCOUNTER — ALLIED HEALTH/NURSE VISIT (OUTPATIENT)
Dept: OBGYN | Facility: CLINIC | Age: 44
End: 2024-02-27
Payer: COMMERCIAL

## 2024-02-27 DIAGNOSIS — N94.6 DYSMENORRHEA: Primary | ICD-10-CM

## 2024-02-27 PROCEDURE — 99207 PR NO CHARGE NURSE ONLY: CPT

## 2024-02-27 PROCEDURE — 96372 THER/PROPH/DIAG INJ SC/IM: CPT | Performed by: OBSTETRICS & GYNECOLOGY

## 2024-02-27 NOTE — PROGRESS NOTES
Clinic Administered Medication Documentation      Injectable Medication Documentation    Is there an active order (written within the past 365 days, with administrations remaining, not ) in the chart? Yes.     Patient was given  Lupron 3.75mg . Prior to medication administration, verified patient's identity using patient s name and date of birth. Please see MAR and medication order for additional information. Patient instructed to report any adverse reaction to staff immediately.    Vial/Syringe: Single dose vial. Was entire vial of medication used? Yes  Was this medication supplied by the patient? No  Is this a medication the patient will need to receive again? Yes. Verified that the patient has refills remaining in their prescription.

## 2024-03-20 ENCOUNTER — OFFICE VISIT (OUTPATIENT)
Dept: OBGYN | Facility: CLINIC | Age: 44
End: 2024-03-20
Attending: OBSTETRICS & GYNECOLOGY
Payer: COMMERCIAL

## 2024-03-20 VITALS
BODY MASS INDEX: 35 KG/M2 | SYSTOLIC BLOOD PRESSURE: 138 MMHG | OXYGEN SATURATION: 98 % | HEART RATE: 104 BPM | WEIGHT: 237 LBS | DIASTOLIC BLOOD PRESSURE: 88 MMHG

## 2024-03-20 DIAGNOSIS — H20.9 UVEITIS: ICD-10-CM

## 2024-03-20 DIAGNOSIS — N94.6 DYSMENORRHEA: Primary | ICD-10-CM

## 2024-03-20 DIAGNOSIS — N92.6 IRREGULAR MENSTRUAL CYCLE: ICD-10-CM

## 2024-03-20 PROCEDURE — 99213 OFFICE O/P EST LOW 20 MIN: CPT | Performed by: OBSTETRICS & GYNECOLOGY

## 2024-03-20 NOTE — PROGRESS NOTES
GYN Problem Visit                                                 CC:  discuss Lupron    HPI:  Carrie Munoz is a 43 year old female who presents to clinic today to check in about how things are going on Lupron.    No uveitis flares since starting Lupron.  Eye a little funny last night, but took norethindrone (add back therapy) and it got better.    Got shot after period started last month, so was miserable with uveitis.  Couple days after getting shot, all that cleared up, which is quicker than it would typically happen.  Otherwise, it would typically last week before, week of and a little after period; 10-14d in total.  Unless really bad eye flare, in which case it's the entire month.    Is interested in moving forward with surgery, but would prefer to avoid major surgery like a hysterectomy.  Also, wouldn't want surgery until .        EXAM:  Blood pressure 138/88, pulse 104, weight 107.5 kg (237 lb), last menstrual period 2024, SpO2 98%, not currently breastfeeding.   BMI= Body mass index is 35 kg/m .  General:  Pleasant, in no acute distress.  CV:  No cyanosis.  Respiratory:  Breathing comfortably.  Ext:  No gross abnormalities.  Neurological:  Normal mental status.  Gait WNL.  Sensation and strength grossly intact  Psych - Appropriate mood and affect.        Assessment:    Carrie Munoz is a 43 year old  who presents today to discuss Lupron and possible oophorectomy.    Plan:  1. Dysmenorrhea  2. Irregular menstrual cycle  3. Uveitis  Will work on scheduling laparoscopic BSO, but do want to complete at least 3 months of Lupron therapy before surgery to better ensure the surgery would be worth the potential side effects.  Discussed doing BSO, given potential benefits of BS in terms of ovarian cancer risk.  She is in agreement.  Reviewed risks of surgery and typical post-op restrictions.  - Case Request: laparoscopic bilateral salpingoophrectomy    Will check in in about a month to see  how things have gone on another month of Lupron.      Shira Alejandro MD

## 2024-03-21 ENCOUNTER — TELEPHONE (OUTPATIENT)
Dept: OBGYN | Facility: CLINIC | Age: 44
End: 2024-03-21
Payer: COMMERCIAL

## 2024-03-26 NOTE — TELEPHONE ENCOUNTER
Type of surgery: gyn  Location of surgery: [facility] [place]  Date and time of surgery: 06/28/2024 7:30AM  Surgeon: Dr. Shira Alejandro  Pre-Op Appt Date: 06/03/2024  Post-Op Appt Date: 07/10/2024   Packet sent out: Yes  Pre-cert/Authorization completed:  Not Applicable  Date: 03/26/2024

## 2024-03-27 ENCOUNTER — ALLIED HEALTH/NURSE VISIT (OUTPATIENT)
Dept: OBGYN | Facility: CLINIC | Age: 44
End: 2024-03-27
Payer: COMMERCIAL

## 2024-03-27 ENCOUNTER — MYC REFILL (OUTPATIENT)
Dept: PHYSICAL MEDICINE AND REHAB | Facility: CLINIC | Age: 44
End: 2024-03-27

## 2024-03-27 VITALS
DIASTOLIC BLOOD PRESSURE: 90 MMHG | SYSTOLIC BLOOD PRESSURE: 129 MMHG | BODY MASS INDEX: 35.38 KG/M2 | HEART RATE: 87 BPM | WEIGHT: 239.6 LBS | TEMPERATURE: 97.7 F

## 2024-03-27 DIAGNOSIS — F90.0 ATTENTION DEFICIT HYPERACTIVITY DISORDER (ADHD), PREDOMINANTLY INATTENTIVE TYPE: ICD-10-CM

## 2024-03-27 DIAGNOSIS — N94.6 DYSMENORRHEA: Primary | ICD-10-CM

## 2024-03-27 PROCEDURE — 99207 PR NO CHARGE NURSE ONLY: CPT

## 2024-03-27 PROCEDURE — 96372 THER/PROPH/DIAG INJ SC/IM: CPT | Performed by: OBSTETRICS & GYNECOLOGY

## 2024-03-27 RX ORDER — METHYLPHENIDATE HYDROCHLORIDE 5 MG/1
5 TABLET ORAL 2 TIMES DAILY
Qty: 60 TABLET | Refills: 0 | Status: SHIPPED | OUTPATIENT
Start: 2024-03-27 | End: 2024-05-14

## 2024-03-27 NOTE — TELEPHONE ENCOUNTER
Refill request received from patient via [employer] refill request.     Medication: methylphenidate (RITALIN) 5 MG tablet   Directions: Take 1 tablet (5 mg) by mouth 2 times daily      Last ordered: 2/12/24   Qty: 60  RF: 0  Last OV: 1/3/24  Next OV: 4/17/24    Routing to Dr. Vizcaino to review and sign if appropriate.    Oralia Morales RN Care Coordinator  [facility]   [facility] main phone # [phone] fax # [phone]

## 2024-03-27 NOTE — NURSING NOTE
Clinic Administered Medication Documentation        Patient was given 3.75 mg for 1-month Lupron. Prior to medication administration, verified patient's identity using patient s name and date of birth. Please see MAR and medication order for additional information. Patient instructed to remain in clinic for 15 minutes and report any adverse reaction to staff immediately.    Vial/Syringe: Single dose vial. Was entire vial of medication used? Yes

## 2024-04-01 ENCOUNTER — DOCUMENTATION ONLY (OUTPATIENT)
Dept: PSYCHOLOGY | Facility: CLINIC | Age: 44
End: 2024-04-01
Payer: COMMERCIAL

## 2024-04-01 NOTE — PROGRESS NOTES
CNS Vital Signs Neurocognitive Battery  The CNS Vital Signs Neurocognitive Battery is a remotely-administered assessment comprised of seven core subtests to individually measure the patient's verbal memory, visual memory, motor speed, psychomotor speed, reaction time, focus, ability to sustain attention and ability to adapt to changing rules and tasks.      Above average domain scores indicate a standard score (SS) greater than 109 or a Percentile Rank (ND) greater than 74, indicating a high functioning test subject. Average is a SS  or ND 25-74, indicating normal function. Low Average is a SS 80-89 or ND 9-24 indicating a slight deficit or impairment. Below Average is a SS 70-79 or ND 2-8, indicating a moderate level of deficit or impairment. Very Low is a SS less than 70 or a ND less than 2, indicating a deficit and impairment.  Validity Indicator denotes a guideline for representing the possibility of an invalid test or domain score, and can be influenced by patient understanding, effort, or other conditions.    The patient's results are detailed below:    Domain Standard Score Percentile Description Validity   Neurocognitive Index 84 14 Low Average Y   Composite Memory Measure 98 45 Average Y   Verbal Memory 66 1 Very Low Y   Visual Memory 128 97 Above Average Y   Psychomotor Speed 91 27 Average Y   Reaction Time 46 1 Very Low Y   Complex Attention 94 34 Average Y   Cognitive Flexibility 91 27 Average Y   Processing Speed 93 32 Average Y   Executive Function 92 30 Average Y   Working Memory 90 25 Average N   Sustained Attention 61 1 Very Low N   Simple Attention 106 66 Average Y   Motor Speed 93 32 Average Y     Neurocognitive Index (NCI): Measures an average score derived from the domain scores or a general assessment of the overall neurocognitive status of the patient. The patient's NCI score is 84, with a percentile of 14, and falls within the Low Average range.    Composite Memory: Measures how well  subject can recognize, remember, and retrieve words and geometric figures, and is comprised of the Visual and Verbal Memory domains. The patient's Composite Memory score is 98, with a percentile of 45, and falls within the Average range.    Verbal Memory: Measures how well subject can recognize, remember, and retrieve words. The patient's Verbal Memory score is 66, with a percentile of 1, and falls within the Very Low range.    Visual Memory: Measures how well subject can recognize, remember and retrieve geometric figures. The patient's Visual Memory score is 128, with a percentile of 97, and falls within the Above Average range.    Psychomotor Speed: Measures how well a subject perceives, attends, responds to complex visual-perceptual information and performs simple fine motor coordination, and is comprised of the Motor Speed and Processing Speed indexes. The patient's Psychomotor Speed score is 91, with a percentile of 27, and falls within the Average range.    Reaction Time: Measures how quickly the subject can react, in milliseconds, to a simple and increasingly complex direction set. The patient's Reaction Time score is 46, with a percentile of 1, and falls within the Very Low range.    Complex Attention: Measures the ability to track and respond to a variety of stimuli over lengthy periods of time and/or perform complex mental tasks requiring vigilance quickly and accurately. The patient's Complex Attention score is 94, with a percentile of 34, and falls within the Average range.    Cognitive Flexibility: Measures how well subject is able to adapt to rapidly changing and increasingly complex set of directions and/or to manipulate the information. The patient's Cognitive Flexibility score is 91, with a percentile of 27, and falls within the Average range.    Processing Speed: Measures how well a subject recognizes and processes information i.e., perceiving, attending/responding to incoming information, motor  speed, fine motor coordination, and visual-perceptual ability. The patient's Processing Speed score is 93, with a percentile of 32, and falls within the Average range.    Executive Function: Measures how well a subject recognizes rules, categories, and manages or navigates rapid decision making. The patient's Executive Function score is 92, with a percentile of 30, and falls within the Average range.    Simple Attention: Measures the ability to track and respond to a single defined stimulus over lengthy periods of time while performing vigilance and response inhibition quickly and accurately to a simple task. The patient's Simple Attention score is 106, with a percentile of 66, and falls within the Average range.    Motor Speed: Measure: Ability to perform simple movements to produce and satisfy an intention towards a manual action and goal. The patient's Motor Speed score is 93, with a percentile of 32, and falls within the Average range.

## 2024-04-05 ENCOUNTER — DOCUMENTATION ONLY (OUTPATIENT)
Dept: PSYCHOLOGY | Facility: CLINIC | Age: 44
End: 2024-04-05
Payer: COMMERCIAL

## 2024-04-05 NOTE — PROGRESS NOTES
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 concentration, and reading. She struggled in school and had an IEP (\"coping skills to help with classes\"). A doctor recommended that Client be tested, but her mother didn't want her to be evaluated. Her mother thought they were over-diagnosing ADHD in black kids \"so she dismissed it.\" Client felt that she was having a lot of trouble in middle school and couldn't read. Client did receive tutoring services during the school years. She had two intervention classes. One was first thing in the morning so that she could finish her homework with the help of teachers during that time. She had another one of these classes in the middle of the day to help her get homework done from the morning classes. This space allowed her to do homework and \"decompress.\" If there was a test, she was able to take the test in that intervention room one on one with the teacher. Sometimes they presented tests orally to Client so that she didn't have to read tests. Client did receive special education services (she had an IEP). Client reported significant behavior and discipline problems including: frequent tardiness or absences and failure to finish or complete homework. Sometimes Client forgot about homework. Some days she was so tired after school that she would fall asleep and run out of time to do it. If she didn't " "understand the assignment, she wasn't going to do it. She procrastinated a lot. Client had attendance issues and would skip class and show up late and missed a lot of days. This started in 9th grade. She just didn't want to be at school. They moved from an [facility] to [place] and there weren't many other Black students there. She disliked having to deal with the teachers in 9th grade (her IEP case lead \"was extremely racist\" so she stopped going). This woman retired by the time Client got to 10th grade and there were new \"wonderful\" staff and she got help making up for what she missed in 9th grade. She went to [employer] classes in the summer \"and did packets for two weeks and got all of my credits.\" Client didn't have behavioral issues. She was not disruptive.     Client did attend post-secondary school. She got her associates degree at [employer]. She graduated from college in 2020 from [employer] with a degree in individualized study with a focus on intervention and prevention programs for youth and family with a minor in civic engagement. She noted it took her a long time to complete her undergraduate degree (\"there was a bunch of starting and stopping\"). Whenever she would get overwhelmed, she would stop going and stop working on things (\"it was over\"). She didn't have study skills. She completed a certificate program (that was one semester) and then decided to try for an associates degree (she started this in 2011 but finished it in 2015). Client was getting Cs in her associates degree program (\"Cs get degrees\"). She failed a few classes in her bachelor's degree program initially. She had her daughter in 2017 and then went back to school in 2018 \"and that was a different kind of focus.\" She worked harder during this time. She had a 3.3 GPA (B average). She was able to make the graciela's list almost every semester. Client was able to take tests in a room by herself throughout her college degree. Client felt she was able " "to focus on schoolwork when she had less time to get things done (e.g., the structure of the system of when she had her daughter, etc.). She developed a system to manage her time. She wasn't successful in school until she went back in 2018 to finish her undergrad degree. This was the first time she was able to do school \"and school was easy.\" She struggled with reading comprehension. She started college at age 26 and didn't realize that she had poor reading skills and she couldn't retain information that she was reading. She had to re-read things over and over again. She plans to get her master's degree. She started taking classes in 2022 (through [employer]) for a master's in public and non-profit administration. Client reported that she was doing well until she was diagnosed with long-COVID (\"when I reverted back to being unable to focus and pay attention and read\"). She explained that she was doing very well until getting COVID; they offered her a lot of accommodations which have been helpful (e.g., instead of writing paper, her professor read all of the prompting questions to her and Client talked through her answers). Client's professor gave her an incomplete for the class and asked her to finish a few discussion posts, and then she saw a long-COVID doctor and was recommended to take Ritalin and sign up for ADHD testing. She took the summer and fall semesters off and just resumed classes in spring semester 2024.      Client reported that she is currently employed full-time. 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 She has been there for 5 months now. Client reported that the current job is a good fit for her skills and personality. Client reported that he been frequently late for work, frequently made mistakes with poor attention to detail, disorganized behavior, and distractible behavior.  Client has had issues with forgetfulness. She makes a lot of lists (she has post-it " notes all over her desk). If she didn't write it down, she would forget. Client did a lot of coordinating for important plans and schedules and had to do things in a timely manner. She will lose her focus and get distracted. She has to remove herself from distractions and stimuli and go into an office and close the door. She will bounce back and forth between tasks without finishing things. The client's work history includes: [employer] (working with single parent students completing college degrees); and [employer]; [employer]. The longest period of employment has been 7 years ([employer]).  Client has not been terminated from a place of employment.    Results of testing were indicative of ADHD.?Client is reporting significant symptoms of inattention and hyperactivity/impulsivity that have been present since childhood.  While a collateral report was not available for review, Client's report during the clinical interview was also indicative of such symptoms during childhood (e.g., poor time management, forgetfulness, poor task completion). Client is reporting severe deficits in executive functioning and severe impairment in functioning. Indeed, she described experiencing difficulty in a number of areas (e.g., school, home, work). Client completed a brief virtual assessment examining brief core brain function domains. Results of this assessment demonstrated that, overall, Client's scores fell in the Average range. Client demonstrated difficulty on a task assessing how well a subject is able to adapt to rapidly changing and increasingly complex set of directions and a measure of sustained attention. Personality testing was positive for issues with attention and concentration, stress, anxiety, and impulsivity. Self-report measures indicate Client is reporting mild anxiety at this time.      Based on the results of clinical interview and psychological testing, the client currently meets criteria for diagnoses of ADHD  Combined Presentation and Major Depressive Disorder, Recurrent in full remission. Client will be provided with the results of testing, diagnosis, and recommendations in her last appointment.      DSM5 Diagnoses: (Sustained by DSM5 Criteria Listed Above)     ADHD Combined Presentation (F90.2)     Major Depressive Disorder, Recurrent, in full remission (F33.42)     Psychosocial & Contextual Factors: history of trauma; fibromyalgia     Recommendations:      1. Schedule a medication evaluation with your physician. Medications are often beneficial in treating depression symptoms as well as ADHD.    2. Access resources through websites, books, and articles such as those provided in the Adult ADHD Symptom Management handout. ??   ??   3. Consider working with an ADHD  or individual therapist to learn skills to?assist with symptom management, as well as ways to improve relationships, etc. that may have been impacted by your symptoms.?   ??   4. Individual therapy is recommended. Therapies focused on identifying and challenging problematic thought and behavior patterns while increasing the use of healthy coping skills has been found to be effective in treating depression. It will be important to set goals in this therapy and work actively toward achieving short-term successes that lead to the completion of each goal. Action-oriented therapies, such as CBT and ACT (Acceptance and Commitment Therapy) are particularly recommended for the treatment of chronic depression.     5. ?The following compensatory strategies may be useful to cope with reported inattention symptoms:    a. Maintaining a predictable routine and structured environment that incorporates prioritized checklists and reminders (e.g., Post-Its).   b. When completing tasks, try to focus on one task at a time and complete it in its entirety before moving on to the next task.   c. Minimize background distractions when working on complex tasks. For example, TV,  radio or ongoing conversations in the background may hinder ability to focus on the task at hand.   d. Take regular breaks from tasks that require prolonged attention. In general, regular breaks from complex tasks can help prevent lapses in attention, which can result in errors.   e. Outline the steps required to complete a task prior to beginning it, which can help ensure an organized approach. Use the outline to refer to throughout the task as a reminder of the steps to be completed.      Shantal Villa, PhD, LP   Licensed Psychologist

## 2024-04-09 ENCOUNTER — VIRTUAL VISIT (OUTPATIENT)
Dept: PSYCHOLOGY | Facility: CLINIC | Age: 44
End: 2024-04-09
Payer: COMMERCIAL

## 2024-04-09 DIAGNOSIS — F90.2 ATTENTION DEFICIT HYPERACTIVITY DISORDER, COMBINED TYPE: ICD-10-CM

## 2024-04-09 DIAGNOSIS — F32.5 DEPRESSION, MAJOR, IN REMISSION (H): Primary | ICD-10-CM

## 2024-04-09 PROCEDURE — 96131 PSYCL TST EVAL PHYS/QHP EA: CPT | Mod: 95 | Performed by: PSYCHOLOGIST

## 2024-04-09 PROCEDURE — 96130 PSYCL TST EVAL PHYS/QHP 1ST: CPT | Mod: 95 | Performed by: PSYCHOLOGIST

## 2024-04-09 PROCEDURE — 90832 PSYTX W PT 30 MINUTES: CPT | Mod: 95 | Performed by: PSYCHOLOGIST

## 2024-04-09 NOTE — PROGRESS NOTES
Client Name:  Carrie Munoz    Date: 4/9/24?   ?   Service Type: Individual (ADHD Evaluation feedback session)?   ??   Session Start Time: 8:00 Session End Time: 8:20 ??   ??   Session Length: 20 minutes ?   ??   Session #: (feedback)?   ??   Attendees: Client attended alone?      Telemedicine Visit: The patient's condition can be safely assessed and treated via synchronous audio and visual telemedicine encounter.      Reason for Telemedicine Visit: Services only offered telehealth    Originating Site (Patient Location): Patient's other (car)      Distant Location (provider location):  Off-site    Consent:  The patient/guardian has verbally consented to: the potential risks and benefits of telemedicine (video visit) versus in person care; bill my insurance or make self-payment for services provided; and responsibility for payment of non-covered services.     Mode of Communication:  Video Conference via [email or web address]    As the provider I attest to compliance with applicable laws and regulations related to telemedicine.    ??   DATA?   ??   ??   Treatment Objective(s) Addressed in This Session:??   Provided feedback on ADHD evaluation. Reviewed test results in depth and answered client's questions. Client diagnosed with ADHD Combined Presentation and Major Depressive Disorder, Recurrent in full remission. Reviewed ADHD Coping Skills Handout. This provider also completed full written report of evaluation, including integration of testing data, summary, and recommendations. Please see Documentation Only dated 4/5/24.?   ??   Progress on / Status of Treatment Objective(s) / Homework:??   Completed??   ??   Intervention:?   ADHD Evaluation feedback; Reviewed report (can be found in Documentation Only encounter dated 4/5/24); Client was appreciative of the feedback and expressed understanding of the diagnosis. ? When reviewing CNS Vital Signs results, Client explained that she did not take Ritalin for the first attempt  (which was invalid) and then she took Ritalin for the re-testing and this improved her performance.  ?   ??   ASSESSMENT: Current Emotional / Mental Status (status of significant symptoms):?   Risk status (Self / Other harm or suicidal ideation)?   Client denies current fears or concerns for personal safety.?   Client denies current or recent suicidal ideation or behaviors.?   Client denies current or recent homicidal ideation or behaviors.?   Client denies current or recent self-injurious behavior or ideation.?   Client denies other safety concerns.?   A safety and risk management plan has not been developed at this time, however client was given the after-hours number / 911 should there be a change in any of these risk factors.?   ??   Appearance: Appropriate   Eye Contact: Good?   Psychomotor Behavior: Normal?   Attitude: Cooperative??   Orientation: All?   Speech?   Rate / Production: Normal??   Volume: Normal??   Mood: Normal?   Affect: Appropriate??   Thought Content: Clear??   Thought Form: Coherent Logical??   Insight: Good??   ??   Medication Review:?   Client is prescribed Ritalin by psychiatry   ?   Medication Compliance:?   Yes   ??   Changes in Health Issues:?   None reported?   ??   Chemical Use Review:?   Substance Use: Chemical use reviewed, no active concerns identified   ??   Tobacco Use: No current tobacco use.??   ??   Collateral Reports Completed:?   Routed note to Care Team Member(s)?   ??   PLAN: (Homework, other)?   ??   Recommendations:?   ?   1. Schedule a medication evaluation with your physician. Medications are often beneficial in treating depression symptoms as well as ADHD.     2. Access resources through websites, books, and articles such as those provided in the Adult ADHD Symptom Management handout. ??   ??   3. Consider working with an ADHD  or individual therapist to learn skills to?assist with symptom management, as well as ways to improve relationships, etc. that may have  been impacted by your symptoms.?   ??   4. Individual therapy is recommended. Therapies focused on identifying and challenging problematic thought and behavior patterns while increasing the use of healthy coping skills has been found to be effective in treating depression. It will be important to set goals in this therapy and work actively toward achieving short-term successes that lead to the completion of each goal. Action-oriented therapies, such as CBT and ACT (Acceptance and Commitment Therapy) are particularly recommended for the treatment of chronic depression.      5. ?The following compensatory strategies may be useful to cope with reported inattention symptoms:    a. Maintaining a predictable routine and structured environment that incorporates prioritized checklists and reminders (e.g., Post-Its).   b. When completing tasks, try to focus on one task at a time and complete it in its entirety before moving on to the next task.   c. Minimize background distractions when working on complex tasks. For example, TV, radio or ongoing conversations in the background may hinder ability to focus on the task at hand.   d. Take regular breaks from tasks that require prolonged attention. In general, regular breaks from complex tasks can help prevent lapses in attention, which can result in errors.   e. Outline the steps required to complete a task prior to beginning it, which can help ensure an organized approach. Use the outline to refer to throughout the task as a reminder of the steps to be completed.      Shantal Villa, PhD, LP   Licensed Psychologist        Psychological Testing    Billing/Services Summary    ?    Testing Evaluation Services  Base: 90227   (1st 60 mins)  Add-on: 03921   (each addtl 60 mins)    Record Review and Clarify Referral Question    (7:20/7:40), (1/8/24)  20 minutes    Integration/Report Generation    (3:00/4:00), (1/16/24) - MMPI-3  (11:00/12:00), (2/26/24) - Daniel  Scales  (12:00/1:00) (4/1/24) - CNS Vital Signs  (3:00/4:00), (4/5/24) - Report Writing  60 minutes  60 minutes  60 minutes  60 minutes    Interactive Feedback Session   (8:00/8:20), (4/9/24)  20 minutes    Total Time:  280 minutes (4 hours, 40 minutes)    Total Units:  1  4   ?     Diagnosis(es): (ICD-10)   ADHD Combined Presentation (F90.2)  Major Depressive Disorder, Recurrent, in full remission (F33.42)

## 2024-04-10 SDOH — SOCIAL STABILITY: SOCIAL NETWORK: PROMIS ABILITY TO PARTICIPATE IN SOCIAL ROLES & ACTIVITIES T-SCORE: 48

## 2024-04-10 SDOH — SOCIAL STABILITY: SOCIAL NETWORK: I HAVE TROUBLE DOING ALL OF MY USUAL WORK (INCLUDE WORK AT HOME): SOMETIMES

## 2024-04-10 SDOH — SOCIAL STABILITY: SOCIAL NETWORK: I HAVE TROUBLE DOING ALL OF THE FAMILY ACTIVITIES THAT I WANT TO DO: SOMETIMES

## 2024-04-10 SDOH — SOCIAL STABILITY: SOCIAL NETWORK: I HAVE TROUBLE DOING ALL OF MY REGULAR LEISURE ACTIVITIES WITH OTHERS: RARELY

## 2024-04-10 SDOH — SOCIAL STABILITY: SOCIAL NETWORK

## 2024-04-10 SDOH — SOCIAL STABILITY: SOCIAL NETWORK: I HAVE TROUBLE DOING ALL OF THE ACTIVITIES WITH FRIENDS THAT I WANT TO DO: RARELY

## 2024-04-10 ASSESSMENT — ANXIETY QUESTIONNAIRES
5. BEING SO RESTLESS THAT IT IS HARD TO SIT STILL: NOT AT ALL
GAD7 TOTAL SCORE: 3
7. FEELING AFRAID AS IF SOMETHING AWFUL MIGHT HAPPEN: NOT AT ALL
8. IF YOU CHECKED OFF ANY PROBLEMS, HOW DIFFICULT HAVE THESE MADE IT FOR YOU TO DO YOUR WORK, TAKE CARE OF THINGS AT HOME, OR GET ALONG WITH OTHER PEOPLE?: NOT DIFFICULT AT ALL
1. FEELING NERVOUS, ANXIOUS, OR ON EDGE: SEVERAL DAYS
IF YOU CHECKED OFF ANY PROBLEMS ON THIS QUESTIONNAIRE, HOW DIFFICULT HAVE THESE PROBLEMS MADE IT FOR YOU TO DO YOUR WORK, TAKE CARE OF THINGS AT HOME, OR GET ALONG WITH OTHER PEOPLE: NOT DIFFICULT AT ALL
4. TROUBLE RELAXING: SEVERAL DAYS
3. WORRYING TOO MUCH ABOUT DIFFERENT THINGS: NOT AT ALL
6. BECOMING EASILY ANNOYED OR IRRITABLE: SEVERAL DAYS
GAD7 TOTAL SCORE: 3
2. NOT BEING ABLE TO STOP OR CONTROL WORRYING: NOT AT ALL
GAD7 TOTAL SCORE: 3
7. FEELING AFRAID AS IF SOMETHING AWFUL MIGHT HAPPEN: NOT AT ALL

## 2024-04-10 ASSESSMENT — PATIENT HEALTH QUESTIONNAIRE - PHQ9
SUM OF ALL RESPONSES TO PHQ QUESTIONS 1-9: 4
10. IF YOU CHECKED OFF ANY PROBLEMS, HOW DIFFICULT HAVE THESE PROBLEMS MADE IT FOR YOU TO DO YOUR WORK, TAKE CARE OF THINGS AT HOME, OR GET ALONG WITH OTHER PEOPLE: SOMEWHAT DIFFICULT
SUM OF ALL RESPONSES TO PHQ QUESTIONS 1-9: 4

## 2024-04-17 ENCOUNTER — VIRTUAL VISIT (OUTPATIENT)
Dept: PHYSICAL MEDICINE AND REHAB | Facility: CLINIC | Age: 44
End: 2024-04-17
Payer: COMMERCIAL

## 2024-04-17 DIAGNOSIS — E55.9 VITAMIN D DEFICIENCY: Primary | ICD-10-CM

## 2024-04-17 PROCEDURE — 99213 OFFICE O/P EST LOW 20 MIN: CPT | Mod: 95 | Performed by: PHYSICAL MEDICINE & REHABILITATION

## 2024-04-17 NOTE — LETTER
4/17/2024       RE: Carrie Munoz  [address] [facility] [address]     Dear Colleague,    Thank you for referring your patient, Carrie Munoz, to the [facility] [place] at [facility]. Please see a copy of my visit note below.    This is a video visit        ASSSSMENT AND PLAN   Carrie Munoz is a 43 year old female who presents with LC in the setting of fibromyalgia. She is very well with the addition of ritalin, now diagnosed with ADHD following a neuropsychological testing, doing well with ritalin.   She has an uveitis and will likely undergo oophorectomy given her response with Lupron.    She is doing remarkable with her LC symptoms   Continue ritalin 5 BID. She will pursue refills with Dr Sanford her PCP.  Reviewed the mental health scores which are reasonable.   Recheck Vit D as last levels on 7/5/23 were low at 11 and she is on 50k units every week. She can f/u with her PCP for that.      Will discharge from the clinic     HPI   Current Symptoms:  Fatigue; ongoing that limits her extraneous activities.   She was is very busy taking her daughter to dancing activities, she is able to do these without severe fatigue.   She was diagnosed with ADHD, by Shantal Villa, and she was placed on Ritalin in July in this office.   She has a psychologist, Dr Pratt in private practice.     She is planning a oophorectomy at the end of June this year. This is due to severe uveitis, currently on lupron she is getting associated with her hormones.     Reviewed the scores below and the labs   B1 levels   105 nmol/l on 7/5/23   B12 539 NMOL/L   D11   She is on 50,000 units per week and she is on it.         4/17/2024     7:56 AM   PHQ Assesment Total Score(s)   PHQ-2 Score 0         4/10/2024    12:29 PM   YANIV-7 Results   YANIV 7 TOTAL SCORE 3 (minimal anxiety)   YANIV-7 Total Score 3         4/10/2024    12:31 PM    PTSD Screen Score   Have you ever experienced this kind of event? Yes   PTSD Screen (Score of 3 or more suggests positive screen) 5         4/10/2024    12:34 PM   PROMIS-29   PROMIS Physical Function T-Score 42 (mild dysfunction)   PROMIS Anxiety T-Score 54 (within normal limits)   PROMIS Depression T-Score 49 (within normal limits)   PROMIS Fatigue T-Score 49 (within normal limits)   PROMIS Sleep Disturbance T-Score 51 (within normal limits)   PROMIS Ability to Participate in Social Roles & Activities T-Score 48 (within normal limits)   PROMIS Pain Interference T-Score 56 (mild)   PROMIS Pain Intensity 5         Past Medical History:   Diagnosis Date    Breast disorder     biopsy 2011(?) benign    Breast lump on right side at 2:30  o'clock position 05/04/2011    Cardiac abnormality     tachycardia during pregnancy    Depressive disorder     Fibromyalgia     Mental disorder     depression, anxiety    Sinus infection     Unspecified sinusitis (chronic)        Past Surgical History:   Procedure Laterality Date    BREAST EXCISIONAL BIOPSY Left     EXCISE GANGLION WRIST Left 05/24/2018    Procedure: EXCISE GANGLION WRIST;  Excision Left Dorsal Wirst Ganglion;  Surgeon: Randall Maradiaga MD;  Location: [facility] OR    HC TOOTH EXTRACTION W/FORCEP  2003    all 4 together       Family History   Problem Relation Age of Onset    Glaucoma Mother     Substance Abuse Mother     Depression Mother     Neurologic Disorder Mother         bell's palsy    Substance Abuse Father     Mental Illness Father     Neurologic Disorder Father         sheehan's palsy    Hypertension Father         not on medication, smoker    Glaucoma Maternal Grandfather     Hypertension Paternal Grandmother     Osteoporosis Paternal Grandmother     Substance Abuse Brother     Bipolar Disorder Brother     Substance Abuse Sister     Schizophrenia Sister     Hypertension Paternal Aunt     Macular Degeneration No family hx of        Social History     Tobacco Use     Smoking status: Never     Passive exposure: Never    Smokeless tobacco: Never   Vaping Use    Vaping status: Never Used   Substance Use Topics    Alcohol use: Yes     Comment: couples times per month    Drug use: Yes     Types: Marijuana     Comment: daily for fibromyalgia         Current Outpatient Medications:     cetirizine (ZYRTEC) 10 MG tablet, Take 1 tablet (10 mg) by mouth 2 times daily, Disp: 180 tablet, Rfl: 1    cyclopentolate (CYCLOGYL) 1 % ophthalmic solution, For flares: Use 1 drop at bedtime in Right eye for  3 days during flares., Disp: 1 mL, Rfl: 0    fluticasone (FLONASE) 50 MCG/ACT nasal spray, Spray 1 spray into both nostrils 2 times daily, Disp: 11.1 mL, Rfl: 3    guanFACINE (TENEX) 1 MG tablet, Take 1 tablet (1 mg) by mouth At Bedtime, Disp: 31 tablet, Rfl: 1    ibuprofen (ADVIL/MOTRIN) 600 MG tablet, TAKE 1 TABLET BY MOUTH EVERY 6 HOURS AS NEEDED FOR MODERATE PAIN, Disp: 120 tablet, Rfl: 1    methylphenidate (RITALIN) 5 MG tablet, Take 1 tablet (5 mg) by mouth 2 times daily, Disp: 60 tablet, Rfl: 0    methylphenidate (RITALIN) 5 MG tablet, Take 1 tablet (5 mg) by mouth 2 times daily, Disp: 30 tablet, Rfl: 0    norethindrone (AYGESTIN) 5 MG tablet, Take 1 tablet (5 mg) by mouth daily, Disp: 90 tablet, Rfl: 1    ondansetron (ZOFRAN ODT) 4 MG ODT tab, Take 1 tablet (4 mg) by mouth every 8 hours as needed for nausea, Disp: 20 tablet, Rfl: 3    prednisoLONE acetate (PRED FORTE) 1 % ophthalmic suspension, For flares: Use 1 drop in right eye 3x/day for 3 days. Call/message if things are not better., Disp: 5 mL, Rfl: 4    SUMAtriptan (IMITREX) 50 MG tablet, Take 1 tablet (50 mg) by mouth at onset of headache for migraine May repeat in 2 hours. Max 4 tablets/24 hours., Disp: 12 tablet, Rfl: 9    vitamin D2 (ERGOCALCIFEROL) 00389 units (1250 mcg) capsule, Take 1 capsule (50,000 Units) by mouth once a week, Disp: 30 capsule, Rfl: 1    Current Facility-Administered Medications:     leuprolide (LUPRON  DEPOT) kit 3.75 mg, 3.75 mg, Intramuscular, Q28 Days, Shira Alejandro MD, 3.75 mg at 03/27/24 0855    Answers for HPI/ROS submitted by the patient on 4/10/2024  If you checked off any problems, how difficult have these problems made it for you to do your work, take care of things at home, or get along with other people?: Somewhat difficult  PHQ9 TOTAL SCORE: 4  YANIV 7 TOTAL SCORE: 3          Again, thank you for allowing me to participate in the care of your patient.      Sincerely,    Yolie Lozano MD

## 2024-04-17 NOTE — PROGRESS NOTES
This is a video visit        ASSSSMENT AND PLAN   Carrie Munoz is a 43 year old female who presents with LC in the setting of fibromyalgia. She is very well with the addition of ritalin, now diagnosed with ADHD following a neuropsychological testing, doing well with ritalin.   She has an uveitis and will likely undergo oophorectomy given her response with Lupron.    She is doing remarkable with her LC symptoms   Continue ritalin 5 BID. She will pursue refills with Dr Sanford her PCP.  Reviewed the mental health scores which are reasonable.   Recheck Vit D as last levels on 7/5/23 were low at 11 and she is on 50k units every week. She can f/u with her PCP for that.      Will discharge from the clinic     HPI   Current Symptoms:  Fatigue; ongoing that limits her extraneous activities.   She was is very busy taking her daughter to dancing activities, she is able to do these without severe fatigue.   She was diagnosed with ADHD, by Shantal Villa, and she was placed on Ritalin in July in this office.   She has a psychologist, Dr Pratt in private practice.     She is planning a oophorectomy at the end of June this year. This is due to severe uveitis, currently on lupron she is getting associated with her hormones.     Reviewed the scores below and the labs   B1 levels   105 nmol/l on 7/5/23   B12 539 NMOL/L   D11   She is on 50,000 units per week and she is on it.         4/17/2024     7:56 AM   PHQ Assesment Total Score(s)   PHQ-2 Score 0         4/10/2024    12:29 PM   YANIV-7 Results   YANIV 7 TOTAL SCORE 3 (minimal anxiety)   YANIV-7 Total Score 3         4/10/2024    12:31 PM   PTSD Screen Score   Have you ever experienced this kind of event? Yes   PTSD Screen (Score of 3 or more suggests positive screen) 5         4/10/2024    12:34 PM   PROMIS-29   PROMIS Physical Function T-Score 42 (mild dysfunction)   PROMIS Anxiety T-Score 54 (within normal limits)   PROMIS Depression T-Score 49 (within normal limits)    PROMIS Fatigue T-Score 49 (within normal limits)   PROMIS Sleep Disturbance T-Score 51 (within normal limits)   PROMIS Ability to Participate in Social Roles & Activities T-Score 48 (within normal limits)   PROMIS Pain Interference T-Score 56 (mild)   PROMIS Pain Intensity 5         Past Medical History:   Diagnosis Date     Breast disorder     biopsy 2011(?) benign     Breast lump on right side at 2:30  o'clock position 05/04/2011     Cardiac abnormality     tachycardia during pregnancy     Depressive disorder      Fibromyalgia      Mental disorder     depression, anxiety     Sinus infection      Unspecified sinusitis (chronic)        Past Surgical History:   Procedure Laterality Date     BREAST EXCISIONAL BIOPSY Left      EXCISE GANGLION WRIST Left 05/24/2018    Procedure: EXCISE GANGLION WRIST;  Excision Left Dorsal Wirst Ganglion;  Surgeon: Randall Maradiaga MD;  [facility]     HC TOOTH EXTRACTION W/FORCEP  2003    all 4 together       Family History   Problem Relation Age of Onset     Glaucoma Mother      Substance Abuse Mother      Depression Mother      Neurologic Disorder Mother         bell's palsy     Substance Abuse Father      Mental Illness Father      Neurologic Disorder Father         sheehan's palsy     Hypertension Father         not on medication, smoker     Glaucoma Maternal Grandfather      Hypertension Paternal Grandmother      Osteoporosis Paternal Grandmother      Substance Abuse Brother      Bipolar Disorder Brother      Substance Abuse Sister      Schizophrenia Sister      Hypertension Paternal Aunt      Macular Degeneration No family hx of        Social History     Tobacco Use     Smoking status: Never     Passive exposure: Never     Smokeless tobacco: Never   Vaping Use     Vaping status: Never Used   Substance Use Topics     Alcohol use: Yes     Comment: couples times per month     Drug use: Yes     Types: Marijuana     Comment: daily for fibromyalgia         Current Outpatient  Medications:      cetirizine (ZYRTEC) 10 MG tablet, Take 1 tablet (10 mg) by mouth 2 times daily, Disp: 180 tablet, Rfl: 1     cyclopentolate (CYCLOGYL) 1 % ophthalmic solution, For flares: Use 1 drop at bedtime in Right eye for  3 days during flares., Disp: 1 mL, Rfl: 0     fluticasone (FLONASE) 50 MCG/ACT nasal spray, Spray 1 spray into both nostrils 2 times daily, Disp: 11.1 mL, Rfl: 3     guanFACINE (TENEX) 1 MG tablet, Take 1 tablet (1 mg) by mouth At Bedtime, Disp: 31 tablet, Rfl: 1     ibuprofen (ADVIL/MOTRIN) 600 MG tablet, TAKE 1 TABLET BY MOUTH EVERY 6 HOURS AS NEEDED FOR MODERATE PAIN, Disp: 120 tablet, Rfl: 1     methylphenidate (RITALIN) 5 MG tablet, Take 1 tablet (5 mg) by mouth 2 times daily, Disp: 60 tablet, Rfl: 0     methylphenidate (RITALIN) 5 MG tablet, Take 1 tablet (5 mg) by mouth 2 times daily, Disp: 30 tablet, Rfl: 0     norethindrone (AYGESTIN) 5 MG tablet, Take 1 tablet (5 mg) by mouth daily, Disp: 90 tablet, Rfl: 1     ondansetron (ZOFRAN ODT) 4 MG ODT tab, Take 1 tablet (4 mg) by mouth every 8 hours as needed for nausea, Disp: 20 tablet, Rfl: 3     prednisoLONE acetate (PRED FORTE) 1 % ophthalmic suspension, For flares: Use 1 drop in right eye 3x/day for 3 days. Call/message if things are not better., Disp: 5 mL, Rfl: 4     SUMAtriptan (IMITREX) 50 MG tablet, Take 1 tablet (50 mg) by mouth at onset of headache for migraine May repeat in 2 hours. Max 4 tablets/24 hours., Disp: 12 tablet, Rfl: 9     vitamin D2 (ERGOCALCIFEROL) 07076 units (1250 mcg) capsule, Take 1 capsule (50,000 Units) by mouth once a week, Disp: 30 capsule, Rfl: 1    Current Facility-Administered Medications:      leuprolide (LUPRON DEPOT) kit 3.75 mg, 3.75 mg, Intramuscular, Q28 Days, Shira Alejandro MD, 3.75 mg at 03/27/24 0855    Answers for HPI/ROS submitted by the patient on 4/10/2024  If you checked off any problems, how difficult have these problems made it for you to do your work, take care of things at  home, or get along with other people?: Somewhat difficult  PHQ9 TOTAL SCORE: 4  YANIV 7 TOTAL SCORE: 3

## 2024-04-17 NOTE — NURSING NOTE
Is the patient currently in the state of [place]? YES    Visit mode:VIDEO    If the visit is dropped, the patient can be reconnected by: VIDEO VISIT: Text to cell phone:   Telephone Information:   Mobile [phone]       Will anyone else be joining the visit? NO  (If patient encounters technical issues they should call [phone]    How would you like to obtain your AVS? MyChart    Are changes needed to the allergy or medication list? No    Are refills needed on medications prescribed by this physician? NO    Reason for visit: Follow Up    Sumaya DAVID

## 2024-04-23 ENCOUNTER — MYC REFILL (OUTPATIENT)
Dept: FAMILY MEDICINE | Facility: CLINIC | Age: 44
End: 2024-04-23
Payer: COMMERCIAL

## 2024-04-23 ENCOUNTER — MYC REFILL (OUTPATIENT)
Dept: MIDWIFE SERVICES | Facility: CLINIC | Age: 44
End: 2024-04-23
Payer: COMMERCIAL

## 2024-04-23 DIAGNOSIS — M54.41 ACUTE MIDLINE LOW BACK PAIN WITH RIGHT-SIDED SCIATICA: ICD-10-CM

## 2024-04-23 DIAGNOSIS — J30.2 SEASONAL ALLERGIC RHINITIS, UNSPECIFIED TRIGGER: ICD-10-CM

## 2024-04-24 RX ORDER — CETIRIZINE HYDROCHLORIDE 10 MG/1
10 TABLET ORAL
Qty: 180 TABLET | Refills: 1 | Status: SHIPPED | OUTPATIENT
Start: 2024-04-24

## 2024-04-24 RX ORDER — FLUTICASONE PROPIONATE 50 MCG
1 SPRAY, SUSPENSION (ML) NASAL
Qty: 11.1 ML | Refills: 3 | Status: SHIPPED | OUTPATIENT
Start: 2024-04-24

## 2024-04-25 RX ORDER — IBUPROFEN 600 MG/1
600 TABLET, FILM COATED ORAL EVERY 6 HOURS PRN
Qty: 120 TABLET | Refills: 1 | Status: SHIPPED | OUTPATIENT
Start: 2024-04-25

## 2024-04-26 ENCOUNTER — APPOINTMENT (OUTPATIENT)
Dept: LAB | Facility: CLINIC | Age: 44
End: 2024-04-26
Payer: COMMERCIAL

## 2024-04-26 ENCOUNTER — ALLIED HEALTH/NURSE VISIT (OUTPATIENT)
Dept: OBGYN | Facility: CLINIC | Age: 44
End: 2024-04-26
Payer: COMMERCIAL

## 2024-04-26 VITALS — SYSTOLIC BLOOD PRESSURE: 125 MMHG | OXYGEN SATURATION: 99 % | HEART RATE: 89 BPM | DIASTOLIC BLOOD PRESSURE: 88 MMHG

## 2024-04-26 DIAGNOSIS — N94.6 DYSMENORRHEA: Primary | ICD-10-CM

## 2024-04-26 PROCEDURE — 99207 PR NO CHARGE NURSE ONLY: CPT

## 2024-04-26 PROCEDURE — 96372 THER/PROPH/DIAG INJ SC/IM: CPT | Performed by: OBSTETRICS & GYNECOLOGY

## 2024-04-26 PROCEDURE — 36415 COLL VENOUS BLD VENIPUNCTURE: CPT | Performed by: PHYSICAL MEDICINE & REHABILITATION

## 2024-04-26 PROCEDURE — 99000 SPECIMEN HANDLING OFFICE-LAB: CPT | Performed by: PATHOLOGY

## 2024-04-26 PROCEDURE — 82306 VITAMIN D 25 HYDROXY: CPT | Performed by: PHYSICAL MEDICINE & REHABILITATION

## 2024-04-26 NOTE — NURSING NOTE
Clinic Administered Medication Documentation        Patient was given 3.75mg 1month Lupron. Prior to medication administration, verified patient's identity using patient s name and date of birth. Please see MAR and medication order for additional information. Patient instructed to remain in clinic for 15 minutes and report any adverse reaction to staff immediately.    Vial/Syringe: Single dose vial. Was entire vial of medication used? Yes

## 2024-04-27 LAB — VIT D+METAB SERPL-MCNC: 28 NG/ML (ref 20–50)

## 2024-05-01 ENCOUNTER — MYC MEDICAL ADVICE (OUTPATIENT)
Dept: FAMILY MEDICINE | Facility: CLINIC | Age: 44
End: 2024-05-01
Payer: COMMERCIAL

## 2024-05-04 ENCOUNTER — MYC REFILL (OUTPATIENT)
Dept: PHYSICAL MEDICINE AND REHAB | Facility: CLINIC | Age: 44
End: 2024-05-04
Payer: COMMERCIAL

## 2024-05-04 DIAGNOSIS — F90.0 ATTENTION DEFICIT HYPERACTIVITY DISORDER (ADHD), PREDOMINANTLY INATTENTIVE TYPE: ICD-10-CM

## 2024-05-06 RX ORDER — METHYLPHENIDATE HYDROCHLORIDE 5 MG/1
5 TABLET ORAL 2 TIMES DAILY
Qty: 60 TABLET | Refills: 0 | OUTPATIENT
Start: 2024-05-06

## 2024-05-13 ENCOUNTER — MYC REFILL (OUTPATIENT)
Dept: PHYSICAL MEDICINE AND REHAB | Facility: CLINIC | Age: 44
End: 2024-05-13
Payer: COMMERCIAL

## 2024-05-13 DIAGNOSIS — F90.0 ATTENTION DEFICIT HYPERACTIVITY DISORDER (ADHD), PREDOMINANTLY INATTENTIVE TYPE: ICD-10-CM

## 2024-05-13 RX ORDER — METHYLPHENIDATE HYDROCHLORIDE 5 MG/1
5 TABLET ORAL 2 TIMES DAILY
Qty: 60 TABLET | Refills: 0 | Status: CANCELLED | OUTPATIENT
Start: 2024-05-13

## 2024-05-14 ENCOUNTER — MYC MEDICAL ADVICE (OUTPATIENT)
Dept: FAMILY MEDICINE | Facility: CLINIC | Age: 44
End: 2024-05-14
Payer: COMMERCIAL

## 2024-05-14 DIAGNOSIS — F90.0 ATTENTION DEFICIT HYPERACTIVITY DISORDER (ADHD), PREDOMINANTLY INATTENTIVE TYPE: ICD-10-CM

## 2024-05-14 RX ORDER — METHYLPHENIDATE HYDROCHLORIDE 5 MG/1
5 TABLET ORAL 2 TIMES DAILY
Qty: 60 TABLET | Refills: 0 | Status: SHIPPED | OUTPATIENT
Start: 2024-05-14 | End: 2024-06-25

## 2024-05-24 ENCOUNTER — MYC REFILL (OUTPATIENT)
Dept: PHYSICAL MEDICINE AND REHAB | Facility: CLINIC | Age: 44
End: 2024-05-24

## 2024-05-24 ENCOUNTER — MYC REFILL (OUTPATIENT)
Dept: OBGYN | Facility: CLINIC | Age: 44
End: 2024-05-24

## 2024-05-24 ENCOUNTER — ALLIED HEALTH/NURSE VISIT (OUTPATIENT)
Dept: OBGYN | Facility: CLINIC | Age: 44
End: 2024-05-24
Attending: OBSTETRICS & GYNECOLOGY
Payer: COMMERCIAL

## 2024-05-24 DIAGNOSIS — N92.6 IRREGULAR MENSTRUAL CYCLE: ICD-10-CM

## 2024-05-24 DIAGNOSIS — N94.6 DYSMENORRHEA: ICD-10-CM

## 2024-05-24 DIAGNOSIS — E55.9 VITAMIN D DEFICIENCY: ICD-10-CM

## 2024-05-24 DIAGNOSIS — N92.6 IRREGULAR MENSTRUAL CYCLE: Primary | ICD-10-CM

## 2024-05-24 PROCEDURE — 96372 THER/PROPH/DIAG INJ SC/IM: CPT | Performed by: OBSTETRICS & GYNECOLOGY

## 2024-05-24 RX ORDER — NORETHINDRONE ACETATE 5 MG
5 TABLET ORAL DAILY
Qty: 90 TABLET | Refills: 0 | Status: SHIPPED | OUTPATIENT
Start: 2024-05-24

## 2024-05-24 RX ORDER — ERGOCALCIFEROL 1.25 MG/1
50000 CAPSULE, LIQUID FILLED ORAL WEEKLY
Qty: 30 CAPSULE | Refills: 1 | OUTPATIENT
Start: 2024-05-24

## 2024-05-24 NOTE — PATIENT INSTRUCTIONS
Clinic Administered Medication Documentation      Injectable Medication Documentation    Is there an active order (written within the past 365 days, with administrations remaining, not ) in the chart? No.     Order was obtained by provider in clinic.  Patient was given  Lupron . Prior to medication administration, verified patient's identity using patient s name and date of birth. Please see MAR and medication order for additional information. Patient instructed to remain in clinic for 15 minutes and report any adverse reaction to staff immediately.    Vial/Syringe: Single dose vial. Was entire vial of medication used? Yes  Was this medication supplied by the patient?  Yes, Medication was received directly from a [place] pharmacy in a staff to staff handoff or via  delivery and the patient HAS already been billed for the medication (follow site specific policies)     Is this a medication the patient will need to receive again? No - not necessary to check for refills remaining.

## 2024-06-10 ENCOUNTER — OFFICE VISIT (OUTPATIENT)
Dept: FAMILY MEDICINE | Facility: CLINIC | Age: 44
End: 2024-06-10
Payer: COMMERCIAL

## 2024-06-10 ENCOUNTER — LAB (OUTPATIENT)
Dept: LAB | Facility: CLINIC | Age: 44
End: 2024-06-10
Payer: COMMERCIAL

## 2024-06-10 VITALS
OXYGEN SATURATION: 98 % | WEIGHT: 245 LBS | HEART RATE: 92 BPM | HEIGHT: 69 IN | BODY MASS INDEX: 36.29 KG/M2 | TEMPERATURE: 97.5 F | SYSTOLIC BLOOD PRESSURE: 116 MMHG | DIASTOLIC BLOOD PRESSURE: 83 MMHG | RESPIRATION RATE: 16 BRPM

## 2024-06-10 DIAGNOSIS — Z23 ENCOUNTER FOR IMMUNIZATION: ICD-10-CM

## 2024-06-10 DIAGNOSIS — E55.9 VITAMIN D DEFICIENCY: ICD-10-CM

## 2024-06-10 DIAGNOSIS — Z01.818 PREOP GENERAL PHYSICAL EXAM: ICD-10-CM

## 2024-06-10 DIAGNOSIS — F43.23 ADJUSTMENT DISORDER WITH MIXED ANXIETY AND DEPRESSED MOOD: ICD-10-CM

## 2024-06-10 DIAGNOSIS — E55.9 VITAMIN D DEFICIENCY DISEASE: ICD-10-CM

## 2024-06-10 DIAGNOSIS — Z01.818 PREOP GENERAL PHYSICAL EXAM: Primary | ICD-10-CM

## 2024-06-10 DIAGNOSIS — Z13.6 CARDIOVASCULAR SCREENING; LDL GOAL LESS THAN 130: ICD-10-CM

## 2024-06-10 DIAGNOSIS — Z83.3 FH: TYPE 2 DIABETES: ICD-10-CM

## 2024-06-10 DIAGNOSIS — N93.8 DUB (DYSFUNCTIONAL UTERINE BLEEDING): ICD-10-CM

## 2024-06-10 DIAGNOSIS — Z12.31 ENCOUNTER FOR SCREENING MAMMOGRAM FOR BREAST CANCER: ICD-10-CM

## 2024-06-10 DIAGNOSIS — J45.20 MILD INTERMITTENT ASTHMA WITHOUT COMPLICATION: ICD-10-CM

## 2024-06-10 LAB
ALBUMIN SERPL BCG-MCNC: 4.5 G/DL (ref 3.5–5.2)
ALP SERPL-CCNC: 57 U/L (ref 40–150)
ALT SERPL W P-5'-P-CCNC: 26 U/L (ref 0–50)
ANION GAP SERPL CALCULATED.3IONS-SCNC: 9 MMOL/L (ref 7–15)
AST SERPL W P-5'-P-CCNC: 21 U/L (ref 0–45)
BILIRUB SERPL-MCNC: 0.3 MG/DL
BUN SERPL-MCNC: 6.9 MG/DL (ref 6–20)
CALCIUM SERPL-MCNC: 9.3 MG/DL (ref 8.6–10)
CHLORIDE SERPL-SCNC: 104 MMOL/L (ref 98–107)
CHOLEST SERPL-MCNC: 129 MG/DL
CREAT SERPL-MCNC: 0.73 MG/DL (ref 0.51–0.95)
DEPRECATED HCO3 PLAS-SCNC: 27 MMOL/L (ref 22–29)
EGFRCR SERPLBLD CKD-EPI 2021: >90 ML/MIN/1.73M2
ERYTHROCYTE [DISTWIDTH] IN BLOOD BY AUTOMATED COUNT: 11.6 % (ref 10–15)
FASTING STATUS PATIENT QL REPORTED: ABNORMAL
FASTING STATUS PATIENT QL REPORTED: ABNORMAL
GLUCOSE SERPL-MCNC: 105 MG/DL (ref 70–99)
HBA1C MFR BLD: 5.4 % (ref 0–5.6)
HCT VFR BLD AUTO: 40.7 % (ref 35–47)
HDLC SERPL-MCNC: 34 MG/DL
HGB BLD-MCNC: 13.7 G/DL (ref 11.7–15.7)
LDLC SERPL CALC-MCNC: 79 MG/DL
MCH RBC QN AUTO: 30.2 PG (ref 26.5–33)
MCHC RBC AUTO-ENTMCNC: 33.7 G/DL (ref 31.5–36.5)
MCV RBC AUTO: 90 FL (ref 78–100)
NONHDLC SERPL-MCNC: 95 MG/DL
PLATELET # BLD AUTO: 234 10E3/UL (ref 150–450)
POTASSIUM SERPL-SCNC: 4.7 MMOL/L (ref 3.4–5.3)
PROT SERPL-MCNC: 7.3 G/DL (ref 6.4–8.3)
RBC # BLD AUTO: 4.53 10E6/UL (ref 3.8–5.2)
SODIUM SERPL-SCNC: 140 MMOL/L (ref 135–145)
TRIGL SERPL-MCNC: 80 MG/DL
TSH SERPL DL<=0.005 MIU/L-ACNC: 1.35 UIU/ML (ref 0.3–4.2)
WBC # BLD AUTO: 5.5 10E3/UL (ref 4–11)

## 2024-06-10 PROCEDURE — 83036 HEMOGLOBIN GLYCOSYLATED A1C: CPT

## 2024-06-10 PROCEDURE — 36415 COLL VENOUS BLD VENIPUNCTURE: CPT

## 2024-06-10 PROCEDURE — 85027 COMPLETE CBC AUTOMATED: CPT

## 2024-06-10 PROCEDURE — 84443 ASSAY THYROID STIM HORMONE: CPT

## 2024-06-10 PROCEDURE — 99214 OFFICE O/P EST MOD 30 MIN: CPT | Mod: 25 | Performed by: FAMILY MEDICINE

## 2024-06-10 PROCEDURE — 80061 LIPID PANEL: CPT

## 2024-06-10 PROCEDURE — 90677 PCV20 VACCINE IM: CPT | Performed by: FAMILY MEDICINE

## 2024-06-10 PROCEDURE — 90471 IMMUNIZATION ADMIN: CPT | Performed by: FAMILY MEDICINE

## 2024-06-10 PROCEDURE — 80053 COMPREHEN METABOLIC PANEL: CPT

## 2024-06-10 RX ORDER — ERGOCALCIFEROL 1.25 MG/1
50000 CAPSULE, LIQUID FILLED ORAL WEEKLY
Qty: 30 CAPSULE | Refills: 1 | Status: SHIPPED | OUTPATIENT
Start: 2024-06-10

## 2024-06-10 ASSESSMENT — PAIN SCALES - GENERAL: PAINLEVEL: NO PAIN (0)

## 2024-06-10 NOTE — PROGRESS NOTES
Preoperative Evaluation  [facility]  [address]  [address]  [facility] [address]  Phone: [phone]  Fax: [phone]  Primary Provider: Narciso Sanford MD  Pre-op Performing Provider: Narciso Sanford MD  Raffy 10, 2024             6/7/2024   Surgical Information   What procedure is being done? Overaries and tubes removed   Facility or Hospital where procedure/surgery will be performed: [facility]   Who is doing the procedure / surgery?    Date of surgery / procedure: June 28th   Time of surgery / procedure: 5am   Where do you plan to recover after surgery? at home with family     Fax number for surgical facility: Note does not need to be faxed, will be available electronically in Epic.    Assessment & Plan     The proposed surgical procedure is considered LOW risk.    Preop general physical exam  Ok for procedure  - Comprehensive metabolic panel (BMP + Alb, Alk Phos, ALT, AST, Total. Bili, TP); Future  - TSH with free T4 reflex; Future  - CBC with platelets; Future    DUB (dysfunctional uterine bleeding)  Worse  Follow up with consultant as planned.     Vitamin D deficiency  Georgie lima medication   - vitamin D2 (ERGOCALCIFEROL) 08947 units (1250 mcg) capsule; Take 1 capsule (50,000 Units) by mouth once a week    Encounter for immunization  Pneumoia  shot given    Adjustment disorder with mixed anxiety and depressed mood  Well controlled   - Comprehensive metabolic panel (BMP + Alb, Alk Phos, ALT, AST, Total. Bili, TP); Future  - TSH with free T4 reflex; Future    Vitamin D deficiency disease  better    CARDIOVASCULAR SCREENING; LDL GOAL LESS THAN 130  recheck  - Lipid panel reflex to direct LDL Non-fasting; Future    Mild intermittent asthma without complication  Now Well controlled     FH: type 2 diabetes  Check for   - Hemoglobin A1c; Future    Encounter for screening mammogram for breast cancer  Will do  - MA Screen Bilateral w/[place]; Future             - No identified additional risk factors other than previously addressed        Recommendation  Approval given to proceed with proposed procedure, without further diagnostic evaluation.        Vanessa Butler is a 43 year old, presenting for the following:  Pre-Op Exam          6/10/2024     8:03 AM   Additional Questions   Roomed by Maria Ines SMITH related to upcoming procedure: DUB        6/7/2024   Pre-Op Questionnaire   Have you ever had a heart attack or stroke? No   Have you ever had surgery on your heart or blood vessels, such as a stent placement, a coronary artery bypass, or surgery on an artery in your head, neck, heart, or legs? No   Do you have chest pain with activity? No   Do you have a history of heart failure? No   Do you currently have a cold, bronchitis or symptoms of other infection? No   Do you have a cough, shortness of breath, or wheezing? No   Do you or anyone in your family have previous history of blood clots? No   Do you or does anyone in your family have a serious bleeding problem such as prolonged bleeding following surgeries or cuts? No   Have you ever had problems with anemia or been told to take iron pills? No   Have you had any abnormal blood loss such as black, tarry or bloody stools, or abnormal vaginal bleeding? No   Have you ever had a blood transfusion? No   Are you willing to have a blood transfusion if it is medically needed before, during, or after your surgery? Yes   Have you or any of your relatives ever had problems with anesthesia? (!) YES    Do you have sleep apnea, excessive snoring or daytime drowsiness? (!) YES   Do you have a CPAP machine? (!) NO    Do you have any artifical heart valves or other implanted medical devices like a pacemaker, defibrillator, or continuous glucose monitor? No   Do you have artificial joints? No   Are you allergic to latex? No     Health Care Directive  Patient does not have a Health Care Directive or Living Will: Discussed  "advance care planning with patient; however, patient declined at this time.    Preoperative Review of    reviewed - controlled substances prescribed by other outside provider(s). For ADHD          Patient Active Problem List    Diagnosis Date Noted    Cervical high risk HPV (human papillomavirus) test positive 03/17/2023     Priority: Medium     4/27/17 NIL pap, Neg HPV  3/17/23 NIL pap, +HR HPV, not 16/18. Plan 1 yr co-test  1/10/24 NIL pap, Neg HPV. Plan 1 yr co-test       Sacroiliac joint pain 11/28/2018     Priority: Medium    Somatic dysfunction of pelvis region 11/28/2018     Priority: Medium    Fibromyalgia 11/05/2018     Priority: Medium    Secondary amenorrhea 01/02/2018     Priority: Medium    Irregular heart beat 02/24/2017     Priority: Medium     2/24/2017 - 2/24/2017 - Per cardiology \"indirect tests to rule out PE and DVT, all normal. I did not see anything unusual on her cardiac echo. NO special precautions besides the usual from cardiac standpoint. If she develops any sudden worsening of her shortness of breath, I would have a low threshold to do CT scan and rule out PE. I did not have a high enough suspicion when I saw her to pursue a CT scan\".       Sinus tachycardia 02/07/2017     Priority: Medium    Breast lump on right side at 2:30  o'clock position 05/04/2011     Priority: Medium    Sinusitis, chronic 10/22/2003     Priority: Medium     Problem list name updated by automated process. Provider to review        Past Medical History:   Diagnosis Date    Breast disorder     biopsy 2011(?) benign    Breast lump on right side at 2:30  o'clock position 05/04/2011    Cardiac abnormality     tachycardia during pregnancy    Depressive disorder     Fibromyalgia     Mental disorder     depression, anxiety    Sinus infection     Unspecified sinusitis (chronic)      Past Surgical History:   Procedure Laterality Date    BREAST EXCISIONAL BIOPSY Left     EXCISE GANGLION WRIST Left 05/24/2018    " Procedure: EXCISE GANGLION WRIST;  Excision Left Dorsal Wirst Ganglion;  Surgeon: Randall Maradiaga MD;  [facility]: [facility]     TOOTH EXTRACTION W/FORCEP  2003    all 4 together     Current Outpatient Medications   Medication Sig Dispense Refill    cetirizine (ZYRTEC) 10 MG tablet Take 1 tablet (10 mg) by mouth 2 times daily 180 tablet 1    cyclopentolate (CYCLOGYL) 1 % ophthalmic solution For flares: Use 1 drop at bedtime in Right eye for  3 days during flares. 1 mL 0    fluticasone (FLONASE) 50 MCG/ACT nasal spray Spray 1 spray into both nostrils 2 times daily 11.1 mL 3    ibuprofen (ADVIL/MOTRIN) 600 MG tablet Take 1 tablet (600 mg) by mouth every 6 hours as needed for moderate pain 120 tablet 1    methylphenidate (RITALIN) 5 MG tablet Take 1 tablet (5 mg) by mouth 2 times daily 30 tablet 0    ondansetron (ZOFRAN ODT) 4 MG ODT tab Take 1 tablet (4 mg) by mouth every 8 hours as needed for nausea 20 tablet 3    prednisoLONE acetate (PRED FORTE) 1 % ophthalmic suspension For flares: Use 1 drop in right eye 3x/day for 3 days. Call/message if things are not better. 5 mL 4    SUMAtriptan (IMITREX) 50 MG tablet Take 1 tablet (50 mg) by mouth at onset of headache for migraine May repeat in 2 hours. Max 4 tablets/24 hours. 12 tablet 9    guanFACINE (TENEX) 1 MG tablet Take 1 tablet (1 mg) by mouth At Bedtime (Patient not taking: Reported on 6/10/2024) 31 tablet 1    methylphenidate (RITALIN) 5 MG tablet Take 1 tablet (5 mg) by mouth 2 times daily 60 tablet 0    norethindrone (AYGESTIN) 5 MG tablet Take 1 tablet (5 mg) by mouth daily 90 tablet 0    vitamin D2 (ERGOCALCIFEROL) 53467 units (1250 mcg) capsule Take 1 capsule (50,000 Units) by mouth once a week (Patient not taking: Reported on 6/10/2024) 30 capsule 1       Allergies   Allergen Reactions    No Known Drug Allergy     Seasonal Allergies         Social History     Tobacco Use    Smoking status: Never     Passive exposure: Never    Smokeless tobacco: Never  "  Substance Use Topics    Alcohol use: Yes     Comment: couples times per month       History   Drug Use    Types: Marijuana     Comment: daily for fibromyalgia             Review of Systems  Constitutional, HEENT, cardiovascular, pulmonary, gi and gu systems are negative, except as otherwise noted.    Objective    /83 (BP Location: Left arm, Patient Position: Sitting, Cuff Size: Adult Large)   Pulse 92   Temp 97.5  F (36.4  C) (Temporal)   Resp 16   Ht 1.747 m (5' 8.78\")   Wt 111.1 kg (245 lb)   LMP  (LMP Unknown)   SpO2 98%   BMI 36.41 kg/m     Estimated body mass index is 36.41 kg/m  as calculated from the following:    Height as of this encounter: 1.747 m (5' 8.78\").    Weight as of this encounter: 111.1 kg (245 lb).  Physical Exam  GENERAL: alert and no distress  NECK: no adenopathy, no asymmetry, masses, or scars  RESP: lungs clear to auscultation - no rales, rhonchi or wheezes  CV: regular rate and rhythm, normal S1 S2, no S3 or S4, no murmur, click or rub, no peripheral edema  ABDOMEN: soft, nontender, no hepatosplenomegaly, no masses and bowel sounds normal  MS: no gross musculoskeletal defects noted, no edema  SKIN: no suspicious lesions or rashes  NEURO: Normal strength and tone, mentation intact and speech normal  PSYCH: mentation appears normal, affect normal/bright  LYMPH: no cervical, supraclavicular, axillary, or inguinal adenopathy  Diabetic foot exam: normal DP and PT pulses, no trophic changes or ulcerative lesions, and normal sensory exam    No results for input(s): \"HGB\", \"PLT\", \"INR\", \"NA\", \"POTASSIUM\", \"CR\", \"A1C\" in the last 8760 hours.     Diagnostics  Recent Results (from the past 720 hour(s))   Hemoglobin A1c    Collection Time: 06/10/24  8:40 AM   Result Value Ref Range    Hemoglobin A1C 5.4 0.0 - 5.6 %   CBC with platelets    Collection Time: 06/10/24  8:40 AM   Result Value Ref Range    WBC Count 5.5 4.0 - 11.0 10e3/uL    RBC Count 4.53 3.80 - 5.20 10e6/uL    Hemoglobin " 13.7 11.7 - 15.7 g/dL    Hematocrit 40.7 35.0 - 47.0 %    MCV 90 78 - 100 fL    MCH 30.2 26.5 - 33.0 pg    MCHC 33.7 31.5 - 36.5 g/dL    RDW 11.6 10.0 - 15.0 %    Platelet Count 234 150 - 450 10e3/uL      No EKG required for low risk surgery (cataract, skin procedure, breast biopsy, etc).    Revised Cardiac Risk Index (RCRI)  The patient has the following serious cardiovascular risks for perioperative complications:   - No serious cardiac risks = 0 points     RCRI Interpretation: 0 points: Class I (very low risk - 0.4% complication rate)         Signed Electronically by: Narciso Sanford MD  Copy of this evaluation report is provided to requesting physician.

## 2024-06-21 DIAGNOSIS — N94.6 DYSMENORRHEA: ICD-10-CM

## 2024-06-21 DIAGNOSIS — Z01.818 PRE-OP EXAM: Primary | ICD-10-CM

## 2024-06-22 ENCOUNTER — HEALTH MAINTENANCE LETTER (OUTPATIENT)
Age: 44
End: 2024-06-22

## 2024-06-25 ENCOUNTER — MYC REFILL (OUTPATIENT)
Dept: FAMILY MEDICINE | Facility: CLINIC | Age: 44
End: 2024-06-25
Payer: COMMERCIAL

## 2024-06-25 DIAGNOSIS — F90.0 ATTENTION DEFICIT HYPERACTIVITY DISORDER (ADHD), PREDOMINANTLY INATTENTIVE TYPE: ICD-10-CM

## 2024-06-25 LAB
ABO/RH(D): NORMAL
ANTIBODY SCREEN: NEGATIVE
SPECIMEN EXPIRATION DATE: NORMAL

## 2024-06-26 ENCOUNTER — ANESTHESIA EVENT (OUTPATIENT)
Dept: SURGERY | Facility: CLINIC | Age: 44
End: 2024-06-26
Payer: COMMERCIAL

## 2024-06-26 ENCOUNTER — LAB (OUTPATIENT)
Dept: LAB | Facility: CLINIC | Age: 44
End: 2024-06-26
Payer: COMMERCIAL

## 2024-06-26 DIAGNOSIS — N94.6 DYSMENORRHEA: ICD-10-CM

## 2024-06-26 DIAGNOSIS — Z01.818 PRE-OP EXAM: ICD-10-CM

## 2024-06-26 LAB
ERYTHROCYTE [DISTWIDTH] IN BLOOD BY AUTOMATED COUNT: 11.7 % (ref 10–15)
HCT VFR BLD AUTO: 43 % (ref 35–47)
HGB BLD-MCNC: 14.2 G/DL (ref 11.7–15.7)
MCH RBC QN AUTO: 30 PG (ref 26.5–33)
MCHC RBC AUTO-ENTMCNC: 33 G/DL (ref 31.5–36.5)
MCV RBC AUTO: 91 FL (ref 78–100)
PLATELET # BLD AUTO: 218 10E3/UL (ref 150–450)
RBC # BLD AUTO: 4.74 10E6/UL (ref 3.8–5.2)
WBC # BLD AUTO: 4.5 10E3/UL (ref 4–11)

## 2024-06-26 PROCEDURE — 36415 COLL VENOUS BLD VENIPUNCTURE: CPT

## 2024-06-26 PROCEDURE — 86900 BLOOD TYPING SEROLOGIC ABO: CPT

## 2024-06-26 PROCEDURE — 86850 RBC ANTIBODY SCREEN: CPT

## 2024-06-26 PROCEDURE — 85027 COMPLETE CBC AUTOMATED: CPT

## 2024-06-26 PROCEDURE — 86901 BLOOD TYPING SEROLOGIC RH(D): CPT

## 2024-06-26 RX ORDER — ONDANSETRON 2 MG/ML
4 INJECTION INTRAMUSCULAR; INTRAVENOUS EVERY 30 MIN PRN
Status: CANCELLED | OUTPATIENT
Start: 2024-06-26

## 2024-06-26 RX ORDER — DEXAMETHASONE SODIUM PHOSPHATE 4 MG/ML
4 INJECTION, SOLUTION INTRA-ARTICULAR; INTRALESIONAL; INTRAMUSCULAR; INTRAVENOUS; SOFT TISSUE
Status: CANCELLED | OUTPATIENT
Start: 2024-06-26

## 2024-06-26 RX ORDER — ONDANSETRON 4 MG/1
4 TABLET, ORALLY DISINTEGRATING ORAL EVERY 30 MIN PRN
Status: CANCELLED | OUTPATIENT
Start: 2024-06-26

## 2024-06-26 RX ORDER — NALOXONE HYDROCHLORIDE 0.4 MG/ML
0.1 INJECTION, SOLUTION INTRAMUSCULAR; INTRAVENOUS; SUBCUTANEOUS
Status: CANCELLED | OUTPATIENT
Start: 2024-06-26

## 2024-06-26 RX ORDER — OXYCODONE HYDROCHLORIDE 5 MG/1
5 TABLET ORAL
Status: CANCELLED | OUTPATIENT
Start: 2024-06-26

## 2024-06-26 RX ORDER — METHYLPHENIDATE HYDROCHLORIDE 5 MG/1
5 TABLET ORAL 2 TIMES DAILY
Qty: 60 TABLET | Refills: 0 | Status: SHIPPED | OUTPATIENT
Start: 2024-06-26

## 2024-06-26 RX ORDER — OXYCODONE HYDROCHLORIDE 5 MG/1
10 TABLET ORAL
Status: CANCELLED | OUTPATIENT
Start: 2024-06-26

## 2024-06-26 RX ORDER — FENTANYL CITRATE 50 UG/ML
25 INJECTION, SOLUTION INTRAMUSCULAR; INTRAVENOUS
Status: CANCELLED | OUTPATIENT
Start: 2024-06-26

## 2024-06-26 NOTE — ANESTHESIA PREPROCEDURE EVALUATION
Anesthesia Pre-Procedure Evaluation    Patient: Carrie Munoz   MRN: [id number] : 1980        Procedure : Procedure(s):  laparoscopic bilateral salpingoophrectomy          Past Medical History:   Diagnosis Date     Breast disorder     biopsy (?) benign     Breast lump on right side at 2:30  o'clock position 2011     Cardiac abnormality     tachycardia during pregnancy     Depressive disorder      Fibromyalgia      Mental disorder     depression, anxiety     Sinus infection      Unspecified sinusitis (chronic)       Past Surgical History:   Procedure Laterality Date     BREAST EXCISIONAL BIOPSY Left      EXCISE GANGLION WRIST Left 2018    Procedure: EXCISE GANGLION WRIST;  Excision Left Dorsal Wirst Ganglion;  Surgeon: Randall Maradiaga MD;  Location:  OR      TOOTH EXTRACTION W/FORCEP      all 4 together      Allergies   Allergen Reactions     No Known Drug Allergy      Seasonal Allergies       Social History     Tobacco Use     Smoking status: Never     Passive exposure: Never     Smokeless tobacco: Never   Substance Use Topics     Alcohol use: Yes     Comment: couples times per month      Wt Readings from Last 1 Encounters:   06/10/24 111.1 kg (245 lb)        Anesthesia Evaluation   Pt has had prior anesthetic. Type: Regional and MAC.    No history of anesthetic complications       ROS/MED HX  ENT/Pulmonary:     (+)     RAFY risk factors, snores loudly,   daytime somnolence,                            (-) asthma   Neurologic:     (+)      migraines,                          Cardiovascular:     (+)  - -   -  - -                          Irregular Heartbeat/Palpitations,       Previous cardiac testing   Echo: Date: Results:    Stress Test:  Date: Results:    ECG Reviewed:  Date: Results:  SR  Cath:  Date: Results:      METS/Exercise Tolerance:     Hematologic:       Musculoskeletal:       GI/Hepatic:  - neg GI/hepatic ROS     Renal/Genitourinary: Comment: Uveitis,  "dysfunctional uterine bleeding s/p BL DALILA      Endo:       Psychiatric/Substance Use:     (+) psychiatric history depression and anxiety (ADHD)       Infectious Disease:  - neg infectious disease ROS     Malignancy:       Other:  - neg other ROS          Physical Exam    Airway        Mallampati: I   TM distance: > 3 FB   Neck ROM: full   Mouth opening: > 3 cm    Respiratory Devices and Support         Dental       (+) Minor Abnormalities - some fillings, tiny chips      Cardiovascular   cardiovascular exam normal       Rhythm and rate: regular     Pulmonary   pulmonary exam normal        breath sounds clear to auscultation         OUTSIDE LABS:  CBC:   Lab Results   Component Value Date    WBC 4.5 06/26/2024    WBC 5.5 06/10/2024    HGB 14.2 06/26/2024    HGB 13.7 06/10/2024    HCT 43.0 06/26/2024    HCT 40.7 06/10/2024     06/26/2024     06/10/2024     BMP:   Lab Results   Component Value Date     06/10/2024     03/17/2023    POTASSIUM 4.7 06/10/2024    POTASSIUM 4.4 03/17/2023    CHLORIDE 104 06/10/2024    CHLORIDE 106 03/17/2023    CO2 27 06/10/2024    CO2 26 03/17/2023    BUN 6.9 06/10/2024    BUN 11.0 03/17/2023    CR 0.73 06/10/2024    CR 0.70 03/17/2023     (H) 06/10/2024    GLC 99 03/17/2023     COAGS: No results found for: \"PTT\", \"INR\", \"FIBR\"  POC:   Lab Results   Component Value Date    BGM 88 03/24/2017    HCG Negative 09/27/2019    HCGS Negative 05/24/2018     HEPATIC:   Lab Results   Component Value Date    ALBUMIN 4.5 06/10/2024    PROTTOTAL 7.3 06/10/2024    ALT 26 06/10/2024    AST 21 06/10/2024    ALKPHOS 57 06/10/2024    BILITOTAL 0.3 06/10/2024     OTHER:   Lab Results   Component Value Date    LACT 1.3 09/27/2019    A1C 5.4 06/10/2024    UZIEL 9.3 06/10/2024    LIPASE 97 07/11/2016    TSH 1.35 06/10/2024    CRP 7.3 09/21/2018    SED 9 07/05/2023       Anesthesia Plan    ASA Status:  2    NPO Status:  NPO Appropriate    Anesthesia Type: General.     - Airway: ETT " "  Induction: Intravenous, Propofol.   Maintenance: Balanced.   Techniques and Equipment:     - Airway: Video-Laryngoscope     - Lines/Monitors: BIS     Consents    Anesthesia Plan(s) and associated risks, benefits, and realistic alternatives discussed. Questions answered and patient/representative(s) expressed understanding.     - Discussed: Risks, Benefits and Alternatives for the PROCEDURE were discussed     - Discussed with:  Patient      - Extended Intubation/Ventilatory Support Discussed: No.      - Patient is DNR/DNI Status: No     Use of blood products discussed: No .     Postoperative Care    Pain management: IV analgesics, Oral pain medications, Multi-modal analgesia.   PONV prophylaxis: Ondansetron (or other 5HT-3), Dexamethasone or Solumedrol     Comments:               Machelle Camara MD    I have reviewed the pertinent notes and labs in the chart from the past 30 days and (re)examined the patient.  Any updates or changes from those notes are reflected in this note.              # Obesity: Estimated body mass index is 36.41 kg/m  as calculated from the following:    Height as of 6/10/24: 1.747 m (5' 8.78\").    Weight as of 6/10/24: 111.1 kg (245 lb).      "

## 2024-06-27 NOTE — OP NOTE
[facility]  Operative Note    Patient: Carrie Munoz  MRN: [id number]  : 1980    Date of Service:  2024     Preoperative Diagnosis:  - Dysmenorrhea    Postoperative Diagnosis:  - Same as above now s/p below stated procedure(s)    Procedure: Laparoscopic bilateral salpingo-oophorectomy, pelvic exam under anesthesia    Surgeon: Shira Alejandro MD    Assistants:  Lashaun Stuart MD, PGY-1  [place] MS3    Anesthesia: GETA    EBL: 10 mL    IVF: 700 mL crystalloid    UOP: 400 mL clear urine at the end of the case    Specimens:  Bilateral fallopian tubes and ovaries    Complications: None apparent    Indications:  Carrie Munoz is a 43 year old  with dysmenorrhea with accompanying migraines and uveitis. Tried a trial of leuprolide which helped improve her symptoms of migraines and uveitis associated with her menstrual cycles but with effects not lasting the entire month. Patient interesting in seeking more definitive treatment of symptoms.  The risks, benefits, and alternatives to the procedure were discussed and she desired to proceed. Written informed consent signed prior to procedure.     Findings: Normal appearing vulva without lesions. On bimanual exam, mobile anteverted uterus of normal size and closed cervix. On speculum exam, normal appearing parous cervix and normal, pink, well rugated vaginal mucosa. On laparoscopy, no evidence of injury on entry. Normal appearing uterus, ovaries, fallopian tubes, bowel, liver, stomach. No adhesions. Ovaries and fallopian tubes fully resected bilaterally and surgical sites hemostatic at end of case. Tenaculum sites hemostatic at end of the case.    Procedure Details:  The patient was brought to the operating room, where adequate general endotracheal anesthesia was administered. She was placed in the dorsal lithotomy position with feet in yellow-fin stirrups. Exam under anesthesia revealed the findings noted above. She was  prepped and draped in the usual sterile fashion. Surgical time out was performed. Hazel catheter and a acorn-tip uterine manipulator were placed.    The umbilicus was everted with an towel clamps, and a stab incision was made through the umbilicus with the scalpel. Direct entry was visualized with 5 mm port and hysteroscope. Was unable to gain entry at this time. The Veress needle was placed, and intraperitoneal placement was suggested with the water drop test and an opening pressure of <5 mmHg. The Veress needle was removed and a 5 mm trocar was placed in the original incision. Laparoscope was placed and revealed no trauma from entry. Survey of the abdomen revealed the findings noted above.     Attention was turned to the Left lower quadrant, where the skin was infiltrated with 0.5% marcaine 4  ml at a point 2 cm superomedial to the Left ASIS. A 5 mm skin incision was made, and a 5 mm trocar was placed atraumatically under direct visualization. Attention was turned to the Right lower quadrant, where the skin was likewise infiltrated with 0.5% marcaine 4 ml at a point 2 cm superomedial to the Right ASIS. A 12 mm incision was made, and a 12 mm trocar was placed atraumatically. The Right ureter was visualized transperitoneally along the Right pelvic side wall.The Right infundibulopelvic ligament was cauterized and ligated with the LigaSure with care to avoid the ureter. The mesosalpinx was then serially cauterized and ligated with the LigaSure. The fallopian tube and uteroovarian ligament were then cauterized and transected with the LigaSure, amputating the Right tube and ovary. The surgical site appeared hemostatic. The Left ureter was visualized transperitoneally along the Left pelvic side wall. The Left infundibulopelvic ligament was cauterized and ligated with the LigaSure with care to avoid the ureter. The mesosalpinx was then serially cauterized and ligated with the LigaSure. The fallopian tube and uteroovarian  ligament were cauterized and transected with the LigaSure, amputating the Left tube and ovary.  The surgical site appeared hemostatic. The bilateral fallopian tubes and ovaries were removed with the EndoCatch bag. Surgical sites were reexamined and noted to be hemostatic. The 12 mm fascial incision was closed with a 0 Vicryl suture using the Wicho-Geremias device. The abdomen was desufflated and trocars were removed. The skin incisions were closed with 4-0 Vicryl and covered with Dermabond. The hernandez catheter was removed.    All sponge, needle, and instrument counts were correct. The patient tolerated the procedure well and was transferred to recovery in stable condition. Dr. Alejandro was present and scrubbed for the entire procedure.     Lashaun Stuart MD  Obstetrics, Gynecology & Women's Health  Resident, PGY-1  06/28/2024 12:48 PM

## 2024-06-28 ENCOUNTER — HOSPITAL ENCOUNTER (OUTPATIENT)
Facility: CLINIC | Age: 44
Discharge: HOME | End: 2024-06-28
Attending: OBSTETRICS & GYNECOLOGY | Admitting: OBSTETRICS & GYNECOLOGY
Payer: COMMERCIAL

## 2024-06-28 ENCOUNTER — ANESTHESIA (OUTPATIENT)
Dept: SURGERY | Facility: CLINIC | Age: 44
End: 2024-06-28
Payer: COMMERCIAL

## 2024-06-28 VITALS
TEMPERATURE: 98.2 F | BODY MASS INDEX: 36.11 KG/M2 | RESPIRATION RATE: 18 BRPM | WEIGHT: 243.83 LBS | HEART RATE: 65 BPM | OXYGEN SATURATION: 98 % | DIASTOLIC BLOOD PRESSURE: 68 MMHG | HEIGHT: 69 IN | SYSTOLIC BLOOD PRESSURE: 133 MMHG

## 2024-06-28 DIAGNOSIS — Z90.722 S/P BILATERAL SALPINGO-OOPHORECTOMY: Primary | ICD-10-CM

## 2024-06-28 LAB — HCG SERPL QL: NEGATIVE

## 2024-06-28 PROCEDURE — 250N000009 HC RX 250

## 2024-06-28 PROCEDURE — 250N000013 HC RX MED GY IP 250 OP 250 PS 637

## 2024-06-28 PROCEDURE — 710N000012 HC RECOVERY PHASE 2, PER MINUTE: Performed by: OBSTETRICS & GYNECOLOGY

## 2024-06-28 PROCEDURE — 999N000141 HC STATISTIC PRE-PROCEDURE NURSING ASSESSMENT: Performed by: OBSTETRICS & GYNECOLOGY

## 2024-06-28 PROCEDURE — 58661 LAPAROSCOPY REMOVE ADNEXA: CPT

## 2024-06-28 PROCEDURE — 710N000010 HC RECOVERY PHASE 1, LEVEL 2, PER MIN: Performed by: OBSTETRICS & GYNECOLOGY

## 2024-06-28 PROCEDURE — 84703 CHORIONIC GONADOTROPIN ASSAY: CPT | Performed by: OBSTETRICS & GYNECOLOGY

## 2024-06-28 PROCEDURE — 58661 LAPAROSCOPY REMOVE ADNEXA: CPT | Mod: 50 | Performed by: OBSTETRICS & GYNECOLOGY

## 2024-06-28 PROCEDURE — 250N000011 HC RX IP 250 OP 636: Performed by: OBSTETRICS & GYNECOLOGY

## 2024-06-28 PROCEDURE — 250N000011 HC RX IP 250 OP 636

## 2024-06-28 PROCEDURE — 88305 TISSUE EXAM BY PATHOLOGIST: CPT | Mod: 26 | Performed by: STUDENT IN AN ORGANIZED HEALTH CARE EDUCATION/TRAINING PROGRAM

## 2024-06-28 PROCEDURE — 999N000127 HC STATISTIC PERIPHERAL IV START W US GUIDANCE

## 2024-06-28 PROCEDURE — 272N000001 HC OR GENERAL SUPPLY STERILE: Performed by: OBSTETRICS & GYNECOLOGY

## 2024-06-28 PROCEDURE — 250N000025 HC SEVOFLURANE, PER MIN: Performed by: OBSTETRICS & GYNECOLOGY

## 2024-06-28 PROCEDURE — 88305 TISSUE EXAM BY PATHOLOGIST: CPT | Mod: TC | Performed by: OBSTETRICS & GYNECOLOGY

## 2024-06-28 PROCEDURE — 999N000007 HC SITE CHECK

## 2024-06-28 PROCEDURE — 999N000040 HC STATISTIC CONSULT NO CHARGE VASC ACCESS

## 2024-06-28 PROCEDURE — 250N000013 HC RX MED GY IP 250 OP 250 PS 637: Performed by: OBSTETRICS & GYNECOLOGY

## 2024-06-28 PROCEDURE — 360N000076 HC SURGERY LEVEL 3, PER MIN: Performed by: OBSTETRICS & GYNECOLOGY

## 2024-06-28 PROCEDURE — 370N000017 HC ANESTHESIA TECHNICAL FEE, PER MIN: Performed by: OBSTETRICS & GYNECOLOGY

## 2024-06-28 PROCEDURE — 258N000003 HC RX IP 258 OP 636

## 2024-06-28 RX ORDER — DEXAMETHASONE SODIUM PHOSPHATE 4 MG/ML
4 INJECTION, SOLUTION INTRA-ARTICULAR; INTRALESIONAL; INTRAMUSCULAR; INTRAVENOUS; SOFT TISSUE
Status: DISCONTINUED | OUTPATIENT
Start: 2024-06-28 | End: 2024-06-28 | Stop reason: HOSPADM

## 2024-06-28 RX ORDER — HYDROMORPHONE HYDROCHLORIDE 1 MG/ML
0.4 INJECTION, SOLUTION INTRAMUSCULAR; INTRAVENOUS; SUBCUTANEOUS EVERY 5 MIN PRN
Status: DISCONTINUED | OUTPATIENT
Start: 2024-06-28 | End: 2024-06-28 | Stop reason: HOSPADM

## 2024-06-28 RX ORDER — LIDOCAINE 40 MG/G
CREAM TOPICAL
Status: DISCONTINUED | OUTPATIENT
Start: 2024-06-28 | End: 2024-06-28 | Stop reason: HOSPADM

## 2024-06-28 RX ORDER — ACETAMINOPHEN 325 MG/1
975 TABLET ORAL ONCE
Status: DISCONTINUED | OUTPATIENT
Start: 2024-06-28 | End: 2024-06-28 | Stop reason: HOSPADM

## 2024-06-28 RX ORDER — PROPOFOL 10 MG/ML
INJECTION, EMULSION INTRAVENOUS
Status: COMPLETED | OUTPATIENT
Start: 2024-06-28 | End: 2024-06-28

## 2024-06-28 RX ORDER — NALOXONE HYDROCHLORIDE 0.4 MG/ML
0.1 INJECTION, SOLUTION INTRAMUSCULAR; INTRAVENOUS; SUBCUTANEOUS
Status: DISCONTINUED | OUTPATIENT
Start: 2024-06-28 | End: 2024-06-28 | Stop reason: HOSPADM

## 2024-06-28 RX ORDER — KETOROLAC TROMETHAMINE 30 MG/ML
INJECTION, SOLUTION INTRAMUSCULAR; INTRAVENOUS PRN
Status: DISCONTINUED | OUTPATIENT
Start: 2024-06-28 | End: 2024-06-28

## 2024-06-28 RX ORDER — LIDOCAINE HYDROCHLORIDE 20 MG/ML
INJECTION, SOLUTION INFILTRATION; PERINEURAL PRN
Status: DISCONTINUED | OUTPATIENT
Start: 2024-06-28 | End: 2024-06-28

## 2024-06-28 RX ORDER — ONDANSETRON 2 MG/ML
4 INJECTION INTRAMUSCULAR; INTRAVENOUS EVERY 30 MIN PRN
Status: DISCONTINUED | OUTPATIENT
Start: 2024-06-28 | End: 2024-06-28 | Stop reason: HOSPADM

## 2024-06-28 RX ORDER — HYDROMORPHONE HYDROCHLORIDE 1 MG/ML
0.2 INJECTION, SOLUTION INTRAMUSCULAR; INTRAVENOUS; SUBCUTANEOUS EVERY 5 MIN PRN
Status: DISCONTINUED | OUTPATIENT
Start: 2024-06-28 | End: 2024-06-28 | Stop reason: HOSPADM

## 2024-06-28 RX ORDER — IBUPROFEN 200 MG
800 TABLET ORAL EVERY 6 HOURS PRN
COMMUNITY
Start: 2024-06-28 | End: 2024-07-05

## 2024-06-28 RX ORDER — AMOXICILLIN 250 MG
1-2 CAPSULE ORAL 2 TIMES DAILY PRN
Qty: 30 TABLET | Refills: 0 | Status: SHIPPED | OUTPATIENT
Start: 2024-06-28

## 2024-06-28 RX ORDER — IBUPROFEN 200 MG
800 TABLET ORAL ONCE
Status: DISCONTINUED | OUTPATIENT
Start: 2024-06-28 | End: 2024-06-28 | Stop reason: HOSPADM

## 2024-06-28 RX ORDER — FENTANYL CITRATE 50 UG/ML
INJECTION, SOLUTION INTRAMUSCULAR; INTRAVENOUS PRN
Status: DISCONTINUED | OUTPATIENT
Start: 2024-06-28 | End: 2024-06-28

## 2024-06-28 RX ORDER — FENTANYL CITRATE 50 UG/ML
25 INJECTION, SOLUTION INTRAMUSCULAR; INTRAVENOUS EVERY 5 MIN PRN
Status: DISCONTINUED | OUTPATIENT
Start: 2024-06-28 | End: 2024-06-28 | Stop reason: HOSPADM

## 2024-06-28 RX ORDER — PROPOFOL 10 MG/ML
INJECTION, EMULSION INTRAVENOUS PRN
Status: DISCONTINUED | OUTPATIENT
Start: 2024-06-28 | End: 2024-06-28

## 2024-06-28 RX ORDER — ACETAMINOPHEN 325 MG/1
975 TABLET ORAL EVERY 6 HOURS PRN
Qty: 50 TABLET | Refills: 0 | Status: SHIPPED | OUTPATIENT
Start: 2024-06-28

## 2024-06-28 RX ORDER — SODIUM CHLORIDE, SODIUM LACTATE, POTASSIUM CHLORIDE, CALCIUM CHLORIDE 600; 310; 30; 20 MG/100ML; MG/100ML; MG/100ML; MG/100ML
INJECTION, SOLUTION INTRAVENOUS CONTINUOUS PRN
Status: DISCONTINUED | OUTPATIENT
Start: 2024-06-28 | End: 2024-06-28

## 2024-06-28 RX ORDER — DEXAMETHASONE SODIUM PHOSPHATE 4 MG/ML
INJECTION, SOLUTION INTRA-ARTICULAR; INTRALESIONAL; INTRAMUSCULAR; INTRAVENOUS; SOFT TISSUE PRN
Status: DISCONTINUED | OUTPATIENT
Start: 2024-06-28 | End: 2024-06-28

## 2024-06-28 RX ORDER — OXYCODONE HYDROCHLORIDE 5 MG/1
5 TABLET ORAL
Status: COMPLETED | OUTPATIENT
Start: 2024-06-28 | End: 2024-06-28

## 2024-06-28 RX ORDER — BUPIVACAINE HYDROCHLORIDE 2.5 MG/ML
INJECTION, SOLUTION INFILTRATION; PERINEURAL PRN
Status: DISCONTINUED | OUTPATIENT
Start: 2024-06-28 | End: 2024-06-28 | Stop reason: HOSPADM

## 2024-06-28 RX ORDER — OXYCODONE HYDROCHLORIDE 5 MG/1
5 TABLET ORAL EVERY 6 HOURS PRN
Qty: 12 TABLET | Refills: 0 | Status: SHIPPED | OUTPATIENT
Start: 2024-06-28 | End: 2024-07-01

## 2024-06-28 RX ORDER — ONDANSETRON 2 MG/ML
INJECTION INTRAMUSCULAR; INTRAVENOUS PRN
Status: DISCONTINUED | OUTPATIENT
Start: 2024-06-28 | End: 2024-06-28

## 2024-06-28 RX ORDER — SODIUM CHLORIDE, SODIUM LACTATE, POTASSIUM CHLORIDE, CALCIUM CHLORIDE 600; 310; 30; 20 MG/100ML; MG/100ML; MG/100ML; MG/100ML
INJECTION, SOLUTION INTRAVENOUS CONTINUOUS
Status: DISCONTINUED | OUTPATIENT
Start: 2024-06-28 | End: 2024-06-28 | Stop reason: HOSPADM

## 2024-06-28 RX ORDER — ONDANSETRON 4 MG/1
4 TABLET, ORALLY DISINTEGRATING ORAL EVERY 30 MIN PRN
Status: DISCONTINUED | OUTPATIENT
Start: 2024-06-28 | End: 2024-06-28 | Stop reason: HOSPADM

## 2024-06-28 RX ORDER — ACETAMINOPHEN 325 MG/1
975 TABLET ORAL ONCE
Status: COMPLETED | OUTPATIENT
Start: 2024-06-28 | End: 2024-06-28

## 2024-06-28 RX ORDER — FENTANYL CITRATE 50 UG/ML
50 INJECTION, SOLUTION INTRAMUSCULAR; INTRAVENOUS EVERY 5 MIN PRN
Status: DISCONTINUED | OUTPATIENT
Start: 2024-06-28 | End: 2024-06-28 | Stop reason: HOSPADM

## 2024-06-28 RX ADMIN — PROPOFOL 150 MG: 10 INJECTION, EMULSION INTRAVENOUS at 11:02

## 2024-06-28 RX ADMIN — OXYCODONE HYDROCHLORIDE 5 MG: 5 TABLET ORAL at 13:56

## 2024-06-28 RX ADMIN — HYDROMORPHONE HYDROCHLORIDE 0.2 MG: 1 INJECTION, SOLUTION INTRAMUSCULAR; INTRAVENOUS; SUBCUTANEOUS at 13:38

## 2024-06-28 RX ADMIN — ACETAMINOPHEN 975 MG: 325 TABLET, FILM COATED ORAL at 10:06

## 2024-06-28 RX ADMIN — PHENYLEPHRINE HYDROCHLORIDE 100 MCG: 10 INJECTION INTRAVENOUS at 11:51

## 2024-06-28 RX ADMIN — HYDROMORPHONE HYDROCHLORIDE 0.5 MG: 1 INJECTION, SOLUTION INTRAMUSCULAR; INTRAVENOUS; SUBCUTANEOUS at 11:42

## 2024-06-28 RX ADMIN — FENTANYL CITRATE 50 MCG: 50 INJECTION INTRAMUSCULAR; INTRAVENOUS at 10:59

## 2024-06-28 RX ADMIN — FENTANYL CITRATE 50 MCG: 50 INJECTION INTRAMUSCULAR; INTRAVENOUS at 13:13

## 2024-06-28 RX ADMIN — Medication 10 MG: at 11:48

## 2024-06-28 RX ADMIN — MIDAZOLAM 2 MG: 1 INJECTION INTRAMUSCULAR; INTRAVENOUS at 10:25

## 2024-06-28 RX ADMIN — HYDROMORPHONE HYDROCHLORIDE 0.5 MG: 1 INJECTION, SOLUTION INTRAMUSCULAR; INTRAVENOUS; SUBCUTANEOUS at 12:45

## 2024-06-28 RX ADMIN — Medication 5 MG: at 12:18

## 2024-06-28 RX ADMIN — FENTANYL CITRATE 50 MCG: 50 INJECTION INTRAMUSCULAR; INTRAVENOUS at 11:17

## 2024-06-28 RX ADMIN — ONDANSETRON 4 MG: 2 INJECTION INTRAMUSCULAR; INTRAVENOUS at 15:50

## 2024-06-28 RX ADMIN — SODIUM CHLORIDE, POTASSIUM CHLORIDE, SODIUM LACTATE AND CALCIUM CHLORIDE: 600; 310; 30; 20 INJECTION, SOLUTION INTRAVENOUS at 10:59

## 2024-06-28 RX ADMIN — Medication 10 MG: at 11:02

## 2024-06-28 RX ADMIN — DEXAMETHASONE SODIUM PHOSPHATE 10 MG: 4 INJECTION, SOLUTION INTRA-ARTICULAR; INTRALESIONAL; INTRAMUSCULAR; INTRAVENOUS; SOFT TISSUE at 10:59

## 2024-06-28 RX ADMIN — FENTANYL CITRATE 50 MCG: 50 INJECTION INTRAMUSCULAR; INTRAVENOUS at 13:25

## 2024-06-28 RX ADMIN — Medication 50 MG: at 10:59

## 2024-06-28 RX ADMIN — ONDANSETRON 4 MG: 2 INJECTION INTRAMUSCULAR; INTRAVENOUS at 12:22

## 2024-06-28 RX ADMIN — KETOROLAC TROMETHAMINE 30 MG: 30 INJECTION, SOLUTION INTRAMUSCULAR at 12:28

## 2024-06-28 RX ADMIN — SUGAMMADEX 100 MG: 100 INJECTION, SOLUTION INTRAVENOUS at 12:31

## 2024-06-28 ASSESSMENT — ACTIVITIES OF DAILY LIVING (ADL)
ADLS_ACUITY_SCORE: 35

## 2024-06-28 NOTE — ANESTHESIA CARE TRANSFER NOTE
Patient: Carrie Munoz    Procedure: Procedure(s):  laparoscopic bilateral salpingoophrectomy       Diagnosis: Dysmenorrhea [N94.6]  Irregular menstrual cycle [N92.6]  Uveitis [H20.9]  Diagnosis Additional Information: No value filed.    Anesthesia Type:   General     Note:    Oropharynx: oropharynx clear of all foreign objects and spontaneously breathing  Level of Consciousness: awake  Oxygen Supplementation: face mask  Level of Supplemental Oxygen (L/min / FiO2): 6  Independent Airway: airway patency satisfactory and stable  Dentition: dentition unchanged  Vital Signs Stable: post-procedure vital signs reviewed and stable  Report to RN Given: handoff report given  Patient transferred to: PACU    Handoff Report: Identifed the Patient, Identified the Reponsible Provider, Reviewed the pertinent medical history, Discussed the surgical course, Reviewed Intra-OP anesthesia mangement and issues during anesthesia, Set expectations for post-procedure period and Allowed opportunity for questions and acknowledgement of understanding  Vitals:  Vitals Value Taken Time   /83 06/28/24 1245   Temp     Pulse 82 06/28/24 1246   Resp 10 06/28/24 1246   SpO2 100 % 06/28/24 1246   Vitals shown include unfiled device data.    Electronically Signed By: Machelle Camara MD  June 28, 2024  12:48 PM

## 2024-06-28 NOTE — CONSULTS
"Consult received for Vascular access care.  See LDA for details.  For additional needs place \"Nursing to Consult for Vascular Access\" [id number] order in EPIC.  "

## 2024-06-28 NOTE — DISCHARGE INSTRUCTIONS
To contact a doctor, call , or:     [phone] and ask for the Resident On Call for:          Gynecology (answered 24 hours a day)   Emergency Departments:  [facility] Emergency Department: [phone]     [facility]: [phone]

## 2024-06-28 NOTE — ANESTHESIA PROCEDURE NOTES
Airway       Patient location during procedure: OR       Procedure Start/Stop Times: 6/28/2024 11:02 AM  Staff -        Resident/Fellow: Machelle Camara MD       Performed By: anesthesiologist  Consent for Airway        Urgency: elective  Indications and Patient Condition       Indications for airway management: nguyễn-procedural       Induction type:intravenous       Mask difficulty assessment: 1 - vent by mask    Final Airway Details       Final airway type: endotracheal airway       Successful airway: ETT - single  Endotracheal Airway Details        ETT size (mm): 7.5       Cuffed: yes       Cuff volume (mL): 7       Successful intubation technique: direct laryngoscopy       DL Blade Type: MAC 3       Grade View of Cords: 1       Adjucts: stylet       Position: Right       Measured from: gums/teeth       Secured at (cm): 23       Bite block used: None    Post intubation assessment        Placement verified by: capnometry, equal breath sounds and chest rise        Number of attempts at approach: 1       Number of other approaches attempted: 1       Secured with: tape       Ease of procedure: easy       Dentition: Intact    Medication(s) Administered   propofol (DIPRIVAN) injection 10 mg/mL vial - Intravenous, Right Upper Arm   150 mg - 6/28/2024 11:02:00 AM  rocuronium (ZEMURON) 10 mg/mL injection - Intravenous   10 mg - 6/28/2024 11:02:00 AM  Medication Administration Time: 6/28/2024 11:02 AM

## 2024-07-01 LAB
PATH REPORT.COMMENTS IMP SPEC: NORMAL
PATH REPORT.COMMENTS IMP SPEC: NORMAL
PATH REPORT.FINAL DX SPEC: NORMAL
PATH REPORT.GROSS SPEC: NORMAL
PATH REPORT.MICROSCOPIC SPEC OTHER STN: NORMAL
PATH REPORT.RELEVANT HX SPEC: NORMAL
PHOTO IMAGE: NORMAL

## 2024-07-10 ENCOUNTER — OFFICE VISIT (OUTPATIENT)
Dept: OBGYN | Facility: CLINIC | Age: 44
End: 2024-07-10
Payer: COMMERCIAL

## 2024-07-10 VITALS
SYSTOLIC BLOOD PRESSURE: 137 MMHG | BODY MASS INDEX: 36.22 KG/M2 | DIASTOLIC BLOOD PRESSURE: 87 MMHG | HEART RATE: 103 BPM | WEIGHT: 243.7 LBS | OXYGEN SATURATION: 97 %

## 2024-07-10 DIAGNOSIS — Z09 POSTOP CHECK: Primary | ICD-10-CM

## 2024-07-10 DIAGNOSIS — E89.40 SURGICAL MENOPAUSE: ICD-10-CM

## 2024-07-10 PROCEDURE — 99213 OFFICE O/P EST LOW 20 MIN: CPT | Performed by: OBSTETRICS & GYNECOLOGY

## 2024-07-10 NOTE — PROGRESS NOTES
CC:  Post-operative visit    HPI:  Carrie Munoz is a 43 year old  who presents 2 weeks status post BS on 24 for abnormal uterine bleeding, recurrent uveitis worsened with menses.  Since her surgery patient reports the following concerns:  has felt more emotional since surgery, crying more.  Also had a panic attack after taking her meds for ADHD, which is new for her.  Didn't have these symptoms when she was on Lupron.    RLQ incision has been the most uncomfortable, but that one was the bigger incision.  Not needing anything for pain at this point.  Only took one oxy post-op and didn't like the way it made her feel.    Reviewed PMH, PSH, Meds and allergies.    Objective:  Vitals:    07/10/24 0829   BP: 137/87   BP Location: Left arm   Patient Position: Sitting   Pulse: 103   SpO2: 97%   Weight: 110.5 kg (243 lb 11.2 oz)     Body mass index is 36.22 kg/m .    General: alert, oriented, no distress  Abd: soft, nontender, nondistended, no masses or organomegaly. Laparoscopy incisions healing well.  Trimmed skin glue from RLQ incision.   --------  Final Diagnosis   Fallopian tubes and ovaries, bilateral, salpingo-oophorectomy:  -Bilateral ovaries with benign follicular cysts  -Bilateral fallopian tubes, no significant histologic abnormalities  -Negative for malignancy   --     Assessment:  Satisfactory post-operative course.    1. Postop check  Healing appropriately on exam.  No concerning findings on pathology    2. Surgical menopause  Will give HRT (E/P since she does still have a uterus).  Plan to touch base via MyChart in 3-4 weeks to see how things are going.  Hoping this will help stabilize mood.  - estradiol-norethindrone (COMBIPATCH) 0.05-0.14 MG/DAY bi-weekly patch; Place 1 patch onto the skin twice a week  Dispense: 8 patch; Refill: 11        Shira Alejandro MD

## 2024-07-31 ENCOUNTER — TRANSFERRED RECORDS (OUTPATIENT)
Dept: HEALTH INFORMATION MANAGEMENT | Facility: CLINIC | Age: 44
End: 2024-07-31
Payer: COMMERCIAL

## 2024-09-13 ENCOUNTER — PATIENT OUTREACH (OUTPATIENT)
Dept: CARE COORDINATION | Facility: CLINIC | Age: 44
End: 2024-09-13
Payer: COMMERCIAL

## 2024-09-16 ENCOUNTER — VIRTUAL VISIT (OUTPATIENT)
Dept: FAMILY MEDICINE | Facility: CLINIC | Age: 44
End: 2024-09-16
Payer: COMMERCIAL

## 2024-09-16 DIAGNOSIS — F90.0 ATTENTION DEFICIT HYPERACTIVITY DISORDER (ADHD), PREDOMINANTLY INATTENTIVE TYPE: Primary | ICD-10-CM

## 2024-09-16 PROCEDURE — 99213 OFFICE O/P EST LOW 20 MIN: CPT | Mod: 95 | Performed by: FAMILY MEDICINE

## 2024-09-16 RX ORDER — ATOMOXETINE 10 MG/1
10 CAPSULE ORAL DAILY
Qty: 30 CAPSULE | Refills: 1 | Status: SHIPPED | OUTPATIENT
Start: 2024-09-16

## 2024-09-16 NOTE — PROGRESS NOTES
"Carrie is a 43 year old who is being evaluated via a billable video visit.    How would you like to obtain your AVS? MyChart  If the video visit is dropped, the invitation should be resent by: Text to cell phone: [phone]  Will anyone else be joining your video visit? No      Assessment & Plan     Attention deficit hyperactivity disorder (ADHD), predominantly inattentive type  Too many side effects with ritalin    try  - atomoxetine (STRATTERA) 10 MG capsule; Take 1 capsule (10 mg) by mouth daily.  Follow up in 1 month.         BMI  Estimated body mass index is 36.22 kg/m  as calculated from the following:    Height as of 6/28/24: 1.747 m (5' 8.78\").    Weight as of 7/10/24: 110.5 kg (243 lb 11.2 oz).         Regular exercise  See Patient Instructions    Subjective   Carrie is a 43 year old, presenting for the following health issues:  No chief complaint on file.    HPI      medication check and ADHD follow up.      CURRENT CONCERNS:  Medication side effects     Review of past medical history:  Attention deficit hyperactivity disorder (ADHD), predominantly inattentive type     CURRENT PRESCRIPTIONS:   switch to stattera     MEDICATION BENEFITS:  Controlled symptoms:  None  Uncontrolled symptoms:  Attention span, Distractability, and Finishing tasks     MEDICATION SIDE EFFECTS:   Has:  insomnia, palpitations, stomach ache, and emotional lability       S  OBJECTIVE:  There were no vitals taken for this visit.  EXAM:GENERAL:  Alert and interactive., EYES:  Normal extra-ocular movements.  PERRLA, LUNGS:  Clear, HEART:  Normal rate and rhythm.  Normal S1 and S2.  No murmurs., NEURO:  No tics or tremor.  Normal tone and strength. Normal gait and balance. , and MENTAL HEALTH: Mood and affect are neutral. There is good eye contact with the examiner.  Patient appears relaxed and well groomed.  No psychomotor agitation or retardation.  Thought content seems intact and some insight is demonstrated.  Speech is " unpressured.     ASSESSMENT:  ADHD--combined type     PLAN:  Medication:Medications changed.  See orders.    Follow-up:  1 month        Review of Systems  Constitutional, HEENT, cardiovascular, pulmonary, gi and gu systems are negative, except as otherwise noted.      Objective           Vitals:  No vitals were obtained today due to virtual visit.    Physical Exam   GENERAL: alert and no distress  EYES: Eyes grossly normal to inspection.  No discharge or erythema, or obvious scleral/conjunctival abnormalities.  RESP: No audible wheeze, cough, or visible cyanosis.    SKIN: Visible skin clear. No significant rash, abnormal pigmentation or lesions.  NEURO: Cranial nerves grossly intact.  Mentation and speech appropriate for age.  PSYCH: Appropriate affect, tone, and pace of words    Admission on 06/28/2024, Discharged on 06/28/2024   Component Date Value Ref Range Status    hCG Serum Qualitative 06/28/2024 Negative  Negative Final    This test is for screening purposes.  Results should be interpreted along with the clinical picture.  Confirmation testing is available if warranted by ordering WDC316, HCG Quantitative Pregnancy.    Case Report 06/28/2024    Final                    Value:Surgical Pathology Report                         Case: [id number]                                  Authorizing Provider:  Shira Alejandro MD   Collected:           06/28/2024 12:08 PM          Ordering Location:      MAIN OR                 Received:            06/28/2024 01:47 PM          Pathologist:           Dimitri Moralez MD                                                    Specimen:    Ovary and Fallopian Tube, Bilateral, Fallopian tube and ovary                              Final Diagnosis 06/28/2024    Final                    Value:This result contains rich text formatting which cannot be displayed here.    Clinical Information 06/28/2024    Final                    Value:This result contains rich text  formatting which cannot be displayed here.    Gross Description 06/28/2024    Final                    Value:This result contains rich text formatting which cannot be displayed here.    Microscopic Description 06/28/2024    Final                    Value:This result contains rich text formatting which cannot be displayed here.    Performing Labs 06/28/2024    Final                    Value:This result contains rich text formatting which cannot be displayed here.         Video-Visit Details    Type of service:  Video Visit   Originating Location (pt. Location): Home    Distant Location (provider location):  On-site  Platform used for Video Visit: Teagan  Signed Electronically by: Narciso Sanford MD

## 2024-09-18 ENCOUNTER — MYC REFILL (OUTPATIENT)
Dept: OBGYN | Facility: CLINIC | Age: 44
End: 2024-09-18
Payer: COMMERCIAL

## 2024-09-18 DIAGNOSIS — N92.6 IRREGULAR MENSTRUAL CYCLE: ICD-10-CM

## 2024-09-18 DIAGNOSIS — N94.6 DYSMENORRHEA: ICD-10-CM

## 2024-09-18 DIAGNOSIS — G47.9 SLEEP TROUBLE: ICD-10-CM

## 2024-09-18 DIAGNOSIS — E89.40 SURGICAL MENOPAUSE: Primary | ICD-10-CM

## 2024-09-18 RX ORDER — NORETHINDRONE ACETATE 5 MG
5 TABLET ORAL DAILY
Qty: 90 TABLET | Refills: 0 | Status: CANCELLED | OUTPATIENT
Start: 2024-09-18

## 2024-09-19 NOTE — TELEPHONE ENCOUNTER
Office visit 7/10/24 with Dr Alejandro post op BS  2. Surgical menopause  Will give HRT (E/P since she does still have a uterus).  Plan to touch base via MyChart in 3-4 weeks to see how things are going.  Hoping this will help stabilize mood.  - estradiol-norethindrone (COMBIPATCH) 0.05-0.14 MG/DAY bi-weekly patch; Place 1 patch onto the skin twice a week  Dispense: 8 patch; Refill: 11    Refill request for norethindrone (AYGESTIN) 5 MG tablet     Should she be taking that still?    Routing to MD to advise  Payal GARCÍA RN   [facility] Triage RN

## 2024-09-20 RX ORDER — PROGESTERONE 100 MG/1
100 CAPSULE ORAL DAILY
Qty: 90 CAPSULE | Refills: 5 | Status: SHIPPED | OUTPATIENT
Start: 2024-09-20

## 2024-09-28 ENCOUNTER — OFFICE VISIT (OUTPATIENT)
Dept: URGENT CARE | Facility: URGENT CARE | Age: 44
End: 2024-09-28
Payer: COMMERCIAL

## 2024-09-28 VITALS
DIASTOLIC BLOOD PRESSURE: 89 MMHG | TEMPERATURE: 99.4 F | BODY MASS INDEX: 36.41 KG/M2 | RESPIRATION RATE: 16 BRPM | WEIGHT: 245 LBS | SYSTOLIC BLOOD PRESSURE: 129 MMHG | OXYGEN SATURATION: 96 % | HEART RATE: 89 BPM

## 2024-09-28 DIAGNOSIS — H66.92 ACUTE OTITIS MEDIA, LEFT: Primary | ICD-10-CM

## 2024-09-28 DIAGNOSIS — H60.392 INFECTIVE OTITIS EXTERNA, LEFT: ICD-10-CM

## 2024-09-28 PROCEDURE — 99213 OFFICE O/P EST LOW 20 MIN: CPT | Performed by: PREVENTIVE MEDICINE

## 2024-09-28 RX ORDER — CIPROFLOXACIN AND DEXAMETHASONE 3; 1 MG/ML; MG/ML
4 SUSPENSION/ DROPS AURICULAR (OTIC) 2 TIMES DAILY
Qty: 7.5 ML | Refills: 0 | Status: SHIPPED | OUTPATIENT
Start: 2024-09-28 | End: 2024-10-05

## 2024-09-28 ASSESSMENT — PAIN SCALES - GENERAL: PAINLEVEL: EXTREME PAIN (8)

## 2024-09-28 NOTE — PROGRESS NOTES
Assessment & Plan     (H66.92) Acute otitis media, left  (primary encounter diagnosis)  Plan: amoxicillin-clavulanate (AUGMENTIN) 875-125 MG         tablet    (H60.118) Infective otitis externa, left  Plan: ciprofloxacin-dexAMETHasone (CIPRODEX) 0.3-0.1         % otic suspension    Augmentin for 7 days  Ciprodex for 7 days    Follow up in 10-14 days if not improving or sooner as needed          No follow-ups on file.    Chino Navarro MD  [facility]    Subjective     Carrie Munoz is a 43 year old year old female who presents to clinic today for the following health issues:    Patient presents with:  Ear Problem: Left ear pain x2wk, pt states used a peroxide solution to clean ear, difficulty hearing, ringing, no drainage, no fever    This is a 44 yo female with left ear pain for 2 weeks.  No drainage.  Minimal congestion.  No fever.  Some muffled hearing and tinnitus.      Patient Active Problem List   Diagnosis    Sinusitis, chronic    Breast lump on right side at 2:30  o'clock position    Sinus tachycardia    Irregular heart beat    Secondary amenorrhea    Fibromyalgia    Sacroiliac joint pain    Somatic dysfunction of pelvis region    Cervical high risk HPV (human papillomavirus) test positive       Current Outpatient Medications   Medication Sig Dispense Refill    atomoxetine (STRATTERA) 10 MG capsule Take 1 capsule (10 mg) by mouth daily. 30 capsule 1    cetirizine (ZYRTEC) 10 MG tablet Take 1 tablet (10 mg) by mouth 2 times daily 180 tablet 1    estradiol-norethindrone (COMBIPATCH) 0.05-0.14 MG/DAY bi-weekly patch Place 1 patch onto the skin twice a week 8 patch 11    fluticasone (FLONASE) 50 MCG/ACT nasal spray Spray 1 spray into both nostrils 2 times daily 11.1 mL 3    ibuprofen (ADVIL/MOTRIN) 600 MG tablet Take 1 tablet (600 mg) by mouth every 6 hours as needed for moderate pain 120 tablet 1    ondansetron (ZOFRAN ODT) 4 MG ODT tab Take 1 tablet (4 mg) by mouth  every 8 hours as needed for nausea 20 tablet 3    prednisoLONE acetate (PRED FORTE) 1 % ophthalmic suspension For flares: Use 1 drop in right eye 3x/day for 3 days. Call/message if things are not better. 5 mL 4    progesterone (PROMETRIUM) 100 MG capsule Take 1 capsule (100 mg) by mouth daily. 90 capsule 5    SUMAtriptan (IMITREX) 50 MG tablet Take 1 tablet (50 mg) by mouth at onset of headache for migraine May repeat in 2 hours. Max 4 tablets/24 hours. 12 tablet 9    vitamin D2 (ERGOCALCIFEROL) 28956 units (1250 mcg) capsule Take 1 capsule (50,000 Units) by mouth once a week 30 capsule 1    acetaminophen (TYLENOL) 325 MG tablet Take 3 tablets (975 mg) by mouth every 6 hours as needed for mild pain (Patient not taking: Reported on 9/28/2024) 50 tablet 0    cyclopentolate (CYCLOGYL) 1 % ophthalmic solution For flares: Use 1 drop at bedtime in Right eye for  3 days during flares. (Patient not taking: Reported on 9/28/2024) 1 mL 0    methylphenidate (RITALIN) 5 MG tablet Take 1 tablet (5 mg) by mouth 2 times daily (Patient not taking: Reported on 9/28/2024) 60 tablet 0    methylphenidate (RITALIN) 5 MG tablet Take 1 tablet (5 mg) by mouth 2 times daily (Patient not taking: Reported on 9/28/2024) 30 tablet 0    norethindrone (AYGESTIN) 5 MG tablet Take 1 tablet (5 mg) by mouth daily (Patient not taking: Reported on 9/28/2024) 90 tablet 0    senna-docusate (SENOKOT-S/PERICOLACE) 8.6-50 MG tablet Take 1-2 tablets by mouth 2 times daily as needed for constipation (While on oral opioids.) (Patient not taking: Reported on 9/28/2024) 30 tablet 0     No current facility-administered medications for this visit.       Past Medical History:   Diagnosis Date    Breast disorder     biopsy 2011(?) benign    Breast lump on right side at 2:30  o'clock position 05/04/2011    Cardiac abnormality     tachycardia during pregnancy    Depressive disorder     Fibromyalgia     Mental disorder     depression, anxiety    Sinus infection      Unspecified sinusitis (chronic)        Social History   reports that she has never smoked. She has never been exposed to tobacco smoke. She has never used smokeless tobacco. She reports current alcohol use. She reports current drug use. Drug: Marijuana.    Family History   Problem Relation Age of Onset    Glaucoma Mother     Substance Abuse Mother     Depression Mother     Neurologic Disorder Mother         bell's palsy    Substance Abuse Father     Mental Illness Father     Neurologic Disorder Father         sheehan's palsy    Hypertension Father         not on medication, smoker    Glaucoma Maternal Grandfather     Hypertension Paternal Grandmother     Osteoporosis Paternal Grandmother     Substance Abuse Brother     Bipolar Disorder Brother     Substance Abuse Sister     Schizophrenia Sister     Hypertension Paternal Aunt     Macular Degeneration No family hx of        Review of Systems  Constitutional, HEENT, cardiovascular, pulmonary, GI, , musculoskeletal, neuro, skin, endocrine and psych systems are negative, except as otherwise noted.      Objective    /89   Pulse 89   Temp 99.4  F (37.4  C) (Oral)   Resp 16   Wt 111.1 kg (245 lb)   SpO2 96%   BMI 36.41 kg/m    Physical Exam   GENERAL: alert and no distress  EYES: Eyes grossly normal to inspection, PERRL and conjunctivae and sclerae normal  HENT: r ear canal and r TM normal, nose and mouth without ulcers or lesions, left external canal erythematous and left tm erythematous and bulging  NECK: no adenopathy, no asymmetry, masses, or scars  RESP: lungs clear to auscultation - no rales, rhonchi or wheezes  CV: regular rate and rhythm, normal S1 S2, no S3 or S4, no murmur, click or rub, no peripheral edema  ABDOMEN: soft, nontender, no hepatosplenomegaly, no masses and bowel sounds normal  MS: no gross musculoskeletal defects noted, no edema  SKIN: no suspicious lesions or rashes  NEURO: Normal strength and tone, mentation intact and speech  normal  PSYCH: mentation appears normal, affect normal/bright

## 2024-10-21 ENCOUNTER — VIRTUAL VISIT (OUTPATIENT)
Dept: FAMILY MEDICINE | Facility: CLINIC | Age: 44
End: 2024-10-21
Payer: COMMERCIAL

## 2024-10-21 DIAGNOSIS — F90.0 ATTENTION DEFICIT HYPERACTIVITY DISORDER (ADHD), PREDOMINANTLY INATTENTIVE TYPE: Primary | ICD-10-CM

## 2024-10-21 PROCEDURE — 99213 OFFICE O/P EST LOW 20 MIN: CPT | Mod: 95 | Performed by: FAMILY MEDICINE

## 2024-10-21 RX ORDER — METHYLPHENIDATE HYDROCHLORIDE 10 MG/1
10 TABLET ORAL 2 TIMES DAILY
Qty: 60 TABLET | Refills: 0 | Status: SHIPPED | OUTPATIENT
Start: 2024-10-21 | End: 2024-11-20

## 2024-10-21 NOTE — PROGRESS NOTES
"Carrie is a 44 year old who is being evaluated via a billable video visit.    How would you like to obtain your AVS? MyChart  If the video visit is dropped, the invitation should be resent by: Text to cell phone: [phone]  Will anyone else be joining your video visit? No      Assessment & Plan     Attention deficit hyperactivity disorder (ADHD), predominantly inattentive type  Gt no benefit from Strattera   Is able to get up \" and moving on 5 mg but not enough to help a lot\" no medication side effects on ritalin now   Ok to try   - methylphenidate (RITALIN) 10 MG tablet; Take 1 tablet (10 mg) by mouth 2 times daily.  Follow up by phone in 2 weeks if not improving.         BMI  Estimated body mass index is 36.41 kg/m  as calculated from the following:    Height as of 6/28/24: 1.747 m (5' 8.78\").    Weight as of 9/28/24: 111.1 kg (245 lb).     Subjective   Carrie is a 44 year old, presenting for the following health issues:  No chief complaint on file.    HPI             Review of Systems  Constitutional, HEENT, cardiovascular, pulmonary, gi and gu systems are negative, except as otherwise noted.      Objective           Vitals:  No vitals were obtained today due to virtual visit.    Physical Exam   GENERAL: alert and no distress  EYES: Eyes grossly normal to inspection.  No discharge or erythema, or obvious scleral/conjunctival abnormalities.  RESP: No audible wheeze, cough, or visible cyanosis.    SKIN: Visible skin clear. No significant rash, abnormal pigmentation or lesions.  NEURO: Cranial nerves grossly intact.  Mentation and speech appropriate for age.  PSYCH: Appropriate affect, tone, and pace of words    Admission on 06/28/2024, Discharged on 06/28/2024   Component Date Value Ref Range Status    hCG Serum Qualitative 06/28/2024 Negative  Negative Final    This test is for screening purposes.  Results should be interpreted along with the clinical picture.  Confirmation testing is available if warranted " by ordering [id number], HCG Quantitative Pregnancy.    Case Report 06/28/2024    Final                    Value:Surgical Pathology Report                         Case: [id number]                                  Authorizing Provider:  Shira Alejandro MD   Collected:           06/28/2024 12:08 PM          Ordering Location:      [facility]                 Received:            06/28/2024 01:47 PM          Pathologist:           Dimitri Moralez MD                                                    Specimen:    Ovary and Fallopian Tube, Bilateral, Fallopian tube and ovary                              Final Diagnosis 06/28/2024    Final                    Value:This result contains rich text formatting which cannot be displayed here.    Clinical Information 06/28/2024    Final                    Value:This result contains rich text formatting which cannot be displayed here.    Gross Description 06/28/2024    Final                    Value:This result contains rich text formatting which cannot be displayed here.    Microscopic Description 06/28/2024    Final                    Value:This result contains rich text formatting which cannot be displayed here.    Performing Labs 06/28/2024    Final                    Value:This result contains rich text formatting which cannot be displayed here.         Video-Visit Details    Type of service:  Video Visit   Originating Location (pt. Location): Home    Distant Location (provider location):  On-site  Platform used for Video Visit: Teagan  Signed Electronically by: Narciso Sanford MD

## 2024-11-11 ENCOUNTER — MYC REFILL (OUTPATIENT)
Dept: FAMILY MEDICINE | Facility: CLINIC | Age: 44
End: 2024-11-11
Payer: COMMERCIAL

## 2024-11-11 DIAGNOSIS — M54.41 ACUTE MIDLINE LOW BACK PAIN WITH RIGHT-SIDED SCIATICA: ICD-10-CM

## 2024-11-12 RX ORDER — IBUPROFEN 600 MG/1
600 TABLET, FILM COATED ORAL EVERY 6 HOURS PRN
Qty: 120 TABLET | Refills: 1 | Status: SHIPPED | OUTPATIENT
Start: 2024-11-12

## 2024-12-10 ENCOUNTER — VIRTUAL VISIT (OUTPATIENT)
Dept: FAMILY MEDICINE | Facility: CLINIC | Age: 44
End: 2024-12-10
Payer: COMMERCIAL

## 2024-12-10 DIAGNOSIS — F90.0 ATTENTION DEFICIT HYPERACTIVITY DISORDER (ADHD), PREDOMINANTLY INATTENTIVE TYPE: Primary | ICD-10-CM

## 2024-12-10 PROCEDURE — 99213 OFFICE O/P EST LOW 20 MIN: CPT | Mod: 95 | Performed by: FAMILY MEDICINE

## 2024-12-10 RX ORDER — LISDEXAMFETAMINE DIMESYLATE 20 MG/1
20 CAPSULE ORAL EVERY MORNING
Qty: 30 CAPSULE | Refills: 0 | Status: SHIPPED | OUTPATIENT
Start: 2024-12-10

## 2024-12-10 ASSESSMENT — PATIENT HEALTH QUESTIONNAIRE - PHQ9
10. IF YOU CHECKED OFF ANY PROBLEMS, HOW DIFFICULT HAVE THESE PROBLEMS MADE IT FOR YOU TO DO YOUR WORK, TAKE CARE OF THINGS AT HOME, OR GET ALONG WITH OTHER PEOPLE: VERY DIFFICULT
SUM OF ALL RESPONSES TO PHQ QUESTIONS 1-9: 9
SUM OF ALL RESPONSES TO PHQ QUESTIONS 1-9: 9

## 2024-12-10 NOTE — PROGRESS NOTES
"Carrie is a 44 year old who is being evaluated via a billable video visit.    How would you like to obtain your AVS? MyChart  If the video visit is dropped, the invitation should be resent by: Text to cell phone: [phone]  Will anyone else be joining your video visit? No      Assessment & Plan     Attention deficit hyperactivity disorder (ADHD), predominantly inattentive type  See note   I am wondering if I can try a long acting  Vyvance so I only have to take one pill a day?   This was recommended by my psychologist because I forget to take my second dose in the afternoon. And next semester I will be in class from 6:30pm-9:30 and will need to be able to pay attention for at least the first hour and a half    Ok for trial of  - lisdexamfetamine (VYVANSE) 20 MG capsule; Take 1 capsule (20 mg) by mouth every morning.    Follow up by MyChart or  phone in 2 weeks       BMI  Estimated body mass index is 36.41 kg/m  as calculated from the following:    Height as of 6/28/24: 1.747 m (5' 8.78\").    Weight as of 9/28/24: 111.1 kg (245 lb).         Regular exercise  Sleep hygiene  See Patient Instructions    Subjective   Carrie is a 44 year old, presenting for the following health issues:  No chief complaint on file.    History of Present Illness       Reason for visit:  Medication conversation    She eats 4 or more servings of fruits and vegetables daily.She consumes 1 sweetened beverage(s) daily.She exercises with enough effort to increase her heart rate 9 or less minutes per day.  She exercises with enough effort to increase her heart rate 3 or less days per week. She is missing 7 dose(s) of medications per week.  She is not taking prescribed medications regularly due to remembering to take.   medication check and ADHD follow up.      CURRENT CONCERNS:  Ritalin did not work well fro her work. School schedule     Review of past medical history:  Attention deficit hyperactivity disorder (ADHD), predominantly " inattentive type     CURRENT PRESCRIPTIONS:  Medications changed.  See orders.     MEDICATION BENEFITS:  Controlled symptoms:  None  Uncontrolled symptoms:  Attention span, Distractibility, and Finishing tasks        Review of Systems  Constitutional, HEENT, cardiovascular, pulmonary, gi and gu systems are negative, except as otherwise noted.      Objective           Vitals:  No vitals were obtained today due to virtual visit.    Physical Exam   GENERAL: alert and no distress  EYES: Eyes grossly normal to inspection.  No discharge or erythema, or obvious scleral/conjunctival abnormalities.  RESP: No audible wheeze, cough, or visible cyanosis.    SKIN: Visible skin clear. No significant rash, abnormal pigmentation or lesions.  NEURO: Cranial nerves grossly intact.  Mentation and speech appropriate for age.  PSYCH: Appropriate affect, tone, and pace of words    Admission on 06/28/2024, Discharged on 06/28/2024   Component Date Value Ref Range Status     hCG Serum Qualitative 06/28/2024 Negative  Negative Final    This test is for screening purposes.  Results should be interpreted along with the clinical picture.  Confirmation testing is available if warranted by ordering [id number], HCG Quantitative Pregnancy.     Case Report 06/28/2024    Final                    Value:Surgical Pathology Report                         Case: [id number]                                  Authorizing Provider:  Shira Alejandro MD   Collected:           06/28/2024 12:08 PM          Ordering Location:      [facility]                 Received:            06/28/2024 01:47 PM          Pathologist:           Dimitri Moralez MD                                                    Specimen:    Ovary and Fallopian Tube, Bilateral, Fallopian tube and ovary                               Final Diagnosis 06/28/2024    Final                    Value:Fallopian tubes and ovaries, bilateral, salpingo-oophorectomy:                          -Bilateral  "ovaries with benign follicular cysts                          -Bilateral fallopian tubes, no significant histologic abnormalities                          -Negative for malignancy     Clinical Information 06/28/2024    Final                    Value:Procedure:                          laparoscopic bilateral salpingoophrectomy - Bilateral                          Pre-op Diagnosis: Dysmenorrhea [N94.6]                          Irregular menstrual cycle [N92.6]                          Uveitis [H20.9]                          Post-op Diagnosis: N94.6 - Dysmenorrhea [ICD-10-CM]                          N92.6 - Irregular menstrual cycle [ICD-10-CM]                          H20.9 - Uveitis [ICD-10-CM]     Gross Description 06/28/2024    Final                    Value:A(1). Ovary and Fallopian Tube, Bilateral, Fallopian tube and ovary:                          The specimen is received in formalin with proper patient identification,                           labeled \"fallopian tube and ovary\".  The specimen consists of bilateral                           ovaries with undesignated laterality, and bilateral, detached fallopian                           tubes segments.  Both fallopian tube segments are received with partially                           detached fimbriated ends.  The 2 detached fimbriated ends are received                           separately.  All pieces are arbitrarily designated ovary 1 (8.9 g, 3.7 x                           2.6 x 1.6 cm), ovary 2 (7.1 g, 3.7 x 2.0 x 1.9 cm), fallopian tube 1                           (inked blue, 6.0 cm in length by 0.5 cm in diameter), fallopian tube 2                           (4.6 cm in length by 0.6 cm in diameter).  The detached fimbriated end                           pieces measure 1.2 and 2.0 cm in greatest dimension.                          Both ovaries contain pink-tan to tan-yellow, cerebriform surfaces.  Ovary                           1 contains a 2.0 cm " surface disruption.  Both ovaries are sectioned                           revealing tan-yellow ovarian parenchyma with normal corpora albicantia and                           multiple tubal cysts measuring up to 0.7 cm in greatest dimension.  No                           masses or processes are identified.                          Both fallopian tubes contain pink-tan, smooth serosa and stellate lumina.                            Fallopian tube 1 contains a portion of attached fimbria.  No lesions are                           seen.  The detached fimbriated ends are tan-pink and papillary with no                           masses.  Representative sections are submitted.                                                    Summary of Sections:                                                    A1-representative section of ovary 1                          A2-representative section of ovary 2                          A3-representative cross-sections and entire attached fimbriated end of                           fallopian tube 1                          A4-representative cross-sections of fallopian tube 2 and entire bisected                           distal end                          A5-detached fimbria, sectioned and entirely submitted     Microscopic Description 06/28/2024    Final                    Value:Microscopic examination is performed.                                                    I have personally reviewed all specimens and or slides, including the                           listed special stains, and used them with my medical judgement to                           determine the final diagnosis.     Performing Labs 06/28/2024    Final                    Value:The technical component of this testing was completed at [facility].                                                    Stain controls for all stains resulted  within this report have been                           reviewed and show appropriate reactivity.          Video-Visit Details    Type of service:  Video Visit   Originating Location (pt. Location): Home    Distant Location (provider location):  On-site  Platform used for Video Visit: Teagan  Signed Electronically by: Narciso Sanford MD

## 2024-12-24 ENCOUNTER — PATIENT OUTREACH (OUTPATIENT)
Dept: MIDWIFE SERVICES | Facility: CLINIC | Age: 44
End: 2024-12-24
Payer: COMMERCIAL

## 2024-12-26 ENCOUNTER — PATIENT OUTREACH (OUTPATIENT)
Dept: CARE COORDINATION | Facility: CLINIC | Age: 44
End: 2024-12-26
Payer: COMMERCIAL

## 2025-01-04 ENCOUNTER — MYC REFILL (OUTPATIENT)
Dept: FAMILY MEDICINE | Facility: CLINIC | Age: 45
End: 2025-01-04
Payer: COMMERCIAL

## 2025-01-04 DIAGNOSIS — F90.0 ATTENTION DEFICIT HYPERACTIVITY DISORDER (ADHD), PREDOMINANTLY INATTENTIVE TYPE: ICD-10-CM

## 2025-01-06 RX ORDER — LISDEXAMFETAMINE DIMESYLATE 20 MG/1
20 CAPSULE ORAL EVERY MORNING
Qty: 30 CAPSULE | Refills: 0 | Status: SHIPPED | OUTPATIENT
Start: 2025-01-06

## 2025-01-15 ENCOUNTER — MYC MEDICAL ADVICE (OUTPATIENT)
Dept: OBGYN | Facility: CLINIC | Age: 45
End: 2025-01-15
Payer: COMMERCIAL

## 2025-01-16 ENCOUNTER — VIRTUAL VISIT (OUTPATIENT)
Dept: FAMILY MEDICINE | Facility: CLINIC | Age: 45
End: 2025-01-16
Payer: COMMERCIAL

## 2025-01-16 DIAGNOSIS — F90.0 ATTENTION DEFICIT HYPERACTIVITY DISORDER (ADHD), PREDOMINANTLY INATTENTIVE TYPE: Primary | ICD-10-CM

## 2025-01-16 ASSESSMENT — PATIENT HEALTH QUESTIONNAIRE - PHQ9
10. IF YOU CHECKED OFF ANY PROBLEMS, HOW DIFFICULT HAVE THESE PROBLEMS MADE IT FOR YOU TO DO YOUR WORK, TAKE CARE OF THINGS AT HOME, OR GET ALONG WITH OTHER PEOPLE: SOMEWHAT DIFFICULT
SUM OF ALL RESPONSES TO PHQ QUESTIONS 1-9: 3
SUM OF ALL RESPONSES TO PHQ QUESTIONS 1-9: 3

## 2025-01-16 ASSESSMENT — ASTHMA QUESTIONNAIRES
ACT_TOTALSCORE: 25
ACT_TOTALSCORE: 25
QUESTION_2 LAST FOUR WEEKS HOW OFTEN HAVE YOU HAD SHORTNESS OF BREATH: NOT AT ALL
QUESTION_1 LAST FOUR WEEKS HOW MUCH OF THE TIME DID YOUR ASTHMA KEEP YOU FROM GETTING AS MUCH DONE AT WORK, SCHOOL OR AT HOME: NONE OF THE TIME
QUESTION_4 LAST FOUR WEEKS HOW OFTEN HAVE YOU USED YOUR RESCUE INHALER OR NEBULIZER MEDICATION (SUCH AS ALBUTEROL): NOT AT ALL
QUESTION_3 LAST FOUR WEEKS HOW OFTEN DID YOUR ASTHMA SYMPTOMS (WHEEZING, COUGHING, SHORTNESS OF BREATH, CHEST TIGHTNESS OR PAIN) WAKE YOU UP AT NIGHT OR EARLIER THAN USUAL IN THE MORNING: NOT AT ALL
QUESTION_5 LAST FOUR WEEKS HOW WOULD YOU RATE YOUR ASTHMA CONTROL: COMPLETELY CONTROLLED

## 2025-01-16 NOTE — PROGRESS NOTES
"Carrie is a 44 year old who is being evaluated via a billable video visit.     How would you like to obtain your AVS? MyChart  If the video visit is dropped, the invitation should be resent by: Text to cell phone: [phone]  Will anyone else be joining your video visit? No      Assessment & Plan     Attention deficit hyperactivity disorder (ADHD), predominantly inattentive type  See notes   is a patient I have known fro a long time   No history of chemical dependency   Explained that she has to be responsible fro not losing the medications     I was at a convention this weekend and the bag with all my medications in them got lost. I called the hotel to see if I left it in the room and called the hosts to see if it was in one of the conference rooms and no one has seen it. I know Vyvance is controlled but is there anyway I can refill ? Do I need to call my insurance company?     I agreed to do a one time refil request but she may need to pay for them out of pocket     I am not sure what happened to the bag, I don t foresee this happening again as I will just use a pill sorter next time I go anywhere. Lesson learned      BMI  Estimated body mass index is 36.41 kg/m  as calculated from the following:    Height as of 6/28/24: 1.747 m (5' 8.78\").    Weight as of 9/28/24: 111.1 kg (245 lb).         See Patient Instructions    Subjective   Carrie is a 44 year old, presenting for the following health issues:  No chief complaint on file.    History of Present Illness       Reason for visit:  Adhd lost meds    She eats 4 or more servings of fruits and vegetables daily.She consumes 2 sweetened beverage(s) daily.She exercises with enough effort to increase her heart rate 30 to 60 minutes per day.  She exercises with enough effort to increase her heart rate 3 or less days per week. She is missing 4 dose(s) of medications per week.  She is not taking prescribed medications regularly due to other.             Review of " Systems  Constitutional, HEENT, cardiovascular, pulmonary, gi and gu systems are negative, except as otherwise noted.      Objective           Vitals:  No vitals were obtained today due to virtual visit.    Physical Exam   GENERAL: alert and no distress  EYES: Eyes grossly normal to inspection.  No discharge or erythema, or obvious scleral/conjunctival abnormalities.  RESP: No audible wheeze, cough, or visible cyanosis.    SKIN: Visible skin clear. No significant rash, abnormal pigmentation or lesions.  NEURO: Cranial nerves grossly intact.  Mentation and speech appropriate for age.  PSYCH: Appropriate affect, tone, and pace of words          Video-Visit Details    Type of service:  Video Visit   Originating Location (pt. Location): Home    Distant Location (provider location):  On-site  Platform used for Video Visit: Teagan  Signed Electronically by: Narciso Sanford MD

## 2025-02-04 ENCOUNTER — MYC REFILL (OUTPATIENT)
Dept: FAMILY MEDICINE | Facility: CLINIC | Age: 45
End: 2025-02-04
Payer: COMMERCIAL

## 2025-02-04 DIAGNOSIS — F90.0 ATTENTION DEFICIT HYPERACTIVITY DISORDER (ADHD), PREDOMINANTLY INATTENTIVE TYPE: ICD-10-CM

## 2025-02-04 RX ORDER — LISDEXAMFETAMINE DIMESYLATE 20 MG/1
20 CAPSULE ORAL EVERY MORNING
Qty: 30 CAPSULE | Refills: 0 | Status: SHIPPED | OUTPATIENT
Start: 2025-02-04

## 2025-03-04 ENCOUNTER — PATIENT OUTREACH (OUTPATIENT)
Dept: CARE COORDINATION | Facility: CLINIC | Age: 45
End: 2025-03-04
Payer: COMMERCIAL

## 2025-03-06 ENCOUNTER — MYC REFILL (OUTPATIENT)
Dept: FAMILY MEDICINE | Facility: CLINIC | Age: 45
End: 2025-03-06
Payer: COMMERCIAL

## 2025-03-06 DIAGNOSIS — F90.0 ATTENTION DEFICIT HYPERACTIVITY DISORDER (ADHD), PREDOMINANTLY INATTENTIVE TYPE: ICD-10-CM

## 2025-03-06 RX ORDER — LISDEXAMFETAMINE DIMESYLATE 20 MG/1
20 CAPSULE ORAL EVERY MORNING
Qty: 30 CAPSULE | Refills: 0 | Status: SHIPPED | OUTPATIENT
Start: 2025-03-06

## 2025-04-01 ENCOUNTER — PATIENT OUTREACH (OUTPATIENT)
Dept: CARE COORDINATION | Facility: CLINIC | Age: 45
End: 2025-04-01
Payer: COMMERCIAL

## 2025-04-15 ENCOUNTER — MYC REFILL (OUTPATIENT)
Dept: FAMILY MEDICINE | Facility: CLINIC | Age: 45
End: 2025-04-15
Payer: COMMERCIAL

## 2025-04-15 DIAGNOSIS — F90.0 ATTENTION DEFICIT HYPERACTIVITY DISORDER (ADHD), PREDOMINANTLY INATTENTIVE TYPE: ICD-10-CM

## 2025-04-16 RX ORDER — LISDEXAMFETAMINE DIMESYLATE 20 MG/1
20 CAPSULE ORAL EVERY MORNING
Qty: 30 CAPSULE | Refills: 0 | Status: SHIPPED | OUTPATIENT
Start: 2025-04-16

## 2025-04-24 ENCOUNTER — OFFICE VISIT (OUTPATIENT)
Dept: FAMILY MEDICINE | Facility: CLINIC | Age: 45
End: 2025-04-24
Payer: COMMERCIAL

## 2025-04-24 VITALS
OXYGEN SATURATION: 98 % | WEIGHT: 261.5 LBS | HEART RATE: 98 BPM | TEMPERATURE: 97.7 F | BODY MASS INDEX: 38.73 KG/M2 | SYSTOLIC BLOOD PRESSURE: 139 MMHG | DIASTOLIC BLOOD PRESSURE: 87 MMHG | HEIGHT: 69 IN

## 2025-04-24 DIAGNOSIS — F43.23 ADJUSTMENT DISORDER WITH MIXED ANXIETY AND DEPRESSED MOOD: ICD-10-CM

## 2025-04-24 DIAGNOSIS — F90.0 ATTENTION DEFICIT HYPERACTIVITY DISORDER (ADHD), PREDOMINANTLY INATTENTIVE TYPE: ICD-10-CM

## 2025-04-24 DIAGNOSIS — J30.1 SEASONAL ALLERGIC RHINITIS DUE TO POLLEN: ICD-10-CM

## 2025-04-24 DIAGNOSIS — Z13.6 CARDIOVASCULAR SCREENING; LDL GOAL LESS THAN 130: ICD-10-CM

## 2025-04-24 DIAGNOSIS — E55.9 VITAMIN D DEFICIENCY: ICD-10-CM

## 2025-04-24 DIAGNOSIS — R22.1 NECK MASS: Primary | ICD-10-CM

## 2025-04-24 DIAGNOSIS — Z83.3 FH: TYPE 2 DIABETES: ICD-10-CM

## 2025-04-24 LAB
AMPHETAMINES UR QL: DETECTED
BARBITURATES UR QL SCN: NOT DETECTED
BENZODIAZ UR QL SCN: NOT DETECTED
BUPRENORPHINE UR QL: NOT DETECTED
CANNABINOIDS UR QL: DETECTED
COCAINE UR QL SCN: NOT DETECTED
D-METHAMPHET UR QL: NOT DETECTED
ERYTHROCYTE [DISTWIDTH] IN BLOOD BY AUTOMATED COUNT: 11.1 % (ref 10–15)
EST. AVERAGE GLUCOSE BLD GHB EST-MCNC: 108 MG/DL
HBA1C MFR BLD: 5.4 % (ref 0–5.6)
HCT VFR BLD AUTO: 43 % (ref 35–47)
HGB BLD-MCNC: 14.4 G/DL (ref 11.7–15.7)
MCH RBC QN AUTO: 30.1 PG (ref 26.5–33)
MCHC RBC AUTO-ENTMCNC: 33.5 G/DL (ref 31.5–36.5)
MCV RBC AUTO: 90 FL (ref 78–100)
METHADONE UR QL SCN: NOT DETECTED
OPIATES UR QL SCN: NOT DETECTED
OXYCODONE UR QL SCN: NOT DETECTED
PCP UR QL SCN: NOT DETECTED
PLATELET # BLD AUTO: 230 10E3/UL (ref 150–450)
RBC # BLD AUTO: 4.78 10E6/UL (ref 3.8–5.2)
TRICYCLICS UR QL SCN: NOT DETECTED
WBC # BLD AUTO: 4.6 10E3/UL (ref 4–11)

## 2025-04-24 RX ORDER — FLUTICASONE PROPIONATE 50 MCG
1 SPRAY, SUSPENSION (ML) NASAL DAILY
Qty: 16 G | Refills: 5 | Status: SHIPPED | OUTPATIENT
Start: 2025-04-24

## 2025-04-24 RX ORDER — LISDEXAMFETAMINE DIMESYLATE 30 MG/1
30 CAPSULE ORAL EVERY MORNING
Qty: 30 CAPSULE | Refills: 0 | Status: SHIPPED | OUTPATIENT
Start: 2025-04-24

## 2025-04-24 ASSESSMENT — PAIN SCALES - GENERAL: PAINLEVEL_OUTOF10: NO PAIN (0)

## 2025-04-24 NOTE — LETTER
Controlled Medication Agreement for Stimulant Medication    [facility]  -- Controlled Medication Agreement    4/24/2025   Carrie BURGESS Alexander   1980   [id number]       I understand that my provider is prescribing controlled medications to assist me in managing my ADHD.  The risks, benefits, and side effects of these medications have been explained to me and I agree to the following conditions for this type of treatment.    Stimulant Medication Prescribed: vyvanse    1.  I will take my medications exactly as prescribed and will not change the medication dosage or schedule without my provider's approval.  Refills will not be given if I  runs out early.     2.  I will keep all regular appointments at this clinic.  If there are three or more missed appointments or appointments canceled less than 2 hours before the scheduled time, my medication may be discontinued.    3.  I understand that prescriptions may only be written for one month at a time, and a written prescription is required each month.  Prescriptions cannot be called in or faxed to the pharmacy.    4.  If the prescription is lost or stolen, replacement is at the discretion of my provider.  I understand that this may mean the prescription might not be replaced.    5.  If I am late for scheduled follow up, I understand that I must make an appointment and that another refill is at the discretion of my provider.  This may mean a prescription for only the amount required until the appointment, regardless of prescription co-pay.  For example, if an appointment is made in 1 week, a prescription might only be written for 7 pills.      I understand that if I violate any of the above conditions, my prescription medications and/or treatment may be terminated.  If the violation includes providing controlled substances to anyone other than to whom the medication is prescribed, a report may be made to my child's physician, pharmacy, and other  authorities, including the police.    I have read this contract and it has been explained to me.  I fully understand the consequences of violating this agreement.    _________________________________/______________/____________________________    Patient signature/Date/Witness

## 2025-04-24 NOTE — PROGRESS NOTES
"  Assessment & Plan     Neck mass  recurrent  - TSH with free T4 reflex; Future  - T4, free; Future  - CBC with platelets; Future  - US Head Neck Soft Tissue; Future  - T4, free  - CBC with platelets  - TSH    Attention deficit hyperactivity disorder (ADHD), predominantly inattentive type  Needs higher dose  Follow up by phone in 1 month   - Urine Drug Screen Clinic; Future  - lisdexamfetamine (VYVANSE) 30 MG capsule; Take 1 capsule (30 mg) by mouth every morning.  - Urine Drug Screen Clinic    Adjustment disorder with mixed anxiety and depressed mood  Well controlled   - Comprehensive metabolic panel (BMP + Alb, Alk Phos, ALT, AST, Total. Bili, TP); Future  - Comprehensive metabolic panel (BMP + Alb, Alk Phos, ALT, AST, Total. Bili, TP)    Vitamin D deficiency  recheck  - Vitamin D Deficiency; Future  - Vitamin D Deficiency    CARDIOVASCULAR SCREENING; LDL GOAL LESS THAN 130  recheck  - Lipid panel reflex to direct LDL Fasting; Future  - Lipid panel reflex to direct LDL Fasting    FH: type 2 diabetes  Check for  - Hemoglobin A1c; Future  - Hemoglobin A1c    Seasonal allergic rhinitis due to pollen  Well controlled on   - fluticasone (FLONASE) 50 MCG/ACT nasal spray; Spray 1 spray into both nostrils daily.          BMI  Estimated body mass index is 38.29 kg/m  as calculated from the following:    Height as of this encounter: 1.76 m (5' 9.29\").    Weight as of this encounter: 118.6 kg (261 lb 8 oz).   Weight management plan: Discussed healthy diet and exercise guidelines      Follow-up       Vanessa Butler is a 44 year old, presenting for the following health issues:  Recheck Medication      4/24/2025     9:19 AM   Additional Questions   Roomed by Maria Ines ESPINAL     History of Present Illness       Reason for visit:  ADHD med check thyroid    She eats 4 or more servings of fruits and vegetables daily.She consumes 0 sweetened beverage(s) daily.She exercises with enough effort to increase her heart rate 30 to 60 " minutes per day.  She exercises with enough effort to increase her heart rate 4 days per week.   She is taking medications regularly.            Depression and Anxiety   How are you doing with your depression since your last visit? No change  How are you doing with your anxiety since your last visit?  No change  Are you having other symptoms that might be associated with depression or anxiety? No  Have you had a significant life event? Health Concerns   Do you have any concerns with your use of alcohol or other drugs? No    Social History     Tobacco Use    Smoking status: Never     Passive exposure: Never    Smokeless tobacco: Never   Vaping Use    Vaping status: Never Used   Substance Use Topics    Alcohol use: Yes     Comment: couples times per month    Drug use: Yes     Types: Marijuana     Comment: daily for fibromyalgia         4/10/2024    12:29 PM 12/10/2024     5:31 PM 1/16/2025     1:36 PM   PHQ   PHQ-9 Total Score 4 9  3    Q9: Thoughts of better off dead/self-harm past 2 weeks Not at all Not at all Not at all       Patient-reported         1/2/2024     4:30 PM 1/10/2024     2:12 PM 4/10/2024    12:29 PM   YANIV-7 SCORE   Total Score 1 (minimal anxiety)  3 (minimal anxiety)   Total Score 1 3 3         1/16/2025     1:36 PM   Last PHQ-9   1.  Little interest or pleasure in doing things 1   2.  Feeling down, depressed, or hopeless 0   3.  Trouble falling or staying asleep, or sleeping too much 0   4.  Feeling tired or having little energy 1   5.  Poor appetite or overeating 0   6.  Feeling bad about yourself 0   7.  Trouble concentrating 1   8.  Moving slowly or restless 0   Q9: Thoughts of better off dead/self-harm past 2 weeks 0   PHQ-9 Total Score 3        Patient-reported         4/10/2024    12:29 PM   YANIV-7    1. Feeling nervous, anxious, or on edge 1   2. Not being able to stop or control worrying 0   3. Worrying too much about different things 0   4. Trouble relaxing 1   5. Being so restless that it  "is hard to sit still 0   6. Becoming easily annoyed or irritable 1   7. Feeling afraid, as if something awful might happen 0   YANIV-7 Total Score 3   If you checked any problems, how difficult have they made it for you to do your work, take care of things at home, or get along with other people? Not difficult at all       Suicide Assessment Five-step Evaluation and Treatment (SAFE-T)    Hypothyroidism Follow-up    Since last visit, patient describes the following symptoms: Weight stable, no hair loss, no skin changes, no constipation, no loose stools      3) medication check and ADHD follow up.      CURRENT CONCERNS:  Not workingas well but toerating medication      Review of past medical history:     Neck mass  Attention deficit hyperactivity disorder (ADHD), predominantly inattentive type  Adjustment disorder with mixed anxiety and depressed mood  Vitamin D deficiency  CARDIOVASCULAR SCREENING; LDL GOAL LESS THAN 130  FH: type 2 diabetes  Seasonal allergic rhinitis due to pollen     CURRENT PRESCRIPTIONS:  Medications changed.  See orders.     MEDICATION BENEFITS:  Controlled symptoms:  Attention span and Finishing tasks  Uncontrolled symptoms:  Distractability     MEDICATION SIDE EFFECTS:   Has:  none  Denies:  appetite suppression, weight loss, insomnia, and palpitations         Review of Systems  Constitutional, HEENT, cardiovascular, pulmonary, gi and gu systems are negative, except as otherwise noted.      Objective    /87 (BP Location: Left arm, Patient Position: Sitting, Cuff Size: Adult Large)   Pulse 98   Temp 97.7  F (36.5  C) (Temporal)   Ht 1.76 m (5' 9.29\")   Wt 118.6 kg (261 lb 8 oz)   LMP  (LMP Unknown)   SpO2 98%   BMI 38.29 kg/m    Body mass index is 38.29 kg/m .  Physical Exam   GENERAL: alert and no distress  NECK: no adenopathy, has rubbery 2-3 cm mass right mid neck anteriorly   RESP: lungs clear to auscultation - no rales, rhonchi or wheezes  CV: regular rate and rhythm, normal S1 " S2, no S3 or S4, no murmur, click or rub, no peripheral edema  ABDOMEN: soft, nontender, no hepatosplenomegaly, no masses and bowel sounds normal  SKIN: no suspicious lesions or rashes  NEURO: Normal strength and tone, mentation intact and speech normal  PSYCH: mentation appears normal, affect normal/bright    Admission on 06/28/2024, Discharged on 06/28/2024   Component Date Value Ref Range Status    hCG Serum Qualitative 06/28/2024 Negative  Negative Final    This test is for screening purposes.  Results should be interpreted along with the clinical picture.  Confirmation testing is available if warranted by ordering [id number], HCG Quantitative Pregnancy.    Case Report 06/28/2024    Final                    Value:Surgical Pathology Report                         Case: [id number]                                  Authorizing Provider:  Shira Alejandro MD   Collected:           06/28/2024 12:08 PM          Ordering Location:      [facility] OR                 Received:            06/28/2024 01:47 PM          Pathologist:           Dimitri Moralez MD                                                    Specimen:    Ovary and Fallopian Tube, Bilateral, Fallopian tube and ovary                              Final Diagnosis 06/28/2024    Final                    Value:Fallopian tubes and ovaries, bilateral, salpingo-oophorectomy:                          -Bilateral ovaries with benign follicular cysts                          -Bilateral fallopian tubes, no significant histologic abnormalities                          -Negative for malignancy    Clinical Information 06/28/2024    Final                    Value:Procedure:                          laparoscopic bilateral salpingoophrectomy - Bilateral                          Pre-op Diagnosis: Dysmenorrhea [N94.6]                          Irregular menstrual cycle [N92.6]                          Uveitis [H20.9]                          Post-op Diagnosis: N94.6 -  "Dysmenorrhea [ICD-10-CM]                          N92.6 - Irregular menstrual cycle [ICD-10-CM]                          H20.9 - Uveitis [ICD-10-CM]    Gross Description 06/28/2024    Final                    Value:A(1). Ovary and Fallopian Tube, Bilateral, Fallopian tube and ovary:                          The specimen is received in formalin with proper patient identification,                           labeled \"fallopian tube and ovary\".  The specimen consists of bilateral                           ovaries with undesignated laterality, and bilateral, detached fallopian                           tubes segments.  Both fallopian tube segments are received with partially                           detached fimbriated ends.  The 2 detached fimbriated ends are received                           separately.  All pieces are arbitrarily designated ovary 1 (8.9 g, 3.7 x                           2.6 x 1.6 cm), ovary 2 (7.1 g, 3.7 x 2.0 x 1.9 cm), fallopian tube 1                           (inked blue, 6.0 cm in length by 0.5 cm in diameter), fallopian tube 2                           (4.6 cm in length by 0.6 cm in diameter).  The detached fimbriated end                           pieces measure 1.2 and 2.0 cm in greatest dimension.                          Both ovaries contain pink-tan to tan-yellow, cerebriform surfaces.  Ovary                           1 contains a 2.0 cm surface disruption.  Both ovaries are sectioned                           revealing tan-yellow ovarian parenchyma with normal corpora albicantia and                           multiple tubal cysts measuring up to 0.7 cm in greatest dimension.  No                           masses or processes are identified.                          Both fallopian tubes contain pink-tan, smooth serosa and stellate lumina.                            Fallopian tube 1 contains a portion of attached fimbria.  No lesions are                           seen.  The detached " fimbriated ends are tan-pink and papillary with no                           masses.  Representative sections are submitted.                                                    Summary of Sections:                                                    A1-representative section of ovary 1                          A2-representative section of ovary 2                          A3-representative cross-sections and entire attached fimbriated end of                           fallopian tube 1                          A4-representative cross-sections of fallopian tube 2 and entire bisected                           distal end                          A5-detached fimbria, sectioned and entirely submitted    Microscopic Description 06/28/2024    Final                    Value:Microscopic examination is performed.                                                    I have personally reviewed all specimens and or slides, including the                           listed special stains, and used them with my medical judgement to                           determine the final diagnosis.    Performing Labs 06/28/2024    Final                    Value:The technical component of this testing was completed at [facility].                                                    Stain controls for all stains resulted within this report have been                           reviewed and show appropriate reactivity.            Signed Electronically by: Narciso Sanford MD

## 2025-04-30 ENCOUNTER — ANCILLARY PROCEDURE (OUTPATIENT)
Dept: MAMMOGRAPHY | Facility: CLINIC | Age: 45
End: 2025-04-30
Attending: FAMILY MEDICINE
Payer: COMMERCIAL

## 2025-04-30 DIAGNOSIS — Z12.31 ENCOUNTER FOR SCREENING MAMMOGRAM FOR BREAST CANCER: ICD-10-CM

## 2025-04-30 PROCEDURE — 77067 SCR MAMMO BI INCL CAD: CPT | Performed by: RADIOLOGY

## 2025-04-30 PROCEDURE — 77063 BREAST TOMOSYNTHESIS BI: CPT | Performed by: RADIOLOGY

## 2025-05-01 ENCOUNTER — ANCILLARY PROCEDURE (OUTPATIENT)
Dept: ULTRASOUND IMAGING | Facility: CLINIC | Age: 45
End: 2025-05-01
Attending: FAMILY MEDICINE
Payer: COMMERCIAL

## 2025-05-01 DIAGNOSIS — R22.1 NECK MASS: ICD-10-CM

## 2025-05-01 PROCEDURE — 76536 US EXAM OF HEAD AND NECK: CPT | Mod: GC | Performed by: RADIOLOGY

## 2025-05-12 DIAGNOSIS — E89.40 SURGICAL MENOPAUSE: ICD-10-CM

## 2025-05-12 NOTE — TELEPHONE ENCOUNTER
[facility] has attempted to transfer prescription for Combipatch from [facility] pharmacy multiple times with no response. Can a new prescription be sent to our pharmacy?    Thank you,    Payal Dia, PharmD  [facility]  [phone]

## 2025-05-13 ENCOUNTER — OFFICE VISIT (OUTPATIENT)
Dept: OBGYN | Facility: CLINIC | Age: 45
End: 2025-05-13
Payer: COMMERCIAL

## 2025-05-13 VITALS
HEART RATE: 117 BPM | DIASTOLIC BLOOD PRESSURE: 99 MMHG | SYSTOLIC BLOOD PRESSURE: 144 MMHG | WEIGHT: 256 LBS | OXYGEN SATURATION: 97 % | BODY MASS INDEX: 37.49 KG/M2

## 2025-05-13 DIAGNOSIS — E89.40 SURGICAL MENOPAUSE: ICD-10-CM

## 2025-05-13 DIAGNOSIS — Z11.3 SCREENING EXAMINATION FOR STI: ICD-10-CM

## 2025-05-13 DIAGNOSIS — Z12.4 CERVICAL CANCER SCREENING: Primary | ICD-10-CM

## 2025-05-13 LAB
HCV AB SERPL QL IA: NONREACTIVE
HIV 1+2 AB+HIV1 P24 AG SERPL QL IA: NONREACTIVE
T PALLIDUM AB SER QL: NONREACTIVE

## 2025-05-13 PROCEDURE — 87389 HIV-1 AG W/HIV-1&-2 AB AG IA: CPT | Performed by: OBSTETRICS & GYNECOLOGY

## 2025-05-13 PROCEDURE — 99396 PREV VISIT EST AGE 40-64: CPT | Performed by: OBSTETRICS & GYNECOLOGY

## 2025-05-13 PROCEDURE — 3080F DIAST BP >= 90 MM HG: CPT | Performed by: OBSTETRICS & GYNECOLOGY

## 2025-05-13 PROCEDURE — 3077F SYST BP >= 140 MM HG: CPT | Performed by: OBSTETRICS & GYNECOLOGY

## 2025-05-13 PROCEDURE — 87624 HPV HI-RISK TYP POOLED RSLT: CPT | Performed by: OBSTETRICS & GYNECOLOGY

## 2025-05-13 PROCEDURE — 87591 N.GONORRHOEAE DNA AMP PROB: CPT | Performed by: OBSTETRICS & GYNECOLOGY

## 2025-05-13 PROCEDURE — 99214 OFFICE O/P EST MOD 30 MIN: CPT | Mod: 25 | Performed by: OBSTETRICS & GYNECOLOGY

## 2025-05-13 PROCEDURE — 86803 HEPATITIS C AB TEST: CPT | Performed by: OBSTETRICS & GYNECOLOGY

## 2025-05-13 PROCEDURE — 99459 PELVIC EXAMINATION: CPT | Performed by: OBSTETRICS & GYNECOLOGY

## 2025-05-13 PROCEDURE — 36415 COLL VENOUS BLD VENIPUNCTURE: CPT | Performed by: OBSTETRICS & GYNECOLOGY

## 2025-05-13 PROCEDURE — 87491 CHLMYD TRACH DNA AMP PROBE: CPT | Performed by: OBSTETRICS & GYNECOLOGY

## 2025-05-13 PROCEDURE — 86780 TREPONEMA PALLIDUM: CPT | Performed by: OBSTETRICS & GYNECOLOGY

## 2025-05-13 RX ORDER — PROGESTERONE 200 MG/1
200 CAPSULE ORAL DAILY
Qty: 90 CAPSULE | Refills: 4 | Status: SHIPPED | OUTPATIENT
Start: 2025-05-13

## 2025-05-13 RX ORDER — ESTRADIOL 0.1 MG/D
1 FILM, EXTENDED RELEASE TRANSDERMAL
Qty: 24 PATCH | Refills: 4 | Status: SHIPPED | OUTPATIENT
Start: 2025-05-15

## 2025-05-13 NOTE — PROGRESS NOTES
"Carrie is a 44 year old  who presents for annual exam.   Postmenopausal since .  She is having no menopausal symptoms. No vaginal bleeding noted.     Besides routine health maintenance, she has no other health concerns today   S/p BSO on 24.  Started on HRT post-operatively.  Sees Dr. Sanford for routine care and annuals, but presents today for gyn exam and refills of HRT.  HRT is helping, but notices it doesn't work as well if she's not careful about where she places the patch.  Has been continuing Aygestin for sleep and then Combipatch for HRT.  Has not had any bouts of uveitis since surgery.  Also notes joint pain and swelling have improved.  GYNECOLOGIC HISTORY:  Menarche: 16     She is sexually active with 1male partner(s) and she is currently in monogamous relationship with partner.    History sexually transmitted infections:No STD history  STI testing offered?  Accepted  Estrogen replacement therapy: Yes -  Oral and patch  RADHA exposure: No    History of abnormal Pap smear: YES - reflected in Problem List and Health Maintenance accordingly  Family history of breast CA: Yes (Please explain): sister p aunt  Family history of uterine/ovarian CA: No  Family history of colon CA: No    HEALTH MAINTENANCE:  Cholesterol: (No components found for: \"CHOL2\" ) History of abnormal lipids: No  Mammo: 2025 . History of abnormal Mammo: No  Regular Self Breast Exams: Yes  Colonoscopy:  History of abnormal Colonoscopy: No  Dexa:  History of abnormal Dexa: No  Calcium/Vitamin D intake: source:  dairy, dietary supplement(s) Adequate? Yes  TSH: (No components found for: \"TSH1\" )  Pap; (  Lab Results   Component Value Date    PAP NIL 2017    PAP NIL 2010    )    HISTORY:  OB History    Para Term  AB Living   1 1 1 0 0 1   SAB IAB Ectopic Multiple Live Births   0 0 0 0 1      # Outcome Date GA Lbr Alexei/2nd Weight Sex Type Anes PTL Lv   1 Term 17 41w2d 02:05 / 04:09 4.564 kg (10 " lb 1 oz) F Vag-Spont EPI, Nitrous N EFRAÍN      Complications: GBS      Name: TERRY RODRIGUES      Apgar1: 8  Apgar5: 9     Past Medical History:   Diagnosis Date    Breast disorder     biopsy 2011(?) benign    Breast lump on right side at 2:30  o'clock position 05/04/2011    Cardiac abnormality     tachycardia during pregnancy    Depressive disorder     Fibromyalgia     Mental disorder     depression, anxiety    Sinus infection     Unspecified sinusitis (chronic)      Past Surgical History:   Procedure Laterality Date    BREAST EXCISIONAL BIOPSY Left     EXCISE GANGLION WRIST Left 05/24/2018    Procedure: EXCISE GANGLION WRIST;  Excision Left Dorsal Wirst Ganglion;  Surgeon: Randall Maradiaga MD;  Location: [facility] OR    HC TOOTH EXTRACTION W/FORCEP  2003    all 4 together    LAPAROSCOPIC SALPINGO-OOPHORECTOMY Bilateral 6/28/2024    Procedure: laparoscopic bilateral salpingoophrectomy;  Surgeon: Shira Alejandro MD;  Location: [facility] OR     Family History   Problem Relation Age of Onset    Glaucoma Mother     Substance Abuse Mother     Depression Mother     Neurologic Disorder Mother         bell's palsy    Substance Abuse Father     Mental Illness Father     Neurologic Disorder Father         sheehan's palsy    Hypertension Father         not on medication, smoker    Glaucoma Maternal Grandfather     Hypertension Paternal Grandmother     Osteoporosis Paternal Grandmother     Substance Abuse Brother     Bipolar Disorder Brother     Substance Abuse Sister     Schizophrenia Sister     Hypertension Paternal Aunt     Macular Degeneration No family hx of      Social History     Socioeconomic History    Marital status: Single     Spouse name: None    Number of children: 0    Years of education: 13 1/2    Highest education level: None   Occupational History    Occupation: teller     Employer: [employer]   Tobacco Use    Smoking status: Never     Passive exposure: Never    Smokeless tobacco: Never   Vaping Use    Vaping  status: Never Used   Substance and Sexual Activity    Alcohol use: Yes     Comment: couples times per month    Drug use: Yes     Types: Marijuana     Comment: daily for fibromyalgia    Sexual activity: Not Currently     Partners: Male     Birth control/protection: None   Other Topics Concern    Parent/sibling w/ CABG, MI or angioplasty before 65F 55M? No   Social History Narrative    Risk Behaviors & Healthy habits    Balanced Diet  no     Prevent Osteoporosis  yes - lots of dairy    Regular Exercise  no     Dental Care  yes    Seat Belt use  yes    Self Breast Exam  no     Sexually Active  no , no STD concerns, but would like to be checked     Contraception  yes    Abuse  no     Guns  no     Sun Screen  no             Working at [employer], 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  Lives with her aunt, and gets along well.  In limited contact with her mother, in contact with father a lot.  Born in [place], and [place] 5, [place] 11.  Father in the .                     Social Drivers of Health     Interpersonal Safety: Low Risk  (4/24/2025)    Interpersonal Safety     Do you feel physically and emotionally safe where you currently live?: Yes     Within the past 12 months, have you been hit, slapped, kicked or otherwise physically hurt by someone?: No     Within the past 12 months, have you been humiliated or emotionally abused in other ways by your partner or ex-partner?: No       Current Outpatient Medications:     cetirizine (ZYRTEC) 10 MG tablet, Take 1 tablet (10 mg) by mouth 2 times daily, Disp: 180 tablet, Rfl: 1    cyclopentolate (CYCLOGYL) 1 % ophthalmic solution, For flares: Use 1 drop at bedtime in Right eye for  3 days during flares., Disp: 1 mL, Rfl: 0    estradiol-norethindrone (COMBIPATCH) 0.05-0.14 MG/DAY bi-weekly patch, Place 1 patch onto the skin twice a week., Disp: 8 patch, Rfl: 11    fluticasone (FLONASE) 50 MCG/ACT nasal spray, Spray 1 spray into both  nostrils daily., Disp: 16 g, Rfl: 5    fluticasone (FLONASE) 50 MCG/ACT nasal spray, Spray 1 spray into both nostrils 2 times daily, Disp: 11.1 mL, Rfl: 3    ibuprofen (ADVIL/MOTRIN) 600 MG tablet, Take 1 tablet (600 mg) by mouth every 6 hours as needed for moderate pain., Disp: 120 tablet, Rfl: 1    lisdexamfetamine (VYVANSE) 20 MG capsule, Take 1 capsule (20 mg) by mouth every morning., Disp: 30 capsule, Rfl: 0    lisdexamfetamine (VYVANSE) 30 MG capsule, Take 1 capsule (30 mg) by mouth every morning., Disp: 30 capsule, Rfl: 0    norethindrone (AYGESTIN) 5 MG tablet, Take 1 tablet (5 mg) by mouth daily, Disp: 90 tablet, Rfl: 0    ondansetron (ZOFRAN ODT) 4 MG ODT tab, Take 1 tablet (4 mg) by mouth every 8 hours as needed for nausea, Disp: 20 tablet, Rfl: 3    prednisoLONE acetate (PRED FORTE) 1 % ophthalmic suspension, For flares: Use 1 drop in right eye 3x/day for 3 days. Call/message if things are not better., Disp: 5 mL, Rfl: 4    progesterone (PROMETRIUM) 100 MG capsule, Take 1 capsule (100 mg) by mouth daily., Disp: 90 capsule, Rfl: 5    senna-docusate (SENOKOT-S/PERICOLACE) 8.6-50 MG tablet, Take 1-2 tablets by mouth 2 times daily as needed for constipation (While on oral opioids.), Disp: 30 tablet, Rfl: 0    SUMAtriptan (IMITREX) 50 MG tablet, Take 1 tablet (50 mg) by mouth at onset of headache for migraine May repeat in 2 hours. Max 4 tablets/24 hours., Disp: 12 tablet, Rfl: 9    vitamin D2 (ERGOCALCIFEROL) 87185 units (1250 mcg) capsule, Take 1 capsule (50,000 Units) by mouth once a week, Disp: 30 capsule, Rfl: 1    acetaminophen (TYLENOL) 325 MG tablet, Take 3 tablets (975 mg) by mouth every 6 hours as needed for mild pain (Patient not taking: Reported on 9/28/2024), Disp: 50 tablet, Rfl: 0    atomoxetine (STRATTERA) 10 MG capsule, Take 1 capsule (10 mg) by mouth daily., Disp: 30 capsule, Rfl: 1     Allergies   Allergen Reactions    No Known Drug Allergy     Seasonal Allergies        Past medical,  surgical, social and family history were reviewed and updated in EPIC.    ROS:   C:       NEGATIVE for fever, chills, change in weight  I:         NEGATIVE for worrisome rashes, moles or lesions  E:       NEGATIVE for vision changes or irritation  E/M:   NEGATIVE for ear, mouth and throat problems  R:       NEGATIVE for significant cough or SOB  CV:     NEGATIVE for chest pain, palpitations or peripheral edema  GI:      NEGATIVE for nausea, abdominal pain, heartburn, or change in bowel habits  :    NEGATIVE for frequency, dysuria, hematuria, vaginal discharge, or bleeding  M:       NEGATIVE for significant arthralgias or myalgia  N:       NEGATIVE for weakness, dizziness or paresthesias  E:       NEGATIVE for temperature intolerance, skin/hair changes  P:       NEGATIVE for changes in mood or affect    EXAM:  BP (!) 144/99   Pulse 117   Wt 116.1 kg (256 lb)   LMP  (LMP Unknown)   SpO2 97%   BMI 37.49 kg/m     BMI: Body mass index is 37.49 kg/m .  Constitutional: healthy, alert and no distress  Breast: Declined.  Recently had mammogram.  :  Vulva:  No external lesions, normal female hair distribution, no inguinal adenopathy.    Urethra:  Midline, non-tender, well supported, no discharge  Vagina:  normal rugae, no abnormal discharge, no lesions  Rectal Exam: deferred  Musculoskeletal: extremities normal  Psychiatric: Affect appropriate, cooperative,mentation appears normal.     COUNSELING:   Reviewed preventive health counseling, as reflected in patient instructions   reports that she has never smoked. She has never been exposed to tobacco smoke. She has never used smokeless tobacco.    Body mass index is 37.49 kg/m .      FRAX Risk Assessment  ASSESSMENT:  44 year old  with satisfactory annual exam  (Z12.4) Cervical cancer screening  (primary encounter diagnosis)  Comment: Routine pap  Plan: HPV and Gynecologic Cytology Panel -         Recommended Age 30-65 Years    (E89.40) Surgical  menopause  Comment: Discussed that it shouldn't be so picky to placement of patch.  Suggested increasing dose to 0.1 patch and then doing oral progesterone; stopping Aygestin.  She is agreeable to this.  New prescriptions sent to her pharmacy  Plan: estradiol (VIVELLE-DOT) 0.1 MG/24HR bi-weekly         patch, progesterone (PROMETRIUM) 200 MG capsule    (Z11.3) Screening examination for STI  Comment: Routine screening.  No known exposures.  Plan: NEISSERIA GONORRHOEA PCR, CHLAMYDIA TRACHOMATIS        PCR, Hepatitis C Screen Reflex to HCV RNA Quant        and Genotype, HIV Antigen Antibody Combo         Cascade, Treponema Abs w Reflex to RPR and         Titer          RTC 1 year and prn.    Shira Alejandro MD

## 2025-05-13 NOTE — PROGRESS NOTES
GYN Problem Visit                                                 CC:  ***    HPI:  Carrie Munoz is a 44 year old female who presents to clinic today complaining of ***        ROS:  ***    EXAM:  not currently breastfeeding.   BMI= There is no height or weight on file to calculate BMI.  General - pleasant female in no acute distress.  Neck - supple without lymphadenopathy or thyromegaly.  Lungs - clear to auscultation bilaterally.  Heart - regular rate and rhythm without murmur.  Breast - no nodularity, skin changes, asymmetry or nipple discharge bilaterally. No axillary or supraclavicular lymphadenopathy.  Abdomen - soft, nontender, nondistended, no hepatosplenomegaly.  Pelvic - external genitalia: normal adult female without lesions or abnormalities; BUS: within normal limits; Vagina: well rugated, no discharge, no lesions; Cervix: no lesions or CMT; Uterus: ***verted, *** week sized, mobile and nontender; Adnexae: no masses or tenderness.  Rectovaginal - deferred.  Musculoskeletal - no gross deformities.  Neurological - normal strength, sensation, and mental status.      Assessment:    Carrie Munoz is a 44 year old  who presents today to discuss ***.    Plan:  No diagnosis found.        Shira Alejandro MD

## 2025-05-14 ENCOUNTER — RESULTS FOLLOW-UP (OUTPATIENT)
Dept: OBGYN | Facility: CLINIC | Age: 45
End: 2025-05-14

## 2025-05-14 LAB
C TRACH DNA SPEC QL NAA+PROBE: NEGATIVE
HPV HR 12 DNA CVX QL NAA+PROBE: NEGATIVE
HPV16 DNA CVX QL NAA+PROBE: NEGATIVE
HPV18 DNA CVX QL NAA+PROBE: NEGATIVE
HUMAN PAPILLOMA VIRUS FINAL DIAGNOSIS: NORMAL
N GONORRHOEA DNA SPEC QL NAA+PROBE: NEGATIVE
SPECIMEN TYPE: NORMAL
SPECIMEN TYPE: NORMAL

## 2025-05-27 ENCOUNTER — MYC REFILL (OUTPATIENT)
Dept: FAMILY MEDICINE | Facility: CLINIC | Age: 45
End: 2025-05-27
Payer: COMMERCIAL

## 2025-05-27 DIAGNOSIS — F90.0 ATTENTION DEFICIT HYPERACTIVITY DISORDER (ADHD), PREDOMINANTLY INATTENTIVE TYPE: ICD-10-CM

## 2025-05-27 RX ORDER — LISDEXAMFETAMINE DIMESYLATE 30 MG/1
30 CAPSULE ORAL EVERY MORNING
Qty: 30 CAPSULE | Refills: 0 | Status: SHIPPED | OUTPATIENT
Start: 2025-05-27

## 2025-07-02 ENCOUNTER — MYC REFILL (OUTPATIENT)
Dept: FAMILY MEDICINE | Facility: CLINIC | Age: 45
End: 2025-07-02
Payer: COMMERCIAL

## 2025-07-02 DIAGNOSIS — F90.0 ATTENTION DEFICIT HYPERACTIVITY DISORDER (ADHD), PREDOMINANTLY INATTENTIVE TYPE: ICD-10-CM

## 2025-07-02 RX ORDER — LISDEXAMFETAMINE DIMESYLATE 30 MG/1
30 CAPSULE ORAL EVERY MORNING
Qty: 30 CAPSULE | Refills: 0 | Status: SHIPPED | OUTPATIENT
Start: 2025-07-02

## 2025-08-05 ENCOUNTER — MYC REFILL (OUTPATIENT)
Dept: FAMILY MEDICINE | Facility: CLINIC | Age: 45
End: 2025-08-05
Payer: COMMERCIAL

## 2025-08-05 DIAGNOSIS — F90.0 ATTENTION DEFICIT HYPERACTIVITY DISORDER (ADHD), PREDOMINANTLY INATTENTIVE TYPE: ICD-10-CM

## 2025-08-05 RX ORDER — LISDEXAMFETAMINE DIMESYLATE 30 MG/1
30 CAPSULE ORAL EVERY MORNING
Qty: 30 CAPSULE | Refills: 0 | Status: SHIPPED | OUTPATIENT
Start: 2025-08-05

## 2025-09-04 ENCOUNTER — OFFICE VISIT (OUTPATIENT)
Dept: FAMILY MEDICINE | Facility: CLINIC | Age: 45
End: 2025-09-04
Payer: COMMERCIAL

## 2025-09-04 VITALS
SYSTOLIC BLOOD PRESSURE: 127 MMHG | TEMPERATURE: 97.6 F | DIASTOLIC BLOOD PRESSURE: 89 MMHG | BODY MASS INDEX: 38.44 KG/M2 | WEIGHT: 259.5 LBS | HEIGHT: 69 IN | OXYGEN SATURATION: 97 % | HEART RATE: 75 BPM | RESPIRATION RATE: 16 BRPM

## 2025-09-04 DIAGNOSIS — Z83.3 FH: TYPE 2 DIABETES: ICD-10-CM

## 2025-09-04 DIAGNOSIS — F90.0 ATTENTION DEFICIT HYPERACTIVITY DISORDER (ADHD), PREDOMINANTLY INATTENTIVE TYPE: ICD-10-CM

## 2025-09-04 DIAGNOSIS — E66.01 SEVERE OBESITY (BMI 35.0-35.9 WITH COMORBIDITY) (H): Primary | ICD-10-CM

## 2025-09-04 DIAGNOSIS — R06.83 HABITUAL SNORING: ICD-10-CM

## 2025-09-04 DIAGNOSIS — R73.09 ELEVATED GLUCOSE: ICD-10-CM

## 2025-09-04 LAB
ANION GAP SERPL CALCULATED.3IONS-SCNC: 9 MMOL/L (ref 7–15)
BUN SERPL-MCNC: 9.5 MG/DL (ref 6–20)
CALCIUM SERPL-MCNC: 9.2 MG/DL (ref 8.8–10.4)
CHLORIDE SERPL-SCNC: 104 MMOL/L (ref 98–107)
CREAT SERPL-MCNC: 0.65 MG/DL (ref 0.51–0.95)
EGFRCR SERPLBLD CKD-EPI 2021: >90 ML/MIN/1.73M2
EST. AVERAGE GLUCOSE BLD GHB EST-MCNC: 114 MG/DL
GLUCOSE SERPL-MCNC: 96 MG/DL (ref 70–99)
HBA1C MFR BLD: 5.6 % (ref 0–5.6)
HCO3 SERPL-SCNC: 27 MMOL/L (ref 22–29)
POTASSIUM SERPL-SCNC: 4.4 MMOL/L (ref 3.4–5.3)
SODIUM SERPL-SCNC: 140 MMOL/L (ref 135–145)

## 2025-09-04 ASSESSMENT — ASTHMA QUESTIONNAIRES
QUESTION_1 LAST FOUR WEEKS HOW MUCH OF THE TIME DID YOUR ASTHMA KEEP YOU FROM GETTING AS MUCH DONE AT WORK, SCHOOL OR AT HOME: NONE OF THE TIME
ACT_TOTALSCORE: 23
QUESTION_4 LAST FOUR WEEKS HOW OFTEN HAVE YOU USED YOUR RESCUE INHALER OR NEBULIZER MEDICATION (SUCH AS ALBUTEROL): NOT AT ALL
QUESTION_2 LAST FOUR WEEKS HOW OFTEN HAVE YOU HAD SHORTNESS OF BREATH: ONCE OR TWICE A WEEK
QUESTION_5 LAST FOUR WEEKS HOW WOULD YOU RATE YOUR ASTHMA CONTROL: WELL CONTROLLED
QUESTION_3 LAST FOUR WEEKS HOW OFTEN DID YOUR ASTHMA SYMPTOMS (WHEEZING, COUGHING, SHORTNESS OF BREATH, CHEST TIGHTNESS OR PAIN) WAKE YOU UP AT NIGHT OR EARLIER THAN USUAL IN THE MORNING: NOT AT ALL

## 2025-09-04 ASSESSMENT — PAIN SCALES - GENERAL: PAINLEVEL_OUTOF10: MODERATE PAIN (4)

## 2025-09-04 ASSESSMENT — PATIENT HEALTH QUESTIONNAIRE - PHQ9
SUM OF ALL RESPONSES TO PHQ QUESTIONS 1-9: 8
SUM OF ALL RESPONSES TO PHQ QUESTIONS 1-9: 8
10. IF YOU CHECKED OFF ANY PROBLEMS, HOW DIFFICULT HAVE THESE PROBLEMS MADE IT FOR YOU TO DO YOUR WORK, TAKE CARE OF THINGS AT HOME, OR GET ALONG WITH OTHER PEOPLE: SOMEWHAT DIFFICULT

## (undated) DEVICE — SUCTION MANIFOLD NEPTUNE 2 SYS 4 PORT 0702-020-000

## (undated) DEVICE — JELLY LUBRICATING SURGILUBE 2OZ TUBE 0281-0205-02

## (undated) DEVICE — ENDO POUCH UNIV RETRIEVAL SYSTEM INZII 10MM CD001

## (undated) DEVICE — LINEN TOWEL PACK X5 5464

## (undated) DEVICE — STRAP KNEE/BODY 31143004

## (undated) DEVICE — GLOVE BIOGEL PI MICRO INDICATOR UNDERGLOVE SZ 7.0 48970

## (undated) DEVICE — ESU GROUND PAD UNIVERSAL W/O CORD

## (undated) DEVICE — PAD PERI INDIV WRAP 11" 2022A

## (undated) DEVICE — SU MONOCRYL 5-0 P-3 18" UND Y493G

## (undated) DEVICE — WIPES FOLEY CARE SURESTEP PROVON DFC100

## (undated) DEVICE — SOL WATER IRRIG 500ML BOTTLE 2F7113

## (undated) DEVICE — BNDG KLING 2" 2231

## (undated) DEVICE — TUBING SMOKE EVAC PNEUMOCLEAR HEATED 0620050350

## (undated) DEVICE — PACK HAND CUSTOM ASC

## (undated) DEVICE — PAD CHUX UNDERPAD 30X36" P3036C

## (undated) DEVICE — ENDO TROCAR SLEEVE KII ADV FIXATION 05X100MM CFS02

## (undated) DEVICE — LINEN ORTHO PACK 5446

## (undated) DEVICE — SOL NACL 0.9% IRRIG 1000ML BOTTLE 2F7124

## (undated) DEVICE — NDL INSUFFLATION 13GA 120MM C2201

## (undated) DEVICE — PAD CHUX UNDERPAD 30X30"

## (undated) DEVICE — ENDO TROCAR FIRST ENTRY KII FIOS Z-THRD 11X100MM CTF33

## (undated) DEVICE — SU DERMABOND ADVANCED .7ML DNX12

## (undated) DEVICE — Device

## (undated) DEVICE — GLOVE PROTEXIS BLUE W/NEU-THERA 6.5  2D73EB65

## (undated) DEVICE — PANTIES MESH LG/XLG 2PK 706M2

## (undated) DEVICE — GLOVE BIOGEL PI ULTRATOUCH G SZ 6.5 42165

## (undated) DEVICE — ENDO TROCAR FIRST ENTRY KII FIOS ADV FIX 05X150MM CFF01

## (undated) DEVICE — CATH TRAY FOLEY SURESTEP 16FR WDRAIN BAG STLK LATEX A300316A

## (undated) DEVICE — SOL NACL 0.9% IRRIG 500ML BOTTLE 2F7123

## (undated) DEVICE — KIT POSITIONER PINK PAD XL ADVANCED 40588

## (undated) DEVICE — ESU LIGASURE LAPAROSCOPIC BLUNT TIP SEALER 5MMX37CM LF1837

## (undated) DEVICE — COVER CAMERA IN-LIGHT DISP LT-C02

## (undated) DEVICE — DRSG KERLIX FLUFFS X5

## (undated) DEVICE — BLADE CLIPPER SGL USE 9680

## (undated) DEVICE — ENDO TROCAR FIRST ENTRY KII FIOS ADV FIX 05X100MM CFF03

## (undated) DEVICE — SU VICRYL+ 0 27 UR6 VLT VCP603H

## (undated) DEVICE — PREP DYNA-HEX 4% CHG SCRUB 4OZ BOTTLE MDS098710

## (undated) DEVICE — ENDO SCOPE WARMER LF TM500

## (undated) DEVICE — ESU HOLDER LAP INST DISP PURPLE LONG 330MM H-PRO-330

## (undated) DEVICE — PREP CHLORAPREP 26ML TINTED ORANGE  260815

## (undated) DEVICE — SU MONOCRYL 4-0 PS-2 18" UND Y496G

## (undated) DEVICE — TRAY PREP DRY SKIN SCRUB 067

## (undated) DEVICE — DEVICE SUTURE PASSER 14GA WECK EFX EFXSP2

## (undated) DEVICE — SU ETHILON 5-0 PS-3 18" 1668G

## (undated) DEVICE — GLOVE PROTEXIS POWDER FREE SMT 6.5  2D72PT65X

## (undated) DEVICE — TOURNIQUET CUFF 18" STERILE

## (undated) DEVICE — FILTER HEPA FLUID TRAP NEPTUNE 0703-040-001

## (undated) DEVICE — NDL INSUFFLATION 13GA 150MM C2202

## (undated) DEVICE — LINEN GOWN X4 5410

## (undated) DEVICE — BRUSH SURGICAL SCRUB W/4% CHG SOL 25ML 371073

## (undated) DEVICE — NDL 27GA 1.25" 305136

## (undated) RX ORDER — PROPOFOL 10 MG/ML
INJECTION, EMULSION INTRAVENOUS
Status: DISPENSED
Start: 2018-05-24

## (undated) RX ORDER — HYDROMORPHONE HYDROCHLORIDE 1 MG/ML
INJECTION, SOLUTION INTRAMUSCULAR; INTRAVENOUS; SUBCUTANEOUS
Status: DISPENSED
Start: 2018-05-24

## (undated) RX ORDER — FENTANYL CITRATE 50 UG/ML
INJECTION, SOLUTION INTRAMUSCULAR; INTRAVENOUS
Status: DISPENSED
Start: 2018-05-24

## (undated) RX ORDER — OXYCODONE HYDROCHLORIDE 5 MG/1
TABLET ORAL
Status: DISPENSED
Start: 2018-05-24

## (undated) RX ORDER — OXYCODONE HYDROCHLORIDE 5 MG/1
TABLET ORAL
Status: DISPENSED
Start: 2024-06-28

## (undated) RX ORDER — HYDROMORPHONE HYDROCHLORIDE 1 MG/ML
INJECTION, SOLUTION INTRAMUSCULAR; INTRAVENOUS; SUBCUTANEOUS
Status: DISPENSED
Start: 2024-06-28

## (undated) RX ORDER — LIDOCAINE HYDROCHLORIDE 10 MG/ML
INJECTION, SOLUTION EPIDURAL; INFILTRATION; INTRACAUDAL; PERINEURAL
Status: DISPENSED
Start: 2023-06-12

## (undated) RX ORDER — FENTANYL CITRATE 50 UG/ML
INJECTION, SOLUTION INTRAMUSCULAR; INTRAVENOUS
Status: DISPENSED
Start: 2024-06-28

## (undated) RX ORDER — GABAPENTIN 300 MG/1
CAPSULE ORAL
Status: DISPENSED
Start: 2018-05-24

## (undated) RX ORDER — BUPIVACAINE HYDROCHLORIDE 2.5 MG/ML
INJECTION, SOLUTION EPIDURAL; INFILTRATION; INTRACAUDAL
Status: DISPENSED
Start: 2024-06-28

## (undated) RX ORDER — LIDOCAINE HYDROCHLORIDE 20 MG/ML
INJECTION, SOLUTION EPIDURAL; INFILTRATION; INTRACAUDAL; PERINEURAL
Status: DISPENSED
Start: 2018-05-24

## (undated) RX ORDER — LIDOCAINE HYDROCHLORIDE 10 MG/ML
INJECTION, SOLUTION EPIDURAL; INFILTRATION; INTRACAUDAL; PERINEURAL
Status: DISPENSED
Start: 2018-01-05

## (undated) RX ORDER — KETOROLAC TROMETHAMINE 30 MG/ML
INJECTION, SOLUTION INTRAMUSCULAR; INTRAVENOUS
Status: DISPENSED
Start: 2024-06-28

## (undated) RX ORDER — ACETAMINOPHEN 325 MG/1
TABLET ORAL
Status: DISPENSED
Start: 2024-06-28

## (undated) RX ORDER — DEXAMETHASONE SODIUM PHOSPHATE 4 MG/ML
INJECTION, SOLUTION INTRA-ARTICULAR; INTRALESIONAL; INTRAMUSCULAR; INTRAVENOUS; SOFT TISSUE
Status: DISPENSED
Start: 2018-05-24

## (undated) RX ORDER — ONDANSETRON 2 MG/ML
INJECTION INTRAMUSCULAR; INTRAVENOUS
Status: DISPENSED
Start: 2018-05-24

## (undated) RX ORDER — ACETAMINOPHEN 325 MG/1
TABLET ORAL
Status: DISPENSED
Start: 2018-05-24

## (undated) RX ORDER — ONDANSETRON 2 MG/ML
INJECTION INTRAMUSCULAR; INTRAVENOUS
Status: DISPENSED
Start: 2024-06-28